# Patient Record
Sex: MALE | Race: WHITE | NOT HISPANIC OR LATINO | Employment: OTHER | URBAN - NONMETROPOLITAN AREA
[De-identification: names, ages, dates, MRNs, and addresses within clinical notes are randomized per-mention and may not be internally consistent; named-entity substitution may affect disease eponyms.]

---

## 2021-04-05 ENCOUNTER — APPOINTMENT (EMERGENCY)
Dept: CT IMAGING | Facility: HOSPITAL | Age: 66
End: 2021-04-05
Payer: MEDICARE

## 2021-04-05 ENCOUNTER — APPOINTMENT (INPATIENT)
Dept: RADIOLOGY | Facility: HOSPITAL | Age: 66
DRG: 181 | End: 2021-04-05
Payer: MEDICARE

## 2021-04-05 ENCOUNTER — HOSPITAL ENCOUNTER (INPATIENT)
Facility: HOSPITAL | Age: 66
LOS: 2 days | Discharge: HOME | DRG: 181 | End: 2021-04-07
Attending: INTERNAL MEDICINE | Admitting: INTERNAL MEDICINE
Payer: MEDICARE

## 2021-04-05 ENCOUNTER — HOSPITAL ENCOUNTER (EMERGENCY)
Facility: HOSPITAL | Age: 66
End: 2021-04-05
Attending: EMERGENCY MEDICINE | Admitting: EMERGENCY MEDICINE
Payer: MEDICARE

## 2021-04-05 ENCOUNTER — APPOINTMENT (EMERGENCY)
Dept: RADIOLOGY | Facility: HOSPITAL | Age: 66
End: 2021-04-05
Payer: MEDICARE

## 2021-04-05 VITALS
RESPIRATION RATE: 18 BRPM | TEMPERATURE: 98.5 F | BODY MASS INDEX: 20.65 KG/M2 | WEIGHT: 147.49 LBS | HEART RATE: 116 BPM | SYSTOLIC BLOOD PRESSURE: 152 MMHG | HEIGHT: 71 IN | OXYGEN SATURATION: 94 % | DIASTOLIC BLOOD PRESSURE: 77 MMHG

## 2021-04-05 DIAGNOSIS — C79.51 MALIGNANT NEOPLASM METASTATIC TO VERTEBRAL COLUMN WITH UNKNOWN PRIMARY SITE (HCC): ICD-10-CM

## 2021-04-05 DIAGNOSIS — R59.0 MEDIASTINAL LYMPHADENOPATHY: ICD-10-CM

## 2021-04-05 DIAGNOSIS — D64.9 ANEMIA: ICD-10-CM

## 2021-04-05 DIAGNOSIS — R06.00 DYSPNEA: Primary | ICD-10-CM

## 2021-04-05 DIAGNOSIS — C80.1 MALIGNANT NEOPLASM METASTATIC TO RIB WITH UNKNOWN PRIMARY SITE (HCC): ICD-10-CM

## 2021-04-05 DIAGNOSIS — R59.0 LYMPHADENOPATHY, MEDIASTINAL: ICD-10-CM

## 2021-04-05 DIAGNOSIS — R05.9 COUGH: ICD-10-CM

## 2021-04-05 DIAGNOSIS — R59.0 HILAR LYMPHADENOPATHY: ICD-10-CM

## 2021-04-05 DIAGNOSIS — C80.1 MALIGNANT NEOPLASM METASTATIC TO VERTEBRAL COLUMN WITH UNKNOWN PRIMARY SITE (HCC): ICD-10-CM

## 2021-04-05 DIAGNOSIS — C79.51 MALIGNANT NEOPLASM METASTATIC TO RIB WITH UNKNOWN PRIMARY SITE (HCC): ICD-10-CM

## 2021-04-05 DIAGNOSIS — J90 PLEURAL EFFUSION ON LEFT: ICD-10-CM

## 2021-04-05 DIAGNOSIS — I50.9 CHF (CONGESTIVE HEART FAILURE) (HCC): ICD-10-CM

## 2021-04-05 DIAGNOSIS — J90 PLEURAL EFFUSION: ICD-10-CM

## 2021-04-05 DIAGNOSIS — R91.8 LUNG MASS: Primary | ICD-10-CM

## 2021-04-05 PROBLEM — D72.829 LEUCOCYTOSIS: Status: ACTIVE | Noted: 2021-04-05

## 2021-04-05 PROBLEM — R06.02 SHORTNESS OF BREATH: Status: ACTIVE | Noted: 2021-04-05

## 2021-04-05 LAB
ALBUMIN SERPL BCP-MCNC: 2.3 G/DL (ref 3.5–5)
ALP SERPL-CCNC: 104 U/L (ref 46–116)
ALT SERPL W P-5'-P-CCNC: 73 U/L (ref 12–78)
ANION GAP SERPL CALCULATED.3IONS-SCNC: 9 MMOL/L (ref 4–13)
APTT PPP: 39 SECONDS (ref 23–37)
AST SERPL W P-5'-P-CCNC: 30 U/L (ref 5–45)
BASOPHILS # BLD AUTO: 0.09 THOUSANDS/ΜL (ref 0–0.1)
BASOPHILS NFR BLD AUTO: 1 % (ref 0–1)
BILIRUB SERPL-MCNC: 0.44 MG/DL (ref 0.2–1)
BUN SERPL-MCNC: 11 MG/DL (ref 5–25)
CALCIUM ALBUM COR SERPL-MCNC: 10.6 MG/DL (ref 8.3–10.1)
CALCIUM SERPL-MCNC: 9.2 MG/DL (ref 8.3–10.1)
CHLORIDE SERPL-SCNC: 102 MMOL/L (ref 100–108)
CO2 SERPL-SCNC: 25 MMOL/L (ref 21–32)
CREAT SERPL-MCNC: 0.73 MG/DL (ref 0.6–1.3)
D DIMER PPP FEU-MCNC: 4.25 UG/ML FEU
EOSINOPHIL # BLD AUTO: 0.03 THOUSAND/ΜL (ref 0–0.61)
EOSINOPHIL NFR BLD AUTO: 0 % (ref 0–6)
ERYTHROCYTE [DISTWIDTH] IN BLOOD BY AUTOMATED COUNT: 13.7 % (ref 11.6–15.1)
FLUAV RNA RESP QL NAA+PROBE: NEGATIVE
FLUBV RNA RESP QL NAA+PROBE: NEGATIVE
GFR SERPL CREATININE-BSD FRML MDRD: 97 ML/MIN/1.73SQ M
GLUCOSE SERPL-MCNC: 120 MG/DL (ref 65–140)
HCT VFR BLD AUTO: 37.4 % (ref 36.5–49.3)
HGB BLD-MCNC: 11.4 G/DL (ref 12–17)
IMM GRANULOCYTES # BLD AUTO: 0.05 THOUSAND/UL (ref 0–0.2)
IMM GRANULOCYTES NFR BLD AUTO: 0 % (ref 0–2)
INR PPP: 1.21 (ref 0.84–1.19)
LACTATE SERPL-SCNC: 1 MMOL/L (ref 0.5–2)
LYMPHOCYTES # BLD AUTO: 0.52 THOUSANDS/ΜL (ref 0.6–4.47)
LYMPHOCYTES NFR BLD AUTO: 4 % (ref 14–44)
MAGNESIUM SERPL-MCNC: 2 MG/DL (ref 1.6–2.6)
MCH RBC QN AUTO: 24.8 PG (ref 26.8–34.3)
MCHC RBC AUTO-ENTMCNC: 30.5 G/DL (ref 31.4–37.4)
MCV RBC AUTO: 81 FL (ref 82–98)
MONOCYTES # BLD AUTO: 1.61 THOUSAND/ΜL (ref 0.17–1.22)
MONOCYTES NFR BLD AUTO: 13 % (ref 4–12)
NEUTROPHILS # BLD AUTO: 9.94 THOUSANDS/ΜL (ref 1.85–7.62)
NEUTS SEG NFR BLD AUTO: 82 % (ref 43–75)
NRBC BLD AUTO-RTO: 0 /100 WBCS
NT-PROBNP SERPL-MCNC: 1121 PG/ML
PLATELET # BLD AUTO: 841 THOUSANDS/UL (ref 149–390)
PMV BLD AUTO: 8.8 FL (ref 8.9–12.7)
POTASSIUM SERPL-SCNC: 3.8 MMOL/L (ref 3.5–5.3)
PROT SERPL-MCNC: 8 G/DL (ref 6.4–8.2)
PROTHROMBIN TIME: 15.1 SECONDS (ref 11.6–14.5)
RBC # BLD AUTO: 4.6 MILLION/UL (ref 3.88–5.62)
RSV RNA RESP QL NAA+PROBE: NEGATIVE
SARS-COV-2 RNA RESP QL NAA+PROBE: NEGATIVE
SODIUM SERPL-SCNC: 136 MMOL/L (ref 136–145)
TROPONIN I SERPL-MCNC: <0.02 NG/ML
WBC # BLD AUTO: 12.24 THOUSAND/UL (ref 4.31–10.16)

## 2021-04-05 PROCEDURE — 84484 ASSAY OF TROPONIN QUANT: CPT | Performed by: PHYSICIAN ASSISTANT

## 2021-04-05 PROCEDURE — 94760 N-INVAS EAR/PLS OXIMETRY 1: CPT

## 2021-04-05 PROCEDURE — 74177 CT ABD & PELVIS W/CONTRAST: CPT

## 2021-04-05 PROCEDURE — 85025 COMPLETE CBC W/AUTO DIFF WBC: CPT | Performed by: PHYSICIAN ASSISTANT

## 2021-04-05 PROCEDURE — 99285 EMERGENCY DEPT VISIT HI MDM: CPT | Performed by: PHYSICIAN ASSISTANT

## 2021-04-05 PROCEDURE — 99285 EMERGENCY DEPT VISIT HI MDM: CPT

## 2021-04-05 PROCEDURE — 87040 BLOOD CULTURE FOR BACTERIA: CPT | Performed by: PHYSICIAN ASSISTANT

## 2021-04-05 PROCEDURE — G1004 CDSM NDSC: HCPCS

## 2021-04-05 PROCEDURE — 71045 X-RAY EXAM CHEST 1 VIEW: CPT

## 2021-04-05 PROCEDURE — 96374 THER/PROPH/DIAG INJ IV PUSH: CPT

## 2021-04-05 PROCEDURE — 85610 PROTHROMBIN TIME: CPT | Performed by: PHYSICIAN ASSISTANT

## 2021-04-05 PROCEDURE — 85730 THROMBOPLASTIN TIME PARTIAL: CPT | Performed by: PHYSICIAN ASSISTANT

## 2021-04-05 PROCEDURE — 0241U HB NFCT DS VIR RESP RNA 4 TRGT: CPT | Performed by: PHYSICIAN ASSISTANT

## 2021-04-05 PROCEDURE — 80053 COMPREHEN METABOLIC PANEL: CPT | Performed by: PHYSICIAN ASSISTANT

## 2021-04-05 PROCEDURE — 71275 CT ANGIOGRAPHY CHEST: CPT

## 2021-04-05 PROCEDURE — 83880 ASSAY OF NATRIURETIC PEPTIDE: CPT | Performed by: PHYSICIAN ASSISTANT

## 2021-04-05 PROCEDURE — 93005 ELECTROCARDIOGRAM TRACING: CPT

## 2021-04-05 PROCEDURE — 83735 ASSAY OF MAGNESIUM: CPT | Performed by: PHYSICIAN ASSISTANT

## 2021-04-05 PROCEDURE — 85379 FIBRIN DEGRADATION QUANT: CPT | Performed by: PHYSICIAN ASSISTANT

## 2021-04-05 PROCEDURE — 83605 ASSAY OF LACTIC ACID: CPT | Performed by: PHYSICIAN ASSISTANT

## 2021-04-05 PROCEDURE — 36415 COLL VENOUS BLD VENIPUNCTURE: CPT | Performed by: PHYSICIAN ASSISTANT

## 2021-04-05 RX ORDER — ACETAMINOPHEN 325 MG/1
650 TABLET ORAL EVERY 6 HOURS PRN
Status: DISCONTINUED | OUTPATIENT
Start: 2021-04-05 | End: 2021-04-07 | Stop reason: HOSPADM

## 2021-04-05 RX ORDER — ALBUTEROL SULFATE 2.5 MG/3ML
2.5 SOLUTION RESPIRATORY (INHALATION) EVERY 4 HOURS PRN
Status: DISCONTINUED | OUTPATIENT
Start: 2021-04-05 | End: 2021-04-07 | Stop reason: HOSPADM

## 2021-04-05 RX ORDER — GUAIFENESIN 600 MG
600 TABLET, EXTENDED RELEASE 12 HR ORAL EVERY 12 HOURS SCHEDULED
Status: DISCONTINUED | OUTPATIENT
Start: 2021-04-05 | End: 2021-04-07 | Stop reason: HOSPADM

## 2021-04-05 RX ORDER — BENZONATATE 100 MG/1
100 CAPSULE ORAL 3 TIMES DAILY PRN
Status: DISCONTINUED | OUTPATIENT
Start: 2021-04-05 | End: 2021-04-07 | Stop reason: HOSPADM

## 2021-04-05 RX ORDER — OXYCODONE HYDROCHLORIDE 5 MG/1
2.5 TABLET ORAL EVERY 4 HOURS PRN
Status: DISCONTINUED | OUTPATIENT
Start: 2021-04-05 | End: 2021-04-07 | Stop reason: HOSPADM

## 2021-04-05 RX ORDER — FUROSEMIDE 10 MG/ML
40 INJECTION INTRAMUSCULAR; INTRAVENOUS ONCE
Status: COMPLETED | OUTPATIENT
Start: 2021-04-05 | End: 2021-04-05

## 2021-04-05 RX ORDER — OXYCODONE HYDROCHLORIDE 5 MG/1
5 TABLET ORAL EVERY 4 HOURS PRN
Status: DISCONTINUED | OUTPATIENT
Start: 2021-04-05 | End: 2021-04-07 | Stop reason: HOSPADM

## 2021-04-05 RX ORDER — HEPARIN SODIUM 5000 [USP'U]/ML
5000 INJECTION, SOLUTION INTRAVENOUS; SUBCUTANEOUS EVERY 8 HOURS SCHEDULED
Status: DISCONTINUED | OUTPATIENT
Start: 2021-04-05 | End: 2021-04-07

## 2021-04-05 RX ADMIN — ACETAMINOPHEN 650 MG: 325 TABLET, FILM COATED ORAL at 21:33

## 2021-04-05 RX ADMIN — HEPARIN SODIUM 5000 UNITS: 5000 INJECTION INTRAVENOUS; SUBCUTANEOUS at 21:33

## 2021-04-05 RX ADMIN — FUROSEMIDE 40 MG: 10 INJECTION, SOLUTION INTRAVENOUS at 11:29

## 2021-04-05 RX ADMIN — IODIXANOL 70 ML: 320 INJECTION, SOLUTION INTRAVASCULAR at 19:43

## 2021-04-05 RX ADMIN — GUAIFENESIN 600 MG: 600 TABLET, EXTENDED RELEASE ORAL at 21:50

## 2021-04-05 RX ADMIN — IOHEXOL 85 ML: 350 INJECTION, SOLUTION INTRAVENOUS at 11:21

## 2021-04-05 NOTE — PROGRESS NOTES
INTERNAL MEDICINE RESIDENCY SENIOR ADMISSION NOTE     Name: Francis Barcenas   Age & Sex: 77 y o  male   MRN: [id number]  Unit/Bed#: [facility] 931-01   Encounter: [id number]  Primary Care Provider: No primary care provider on file  Admit to team: SOD Team B     Patient seen and examined  Reviewed H&P per Dr Rosa Gallo  Agree with the assessment and plan with any exception/addition as noted below:    Principal Problem:    Shortness of breath  Active Problems:    Anemia    Leucocytosis    Pleural effusion    Lung mass    Mediastinal lymphadenopathy      77years old patient with 30+ pack-year smoking history, no other known medical history due to poor outpatient follow-up presented to [address] [place] [address] with progressive shortness of breath and intermittent cough productive of clear sputum for 2 months  No CP, orthopnea, PND, lower extremity edema, hemoptysis or night sweats  Admits to almost 10 lb unintentional weight loss, no nausea, vomiting, change in apepetite, change in bowel habits, hematochezia/black stool  No family history of cancer  No prior colonoscopy, quit smoking 2 months ago  Exam patient in no acute distress, comfortable, tachycardic but regular, S1/S2, no murmur or gallop, decreased breath sounds L lower lung, b/l wheeze  Vitals tachycardia, otherwise WNL  EKG Sinus tachycardia  Labs significant for microscopic hypochromic anemia, leucocytosis and thrombocytosis  Mild hypercalcemia on BMP  Elevated d-dimer and elevated BNP to 1k-no baseline  CTA completed, negative for PE but did reveal mass effect on the upper lobe pulmonary arterial branches, mediastinal and hilar and left para-aortic LAD, left upper, lingula and right lower lobe nodular masses of metastasis, moderate left pleural effusion and extensive destructive lesion at left 9th rib and T9 vertebral body pathologic process     At this point suspected metastatic cancer, unclear primary   Will obtain CT abdomen and pelvis and IR thoracentesis with fluid analysis of pleural effusion  Will need tissue diagnosis, follow up on CT A/P for accessible site for biopsy, consider oncology consultation            Code Status: Level 1 - Full Code  Admission Status: INPATIENT   Disposition: Patient requires Med/Surg  Expected Length of Stay: > 2 midnights

## 2021-04-05 NOTE — ASSESSMENT & PLAN NOTE
Shortness of breath noted 2 months ago, worsening gradually, more when the patient active  EKG shows sinus tachy  He has been tachycardic 117-125, denies chest pain, sob lightheadedness, numbness tingling sensation of extremities this morning    CTA chest 04/05 remarkable for mediastinal and hilar lymphadenopathy as well as  Left upper, lingula and right lower lobe nodular masses of metastasis; moderate left pleural effusion and destructive lesion in T9 vertebral body and 9th rib the left  CTAP; Indeterminate mass lesions in the liver and at the left kidney suspicious for metastatic disease  Extensive retroperitoneal adenopathy including a large left para-aortic lymph node conglomerate measuring up to 9 3 cm     Most likely metastatic lung cancer  Given his significant smoking history, and his clinical presentation- this is likely a lung primary, but biopsy will need to be obtained to confirm (results pending)  S/P IR therapeutic thoracentesis;with no bacteria seen on Gram stain preliminary results  Patient is afebrile  S/P IR ultrasound-guided liver biopsy tissue result pending  Oncology consulted recs the patient follow up    Awaiting biopsy results for biomarker testing

## 2021-04-05 NOTE — ASSESSMENT & PLAN NOTE
White blood cells 12 on 04/05 ; afebrile, most likely reactive in context of malignancy versus infection  Patient with no fever,cough,dysuria    No symptoms suggestive of infection

## 2021-04-05 NOTE — PLAN OF CARE
Problem: PAIN - ADULT  Goal: Verbalizes/displays adequate comfort level or baseline comfort level  Description: Interventions:  - Encourage patient to monitor pain and request assistance  - Assess pain using appropriate pain scale  - Administer analgesics based on type and severity of pain and evaluate response  - Implement non-pharmacological measures as appropriate and evaluate response  - Consider cultural and social influences on pain and pain management  - Notify physician/advanced practitioner if interventions unsuccessful or patient reports new pain  Outcome: Progressing     Problem: INFECTION - ADULT  Goal: Absence or prevention of progression during hospitalization  Description: INTERVENTIONS:  - Assess and monitor for signs and symptoms of infection  - Monitor lab/diagnostic results  - Monitor all insertion sites, i e  indwelling lines, tubes, and drains  - Monitor endotracheal if appropriate and nasal secretions for changes in amount and color  - Adams appropriate cooling/warming therapies per order  - Administer medications as ordered  - Instruct and encourage patient and family to use good hand hygiene technique  - Identify and instruct in appropriate isolation precautions for identified infection/condition  Outcome: Progressing     Problem: SAFETY ADULT  Goal: Patient will remain free of falls  Description: INTERVENTIONS:  - Assess patient frequently for physical needs  -  Identify cognitive and physical deficits and behaviors that affect risk of falls    -  Adams fall precautions as indicated by assessment   - Educate patient/family on patient safety including physical limitations  - Instruct patient to call for assistance with activity based on assessment  - Modify environment to reduce risk of injury  - Consider OT/PT consult to assist with strengthening/mobility  Outcome: Progressing  Goal: Maintain or return to baseline ADL function  Description: INTERVENTIONS:  -  Assess patient's ability to carry out ADLs; assess patient's baseline for ADL function and identify physical deficits which impact ability to perform ADLs (bathing, care of mouth/teeth, toileting, grooming, dressing, etc )  - Assess/evaluate cause of self-care deficits   - Assess range of motion  - Assess patient's mobility; develop plan if impaired  - Assess patient's need for assistive devices and provide as appropriate  - Encourage maximum independence but intervene and supervise when necessary  - Involve family in performance of ADLs  - Assess for home care needs following discharge   - Consider OT consult to assist with ADL evaluation and planning for discharge  - Provide patient education as appropriate  Outcome: Progressing  Goal: Maintain or return mobility status to optimal level  Description: INTERVENTIONS:  - Assess patient's baseline mobility status (ambulation, transfers, stairs, etc )    - Identify cognitive and physical deficits and behaviors that affect mobility  - Identify mobility aids required to assist with transfers and/or ambulation (gait belt, sit-to-stand, lift, walker, cane, etc )  - Perry fall precautions as indicated by assessment  - Record patient progress and toleration of activity level on Mobility SBAR; progress patient to next Phase/Stage  - Instruct patient to call for assistance with activity based on assessment  - Consider rehabilitation consult to assist with strengthening/weightbearing, etc   Outcome: Progressing     Problem: DISCHARGE PLANNING  Goal: Discharge to home or other facility with appropriate resources  Description: INTERVENTIONS:  - Identify barriers to discharge w/patient and caregiver  - Arrange for needed discharge resources and transportation as appropriate  - Identify discharge learning needs (meds, wound care, etc )  - Arrange for interpretive services to assist at discharge as needed  - Refer to Case Management Department for coordinating discharge planning if the patient needs post-hospital services based on physician/advanced practitioner order or complex needs related to functional status, cognitive ability, or social support system  Outcome: Progressing     Problem: Knowledge Deficit  Goal: Patient/family/caregiver demonstrates understanding of disease process, treatment plan, medications, and discharge instructions  Description: Complete learning assessment and assess knowledge base    Interventions:  - Provide teaching at level of understanding  - Provide teaching via preferred learning methods  Outcome: Progressing

## 2021-04-05 NOTE — H&P
INTERNAL MEDICINE RESIDENCY ADMISSION H&P     Name: Librado Oliver   Age & Sex: 77 y o  male   MRN: [id number]  Unit/Bed#: [facility] 931-01   Encounter: [id number]  Primary Care Provider: No primary care provider on file  Code Status: Level 1 - Full Code  Admission Status: INPATIENT   Disposition: Patient requires Med/Surg    Admit to team: SOD Team B     ASSESSMENT/PLAN     Principal Problem:    Shortness of breath  Active Problems:    Anemia    Leucocytosis    Pleural effusion    Lung mass    Mediastinal lymphadenopathy      Mediastinal lymphadenopathy  Assessment & Plan  See shortness of breath assessment and plan    Lung mass  Assessment & Plan  See shortness of breath assessment and plan    Pleural effusion  Assessment & Plan  See shortness of breath assessment and plan     Leucocytosis  Assessment & Plan  White blood cells 12 on 04/05 ; afebrile, most likely reactive in context of malignancy versus infection    Plan  Monitor CBC  Monitor vitals  Thoracentesis ordered with fluid culture and cytology    Anemia  Assessment & Plan  No past medical history; April 5, 2021 hemoglobin 11 4 and MCV 81    Plan  Iron panel  Retic count        * Shortness of breath  Assessment & Plan  Shortness of breath noted 2 months ago, worsening gradually, more when the patient active    CTA chest 04/05 remarkable for mediastinal and hilar lymphadenopathy as well as  Left upper, lingula and right lower lobe nodular masses of metastasis; moderate left pleural effusion and destructive lesion in T9 vertebral body and 9th rib the left  Most likely metastatic lung cancer unknown primary source    Plan  IR consult for thoracentesis; LDH serum and fluid, protein serum fluid, culture and cytology  CT abdomen pelvis with contrast  Will consider IR consult and Oncology consult        VTE Pharmacologic Prophylaxis: Heparin  VTE Mechanical Prophylaxis: sequential compression device    CHIEF COMPLAINT   No chief complaint on file  HISTORY OF PRESENT ILLNESS   Patient is a 77years old male with no significant past medical history, does not follow up with primary care physician, presented initially to [facility] today 4/5 morning with worsening shortness of breath and intermittent cough  Per patient shortness of breath started about 2 months ago started to got worse gradually and experiences a more when active along with intermittent cough with sometimes clear sputum, for which she decided to seek medical care  Patient also notes left chest tenderness level of T9-T10 and say sometimes experience pain/tenderness on the right as well  The possible of benign versus malignancy differential explained to the patient and the need for further workup including thoracentesis and biopsy explained to him and he verbalized agreement noting that he wants everything to be done as indicated  Patient notes having living will document in place and will work on getting it to the hospital and till then he agreed to full code status  Family history positive for heart disease, patient's father  of heart attack at age 77 and his brother had 2 heart attacks  Social history remarkable for smoking 1 pack a day for 35 years, quit recently  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  Medication wise patient notes taken over-the-counter ibuprofen every 6-8 hours in the past 3-4 weeks for the pain also trying over-the-counter Mucinex for the cough  REVIEW OF SYSTEMS     Review of Systems   - GENERAL:  Positive for fatigue and weight loss; noting lost about 10 lb in a month, denies fever  - HEENT: Negative for any head/Neck trauma, pain, double/blurry vision, sinusitis, rhinitis, nose bleeding   - CARDIAC: Negative for any chest pain, palpitation, Dyspnea on exertion, peripheral edema    - PULMONARY:  Positive for shortness of breath and dyspnea on exertion, positive for intermittent productive cough with clear sputum , denies wheezing  - GASTROINTESTINAL: Negative for any abdominal pain, N/V/D/C, blood in stool    - GENITOURINARY: Negative for any dysuria, hematuria, incontinence   - NEUROLOGIC: Negative for any muscle weakness, numbness/tingling, memory changes  - MUSCULOSKELETAL: Negative for any joint pains/swelling, limited ROM  - INTEGUMENTARY: Negative for any rashes, cuts/ lesions   - HEMATOLOGIC: Negative for any abnormal bruising, frequent infections or bleeding  OBJECTIVE     Vitals:    21 1742   BP: 149/73   BP Location: Right arm   Pulse: (!) 125   Resp: 20   Temp: 98 3 °F (36 8 °C)   TempSrc: Oral   SpO2: 93%   Weight: 62 9 kg (138 lb 10 7 oz)   Height: 5' 11" (1 803 m)      Temperature:   Temp (24hrs), Av 4 °F (36 9 °C), Min:98 3 °F (36 8 °C), Max:98 5 °F (36 9 °C)    Temperature: 98 3 °F (36 8 °C)  Intake & Output:  I/O     None        Weights:   IBW: 75 3 kg    Body mass index is 19 34 kg/m²  Weight (last 2 days)     Date/Time   Weight    21 17:42:49   62 9 (138 67)            Physical Exam   - GEN: Appears well, alert and oriented x 3, pleasant and cooperative, in no acute distress  - HEENT: Anicteric, mucous membranes moist, PERRL and EOMI   - NECK: No lymphadenopathy, JVD or carotid bruits   - HEART: RRR, normal S1 and S2, no murmurs, clicks, gallops or rubs   - LUNGS: Clear to auscultation bilaterally; no wheezes, rales, or rhonchi  - ABDOMEN: Normal bowel sounds, soft, no tenderness, no distention, no organomegaly or masses felt on exam    - EXTREMITIES: Peripheral pulses normal; no clubbing, cyanosis, or edema  - NEURO: No focal findings, CN II-XII are grossly intact  - Musculoskeletal:  Left posterior chest tenderness to palpation level ribs 9 and 10 ; 5/5 strength, normal ROM, no swollen or erythematous joints     - SKIN: Normal without suspicious lesions on exposed skin  PAST MEDICAL HISTORY   No past medical history on file  PAST SURGICAL HISTORY     Past Surgical History:   Procedure Laterality Date    TENDON REPAIR Right     forearm     SOCIAL & FAMILY HISTORY     Social History     Substance and Sexual Activity   Alcohol Use Never    Frequency: Never     Social History     Substance and Sexual Activity   Drug Use Never     Social History     Tobacco Use   Smoking Status Former Smoker   Smokeless Tobacco Never Used     No family history on file  LABORATORY DATA     Labs: I have personally reviewed pertinent reports  Results from last 7 days   Lab Units 04/05/21  1013   WBC Thousand/uL 12 24*   HEMOGLOBIN g/dL 11 4*   HEMATOCRIT % 37 4   PLATELETS Thousands/uL 841*   NEUTROS PCT % 82*   MONOS PCT % 13*      Results from last 7 days   Lab Units 04/05/21  1013   POTASSIUM mmol/L 3 8   CHLORIDE mmol/L 102   CO2 mmol/L 25   BUN mg/dL 11   CREATININE mg/dL 0 73   CALCIUM mg/dL 9 2   ALK PHOS U/L 104   ALT U/L 73   AST U/L 30     Results from last 7 days   Lab Units 04/05/21  1013   MAGNESIUM mg/dL 2 0          Results from last 7 days   Lab Units 04/05/21  1013   INR  1 21*   PTT seconds 39*     Results from last 7 days   Lab Units 04/05/21  1013   LACTIC ACID mmol/L 1 0     Results from last 7 days   Lab Units 04/05/21  1013   TROPONIN I ng/mL <0 02     Micro:  Lab Results   Component Value Date    BLOODCX Received in Microbiology Lab  Culture in Progress  04/05/2021    BLOODCX Received in Microbiology Lab  Culture in Progress  04/05/2021     IMAGING & DIAGNOSTIC TESTS     Imaging: I have personally reviewed pertinent reports  Xr Chest 1 View Portable    Result Date: 4/5/2021  Impression: Pattern suggests probable CHF with some interstitial edema and minimal left effusion  There is elevation of left hemidiaphragm  Workstation performed: [id number]     Cta Ed Chest Pe Study    Result Date: 4/5/2021  Impression: 1  No pulmonary embolus  Mass effect on the upper lobe pulmonary arterial branches   Measured RV/LV ratio is within normal limits at less than 0 9   2   Pathologic mediastinal and hilar lymphadenopathy of lymphomatous process  3   Left upper, lingula and right lower lobe nodular masses of metastasis  4   Left para-aortic pathologic lymphadenopathy of lymphomatous process  CT abdomen pelvis for complete evaluation is suggested  5   Expansile destructive lesion involving the left 9th rib laterally and T9 vertebral body pathologic process of metastasis  6   Moderate left pleural effusion  Enhancing left pleura with nodular masses largest inferiorly of metastasis  I personally discussed this study with Brian Park on 4/5/2021 at 12:19 PM  Workstation performed: [id number]     EKG, Pathology, and Other Studies: I have personally reviewed pertinent reports  ALLERGIES   No Known Allergies  MEDICATIONS PRIOR TO ARRIVAL     Prior to Admission medications    Not on File     MEDICATIONS ADMINISTERED IN LAST 24 HOURS     CURRENT MEDICATIONS     Current Facility-Administered Medications   Medication Dose Route Frequency Provider Last Rate    acetaminophen  650 mg Oral Q6H PRN Husam Aiad, DO      heparin (porcine)  5,000 Units Subcutaneous [facility] Husam Aiad, DO      oxyCODONE  2 5 mg Oral Q4H PRN Husam Aiad, DO      Or    oxyCODONE  5 mg Oral Q4H PRN Husam Aiad, DO          acetaminophen, 650 mg, Q6H PRN  oxyCODONE, 2 5 mg, Q4H PRN    Or  oxyCODONE, 5 mg, Q4H PRN        Admission Time  I spent 30 minutes admitting the patient  This involved direct patient contact where I performed a full history and physical, reviewing previous records, and reviewing laboratory and other diagnostic studies  Portions of the record may have been created with voice recognition software  Occasional wrong word or "sound a like" substitutions may have occurred due to the inherent limitations of voice recognition software    Read the chart carefully and recognize, using context, where substitutions have occurred     ==  [employer], [address] [facility]  Internal Medicine Residency PGY-1

## 2021-04-05 NOTE — EMTALA/ACUTE CARE TRANSFER
[facility] EMERGENCY DEPARTMENT  [address] [facility] [address]  Dept: [phone]      EMTALA TRANSFER CONSENT    NAME Cachorro Garcia                                         1955                              MRN [id number]    I have been informed of my rights regarding examination, treatment, and transfer   by Dr Kevin Greenwood MD    Benefits: Specialized equipment and/or services available at the receiving facility (Include comment)________________________    Risks: Potential for delay in receiving treatment, Potential deterioration of medical condition, Loss of IV, Increased discomfort during transfer, Possible worsening of condition or death during transfer      Consent for Transfer:  I acknowledge that my medical condition has been evaluated and explained to me by the emergency department physician or other qualified medical person and/or my attending physician, who has recommended that I be transferred to the service of  Accepting Physician: Dr Darian Lopez at [address] Name, [address] : [facility]  The above potential benefits of such transfer, the potential risks associated with such transfer, and the probable risks of not being transferred have been explained to me, and I fully understand them  The doctor has explained that, in my case, the benefits of transfer outweigh the risks  I agree to be transferred  I authorize the performance of emergency medical procedures and treatments upon me in both transit and upon arrival at the receiving facility  Additionally, I authorize the release of any and all medical records to the receiving facility and request they be transported with me, if possible  I understand that the safest mode of transportation during a medical emergency is an ambulance and that the Hospital advocates the use of this mode of transport   Risks of traveling to the receiving facility by car, including absence of medical control, life sustaining equipment, such as oxygen, and medical personnel has been explained to me and I fully understand them  (NEEL CORRECT BOX BELOW)  [ x ]  I consent to the stated transfer and to be transported by ambulance/helicopter  [  ]  I consent to the stated transfer, but refuse transportation by ambulance and accept full responsibility for my transportation by car  I understand the risks of non-ambulance transfers and I exonerate the Hospital and its staff from any deterioration in my condition that results from this refusal     X___________________________________________    DATE  21  TIME________  Signature of patient or legally responsible individual signing on patient behalf           RELATIONSHIP TO PATIENT_________________________          Provider Certification    NAME Cachorro Garcia                                         1955                              MRN [id number]    A medical screening exam was performed on the above named patient  Based on the examination:    Condition Necessitating Transfer The primary encounter diagnosis was Dyspnea  Diagnoses of CHF (congestive heart failure) ([facility] [address] ), Lymphadenopathy, mediastinal, Hilar lymphadenopathy, 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 [facility]), 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 [facility]), and Pleural effusion on left were also pertinent to this visit      Patient Condition: The patient has been stabilized such that within reasonable medical probability, no material deterioration of the patient condition or the condition of the unborn child(debby) is likely to result from the transfer    Reason for Transfer: Level of Care needed not available at this facility    Transfer Requirements: Chintan Tapia   · Space available and qualified personnel available for treatment as acknowledged by PACS  · Agreed to accept transfer and to provide appropriate medical treatment as acknowledged by       Dr Endy Doyle  · Appropriate medical records of the examination and treatment of the patient are provided at the time of transfer   [address] [facility], [address] _______  · Transfer will be performed by qualified personnel from    and appropriate transfer equipment as required, including the use of necessary and appropriate life support measures  Provider Certification: I have examined the patient and explained the following risks and benefits of being transferred/refusing transfer to the patient/family:  General risk, such as traffic hazards, adverse weather conditions, rough terrain or turbulence, possible failure of equipment (including vehicle or aircraft), or consequences of actions of persons outside the control of the transport personnel, Unanticipated needs of medical equipment and personnel during transport, Risk of worsening condition, The possibility of a transport vehicle being unavailable      Based on these reasonable risks and benefits to the patient and/or the unborn child(debby), and based upon the information available at the time of the patients examination, I certify that the medical benefits reasonably to be expected from the provision of appropriate medical treatments at another medical facility outweigh the increasing risks, if any, to the individuals medical condition, and in the case of labor to the unborn child, from effecting the transfer      X____________________________________________ DATE 04/05/21        TIME_______      ORIGINAL - SEND TO MEDICAL RECORDS   COPY - SEND WITH PATIENT DURING TRANSFER

## 2021-04-05 NOTE — ASSESSMENT & PLAN NOTE
No past medical history; April 5, 2021 hemoglobin 11 4 and MCV 81  Iron panel/ferritin suggestive of mixed picture of iron deficiency anemia and anemia of chronic disease    Patient currently asymptomatic, HB 11 2  Plan  Will consider ferrous sulfate every other day

## 2021-04-05 NOTE — ED PROVIDER NOTES
History  Chief Complaint   Patient presents with    Shortness of Breath     Constant shortness of breath for 2 months, gets better when laying down, denies chest pain     77year old male with no reported significant PMH presents for evaluation of shortness of breath  Pt notes it started a few months ago after a snow storm in which he was shoveling  He developed a cough with this  He reports cough has been productive with clear phlegm  Denies chest pain but does not he gets pain along both his shoulder blades, typically relieved with ibuprofen throughout the day but not always  Denies fever, chills, congestion  Denies N/V/D, abdominal pain  He still has the cough and continues with shortness of breath intermittently  Shortness of breath worse with activity and improved when laying down  Although overall symptoms getting worse  No prior evaluation of symptoms  Does not have current physician  Has been taking OTC mucinex without relief  He is a former smoker and states he quit about 2 months ago  Denies sick contacts  Denies recent travel  No known exposure to covid 19  He reports a family history significant for cardiac disease  History provided by:  Patient   used: No    Shortness of Breath  Timing:  Intermittent  Progression:  Unchanged  Chronicity:  New  Relieved by:  Lying down  Worsened by:  Exertion and coughing  Ineffective treatments:  Rest  Associated symptoms: cough and sputum production    Associated symptoms: no abdominal pain, no chest pain, no fever, no headaches, no hemoptysis, no neck pain, no PND, no rash, no sore throat, no syncope, no vomiting and no wheezing    Risk factors: tobacco use    Risk factors: no hx of cancer, no hx of PE/DVT and no recent surgery        None       History reviewed  No pertinent past medical history  Past Surgical History:   Procedure Laterality Date    TENDON REPAIR Right     forearm       History reviewed   No pertinent family history  I have reviewed and agree with the history as documented  E-Cigarette/Vaping     E-Cigarette/Vaping Substances     Social History     Tobacco Use    Smoking status: Former Smoker    Smokeless tobacco: Never Used   Substance Use Topics    Alcohol use: Never     Frequency: Never    Drug use: Never       Review of Systems   Constitutional: Positive for unexpected weight change (lost about 10 lbs over the past month)  Negative for chills, fatigue and fever  HENT: Negative  Negative for congestion, rhinorrhea and sore throat  Eyes: Negative  Negative for visual disturbance  Respiratory: Positive for cough, sputum production and shortness of breath  Negative for hemoptysis and wheezing  Cardiovascular: Negative  Negative for chest pain, palpitations, leg swelling, syncope and PND  Gastrointestinal: Negative  Negative for abdominal pain, constipation, diarrhea, nausea and vomiting  Genitourinary: Negative  Negative for dysuria, flank pain, frequency and hematuria  Musculoskeletal: Positive for back pain  Negative for neck pain  Skin: Negative  Negative for rash  Neurological: Negative  Negative for dizziness, light-headedness, numbness and headaches  Psychiatric/Behavioral: Negative  Negative for confusion  All other systems reviewed and are negative  Physical Exam  Physical Exam  Vitals signs and nursing note reviewed  Constitutional:       General: He is not in acute distress  Appearance: Normal appearance  He is well-developed  He is not toxic-appearing or diaphoretic  HENT:      Head: Normocephalic and atraumatic  Right Ear: Hearing and external ear normal       Left Ear: Hearing and external ear normal       Nose: Nose normal       Mouth/Throat:      Pharynx: Oropharynx is clear  Uvula midline  No oropharyngeal exudate  Eyes:      General: No scleral icterus       Conjunctiva/sclera: Conjunctivae normal       Pupils: Pupils are equal, round, and reactive to light  Neck:      Musculoskeletal: Normal range of motion and neck supple  Trachea: Trachea and phonation normal  No tracheal deviation  Cardiovascular:      Rate and Rhythm: Regular rhythm  Tachycardia present  Pulses: Normal pulses  Heart sounds: Normal heart sounds, S1 normal and S2 normal  No murmur  Pulmonary:      Effort: Pulmonary effort is normal  Tachypnea present  No accessory muscle usage or respiratory distress  Breath sounds: Normal breath sounds  No wheezing, rhonchi or rales  Abdominal:      General: Bowel sounds are normal  There is no distension  Palpations: Abdomen is soft  Tenderness: There is no abdominal tenderness  There is no guarding or rebound  Musculoskeletal: Normal range of motion  General: No tenderness  Right lower leg: He exhibits no tenderness  No edema  Left lower leg: He exhibits no tenderness  No edema  Skin:     General: Skin is warm and dry  Capillary Refill: Capillary refill takes less than 2 seconds  Coloration: Skin is not cyanotic  Findings: No rash  Neurological:      General: No focal deficit present  Mental Status: He is alert and oriented to person, place, and time  GCS: GCS eye subscore is 4  GCS verbal subscore is 5  GCS motor subscore is 6  Cranial Nerves: No cranial nerve deficit  Sensory: No sensory deficit  Motor: No abnormal muscle tone        Gait: Gait normal    Psychiatric:         Mood and Affect: Mood normal          Speech: Speech normal          Behavior: Behavior normal          Vital Signs  ED Triage Vitals [04/05/21 0954]   Temperature Pulse Respirations Blood Pressure SpO2   98 5 °F (36 9 °C) (!) 116 16 (!) 175/82 96 %      Temp src Heart Rate Source Patient Position - Orthostatic VS BP Location FiO2 (%)   -- Monitor Lying Left arm --      Pain Score       --           Vitals:    04/05/21 1300 04/05/21 1330 04/05/21 1430 04/05/21 1500 BP: 152/87 139/81 146/82 152/77   Pulse: (!) 128 (!) 122 (!) 123 (!) 116   Patient Position - Orthostatic VS: Lying Sitting Sitting Sitting         Visual Acuity      ED Medications  Medications   furosemide (LASIX) injection 40 mg (40 mg Intravenous Given 4/5/21 1129)   iohexol (OMNIPAQUE) 350 MG/ML injection (SINGLE-DOSE) 85 mL (85 mL Intravenous Given 4/5/21 1121)       Diagnostic Studies  Results Reviewed     Procedure Component Value Units Date/Time    Blood culture #1 [[id number]] Collected: 04/05/21 1013    Lab Status: Preliminary result Specimen: Blood from Arm, Right Updated: 04/05/21 1701     Blood Culture Received in Microbiology Lab  Culture in Progress  Blood culture #2 [[id number]] Collected: 04/05/21 1013    Lab Status: Preliminary result Specimen: Blood from Arm, Right Updated: 04/05/21 1701     Blood Culture Received in Microbiology Lab  Culture in Progress  COVID19, Influenza A/B, RSV PCR, [facility] [[id number]]  (Normal) Collected: 04/05/21 1013    Lab Status: Final result Specimen: Nasopharyngeal Swab Updated: 04/05/21 1116     SARS-CoV-2 Negative     INFLUENZA A PCR Negative     INFLUENZA B PCR Negative     RSV PCR Negative    Narrative: This test has been authorized by FDA under an EUA (Emergency Use Assay) for use by authorized laboratories  Clinical caution and judgement should be used with the interpretation of these results with consideration of the clinical impression and other laboratory testing  Testing reported as "Positive" or "Negative" has been proven to be accurate according to standard laboratory validation requirements  All testing is performed with control materials showing appropriate reactivity at standard intervals      D-Dimer [[id number]]  (Abnormal) Collected: 04/05/21 1013    Lab Status: Final result Specimen: Blood from Arm, Right Updated: 04/05/21 1054     D-Dimer, Quant 4 25 ug/ml FEU     Troponin I [[id number]]  (Normal) Collected: 04/05/21 1013    Lab Status: Final result Specimen: Blood from Arm, Right Updated: 04/05/21 1048     Troponin I <0 02 ng/mL     NT-BNP PRO [[id number]]  (Abnormal) Collected: 04/05/21 1013    Lab Status: Final result Specimen: Blood from Arm, Right Updated: 04/05/21 1048     NT-proBNP 1,121 pg/mL     Magnesium [[id number]]  (Normal) Collected: 04/05/21 1013    Lab Status: Final result Specimen: Blood from Arm, Right Updated: 04/05/21 1048     Magnesium 2 0 mg/dL     Lactic acid [[id number]]  (Normal) Collected: 04/05/21 1013    Lab Status: Final result Specimen: Blood from Arm, Right Updated: 04/05/21 1044     LACTIC ACID 1 0 mmol/L     Narrative:      Result may be elevated if tourniquet was used during collection      Protime-INR [[id number]]  (Abnormal) Collected: 04/05/21 1013    Lab Status: Final result Specimen: Blood from Arm, Right Updated: 04/05/21 1042     Protime 15 1 seconds      INR 1 21    APTT [[id number]]  (Abnormal) Collected: 04/05/21 1013    Lab Status: Final result Specimen: Blood from Arm, Right Updated: 04/05/21 1042     PTT 39 seconds     Comprehensive metabolic panel [[id number]]  (Abnormal) Collected: 04/05/21 1013    Lab Status: Final result Specimen: Blood from Arm, Right Updated: 04/05/21 1041     Sodium 136 mmol/L      Potassium 3 8 mmol/L      Chloride 102 mmol/L      CO2 25 mmol/L      ANION GAP 9 mmol/L      BUN 11 mg/dL      Creatinine 0 73 mg/dL      Glucose 120 mg/dL      Calcium 9 2 mg/dL      Corrected Calcium 10 6 mg/dL      AST 30 U/L      ALT 73 U/L      Alkaline Phosphatase 104 U/L      Total Protein 8 0 g/dL      Albumin 2 3 g/dL      Total Bilirubin 0 44 mg/dL      eGFR 97 ml/min/1 73sq m     Narrative:      [place] guidelines for Chronic Kidney Disease (CKD):     Stage 1 with normal or high GFR (GFR > 90 mL/min/1 73 square meters)    Stage 2 Mild CKD (GFR = 60-89 mL/min/1 73 square meters)    Stage 3A Moderate CKD (GFR = 45-59 mL/min/1 73 square meters)    Stage 3B Moderate CKD (GFR = 30-44 mL/min/1 73 square meters)    Stage 4 Severe CKD (GFR = 15-29 mL/min/1 73 square meters)    Stage 5 End Stage CKD (GFR <15 mL/min/1 73 square meters)  Note: GFR calculation is accurate only with a steady state creatinine    CBC and differential [[id number]]  (Abnormal) Collected: 04/05/21 1013    Lab Status: Final result Specimen: Blood from Arm, Right Updated: 04/05/21 1029     WBC 12 24 Thousand/uL      RBC 4 60 Million/uL      Hemoglobin 11 4 g/dL      Hematocrit 37 4 %      MCV 81 fL      MCH 24 8 pg      MCHC 30 5 g/dL      RDW 13 7 %      MPV 8 8 fL      Platelets 985 Thousands/uL      nRBC 0 /100 WBCs      Neutrophils Relative 82 %      Immat GRANS % 0 %      Lymphocytes Relative 4 %      Monocytes Relative 13 %      Eosinophils Relative 0 %      Basophils Relative 1 %      Neutrophils Absolute 9 94 Thousands/µL      Immature Grans Absolute 0 05 Thousand/uL      Lymphocytes Absolute 0 52 Thousands/µL      Monocytes Absolute 1 61 Thousand/µL      Eosinophils Absolute 0 03 Thousand/µL      Basophils Absolute 0 09 Thousands/µL                  CTA ED chest PE Study   Final Result by Alea Hoyos MD (04/05 1223)      1  No pulmonary embolus  Mass effect on the upper lobe pulmonary arterial branches  Measured RV/LV ratio is within normal limits at less than 0 9         2   Pathologic mediastinal and hilar lymphadenopathy of lymphomatous process  3   Left upper, lingula and right lower lobe nodular masses of metastasis  4   Left para-aortic pathologic lymphadenopathy of lymphomatous process  CT abdomen pelvis for complete evaluation is suggested  5   Expansile destructive lesion involving the left 9th rib laterally and T9 vertebral body pathologic process of metastasis  6   Moderate left pleural effusion  Enhancing left pleura with nodular masses largest inferiorly of metastasis               I personally discussed this study with Sravani San on 4/5/2021 at 12:19 PM                Workstation performed: [id number]         XR chest 1 view portable   Final Result by David Gary MD (04/05 1032)      Pattern suggests probable CHF with some interstitial edema and minimal left effusion  There is elevation of left hemidiaphragm  Workstation performed: [id number]                    Procedures  ECG 12 Lead Documentation Only    Date/Time: 4/5/2021 10:00 AM  Performed by: Kike Sood PA-C  Authorized by: Kkie Sood PA-C     Indications / Diagnosis:  Dyspnea  ECG reviewed by me, the ED Provider: yes    Patient location:  ED  Previous ECG:     Previous ECG:  Unavailable    Comparison to cardiac monitor: Yes    Interpretation:     Interpretation: abnormal    Rate:     ECG rate:  115    ECG rate assessment: tachycardic    Rhythm:     Rhythm: sinus tachycardia    Ectopy:     Ectopy: none    QRS:     QRS axis:  Normal    QRS intervals:  Normal  Conduction:     Conduction: normal    ST segments:     ST segments:  Normal  T waves:     T waves: normal    Comments:      , QRS 76, QT/QTc 324/448; no acute ischemic changes  ED Course  ED Course as of Apr 05 2235 Mon Apr 05, 2021   1034 WBC(!): 12 24   1034 No prior for comparison  Hemoglobin(!): 11 4   1034 Platelet Count(!): 779   1036 IMPRESSION:     Pattern suggests probable CHF with some interstitial edema and minimal left effusion  There is elevation of left hemidiaphragm     XR chest 1 view portable   1042 Glucose, Random: 120   1042 Creatinine: 0 73   1042 BUN: 11   1042 Sodium: 136   1042 Potassium: 3 8   1042 Chloride: 102   1042 CO2: 25   1042 Anion Gap: 9   1042 CORRECTED CALCIUM(!): 10 6   1042 AST: 30   1042 ALT: 73   1042 Alkaline Phosphatase: 104   1042 Total Protein: 8 0   1042 Albumin(!): 2 3   1042 TOTAL BILIRUBIN: 0 44   1042 eGFR: 97   1043 INR(!): 1 21   1043 Protime(!): 15 1   1043 PTT(!): 39   1047 LACTIC ACID: 1 0   1049 Magnesium: 2 0   1049 Troponin I: <0 02   1049 NT-proBNP(!): 1,121   1103 Will obtain CTA chest to r/o PE   D-Dimer, Quant(!): 4 25   1106 Pt updated on results thus far  He is agreeable to recommended CT imaging  1117 SARS-COV-2: Negative   1117 INFLU A PCR: Negative   1117 INFLU B PCR: Negative   1117 RSV PCR: Negative   1220 Spoke to radiology regarding CTA findings  No PE       1239 IMPRESSION:     1  No pulmonary embolus  Mass effect on the upper lobe pulmonary arterial branches      Measured RV/LV ratio is within normal limits at less than 0 9        2   Pathologic mediastinal and hilar lymphadenopathy of lymphomatous process      3   Left upper, lingula and right lower lobe nodular masses of metastasis      4   Left para-aortic pathologic lymphadenopathy of lymphomatous process  CT abdomen pelvis for complete evaluation is suggested      5  Expansile destructive lesion involving the left 9th rib laterally and T9 vertebral body pathologic process of metastasis      6  Moderate left pleural effusion  Enhancing left pleura with nodular masses largest inferiorly of metastasis           I personally discussed this study with Christopher Christian on 4/5/2021 at 12:19 PM       CTA ED chest PE Study   1240 Pt updated on results of CT scan  Discussed report at length and reviewed body of report and final impressions  There is concern for malignancy of unknown primary  [facility] text sent to on call [address] Charis Orona to discuss admission  [address] Per Dr Charis Orona of [facility], recommends transfer for Pulm/Heme onc, possible rad onc, likely needs a bronch but definitely needs some type of biopsy  1404 Call from Sadie Swift at [facility]  Verifying if pt has PCP  Pt tells me he doesn't have a current physician  [phone] Pt and brother again updated and results again reviewed  He is agreeable to transfer  He would like any and all measures taken  He reports mild improvement with the lasix that was received but still notes continued dyspnea  O2 stable on room air  [address] to [place] resident and accepts for med-surg tele to service of Dr Clara Graves  0651 Pt in agreeance with admission/transfer plan  He is aware of need for further work up and to evaluate for cancer  I did discuss with pt that there doesn't appear to be a known primary and will need further work up to include consult from specialists and likely biopsy to establish a plan of care and discuss further treatment options  1513 Received call from [facility] and ETA 4 pm         Pt left in stable condition  Pt transferred to [facility] for further evaluation and management  Will need multi-speciality consultation with likely cardio, pulmonary, heme/onc, possibly IR, etc   Appears to have metastatic malignancy  Will need further evaluation  Pt has no prior records or any labs for review  PMH significant for tobacco abuse  HEART Risk Score      Most Recent Value   Heart Score Risk Calculator   History  0 Filed at: 04/05/2021 1012   ECG  1 Filed at: 04/05/2021 1012   Age  2 Filed at: 04/05/2021 1012   Risk Factors  1 Filed at: 04/05/2021 1012   Troponin  0 Filed at: 04/05/2021 1012   HEART Score  4 Filed at: 04/05/2021 1012                      SBIRT 22yo+      Most Recent Value   SBIRT (25 yo +)   In order to provide better care to our patients, we are screening all of our patients for alcohol and drug use  Would it be okay to ask you these screening questions? Yes Filed at: 04/05/2021 1029   Initial Alcohol Screen: US AUDIT-C    1  How often do you have a drink containing alcohol?  0 Filed at: 04/05/2021 1029   2  How many drinks containing alcohol do you have on a typical day you are drinking? 0 Filed at: 04/05/2021 1029   3b  FEMALE Any Age, or MALE 65+: How often do you have 4 or more drinks on one occassion? 0 Filed at: 04/05/2021 1029   Audit-C Score  0 Filed at: 04/05/2021 1029   VALERIY: How many times in the past year have you       Used an illegal drug or used a prescription medication for non-medical reasons?   Never Filed at: 04/05/2021 1029          Wells' Criteria for PE      Most Recent Value   Wells' Criteria for PE   Clinical signs and symptoms of DVT  0 Filed at: 04/05/2021 1012   PE is primary diagnosis or equally likely  3 Filed at: 04/05/2021 1012   HR >100  1 5 Filed at: 04/05/2021 1012   Immobilization at least 3 days or Surgery in the previous 4 weeks  0 Filed at: 04/05/2021 1012   Previous, objectively diagnosed PE or DVT  0 Filed at: 04/05/2021 1012   Hemoptysis  0 Filed at: 04/05/2021 1012   Malignancy with treatment within 6 months or palliative  0 Filed at: 04/05/2021 1012   Wells' Criteria Total  4 5 Filed at: 04/05/2021 1012                MDM  Number of Diagnoses or Management Options  CHF (congestive heart failure) ([facility] [address] ): new and requires workup  Dyspnea: new and requires workup  Hilar lymphadenopathy: new and requires workup  Lymphadenopathy, mediastinal: new and requires workup  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 [facility]): new and requires workup  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 [facility]): new and requires workup  Pleural effusion on left: new and requires workup     Amount and/or Complexity of Data Reviewed  Clinical lab tests: reviewed and ordered  Tests in the radiology section of CPT®: ordered and reviewed  Decide to obtain previous medical records or to obtain history from someone other than the patient: yes  Obtain history from someone other than the patient: yes  Review and summarize past medical records: yes  Discuss the patient with other providers: yes (SLIM)  Independent visualization of images, tracings, or specimens: yes    Risk of Complications, Morbidity, and/or Mortality  Presenting problems: high  Diagnostic procedures: high  Management options: high    Patient Progress  Patient progress: stable      Disposition  Final diagnoses:   Dyspnea   CHF (congestive heart failure) ([facility] )   Lymphadenopathy, mediastinal   Hilar lymphadenopathy   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 [facility])   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 [facility])   Pleural effusion on left     Time reflects when diagnosis was documented in both MDM as applicable and the Disposition within this note     Time User Action Codes Description Comment    4/5/2021  2:33 PM Kelsea Nordmann Add [R06 00] Dyspnea     4/5/2021  2:33 PM Kelsea Nordmann Add [I50 9] CHF (congestive heart failure) ([facility] [address] )     4/5/2021  2:34 PM Kelsea Nordmann Add [R59 0] Lymphadenopathy, mediastinal     4/5/2021  2:34 PM Kelsea Nordmann Add [R59 0] Hilar lymphadenopathy     4/5/2021  2:34 PM Kelsea Nordmann Add [C79 51,  C80 1] 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 ([facility] )     4/5/2021  2:35 PM Kelsea Nordmann Add [C79 51,  C80 1] 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 ([facility] )     4/5/2021  2:35 PM Kelsea Nordmann Add [J90] Pleural effusion on left       ED Disposition     ED Disposition Condition Date/Time Comment    Transfer to Another Facility-In Network  Mon Apr 5, 2021  1:35 PM Simi Smith should be transferred out to B          MD Documentation      Most Recent Value   Patient Condition  The patient has been stabilized such that within reasonable medical probability, no material deterioration of the patient condition or the condition of the unborn child(debby) is likely to result from the transfer   Reason for Transfer  Level of Care needed not available at this facility   Benefits of Transfer  Specialized equipment and/or services available at the receiving facility (Include comment)________________________   Risks of Transfer  Potential for delay in receiving treatment, Potential deterioration of medical condition, Loss of IV, Increased discomfort during transfer, Possible worsening of condition or death during transfer   Accepting Physician  Dr Juan Singleton Name, Melvi Sawyer    (Name & Tel number)  PACS   Sending MD Dr Beatriz Shepard PA-C   Provider Certification  General risk, such as traffic hazards, adverse weather conditions, rough terrain or turbulence, possible failure of equipment (including vehicle or aircraft), or consequences of actions of persons outside the control of the transport personnel, Unanticipated needs of medical equipment and personnel during transport, Risk of worsening condition, The possibility of a transport vehicle being unavailable      RN Documentation      Most Recent Value   Accepting Facility Name, Melvi Sawyer   Bed Assignment  [phone]    (Name & Tel number)  PACS   Report Given to  BODØ   Medications Reviewed with Next Provider of Service  Yes   Transport Mode  Ambulance   Level of Care  Basic life support   Copies of Medical Records Sent  History and Physical, Orders, Progress note, Transfer form, Nursing note, Labs, Radiology, EKG, Med Rec form   Transfer Date  04/05/21      Follow-up Information    None         There are no discharge medications for this patient  No discharge procedures on file      PDMP Review     None          ED Provider  Electronically Signed by           Kike Sood PA-C  04/05/21 5179

## 2021-04-06 ENCOUNTER — APPOINTMENT (INPATIENT)
Dept: RADIOLOGY | Facility: HOSPITAL | Age: 66
DRG: 181 | End: 2021-04-06
Payer: MEDICARE

## 2021-04-06 ENCOUNTER — APPOINTMENT (INPATIENT)
Dept: NON INVASIVE DIAGNOSTICS | Facility: HOSPITAL | Age: 66
DRG: 181 | End: 2021-04-06
Payer: MEDICARE

## 2021-04-06 PROBLEM — R00.0 TACHYCARDIA: Status: ACTIVE | Noted: 2021-04-06

## 2021-04-06 LAB
ALBUMIN SERPL BCP-MCNC: 2.2 G/DL (ref 3.5–5)
ALP SERPL-CCNC: 116 U/L (ref 46–116)
ALT SERPL W P-5'-P-CCNC: 83 U/L (ref 12–78)
ANION GAP SERPL CALCULATED.3IONS-SCNC: 8 MMOL/L (ref 4–13)
APPEARANCE FLD: ABNORMAL
AST SERPL W P-5'-P-CCNC: 42 U/L (ref 5–45)
ATRIAL RATE: 115 BPM
BASOPHILS # BLD AUTO: 0.07 THOUSANDS/ΜL (ref 0–0.1)
BASOPHILS NFR BLD AUTO: 1 % (ref 0–1)
BILIRUB SERPL-MCNC: 0.38 MG/DL (ref 0.2–1)
BUN SERPL-MCNC: 12 MG/DL (ref 5–25)
CALCIUM ALBUM COR SERPL-MCNC: 10.4 MG/DL (ref 8.3–10.1)
CALCIUM SERPL-MCNC: 9 MG/DL (ref 8.3–10.1)
CHLORIDE SERPL-SCNC: 104 MMOL/L (ref 100–108)
CO2 SERPL-SCNC: 25 MMOL/L (ref 21–32)
COLOR FLD: YELLOW
CREAT SERPL-MCNC: 0.68 MG/DL (ref 0.6–1.3)
EOSINOPHIL # BLD AUTO: 0.1 THOUSAND/ΜL (ref 0–0.61)
EOSINOPHIL NFR BLD AUTO: 1 % (ref 0–6)
ERYTHROCYTE [DISTWIDTH] IN BLOOD BY AUTOMATED COUNT: 14 % (ref 11.6–15.1)
FERRITIN SERPL-MCNC: 1055 NG/ML (ref 8–388)
GFR SERPL CREATININE-BSD FRML MDRD: 100 ML/MIN/1.73SQ M
GLUCOSE FLD-MCNC: 94 MG/DL
GLUCOSE SERPL-MCNC: 95 MG/DL (ref 65–140)
HCT VFR BLD AUTO: 36 % (ref 36.5–49.3)
HGB BLD-MCNC: 11.2 G/DL (ref 12–17)
IMM GRANULOCYTES # BLD AUTO: 0.07 THOUSAND/UL (ref 0–0.2)
IMM GRANULOCYTES NFR BLD AUTO: 1 % (ref 0–2)
IRON SATN MFR SERPL: 8 %
IRON SERPL-MCNC: 18 UG/DL (ref 65–175)
LDH FLD L TO P-CCNC: 481 U/L
LDH SERPL-CCNC: 361 U/L (ref 81–234)
LYMPHOCYTES # BLD AUTO: 0.71 THOUSANDS/ΜL (ref 0.6–4.47)
LYMPHOCYTES NFR BLD AUTO: 6 % (ref 14–44)
LYMPHOCYTES NFR BLD AUTO: 94 %
MCH RBC QN AUTO: 24.9 PG (ref 26.8–34.3)
MCHC RBC AUTO-ENTMCNC: 31.1 G/DL (ref 31.4–37.4)
MCV RBC AUTO: 80 FL (ref 82–98)
MONOCYTES # BLD AUTO: 1.66 THOUSAND/ΜL (ref 0.17–1.22)
MONOCYTES NFR BLD AUTO: 13 % (ref 4–12)
MONOCYTES NFR BLD AUTO: 3 %
NEUTROPHILS # BLD AUTO: 10.21 THOUSANDS/ΜL (ref 1.85–7.62)
NEUTS SEG NFR BLD AUTO: 3 %
NEUTS SEG NFR BLD AUTO: 78 % (ref 43–75)
NRBC BLD AUTO-RTO: 0 /100 WBCS
P AXIS: 73 DEGREES
PH BODY FLUID: 7.7
PLATELET # BLD AUTO: 822 THOUSANDS/UL (ref 149–390)
PMV BLD AUTO: 9.1 FL (ref 8.9–12.7)
POTASSIUM SERPL-SCNC: 4 MMOL/L (ref 3.5–5.3)
PR INTERVAL: 118 MS
PROT FLD-MCNC: 5.1 G/DL
PROT SERPL-MCNC: 7.7 G/DL (ref 6.4–8.2)
QRS AXIS: 72 DEGREES
QRSD INTERVAL: 76 MS
QT INTERVAL: 324 MS
QTC INTERVAL: 448 MS
RBC # BLD AUTO: 4.5 MILLION/UL (ref 3.88–5.62)
RETICS # AUTO: NORMAL 10*3/UL (ref 14356–105094)
RETICS # CALC: 1.09 % (ref 0.37–1.87)
SITE: ABNORMAL
SODIUM SERPL-SCNC: 137 MMOL/L (ref 136–145)
T WAVE AXIS: 62 DEGREES
TIBC SERPL-MCNC: 227 UG/DL (ref 250–450)
TOTAL CELLS COUNTED SPEC: 100
TSH SERPL DL<=0.05 MIU/L-ACNC: 1.21 UIU/ML (ref 0.36–3.74)
VENTRICULAR RATE: 115 BPM
WBC # BLD AUTO: 12.82 THOUSAND/UL (ref 4.31–10.16)
WBC # FLD MANUAL: 3729 /UL

## 2021-04-06 PROCEDURE — 84157 ASSAY OF PROTEIN OTHER: CPT | Performed by: STUDENT IN AN ORGANIZED HEALTH CARE EDUCATION/TRAINING PROGRAM

## 2021-04-06 PROCEDURE — 83615 LACTATE (LD) (LDH) ENZYME: CPT | Performed by: STUDENT IN AN ORGANIZED HEALTH CARE EDUCATION/TRAINING PROGRAM

## 2021-04-06 PROCEDURE — 80053 COMPREHEN METABOLIC PANEL: CPT | Performed by: STUDENT IN AN ORGANIZED HEALTH CARE EDUCATION/TRAINING PROGRAM

## 2021-04-06 PROCEDURE — 85045 AUTOMATED RETICULOCYTE COUNT: CPT | Performed by: STUDENT IN AN ORGANIZED HEALTH CARE EDUCATION/TRAINING PROGRAM

## 2021-04-06 PROCEDURE — 84443 ASSAY THYROID STIM HORMONE: CPT | Performed by: STUDENT IN AN ORGANIZED HEALTH CARE EDUCATION/TRAINING PROGRAM

## 2021-04-06 PROCEDURE — 99233 SBSQ HOSP IP/OBS HIGH 50: CPT | Performed by: INTERNAL MEDICINE

## 2021-04-06 PROCEDURE — 88305 TISSUE EXAM BY PATHOLOGIST: CPT | Performed by: PATHOLOGY

## 2021-04-06 PROCEDURE — 97166 OT EVAL MOD COMPLEX 45 MIN: CPT

## 2021-04-06 PROCEDURE — 97162 PT EVAL MOD COMPLEX 30 MIN: CPT

## 2021-04-06 PROCEDURE — 82728 ASSAY OF FERRITIN: CPT | Performed by: STUDENT IN AN ORGANIZED HEALTH CARE EDUCATION/TRAINING PROGRAM

## 2021-04-06 PROCEDURE — 87070 CULTURE OTHR SPECIMN AEROBIC: CPT | Performed by: STUDENT IN AN ORGANIZED HEALTH CARE EDUCATION/TRAINING PROGRAM

## 2021-04-06 PROCEDURE — 83550 IRON BINDING TEST: CPT | Performed by: STUDENT IN AN ORGANIZED HEALTH CARE EDUCATION/TRAINING PROGRAM

## 2021-04-06 PROCEDURE — 0FB13ZX EXCISION OF RIGHT LOBE LIVER, PERCUTANEOUS APPROACH, DIAGNOSTIC: ICD-10-PCS | Performed by: RADIOLOGY

## 2021-04-06 PROCEDURE — 83540 ASSAY OF IRON: CPT | Performed by: STUDENT IN AN ORGANIZED HEALTH CARE EDUCATION/TRAINING PROGRAM

## 2021-04-06 PROCEDURE — 85025 COMPLETE CBC W/AUTO DIFF WBC: CPT | Performed by: STUDENT IN AN ORGANIZED HEALTH CARE EDUCATION/TRAINING PROGRAM

## 2021-04-06 PROCEDURE — 93306 TTE W/DOPPLER COMPLETE: CPT | Performed by: INTERNAL MEDICINE

## 2021-04-06 PROCEDURE — 32555 ASPIRATE PLEURA W/ IMAGING: CPT

## 2021-04-06 PROCEDURE — 94760 N-INVAS EAR/PLS OXIMETRY 1: CPT

## 2021-04-06 PROCEDURE — 88112 CYTOPATH CELL ENHANCE TECH: CPT | Performed by: PATHOLOGY

## 2021-04-06 PROCEDURE — 32555 ASPIRATE PLEURA W/ IMAGING: CPT | Performed by: RADIOLOGY

## 2021-04-06 PROCEDURE — 83986 ASSAY PH BODY FLUID NOS: CPT | Performed by: STUDENT IN AN ORGANIZED HEALTH CARE EDUCATION/TRAINING PROGRAM

## 2021-04-06 PROCEDURE — 0W9B3ZZ DRAINAGE OF LEFT PLEURAL CAVITY, PERCUTANEOUS APPROACH: ICD-10-PCS | Performed by: INTERNAL MEDICINE

## 2021-04-06 PROCEDURE — 82945 GLUCOSE OTHER FLUID: CPT | Performed by: STUDENT IN AN ORGANIZED HEALTH CARE EDUCATION/TRAINING PROGRAM

## 2021-04-06 PROCEDURE — 93010 ELECTROCARDIOGRAM REPORT: CPT | Performed by: INTERNAL MEDICINE

## 2021-04-06 PROCEDURE — 89051 BODY FLUID CELL COUNT: CPT | Performed by: STUDENT IN AN ORGANIZED HEALTH CARE EDUCATION/TRAINING PROGRAM

## 2021-04-06 PROCEDURE — 93306 TTE W/DOPPLER COMPLETE: CPT

## 2021-04-06 PROCEDURE — 87205 SMEAR GRAM STAIN: CPT | Performed by: STUDENT IN AN ORGANIZED HEALTH CARE EDUCATION/TRAINING PROGRAM

## 2021-04-06 RX ORDER — LANOLIN ALCOHOL/MO/W.PET/CERES
6 CREAM (GRAM) TOPICAL
Status: DISCONTINUED | OUTPATIENT
Start: 2021-04-06 | End: 2021-04-07 | Stop reason: HOSPADM

## 2021-04-06 RX ADMIN — HEPARIN SODIUM 5000 UNITS: 5000 INJECTION INTRAVENOUS; SUBCUTANEOUS at 21:39

## 2021-04-06 RX ADMIN — GUAIFENESIN 600 MG: 600 TABLET, EXTENDED RELEASE ORAL at 21:39

## 2021-04-06 RX ADMIN — MELATONIN TAB 3 MG 6 MG: 3 TAB at 21:39

## 2021-04-06 RX ADMIN — HEPARIN SODIUM 5000 UNITS: 5000 INJECTION INTRAVENOUS; SUBCUTANEOUS at 06:21

## 2021-04-06 RX ADMIN — GUAIFENESIN 600 MG: 600 TABLET, EXTENDED RELEASE ORAL at 11:10

## 2021-04-06 NOTE — PHYSICAL THERAPY NOTE
Physical Therapy Evaluation     Patient's Name: Max Begin    Admitting Diagnosis  Shortness of breath [R06 02]    Problem List  Patient Active Problem List   Diagnosis    Shortness of breath    Anemia    Leucocytosis    Pleural effusion    Lung mass    Mediastinal lymphadenopathy    Tachycardia       Past Medical History  No past medical history on file      Past Surgical History  Past Surgical History:   Procedure Laterality Date    TENDON REPAIR Right     forearm        04/06/21 0919   PT Last Visit   PT Visit Date 04/06/21   Note Type   Note type Evaluation   Pain Assessment   Pain Assessment Tool Pain Assessment not indicated - pt denies pain   Pain Score No Pain   Home Living   Type of [address] [place] [address] Two level;Bed/bath upstairs  (2 HIRAM)   Bathroom Shower/Tub Walk-in shower   Bathroom Toilet Raised   Bathroom Equipment   (denies)   Home Equipment   (denies )   Prior Function   Level of [place] Independent with ADLs and functional mobility   Lives With Alone   Receives Help From Family   ADL Assistance Independent   IADLs Independent   Falls in the last 6 months 0   Vocational Retired   Restrictions/Precautions   Wells Janay Bearing Precautions Per Order No   Other Precautions Fall Risk   General   Family/Caregiver Present No   Cognition   Overall Cognitive Status WFL   Arousal/Participation Cooperative   Orientation Level Oriented X4   Memory Within functional limits   Following Commands Follows all commands and directions without difficulty   Comments Pt pleasant and cooperative to particiapte in therapy session this date    RLE Assessment   RLE Assessment   (fucntionally 4/5)   LLE Assessment   LLE Assessment   (functionally 4/5 )   Bed Mobility   Supine to Sit Unable to assess   Sit to Supine Unable to assess   Additional Comments Pt oob in bedside chair upon arrival  Pt returned to bedside chair with all needs within reach at the end of therapy session    Transfers   Sit to Stand 7 Independent   Stand to Sit 7  Independent   Additional Comments Performs STS without AD    Ambulation/Elevation   Gait pattern Excessively slow; Short stride   Gait Assistance 7  Independent   Assistive Device None   Distance 2 x 100ft    Stair Management Assistance 5  Supervision   Stair Management Technique One rail L;Foreward;Reciprocal   Number of Stairs 7   Balance   Static Sitting Good   Dynamic Sitting Fair +   Static Standing Fair +   Dynamic Standing Fair +   Ambulatory Fair +   Endurance Deficit   Endurance Deficit No   Activity Tolerance   Activity Tolerance Patient tolerated treatment well   Medical Staff Made Aware OT gris    Nurse Made Aware RN cleared pt to participate in therapy session    Assessment   Prognosis Good   Assessment Pt seen for mod complexity PT evaluation  Pt with active PT eval/treat orders  Pt is a 77 y o  male who was admitted to [facility] on 4/5/2021 s/p c/o of SOB for the past 2 months  Pt active problems include: SOB, mediastinal lymphadenopathy, lung mass, pleural effusion, leucocytosis, and anemia  Pt resides alone in [facility] with 2 [facility] and was independent prior to hospital admission  Pt performed STS from bedside chair without AD at I level  Pt ambulated in hallway without AD at an I level  Pt ascended 7 steps reciprocally using L HR at a S level  Pt was left sitting upright in bedside chair at the end of PT session with all needs in reach  Pt has no questions/ concerns regarding d/c home; pt has no further acute inpatient PT needs at this time- please re-consult if needed  PT to d/c pt and recommends home with increased family support after d/c  The patient's AM-PAC Basic Mobility Inpatient Short Form Raw Score is 23, Standardized Score is 50  88  A standardized score greater than 42 9 suggests the patient may benefit from discharge to home  Please also refer to the recommendation of the Physical Therapist for safe discharge planning     Goals   Patient Goals To go home    Plan   Treatment/Interventions ADL retraining;Functional transfer training;LE strengthening/ROM; Elevations; Endurance training;Patient/family training;Gait training;Spoke to nursing;OT   PT Frequency   (Evaluation this date)   Recommendation   PT Discharge Recommendation Return to previous environment with no needs   Equipment Recommended   (none)   PT - OK to Discharge Yes  (once medically cleared)   AM-PAC Basic Mobility Inpatient   Turning in Bed Without Bedrails 4   Lying on Back to Sitting on Edge of Flat Bed 4   Moving Bed to Chair 4   Standing Up From Chair 4   Walk in Room 4   Climb 3-5 Stairs 3   Basic Mobility Inpatient Raw Score 23   Basic Mobility Standardized Score 50 88           Lucas Bolanos, PT, DPT

## 2021-04-06 NOTE — OCCUPATIONAL THERAPY NOTE
Occupational Therapy Evaluation     Patient Name: Janak López  [id number] Date: 4/6/2021  Problem List  Principal Problem:    Shortness of breath  Active Problems:    Anemia    Leucocytosis    Pleural effusion    Lung mass    Mediastinal lymphadenopathy    Past Medical History  No past medical history on file  Past Surgical History  Past Surgical History:   Procedure Laterality Date    TENDON REPAIR Right     forearm             04/06/21 0920   OT Last Visit   OT Visit Date 04/06/21   Restrictions/Precautions   Weight Bearing Precautions Per Order No   Other Precautions Pain   Pain Assessment   Pain Assessment Tool 0-10   Pain Score 5   Pain Location/Orientation Orientation: Left; Location: Rib Cage   Hospital Pain Intervention(s) Repositioned; Ambulation/increased activity   Home Living   Type of [address] [place] [address] Two level  (2STE)   Bathroom Shower/Tub Walk-in shower   Bathroom Toilet Raised   Bathroom Equipment   (denies)   Home Equipment   (denies)   Prior Function   Level of [place] Independent with ADLs and functional mobility   Lives With Alone   Receives Help From Family   ADL Assistance Independent   IADLs Independent   Falls in the last 6 months 0   Vocational Retired   Lifestyle   Autonomy PTA pt was I with ADLs/IADLs   Reciprocal Relationships Girlfriend, brother, friends   Service to Others Retired   [address] ([address]) WDL   ADL   Where Assessed Chair   Eating Assistance 7  Independent   Grooming Assistance 7  Independent   UB Bathing Assistance 7  Independent   LB Bathing Assistance 7  Independent   UB Dressing Assistance 7  Independent   LB Dressing Assistance 7  [address]  7  Independent   Bed Mobility   Supine to Sit Unable to assess   Sit to Supine Unable to assess   Additional Comments Pt seated OOB upon arrival    Transfers   Sit to [address] to [address] [facility] Mobility   Functional Mobility 7  Independent   Balance   Static Sitting Good   Dynamic Sitting Good   Static Standing Fair +   Dynamic Standing Fair +   Ambulatory Fair +   Activity Tolerance   Activity Tolerance Patient tolerated treatment well   Medical Staff Made Aware PT Maliha   Nurse Made Aware RN clearance for session    RUE Assessment   RUE Assessment WFL   LUE Assessment   LUE Assessment WFL   Hand Function   Gross Motor Coordination Functional   Fine Motor Coordination Functional   Sensation   Light Touch No apparent deficits   Vision-Basic Assessment   Current Vision Wears glasses only for reading   Vision - Complex Assessment   Ocular Range of Motion WFL   Head Position WDL   Tracking Able to track stimulus in all quads without difficulty   Perception   Inattention/Neglect Appears intact   Cognition   Overall Cognitive Status [facility]   Arousal/Participation Alert; Responsive; Cooperative   Attention Within functional limits   Orientation Level Oriented X4   Memory Within functional limits   Following Commands Follows all commands and directions without difficulty   Comments Pt pleasant and cooperative t/o session    Assessment   Assessment Pt is a 77 y o  male admitted to [facility] on 4/5/2021 w/ Shortness of breath, pt with mediastinal lymphadenopathy, lung mass (NAKIA, lingula, and RL lbe nodular masses of metastasis), PE, & "destructive lesion in T9 vertebral body and 9th rib of L)  Pt with active OT orders  As per pt report, pta, resides in a 2STH, 2STE alone  Pt was I w/  ADLS and IADLS, (+) drove  Upon evaluation, pt currently independent for transfers and mobility  Pt currently independent for ADLs overall  Pt reports he has family, friends, and his girlfriend who can assist as needed upon d/c  Pt with no questions or concerns at this time  From OT standpoint, recommendation would be to return home with assist PRN  No further acute OT needs  D/C OT  Please re-consult if needed  Thank you   Pt was left in chair after session with all current needs met  The patient's raw score on the AM-PAC Daily Activity inpatient short form is 24, standardized score is 57 54, greater than 39 4  Patients at this level are likely to benefit from discharge to home  Please refer to the recommendation of the Occupational Therapist for safe discharge planning     Goals   Patient Goals to go home   Recommendation   OT Discharge Recommendation Return to previous environment with no needs   OT - OK to Discharge Yes  (when medically stable )   AM-PAC Daily Activity Inpatient   Lower Body Dressing 4   Bathing 4   Toileting 4   Upper Body Dressing 4   Grooming 4   Eating 4   Daily Activity Raw Score 24   Daily Activity Standardized Score (Calc for Raw Score >=11) 57 54   AM-PAC Applied Cognition Inpatient   Following a Speech/Presentation 4   Understanding Ordinary Conversation 4   Taking Medications 4   Remembering Where Things Are Placed or Put Away 4   Remembering List of 4-5 Errands 4   Taking Care of Complicated Tasks 4   Applied Cognition Raw Score 24   Applied Cognition Standardized Score 62 21     Dylan Douglas MS, OTR/L

## 2021-04-06 NOTE — PROGRESS NOTES
INTERNAL MEDICINE RESIDENCY PROGRESS NOTE     Name: Cindi Trejo   Age & Sex: 77 y o  male   MRN: [id number]  Unit/Bed#: [facility] 931-01   Encounter: [id number]  Team: SOD Team B     PATIENT INFORMATION     Name: Cindi Trejo   Age & Sex: 77 y o  male   MRN: [id number]  Hospital Stay Days: 1    ASSESSMENT/PLAN     Principal Problem:    Shortness of breath  Active Problems:    Anemia    Leucocytosis    Pleural effusion    Lung mass    Mediastinal lymphadenopathy      * Shortness of breath  Assessment & Plan  Shortness of breath noted 2 months ago, worsening gradually, more when the patient active  EKG shows sinus tachy  He has been tachycardic 117-125, denies chest pain, sob lightheadedness, numbness tingling sensation of extremities this morning    CTA chest 04/05 remarkable for mediastinal and hilar lymphadenopathy as well as  Left upper, lingula and right lower lobe nodular masses of metastasis; moderate left pleural effusion and destructive lesion in T9 vertebral body and 9th rib the left  CTAP; Indeterminate mass lesions in the liver and at the left kidney suspicious for metastatic disease  Extensive retroperitoneal adenopathy including a large left para-aortic lymph node conglomerate measuring up to 9 3 cm     Most likely metastatic lung cancer unknown primary source    Plan  IR consult for thoracentesis; LDH serum and fluid, protein serum fluid, culture and cytology     CT abdomen pelvis with contrast   IR consult and Oncology consulted recs appreciated  Will order TSH    Mediastinal lymphadenopathy  Assessment & Plan  See shortness of breath assessment and plan    Lung mass  Assessment & Plan  See shortness of breath assessment and plan    Pleural effusion  Assessment & Plan  See shortness of breath assessment and plan     Leucocytosis  Assessment & Plan  White blood cells 12 on 04/05 ; afebrile, most likely reactive in context of malignancy versus infection    Plan  Thoracentesis ordered with fluid culture and cytology  IR consulted  Monitor fever curve and CBC daily    Anemia  Assessment & Plan  No past medical history; 2021 hemoglobin 11 4 and MCV 81  Iron panel/ferritin suggestive of mixed picture of iron deficiency anemia and anemia of chronic disease  Patient currently asymptomatic, HB 11 2  Plan  Continue to monitor daily BMPTransfuse for hemoglobin less than 7  Will consider ferrous sulfate every other day        Disposition:  Continue inpatient management  SUBJECTIVE     Patient seen and examined by bedside  He has no complaint at this time his air not in any obvious distress  I explained findings on imaging 2020  No acute events overnight  OBJECTIVE     Vitals:    21 1742 21 2036 21 2228 21 0718   BP: 149/73  103/63 118/66   BP Location: Right arm   Right arm   Pulse: (!) 125  (!) 114 (!) 117   Resp:    Temp: 98 3 °F (36 8 °C)  98 3 °F (36 8 °C) 98 3 °F (36 8 °C)   TempSrc: Oral  Oral Oral   SpO2: 93% 94% 92% 92%   Weight: 62 9 kg (138 lb 10 7 oz)      Height: 5' 11" (1 803 m)         Temperature:   Temp (24hrs), Av 3 °F (36 8 °C), Min:98 3 °F (36 8 °C), Max:98 3 °F (36 8 °C)    Temperature: 98 3 °F (36 8 °C)  Intake & Output:  I/O        07 - / 0700 / 07 -  0700 / 07 -  0700    Urine (mL/kg/hr)  400     Total Output  400     Net  -400                Weights:   IBW: 75 3 kg    Body mass index is 19 34 kg/m²  Weight (last 2 days)     Date/Time   Weight    21 17:42:49   62 9 (138 67)            Physical Exam  Vitals signs and nursing note reviewed  Constitutional:       Appearance: He is well-developed  HENT:      Head: Normocephalic and atraumatic  Eyes:      Conjunctiva/sclera: Conjunctivae normal    Neck:      Musculoskeletal: Neck supple  Cardiovascular:      Rate and Rhythm: Normal rate and regular rhythm  Pulses: Normal pulses  Heart sounds: Normal heart sounds  No murmur  Pulmonary:      Effort: Pulmonary effort is normal  No respiratory distress  Breath sounds: Normal breath sounds  Abdominal:      General: Bowel sounds are normal       Palpations: Abdomen is soft  Tenderness: There is no abdominal tenderness  Musculoskeletal: Normal range of motion  General: No swelling or tenderness  Skin:     General: Skin is warm and dry  Neurological:      General: No focal deficit present  Mental Status: He is alert and oriented to person, place, and time  Mental status is at baseline  Psychiatric:         Mood and Affect: Mood normal          Behavior: Behavior normal        LABORATORY DATA     Labs: I have personally reviewed pertinent reports  Results from last 7 days   Lab Units 04/06/21  0610 04/05/21  1013   WBC Thousand/uL 12 82* 12 24*   HEMOGLOBIN g/dL 11 2* 11 4*   HEMATOCRIT % 36 0* 37 4   PLATELETS Thousands/uL 822* 841*   NEUTROS PCT % 78* 82*   MONOS PCT % 13* 13*      Results from last 7 days   Lab Units 04/06/21  0610 04/05/21  1013   POTASSIUM mmol/L 4 0 3 8   CHLORIDE mmol/L 104 102   CO2 mmol/L 25 25   BUN mg/dL 12 11   CREATININE mg/dL 0 68 0 73   CALCIUM mg/dL 9 0 9 2   ALK PHOS U/L 116 104   ALT U/L 83* 73   AST U/L 42 30     Results from last 7 days   Lab Units 04/05/21  1013   MAGNESIUM mg/dL 2 0          Results from last 7 days   Lab Units 04/05/21  1013   INR  1 21*   PTT seconds 39*     Results from last 7 days   Lab Units 04/05/21  1013   LACTIC ACID mmol/L 1 0     Results from last 7 days   Lab Units 04/05/21  1013   TROPONIN I ng/mL <0 02       IMAGING & DIAGNOSTIC TESTING     Radiology Results: I have personally reviewed pertinent reports  Xr Chest 1 View Portable    Result Date: 4/5/2021  Impression: Pattern suggests probable CHF with some interstitial edema and minimal left effusion  There is elevation of left hemidiaphragm   Workstation performed: [id number]     [facility] Ed Chest Pe Study    Result Date: 4/5/2021  Impression: 1   No pulmonary embolus  Mass effect on the upper lobe pulmonary arterial branches  Measured RV/LV ratio is within normal limits at less than 0 9   2   Pathologic mediastinal and hilar lymphadenopathy of lymphomatous process  3   Left upper, lingula and right lower lobe nodular masses of metastasis  4   Left para-aortic pathologic lymphadenopathy of lymphomatous process  CT abdomen pelvis for complete evaluation is suggested  5   Expansile destructive lesion involving the left 9th rib laterally and T9 vertebral body pathologic process of metastasis  6   Moderate left pleural effusion  Enhancing left pleura with nodular masses largest inferiorly of metastasis  I personally discussed this study with Bard Rendon on 4/5/2021 at 12:19 PM  Workstation performed: [id number]     Ct Abdomen Pelvis W Contrast    Result Date: 4/6/2021  Impression: Indeterminate mass lesions in the liver and at the left kidney suspicious for metastatic disease  Extensive retroperitoneal adenopathy including a large left para-aortic lymph node conglomerate measuring up to 9 3 cm as detailed above  Osseous metastasis as detailed above  Workstation performed: [id number]     Other Diagnostic Testing: I have personally reviewed pertinent reports      ACTIVE MEDICATIONS     Current Facility-Administered Medications   Medication Dose Route Frequency    acetaminophen (TYLENOL) tablet 650 mg  650 mg Oral Q6H PRN    albuterol inhalation solution 2 5 mg  2 5 mg Nebulization Q4H PRN    benzonatate (TESSALON PERLES) capsule 100 mg  100 mg Oral TID PRN    guaiFENesin (MUCINEX) 12 hr tablet 600 mg  600 mg Oral Q12H SADAF    heparin (porcine) subcutaneous injection 5,000 Units  5,000 Units Subcutaneous Q8H [address]    oxyCODONE (ROXICODONE) IR tablet 2 5 mg  2 5 mg Oral Q4H PRN    Or    oxyCODONE (ROXICODONE) IR tablet 5 mg  5 mg Oral Q4H PRN       VTE Pharmacologic Prophylaxis: Heparin  VTE Mechanical Prophylaxis: sequential compression device    Portions of the record may have been created with voice recognition software  Occasional wrong word or "sound a like" substitutions may have occurred due to the inherent limitations of voice recognition software    Read the chart carefully and recognize, using context, where substitutions have occurred   ==  Yinka Morrissey1 [facility]  Internal Medicine Residency PGY-2

## 2021-04-06 NOTE — BRIEF OP NOTE (RAD/CATH)
INTERVENTIONAL RADIOLOGY PROCEDURE NOTE    Date: 4/6/2021    Procedure:  Thoracentesis  Preoperative diagnosis:   1  Lung mass    2  Mediastinal lymphadenopathy    3  Pleural effusion         Postoperative diagnosis: Same  Surgeon: Alesha Naidu MD     Assistant: None  No qualified resident was available  Blood loss:  None    Specimens:  Sent     Findings:  Successful left thoracentesis    Complications: None immediate      Anesthesia: local

## 2021-04-06 NOTE — CASE MANAGEMENT
LOS: 1  Not a bundle  Readmission risk: Green  Met with pt and discussed role of CM  Pt lives alone in a 3-story home with 2 steps at entrance  Pt is independent in ADLs  No DME or prior HHC  Preference for pharmacy is [facility] in [place], [place]  No MH/D&A tx hx  Pt does not have identified PCP--info-link card provided to pt  Pt unsure if he completed POA  Main contact: SORosina Kocher ([phone])  Pt's brother will transport upon d/c     CM reviewed d/c planning process including the following: identifying help at home, patient preference for d/c planning needs, Discharge Lounge, [facility] Meds to Bed program, availability of treatment team to discuss questions or concerns patient and/or family may have regarding understanding medications and recognizing signs and symptoms once discharged  CM also encouraged patient to follow up with all recommended appointments after discharge  Patient advised of importance for patient and family to participate in managing patients medical well being

## 2021-04-06 NOTE — SEDATION DOCUMENTATION
Left sided thoracentesis performed without complications  800ml clear yellow fluid drained, specimens sent to lab  Tolerated well by patient

## 2021-04-06 NOTE — TELEMEDICINE
INTERPROFESSIONAL (PHONE) CONSULTATION - Interventional Radiology  Tino Post 77 y o  male MRN: [id number]  Unit/Bed#: [facility] 931-01 Encounter: [id number]    IR has been consulted to evaluate the patient, determine the appropriate procedure, and whether or not a procedure can and should be performed regarding the care of Tino Post     We were consulted by internal medicine concerning liver and periaortic mass, and to possibly perform a biopsy if medically appropriate for the patient  IP Consult to IR  Consult performed by: Edward Vo PA-C  Consult ordered by: Liborio Soulier, DO        04/06/21      Assessment/Recommendation:     77year old male with no significant pmh presenting with worsening SOB, left pleural effusion, lung, liver, periaortic masses    - patient for thoracentesis with IR today  - will evaluate liver in holding area with ultrasound today to determine if liver mass able to be biopsied  - if liver mass is not able to be biopsied, will plan for CT guided periaortic mass biopsy  - please keep npo after midnight in preparation for biopsy tomorrow  Will be determined today if liver vs periaortic mass  - please hold morning anticoagulation    Total time spent in review of data, discussion with requesting provider and rendering advice was 25 minutes       Patient or appropriate family member was verbally informed by internal medicine of this consultative service on their behalf to provide more timely access to specialty care in lieu of an in person consultation  Verbal consent was obtained  Thank you for allowing Interventional Radiology to participate in the care of Tino Post  Please don't hesitate to call or TigerText us with any questions       Edward Vo PA-C

## 2021-04-06 NOTE — RESPIRATORY THERAPY NOTE
RT Protocol Note  Ash Cantu 77 y o  male MRN: [id number]  [facility] Encounter: [id number]    Assessment    Principal Problem:    Shortness of breath  Active Problems:    Anemia    Leucocytosis    Pleural effusion    Lung mass    Mediastinal lymphadenopathy      Home Pulmonary Medications:  na       No past medical history on file    Social History     Socioeconomic History    Marital status: Single     Spouse name: Not on file    Number of children: Not on file    Years of education: Not on file    Highest education level: Not on file   Occupational History    Not on file   Social Needs    Financial resource strain: Not on file    Food insecurity     Worry: Not on file     Inability: Not on file    Transportation needs     Medical: Not on file     Non-medical: Not on file   Tobacco Use    Smoking status: Former Smoker    Smokeless tobacco: Never Used   Substance and Sexual Activity    Alcohol use: Never     Frequency: Never    Drug use: Never    Sexual activity: Not on file   Lifestyle    Physical activity     Days per week: Not on file     Minutes per session: Not on file    Stress: Not on file   Relationships    Social connections     Talks on phone: Not on file     Gets together: Not on file     Attends Yazidi service: Not on file     Active member of club or organization: Not on file     Attends meetings of clubs or organizations: Not on file     Relationship status: Not on file    Intimate partner violence     Fear of current or ex partner: Not on file     Emotionally abused: Not on file     Physically abused: Not on file     Forced sexual activity: Not on file   Other Topics Concern    Not on file   Social History Narrative    Not on file       Subjective         Objective    Physical Exam:   Assessment Type: Assess only  General Appearance: Alert, Awake  Respiratory Pattern: Dyspnea with exertion  Chest Assessment: Chest expansion symmetrical  Bilateral Breath Sounds: Diminished, Crackles, Coarse  Cough: Non-productive  O2 Device: RA    Vitals:  Blood pressure 149/73, pulse (!) 125, temperature 98 3 °F (36 8 °C), temperature source Oral, resp  rate 20, height 5' 11" (1 803 m), weight 62 9 kg (138 lb 10 7 oz), SpO2 94 %  Imaging and other studies: I have personally reviewed pertinent reports  and I have personally reviewed pertinent films in PACS    O2 Device: RA     Plan    Respiratory Plan: Mild Distress pathway        Resp Comments: (P) Pt  evaluated at bedside per Respiratory protocol  Pt  admitted with SOB  Pt  admits to being 35pk/yr smoker and quit 2 months ago  CTA of chest shows DONALDO,LLL, and RUL nodular mases with mets and moderate lt  pleural effusion  Pt's breath sounds are coarse with crackles in bases  Pt  complains of SOB with exertion but is in no distress at this time  Will order Albuterol udns prn for sob/wheezing per Respiratory protocol

## 2021-04-06 NOTE — CONSULTS
Medical Oncology/Hematology Consult Note  Cece Epstein, male, 77 y o , 1955,  [facility] 931-01, [id number]     Assessment and Plan  1  Mediastinal lymphadenopathy  2  Lung masses  3  Liver lesion  4  Osseous lesions   -  mediastinal and hilar lymphadenopathy as well as  Left upper, lingula and right lower lobe nodular masses of metastasis; moderate left pleural effusion and destructive lesion in T9 vertebral body and 9th rib the left  -  Indeterminate mass lesions in the liver and at the left kidney suspicious for metastatic disease  Extensive retroperitoneal adenopathy including a large left para-aortic lymph node conglomerate measuring up to 9 3 cm  - All of these findings are concerning for likely metastatic cancer;  Given his significant smoking history, and his clinical presentation- this is likely a lung primary, but biopsy will need to be obtained to confirm this  - Spoke with Dr Laisha Morales; asked that IR biopsy the liver lesion; this would confirm diagnosis and staging  - Fine for patient to have thoracentesis with IR, but should be more for therapeutic intent, rather than diagnostic as it may not yield sufficient sample for biomarker testing (if thora is done, please still send cytology)    5  Iron deficiency microcytic anemia  - mild, likely multifactorial secondary to chronic illness and iron deficiency  - he could have IV venofer 200mg IV inpatient EOD x 3 doses; if he does not entire course this can be continued in outpatient setting by hematologist/oncologist  He should have EGD and colonoscopy as outpatient as I do not see that has been done    Reason for consultation: new likely metastatic cancer    I saw this patient with Dr Zackery Everett and he is in agreement with this plan  Johan Issa, STERLING-BC  Hematology/Oncology Nurse Practitioner        History of present illness:   77years old male with no significant past medical history, does not follow up with primary care physician   Pt has had progressive shortness of breath started about 2 months ago, with intermittent cough with sometimes clear sputum  Mild fatigue  No N/V/D/C, BRBPR, black or tarry stools  No blood in urine  No urinary issues  No areas of significant pain- some chest tenderness  No family cancer history that he reports/recalls     Family history positive for heart disease, patient's father  of heart attack at age 77 and his brother had 2 heart attacks  Social history remarkable for smoking 1 pack a day for 35 years, quit recently  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 and 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 during work  Review of Systems:   Review of Systems   Constitutional: Positive for fatigue  Negative for appetite change and fever  HENT: Negative for congestion, nosebleeds and sore throat  Eyes: Negative for visual disturbance  Respiratory: Positive for cough and shortness of breath  Negative for wheezing  Cardiovascular: Negative for chest pain and leg swelling  Gastrointestinal: Negative for abdominal pain, constipation, diarrhea and nausea  Endocrine: Negative for cold intolerance and heat intolerance  Genitourinary: Negative for difficulty urinating and dysuria  Musculoskeletal: Negative for arthralgias  Skin: Negative for color change and rash  Neurological: Negative for dizziness, seizures, numbness and headaches  Hematological: Negative for adenopathy  Does not bruise/bleed easily  Psychiatric/Behavioral: Negative for agitation and confusion  No past medical history on file  Past Surgical History:   Procedure Laterality Date    TENDON REPAIR Right     forearm       No family history on file      Social History     Socioeconomic History    Marital status: Single     Spouse name: Not on file    Number of children: Not on file    Years of education: Not on file    Highest education level: Not on file   Occupational History    Not on file Social Needs    Financial resource strain: Not on file    Food insecurity     Worry: Not on file     Inability: Not on file    Transportation needs     Medical: Not on file     Non-medical: Not on file   Tobacco Use    Smoking status: Former Smoker    Smokeless tobacco: Never Used   Substance and Sexual Activity    Alcohol use: Never     Frequency: Never    Drug use: Never    Sexual activity: Not on file   Lifestyle    Physical activity     Days per week: Not on file     Minutes per session: Not on file    Stress: Not on file   Relationships    Social connections     Talks on phone: Not on file     Gets together: Not on file     Attends Roman Catholic service: Not on file     Active member of club or organization: Not on file     Attends meetings of clubs or organizations: Not on file     Relationship status: Not on file    Intimate partner violence     Fear of current or ex partner: Not on file     Emotionally abused: Not on file     Physically abused: Not on file     Forced sexual activity: Not on file   Other Topics Concern    Not on file   Social History Narrative    Not on file         Current Facility-Administered Medications:     acetaminophen (TYLENOL) tablet 650 mg, 650 mg, Oral, Q6H PRN, Husam Waller, DO, 650 mg at 04/05/21 2133    albuterol inhalation solution 2 5 mg, 2 5 mg, Nebulization, Q4H PRN, Deepika Hammonds MD    benzonatate (TESSALON PERLES) capsule 100 mg, 100 mg, Oral, TID PRN, Nadia Fuentes DO    guaiFENesin (MUCINEX) 12 hr tablet 600 mg, 600 mg, Oral, Q12H [address], Bernie Ball DO, 600 mg at 04/06/21 1110    heparin (porcine) subcutaneous injection 5,000 Units, 5,000 Units, Subcutaneous, Q8H [address], Husam Waller DO, 5,000 Units at 04/06/21 0621    oxyCODONE (ROXICODONE) IR tablet 2 5 mg, 2 5 mg, Oral, Q4H PRN **OR** oxyCODONE (ROXICODONE) IR tablet 5 mg, 5 mg, Oral, Q4H PRN, Husam Waller DO    No medications prior to admission         No Known Allergies      Physical Exam:    /66 (BP Location: Right arm)   Pulse (!) 117   Temp 98 3 °F (36 8 °C) (Oral)   Resp 19   Ht 5' 11" (1 803 m)   Wt 62 9 kg (138 lb 10 7 oz)   SpO2 92%   BMI 19 34 kg/m²     Physical Exam  Constitutional:       Appearance: Normal appearance  He is normal weight  HENT:      Head: Normocephalic and atraumatic  Eyes:      Conjunctiva/sclera: Conjunctivae normal       Pupils: Pupils are equal, round, and reactive to light  Neck:      Musculoskeletal: No muscular tenderness  Cardiovascular:      Rate and Rhythm: Normal rate and regular rhythm  Heart sounds: Normal heart sounds  Pulmonary:      Effort: Pulmonary effort is normal       Breath sounds: Wheezing (throughout) present  Comments: Breath sounds absent from RLL  Abdominal:      General: Abdomen is flat  Bowel sounds are normal       Palpations: Abdomen is soft  Musculoskeletal: Normal range of motion  Lymphadenopathy:      Cervical: No cervical adenopathy  Skin:     General: Skin is warm and dry  Neurological:      General: No focal deficit present  Mental Status: He is alert and oriented to person, place, and time     Psychiatric:         Mood and Affect: Mood normal          Judgment: Judgment normal          Recent Results (from the past 48 hour(s))   ECG 12 lead    Collection Time: 04/05/21  9:52 AM   Result Value Ref Range    Ventricular Rate 115 BPM    Atrial Rate 115 BPM    ND Interval 118 ms    QRSD Interval 76 ms    QT Interval 324 ms    QTC Interval 448 ms    P Axis 73 degrees    QRS Axis 72 degrees    T Wave Axis 62 degrees   CBC and differential    Collection Time: 04/05/21 10:13 AM   Result Value Ref Range    WBC 12 24 (H) 4 31 - 10 16 Thousand/uL    RBC 4 60 3 88 - 5 62 Million/uL    Hemoglobin 11 4 (L) 12 0 - 17 0 g/dL    Hematocrit 37 4 36 5 - 49 3 %    MCV 81 (L) 82 - 98 fL    MCH 24 8 (L) 26 8 - 34 3 pg    MCHC 30 5 (L) 31 4 - 37 4 g/dL    RDW 13 7 11 6 - 15 1 %    MPV 8 8 (L) 8 9 - 12 7 fL    Platelets 034 (H) 144 - 390 Thousands/uL    nRBC 0 /100 WBCs    Neutrophils Relative 82 (H) 43 - 75 %    Immat GRANS % 0 0 - 2 %    Lymphocytes Relative 4 (L) 14 - 44 %    Monocytes Relative 13 (H) 4 - 12 %    Eosinophils Relative 0 0 - 6 %    Basophils Relative 1 0 - 1 %    Neutrophils Absolute 9 94 (H) 1 85 - 7 62 Thousands/µL    Immature Grans Absolute 0 05 0 00 - 0 20 Thousand/uL    Lymphocytes Absolute 0 52 (L) 0 60 - 4 47 Thousands/µL    Monocytes Absolute 1 61 (H) 0 17 - 1 22 Thousand/µL    Eosinophils Absolute 0 03 0 00 - 0 61 Thousand/µL    Basophils Absolute 0 09 0 00 - 0 10 Thousands/µL   Protime-INR    Collection Time: 04/05/21 10:13 AM   Result Value Ref Range    Protime 15 1 (H) 11 6 - 14 5 seconds    INR 1 21 (H) 0 84 - 1 19   APTT    Collection Time: 04/05/21 10:13 AM   Result Value Ref Range    PTT 39 (H) 23 - 37 seconds   Comprehensive metabolic panel    Collection Time: 04/05/21 10:13 AM   Result Value Ref Range    Sodium 136 136 - 145 mmol/L    Potassium 3 8 3 5 - 5 3 mmol/L    Chloride 102 100 - 108 mmol/L    CO2 25 21 - 32 mmol/L    ANION GAP 9 4 - 13 mmol/L    BUN 11 5 - 25 mg/dL    Creatinine 0 73 0 60 - 1 30 mg/dL    Glucose 120 65 - 140 mg/dL    Calcium 9 2 8 3 - 10 1 mg/dL    Corrected Calcium 10 6 (H) 8 3 - 10 1 mg/dL    AST 30 5 - 45 U/L    ALT 73 12 - 78 U/L    Alkaline Phosphatase 104 46 - 116 U/L    Total Protein 8 0 6 4 - 8 2 g/dL    Albumin 2 3 (L) 3 5 - 5 0 g/dL    Total Bilirubin 0 44 0 20 - 1 00 mg/dL    eGFR 97 ml/min/1 73sq m   Magnesium    Collection Time: 04/05/21 10:13 AM   Result Value Ref Range    Magnesium 2 0 1 6 - 2 6 mg/dL   D-Dimer    Collection Time: 04/05/21 10:13 AM   Result Value Ref Range    D-Dimer, Quant 4 25 (H) <0 50 ug/ml FEU   Troponin I    Collection Time: 04/05/21 10:13 AM   Result Value Ref Range    Troponin I <0 02 <=0 04 ng/mL   NT-BNP PRO    Collection Time: 04/05/21 10:13 AM   Result Value Ref Range    NT-proBNP 1,121 (H) <125 pg/mL   Lactic acid    Collection Time: 04/05/21 10:13 AM   Result Value Ref Range    LACTIC ACID 1 0 0 5 - 2 0 mmol/L   Blood culture #1    Collection Time: 04/05/21 10:13 AM    Specimen: Arm, Right; Blood   Result Value Ref Range    Blood Culture Received in Microbiology Lab  Culture in Progress  Blood culture #2    Collection Time: 04/05/21 10:13 AM    Specimen: Arm, Right; Blood   Result Value Ref Range    Blood Culture Received in Microbiology Lab  Culture in Progress      COVID19, Influenza A/B, RSV PCR, [facility]    Collection Time: 04/05/21 10:13 AM    Specimen: Nasopharyngeal Swab   Result Value Ref Range    SARS-CoV-2 Negative Negative    INFLUENZA A PCR Negative Negative    INFLUENZA B PCR Negative Negative    RSV PCR Negative Negative   Comprehensive metabolic panel    Collection Time: 04/06/21  6:10 AM   Result Value Ref Range    Sodium 137 136 - 145 mmol/L    Potassium 4 0 3 5 - 5 3 mmol/L    Chloride 104 100 - 108 mmol/L    CO2 25 21 - 32 mmol/L    ANION GAP 8 4 - 13 mmol/L    BUN 12 5 - 25 mg/dL    Creatinine 0 68 0 60 - 1 30 mg/dL    Glucose 95 65 - 140 mg/dL    Calcium 9 0 8 3 - 10 1 mg/dL    Corrected Calcium 10 4 (H) 8 3 - 10 1 mg/dL    AST 42 5 - 45 U/L    ALT 83 (H) 12 - 78 U/L    Alkaline Phosphatase 116 46 - 116 U/L    Total Protein 7 7 6 4 - 8 2 g/dL    Albumin 2 2 (L) 3 5 - 5 0 g/dL    Total Bilirubin 0 38 0 20 - 1 00 mg/dL    eGFR 100 ml/min/1 73sq m   CBC and differential    Collection Time: 04/06/21  6:10 AM   Result Value Ref Range    WBC 12 82 (H) 4 31 - 10 16 Thousand/uL    RBC 4 50 3 88 - 5 62 Million/uL    Hemoglobin 11 2 (L) 12 0 - 17 0 g/dL    Hematocrit 36 0 (L) 36 5 - 49 3 %    MCV 80 (L) 82 - 98 fL    MCH 24 9 (L) 26 8 - 34 3 pg    MCHC 31 1 (L) 31 4 - 37 4 g/dL    RDW 14 0 11 6 - 15 1 %    MPV 9 1 8 9 - 12 7 fL    Platelets 815 (H) 963 - 390 Thousands/uL    nRBC 0 /100 WBCs    Neutrophils Relative 78 (H) 43 - 75 %    Immat GRANS % 1 0 - 2 %    Lymphocytes Relative 6 (L) 14 - 44 %    Monocytes Relative 13 (H) 4 - 12 % Eosinophils Relative 1 0 - 6 %    Basophils Relative 1 0 - 1 %    Neutrophils Absolute 10 21 (H) 1 85 - 7 62 Thousands/µL    Immature Grans Absolute 0 07 0 00 - 0 20 Thousand/uL    Lymphocytes Absolute 0 71 0 60 - 4 47 Thousands/µL    Monocytes Absolute 1 66 (H) 0 17 - 1 22 Thousand/µL    Eosinophils Absolute 0 10 0 00 - 0 61 Thousand/µL    Basophils Absolute 0 07 0 00 - 0 10 Thousands/µL   Lactate dehydrogenase    Collection Time: 04/06/21  6:10 AM   Result Value Ref Range     (H) 81 - 234 U/L   Retic Count    Collection Time: 04/06/21  6:10 AM   Result Value Ref Range    Retic Ct Abs 49,100 14,356-105,094    Retic Ct Pct 1 09 0 37 - 1 87 %   Iron Saturation %    Collection Time: 04/06/21  6:10 AM   Result Value Ref Range    Iron Saturation 8 %    TIBC 227 (L) 250 - 450 ug/dL    Iron 18 (L) 65 - 175 ug/dL   Ferritin    Collection Time: 04/06/21  6:10 AM   Result Value Ref Range    Ferritin 1,055 (H) 8 - 388 ng/mL   TSH, 3rd generation with Free T4 reflex    Collection Time: 04/06/21  6:10 AM   Result Value Ref Range    TSH 3RD GENERATON 1 210 0 358 - 3 740 uIU/mL       Xr Chest 1 View Portable    Result Date: 4/5/2021  Narrative: CHEST INDICATION:   dyspnea  Patient has suspected COVID-19  COMPARISON:  None EXAM PERFORMED/VIEWS:  XR CHEST PORTABLE FINDINGS: Cardiomediastinal silhouette appears unremarkable  The left diaphragm is elevated  There are relatively diffuse bilateral interstitial infiltrates in the lungs and there may also be some ill-defined alveolar infiltrate on the left side  The pulmonary vasculature centrally seems somewhat prominent  There seems to be some left pleural fluid along the lateral aspect of the chest   No right-sided effusion  No pneumothorax  Osseous structures appear within normal limits for patient age  Impression: Pattern suggests probable CHF with some interstitial edema and minimal left effusion  There is elevation of left hemidiaphragm   Workstation performed: [id number]     Cta Ed Chest Pe Study    Result Date: 4/5/2021  Narrative: CTA - CHEST WITH IV CONTRAST - PULMONARY ANGIOGRAM INDICATION:   Shortness of breath PE suspected, intermediate prob, positive D-dimer dyspnea, elevated d dimer  COMPARISON: None  TECHNIQUE: CTA examination of the chest was performed using angiographic technique according to a protocol specifically tailored to evaluate for pulmonary embolism  Axial, sagittal, and coronal 2D reformatted images were created from the source data and  submitted for interpretation  In addition, coronal 3D MIP postprocessing was performed on the acquisition scanner  Radiation dose length product (DLP) for this visit:  285 44 mGy-cm   This examination, like all CT scans performed in the [facility], was performed utilizing techniques to minimize radiation dose exposure, including the use of iterative  reconstruction and automated exposure control  IV Contrast:  85 mL of iohexol (OMNIPAQUE)  FINDINGS: PULMONARY ARTERIAL TREE:  No pulmonary embolus is seen  Mass effect on the upper lobe pulmonary arterial branches  LUNGS:  Lobulated left upper lobe mass measuring 3 x 3 4 cm extending superiorly and along the fissure  Lobular mass in the lingular region measuring 3 x 2 cm  Nodularity in the right lower lobe peripherally measuring 1 5 cm and 0 9 cm in image 59 and 61  Bilateral scattered patchy groundglass areas seen  There is no tracheal or endobronchial lesion  PLEURA:  Moderate left pleural effusion  Enhancement of the pleura with nodularity seen with a enhancing mass inferiorly and posteriorly measuring 3 0 x 1 8 cm  HEART/GREAT VESSELS:  Unremarkable for patient's age  MEDIASTINUM AND MARGARITA:  Multiple prevascular lymph nodes largest measuring 1 5 x 2 3 cm  Subcentimeter superior mediastinal lymph node  Right paratracheal lymph node measuring 3 1 x 2 1 cm  Subcarinal lymph node complex measuring 5 2 x 4 2 cm    Right hilar lymph node complex measuring 4 4 x 3 4 cm and left suprahilar lymph node complex measuring 3 7 x 3 5 cm  CHEST WALL AND LOWER NECK: 1 2 cm hypodense nodule in the left thyroid lobe  Incidental discovery of one or more thyroid nodule(s) measuring less than 1 5 cm and without suspicious features is noted in this patient who is above 28years old; according to guidelines published in the February 2015 white paper on incidental thyroid nodules in the Journal of the [employer] of Radiology [facility]), no further evaluation is recommended  VISUALIZED STRUCTURES IN THE UPPER ABDOMEN:  Heterogeneity of the spleen may be due to phase of contrast or pathologic involvement given the other findings  Aortocaval lymph node measuring 1 4 cm  Partially imaged left para-aortic soft tissue mass measuring 6 4 x 4 6 cm and encases the left splenic artery  OSSEOUS STRUCTURES:  Expansile destructive lesion involving the left 9th rib laterally measuring 3 6 x 1 9 cm  Wedge deformity of T9 vertebral body with areas of lucency sclerosis  Impression: 1  No pulmonary embolus  Mass effect on the upper lobe pulmonary arterial branches  Measured RV/LV ratio is within normal limits at less than 0 9   2   Pathologic mediastinal and hilar lymphadenopathy of lymphomatous process  3   Left upper, lingula and right lower lobe nodular masses of metastasis  4   Left para-aortic pathologic lymphadenopathy of lymphomatous process  CT abdomen pelvis for complete evaluation is suggested  5   Expansile destructive lesion involving the left 9th rib laterally and T9 vertebral body pathologic process of metastasis  6   Moderate left pleural effusion  Enhancing left pleura with nodular masses largest inferiorly of metastasis    I personally discussed this study with Annemarie Prasad on 4/5/2021 at 12:19 PM  Workstation performed: [id number]     Ct Abdomen Pelvis W Contrast    Result Date: 4/6/2021  Narrative: CT ABDOMEN AND PELVIS WITH IV CONTRAST INDICATION:   lung masses suspectd mets , primary unknown  COMPARISON:  None  TECHNIQUE:  CT examination of the abdomen and pelvis was performed  Axial, sagittal, and coronal 2D reformatted images were created from the source data and submitted for interpretation  Radiation dose length product (DLP) for this visit:  691 87 mGy-cm   This examination, like all CT scans performed in the [facility], was performed utilizing techniques to minimize radiation dose exposure, including the use of iterative  reconstruction and automated exposure control  IV Contrast:  70 mL of iodixanol (VISIPAQUE) Enteric Contrast:  Enteric contrast was not administered  FINDINGS: ABDOMEN LOWER CHEST:  Loculated left pleural effusion with areas of nodular pleural thickening better evaluated on recently performed CT chest examination  LIVER/BILIARY TREE:  2 5 cm hypodense lesion at the right hepatic lobe  GALLBLADDER:  No calcified gallstones  No pericholecystic inflammatory change  SPLEEN:  Unremarkable  PANCREAS:  Unremarkable  ADRENAL GLANDS:  Unremarkable  KIDNEYS/URETERS:  2 8 cm mass at the left inferior pole  No hydronephrosis  STOMACH AND BOWEL:  Unremarkable  APPENDIX:  No findings to suggest appendicitis  ABDOMINOPELVIC CAVITY:  Retroperitoneal adenopathy including a large left para-aortic lymph node conglomerate measuring 5 8 x 5 3 x 9 3 cm, which compresses the left renal artery and anteriorly displaces the aorta and left renal artery  Mild pelvic ascites  VESSELS:  Unremarkable for patient's age  PELVIS REPRODUCTIVE ORGANS:  Unremarkable for patient's age  URINARY BLADDER:  Unremarkable  ABDOMINAL WALL/INGUINAL REGIONS:  Unremarkable  OSSEOUS STRUCTURES:  Expansile lytic lesion at the left 9th rib and right L4 transverse process  1 4 cm lytic lesion at the right sacral ala bordering the right sacroiliac joint  Impression: Indeterminate mass lesions in the liver and at the left kidney suspicious for metastatic disease  Extensive retroperitoneal adenopathy including a large left para-aortic lymph node conglomerate measuring up to 9 3 cm as detailed above  Osseous metastasis as detailed above  Workstation performed: [id number]       Labs and pertinent reports reviewed  This note has been generated by voice recognition software system  Therefore, there may be spelling, grammar, and or syntax errors  Please contact if questions arise

## 2021-04-07 ENCOUNTER — APPOINTMENT (INPATIENT)
Dept: RADIOLOGY | Facility: HOSPITAL | Age: 66
DRG: 181 | End: 2021-04-07
Payer: MEDICARE

## 2021-04-07 ENCOUNTER — PREP FOR PROCEDURE (OUTPATIENT)
Dept: INTERVENTIONAL RADIOLOGY/VASCULAR | Facility: CLINIC | Age: 66
End: 2021-04-07

## 2021-04-07 VITALS
DIASTOLIC BLOOD PRESSURE: 68 MMHG | RESPIRATION RATE: 18 BRPM | SYSTOLIC BLOOD PRESSURE: 129 MMHG | BODY MASS INDEX: 19.41 KG/M2 | OXYGEN SATURATION: 94 % | HEART RATE: 116 BPM | WEIGHT: 138.67 LBS | HEIGHT: 71 IN | TEMPERATURE: 97.9 F

## 2021-04-07 DIAGNOSIS — R59.0 MEDIASTINAL LYMPHADENOPATHY: Primary | ICD-10-CM

## 2021-04-07 LAB
ALBUMIN SERPL BCP-MCNC: 2 G/DL (ref 3.5–5)
ALP SERPL-CCNC: 100 U/L (ref 46–116)
ALT SERPL W P-5'-P-CCNC: 64 U/L (ref 12–78)
ANION GAP SERPL CALCULATED.3IONS-SCNC: 6 MMOL/L (ref 4–13)
AST SERPL W P-5'-P-CCNC: 25 U/L (ref 5–45)
BASOPHILS # BLD AUTO: 0.1 THOUSANDS/ΜL (ref 0–0.1)
BASOPHILS NFR BLD AUTO: 1 % (ref 0–1)
BILIRUB SERPL-MCNC: 0.39 MG/DL (ref 0.2–1)
BUN SERPL-MCNC: 14 MG/DL (ref 5–25)
CALCIUM ALBUM COR SERPL-MCNC: 10.3 MG/DL (ref 8.3–10.1)
CALCIUM SERPL-MCNC: 8.7 MG/DL (ref 8.3–10.1)
CHLORIDE SERPL-SCNC: 106 MMOL/L (ref 100–108)
CO2 SERPL-SCNC: 26 MMOL/L (ref 21–32)
CREAT SERPL-MCNC: 0.69 MG/DL (ref 0.6–1.3)
EOSINOPHIL # BLD AUTO: 0.1 THOUSAND/ΜL (ref 0–0.61)
EOSINOPHIL NFR BLD AUTO: 1 % (ref 0–6)
ERYTHROCYTE [DISTWIDTH] IN BLOOD BY AUTOMATED COUNT: 13.9 % (ref 11.6–15.1)
GFR SERPL CREATININE-BSD FRML MDRD: 99 ML/MIN/1.73SQ M
GLUCOSE SERPL-MCNC: 97 MG/DL (ref 65–140)
HCT VFR BLD AUTO: 35.5 % (ref 36.5–49.3)
HGB BLD-MCNC: 11.1 G/DL (ref 12–17)
IMM GRANULOCYTES # BLD AUTO: 0.06 THOUSAND/UL (ref 0–0.2)
IMM GRANULOCYTES NFR BLD AUTO: 1 % (ref 0–2)
LYMPHOCYTES # BLD AUTO: 0.58 THOUSANDS/ΜL (ref 0.6–4.47)
LYMPHOCYTES NFR BLD AUTO: 5 % (ref 14–44)
MCH RBC QN AUTO: 25.2 PG (ref 26.8–34.3)
MCHC RBC AUTO-ENTMCNC: 31.3 G/DL (ref 31.4–37.4)
MCV RBC AUTO: 81 FL (ref 82–98)
MONOCYTES # BLD AUTO: 1.71 THOUSAND/ΜL (ref 0.17–1.22)
MONOCYTES NFR BLD AUTO: 13 % (ref 4–12)
NEUTROPHILS # BLD AUTO: 10.47 THOUSANDS/ΜL (ref 1.85–7.62)
NEUTS SEG NFR BLD AUTO: 79 % (ref 43–75)
NRBC BLD AUTO-RTO: 0 /100 WBCS
PLATELET # BLD AUTO: 773 THOUSANDS/UL (ref 149–390)
PMV BLD AUTO: 9 FL (ref 8.9–12.7)
POTASSIUM SERPL-SCNC: 4.1 MMOL/L (ref 3.5–5.3)
PROT SERPL-MCNC: 7.3 G/DL (ref 6.4–8.2)
RBC # BLD AUTO: 4.41 MILLION/UL (ref 3.88–5.62)
SODIUM SERPL-SCNC: 138 MMOL/L (ref 136–145)
WBC # BLD AUTO: 13.02 THOUSAND/UL (ref 4.31–10.16)

## 2021-04-07 PROCEDURE — 88341 IMHCHEM/IMCYTCHM EA ADD ANTB: CPT | Performed by: PATHOLOGY

## 2021-04-07 PROCEDURE — 88307 TISSUE EXAM BY PATHOLOGIST: CPT | Performed by: PATHOLOGY

## 2021-04-07 PROCEDURE — 99153 MOD SED SAME PHYS/QHP EA: CPT

## 2021-04-07 PROCEDURE — 88342 IMHCHEM/IMCYTCHM 1ST ANTB: CPT | Performed by: PATHOLOGY

## 2021-04-07 PROCEDURE — 76942 ECHO GUIDE FOR BIOPSY: CPT

## 2021-04-07 PROCEDURE — 47000 NEEDLE BIOPSY OF LIVER PERQ: CPT

## 2021-04-07 PROCEDURE — 99152 MOD SED SAME PHYS/QHP 5/>YRS: CPT | Performed by: STUDENT IN AN ORGANIZED HEALTH CARE EDUCATION/TRAINING PROGRAM

## 2021-04-07 PROCEDURE — 80053 COMPREHEN METABOLIC PANEL: CPT | Performed by: STUDENT IN AN ORGANIZED HEALTH CARE EDUCATION/TRAINING PROGRAM

## 2021-04-07 PROCEDURE — 47000 NEEDLE BIOPSY OF LIVER PERQ: CPT | Performed by: STUDENT IN AN ORGANIZED HEALTH CARE EDUCATION/TRAINING PROGRAM

## 2021-04-07 PROCEDURE — 99152 MOD SED SAME PHYS/QHP 5/>YRS: CPT

## 2021-04-07 PROCEDURE — 85025 COMPLETE CBC W/AUTO DIFF WBC: CPT | Performed by: STUDENT IN AN ORGANIZED HEALTH CARE EDUCATION/TRAINING PROGRAM

## 2021-04-07 PROCEDURE — NC001 PR NO CHARGE: Performed by: STUDENT IN AN ORGANIZED HEALTH CARE EDUCATION/TRAINING PROGRAM

## 2021-04-07 PROCEDURE — 76942 ECHO GUIDE FOR BIOPSY: CPT | Performed by: STUDENT IN AN ORGANIZED HEALTH CARE EDUCATION/TRAINING PROGRAM

## 2021-04-07 PROCEDURE — 94760 N-INVAS EAR/PLS OXIMETRY 1: CPT

## 2021-04-07 RX ORDER — BENZONATATE 100 MG/1
100 CAPSULE ORAL 3 TIMES DAILY PRN
Qty: 20 CAPSULE | Refills: 0 | Status: SHIPPED | OUTPATIENT
Start: 2021-04-07

## 2021-04-07 RX ORDER — FERROUS SULFATE TAB EC 324 MG (65 MG FE EQUIVALENT) 324 (65 FE) MG
324 TABLET DELAYED RESPONSE ORAL EVERY OTHER DAY
Qty: 15 TABLET | Refills: 0 | Status: SHIPPED | OUTPATIENT
Start: 2021-04-07 | End: 2021-06-08

## 2021-04-07 RX ORDER — OXYCODONE HYDROCHLORIDE 5 MG/1
5 TABLET ORAL ONCE AS NEEDED
Status: DISCONTINUED | OUTPATIENT
Start: 2021-04-07 | End: 2021-04-07 | Stop reason: HOSPADM

## 2021-04-07 RX ORDER — FENTANYL CITRATE 50 UG/ML
INJECTION, SOLUTION INTRAMUSCULAR; INTRAVENOUS CODE/TRAUMA/SEDATION MEDICATION
Status: COMPLETED | OUTPATIENT
Start: 2021-04-07 | End: 2021-04-07

## 2021-04-07 RX ORDER — MIDAZOLAM HYDROCHLORIDE 2 MG/2ML
INJECTION, SOLUTION INTRAMUSCULAR; INTRAVENOUS CODE/TRAUMA/SEDATION MEDICATION
Status: COMPLETED | OUTPATIENT
Start: 2021-04-07 | End: 2021-04-07

## 2021-04-07 RX ORDER — SODIUM CHLORIDE 9 MG/ML
75 INJECTION, SOLUTION INTRAVENOUS CONTINUOUS
Status: CANCELLED | OUTPATIENT
Start: 2021-04-07

## 2021-04-07 RX ADMIN — MIDAZOLAM 2 MG: 1 INJECTION INTRAMUSCULAR; INTRAVENOUS at 12:48

## 2021-04-07 RX ADMIN — FENTANYL CITRATE 50 MCG: 50 INJECTION INTRAMUSCULAR; INTRAVENOUS at 12:48

## 2021-04-07 RX ADMIN — MIDAZOLAM 1 MG: 1 INJECTION INTRAMUSCULAR; INTRAVENOUS at 12:56

## 2021-04-07 RX ADMIN — FENTANYL CITRATE 50 MCG: 50 INJECTION INTRAMUSCULAR; INTRAVENOUS at 12:56

## 2021-04-07 NOTE — SEDATION DOCUMENTATION
Liver Mass Biopsy Completed by Dr Davin Douglas without complications  VSS  Denies pain or nausea at this time  Instructed to do bedrest x 4 hours until 1730 with right side down for first 2 hours (until 1530)  Report called to Tacho Lopez RN

## 2021-04-07 NOTE — PROGRESS NOTES
Interventional Radiology Preprocedure Note    History/Indication for procedure:   Max Jenkins is a 77 y o  male with a PMH of liver lesions who presents for US guided liver biopsy  Relevant past medical history:    No past medical history on file  Patient Active Problem List   Diagnosis    Shortness of breath    Anemia    Leucocytosis    Pleural effusion    Lung mass    Mediastinal lymphadenopathy    Tachycardia       /62   Pulse 98   Temp 97 6 °F (36 4 °C)   Resp 16   Ht 5' 11" (1 803 m)   Wt 62 9 kg (138 lb 10 7 oz)   SpO2 95%   BMI 19 34 kg/m²     Medications:    Inpatient Medications:     Scheduled Medications:  Current Facility-Administered Medications   Medication Dose Route Frequency Provider Last Rate    acetaminophen  650 mg Oral Q6H PRN Husam Aiad, DO      albuterol  2 5 mg Nebulization Q4H PRN Kwabena Fernandez MD      benzonatate  100 mg Oral TID PRN Ryan Long, DO      fentanyl citrate (PF)   Intravenous Code/Trauma/Sedation Med Cecile Trinh MD      guaiFENesin  600 mg Oral Q12H [address] Ryan Long, DO      iron sucrose  200 mg Intravenous Once per day on Mon Wed Fri [facility], DO      melatonin  6 mg Oral HS Ryan Long, DO      midazolam    Code/Trauma/Sedation Med Cecile Trinh MD      oxyCODONE  2 5 mg Oral Q4H PRN Husam Aiad, DO      Or    oxyCODONE  5 mg Oral Q4H PRN Husam Aiad, DO         Infusions:       PRN:    acetaminophen    albuterol    benzonatate    fentanyl citrate (PF)    midazolam    oxyCODONE **OR** oxyCODONE    Outpatient Medications:  No current facility-administered medications on file prior to encounter  No current outpatient medications on file prior to encounter         No Known Allergies    Anticoagulants: none    ASA classification: ASA 3 - Patient with moderate systemic disease with functional limitations    Airway Assessment: II (hard and soft palate, upper portion of tonsils anduvula visible)    Relevant family history: None    Relevant review of systems: None    Prior sedation/anesthesia: yes    Can the patient lie flat? Yes     NPO Status: yes    Labs:   CBC with diff:   Lab Results   Component Value Date    WBC 13 02 (H) 04/07/2021    HGB 11 1 (L) 04/07/2021    HCT 35 5 (L) 04/07/2021    MCV 81 (L) 04/07/2021     (H) 04/07/2021    MCH 25 2 (L) 04/07/2021    MCHC 31 3 (L) 04/07/2021    RDW 13 9 04/07/2021    MPV 9 0 04/07/2021    NRBC 0 04/07/2021     BMP/CMP:  Lab Results   Component Value Date    K 4 1 04/07/2021     04/07/2021    CO2 26 04/07/2021    BUN 14 04/07/2021    CREATININE 0 69 04/07/2021    CALCIUM 8 7 04/07/2021    AST 25 04/07/2021    ALT 64 04/07/2021    ALKPHOS 100 04/07/2021    EGFR 99 04/07/2021   ,     Coags:   Lab Results   Component Value Date    PTT 39 (H) 04/05/2021    INR 1 21 (H) 04/05/2021   ,   Results from last 7 days   Lab Units 04/05/21  1013   PTT seconds 39*   INR  1 21*        Relevant imaging studies:   Reviewed  Directed physical examination:  I agree with the physical exam performed on 4/6/21 and there are no additional changes  Assessment/Plan: For US guided liver biopsy  Sedation/Anesthesia plan: Moderate sedation will be used as needed for procedure  Consent with alternatives to the procedure, risks and benefits have been explained and discussed with the patient/patient's family: yes

## 2021-04-07 NOTE — NURSING NOTE
Patients medication were picked up from the pharmacy by the RN, and given directly to patient  Discharge instructions explained and given to patient as well

## 2021-04-07 NOTE — DISCHARGE SUMMARY
IMR Discharge Summary - Medical Antony Colbert 77 y o  male MRN: [id number]    [facility] 9[facility] 931-01 Encounter: [id number]    BRIEF OVERVIEW    Admitting Provider: Rachel Mishra MD  Discharge Provider: Rachel Mishra MD  Primary Care Physician at Discharge: Rachel Mishra MD    Discharge To: Home    Admission Date: 4/5/2021     Discharge Date: 4/7/2021  Primary Discharge Diagnosis  Principal Problem:    Shortness of breath  Active Problems:    Anemia    Leucocytosis    Pleural effusion    Lung mass    Mediastinal lymphadenopathy  Resolved Problems:    * No resolved hospital problems  *    * Shortness of breath  Assessment & Plan  Shortness of breath noted 2 months ago, worsening gradually, more when the patient active  EKG shows sinus tachy  He has been tachycardic 117-125, denies chest pain, sob lightheadedness, numbness tingling sensation of extremities this morning    CTA chest 04/05 remarkable for mediastinal and hilar lymphadenopathy as well as  Left upper, lingula and right lower lobe nodular masses of metastasis; moderate left pleural effusion and destructive lesion in T9 vertebral body and 9th rib the left  CTAP; Indeterminate mass lesions in the liver and at the left kidney suspicious for metastatic disease  Extensive retroperitoneal adenopathy including a large left para-aortic lymph node conglomerate measuring up to 9 3 cm     Most likely metastatic lung cancer  Given his significant smoking history, and his clinical presentation- this is likely a lung primary, but biopsy will need to be obtained to confirm (results pending)  S/P IR therapeutic thoracentesis;with no bacteria seen on Gram stain preliminary results  Patient is afebrile  S/P IR ultrasound-guided liver biopsy tissue result pending  Oncology consulted recs the patient follow up    Awaiting biopsy results for biomarker testing      Mediastinal lymphadenopathy  Assessment & Plan  See shortness of breath assessment and plan    Lung mass  Assessment & Plan  See shortness of breath assessment and plan    Pleural effusion  Assessment & Plan  See shortness of breath assessment and plan     Leucocytosis  Assessment & Plan  White blood cells 12 on 04/05 ; afebrile, most likely reactive in context of malignancy versus infection  Patient with no fever,cough,dysuria  No symptoms suggestive of infection    Anemia  Assessment & Plan  No past medical history; April 5, 2021 hemoglobin 11 4 and MCV 81  Iron panel/ferritin suggestive of mixed picture of iron deficiency anemia and anemia of chronic disease  Patient currently asymptomatic, HB 11 2  Plan  Will consider ferrous sulfate every other day      Other Problems Addressed: None    Consulting Providers   Dr Rosita Dnih        Therapeutic Operative Procedures Performed      Diagnostic Procedures Performed  Liver biopsy, thoracentesis    Discharge Disposition: [address]  Discharged With Lines: no    Test Results Pending at Discharge: Cytology/path    Outpatient Follow-Up  IR,Oncology,PCP  Code Status: Level 1 - Full Code  Advance Directive and Living Will: <no information>  Power of :    POLST:      Medications   See after visit summary for reconciled discharge medications provided to patient and family  Allergies  No Known Allergies  Discharge Diet: regular diet  Activity restrictions: none    [email or web address] Course  Patient is a 77years old male with no significant past medical history, does not follow up with primary care physician, presented initially to [facility] today 4/5 morning with worsening shortness of breath and intermittent cough  Per patient shortness of breath started about 2 months ago started to got worse gradually and experiences a more when active along with intermittent cough with sometimes clear sputum, for which she decided to seek medical care  Patient also notes left chest tenderness level of T9-T10 and say sometimes experience pain/tenderness on the right as well  In the ED, CXR shows Pattern suggestive of probable CHF with some interstitial edema and minimal left effusion, CT PE showed mediastinal and hilar lymphadenopathy as well as  Left upper, lingula and right lower lobe nodular masses of metastasis; moderate left pleural effusion and destructive lesion in T9 vertebral body and 9th rib the left   CTAP shows Indeterminate mass lesions in the liver and at the left kidney suspicious for metastatic disease  Extensive retroperitoneal adenopathy including a large left para-aortic lymph node conglomerate measuring up to 9 3 cm  IR was consulted for liver biopsy and therapeutic thoracentesis, Oncology was also consulted  He is now s/p ultrasound-guided liver biopsy with unfortunately no definite lesion along with serial obtained despite multiple bypasses  He will have an outpatient CT-guided left periaortic lymph node biopsy  The S/B therapeutic  Thoracentesis  with no bacteria seen on Gram stain preliminary result  Covid 19 negetive, Blood culture shows no growth in 48 hours, EKG sinus tach  He will follow up outpatient with Oncology in two weeks for discussion of further management, tissue exam pending at this time  Patient was seen and examined by bedside,sitting comfortably on the chair not in any obvious distress  He denies headaches, lightheadedness, chest pain, SOB, palpitation, abdominal pain, N/V/C/D, numbness tingling sensation of his extremity, patient looks stable on room air  The patient will be picked up by his brother  He was advised to schedule appointment with PCP, follow up with IR on Oncology outpatient for further workup/management  Shortness of breath is resolved at this time      Vitals:    04/07/21 1545   BP: 93/51   Pulse: (!) 111   Resp:    Temp:    SpO2:    Physical Exam  Vitals signs and nursing note reviewed  Constitutional:       General: He is not in acute distress  Appearance: He is well-developed  He is not diaphoretic  HENT:      Head: Normocephalic and atraumatic  Mouth/Throat:      Mouth: Mucous membranes are moist       Pharynx: No oropharyngeal exudate  Eyes:      General: No scleral icterus  Extraocular Movements: Extraocular movements intact  Neck:      Musculoskeletal: Normal range of motion and neck supple  Vascular: No JVD  Cardiovascular:      Rate and Rhythm: Normal rate and regular rhythm  Heart sounds: Normal heart sounds  No murmur  No friction rub  No gallop  Pulmonary:      Effort: Pulmonary effort is normal  No respiratory distress  Breath sounds: Normal breath sounds  No stridor  No wheezing or rales  Abdominal:      General: Bowel sounds are normal  There is no distension  Palpations: Abdomen is soft  Tenderness: There is no abdominal tenderness  Musculoskeletal: Normal range of motion  General: No tenderness  Skin:     General: Skin is warm and dry  Capillary Refill: Capillary refill takes less than 2 seconds  Findings: No erythema  Neurological:      General: No focal deficit present  Mental Status: He is alert and oriented to person, place, and time     Psychiatric:         Mood and Affect: Mood normal          Behavior: Behavior normal        Current Discharge Medication List        Current Discharge Medication List        Current Discharge Medication List      START taking these medications    Details   benzonatate (TESSALON PERLES) 100 mg capsule Take 1 capsule (100 mg total) by mouth 3 (three) times a day as needed for cough  Qty: 20 capsule, Refills: 0    Associated Diagnoses: Cough      ferrous sulfate 324 (65 Fe) mg Take 1 tablet (324 mg total) by mouth every other day  Qty: 15 tablet, Refills: 0    Associated Diagnoses: Anemia               Other Pertinent Test Results  biopsy result    Discharge Condition: stable      Discharge  Statement   I spent 30 minutes minutes discharging the patient  This time was spent on the day of discharge  I had direct contact with the patient on the day of discharge  Additional documentation is required if more than 30 minutes were spent on discharge

## 2021-04-07 NOTE — BRIEF OP NOTE (RAD/CATH)
INTERVENTIONAL RADIOLOGY PROCEDURE NOTE    Date: 4/7/2021    Procedure: US guided liver biopsy    Preoperative diagnosis:   1  Lung mass    2  Mediastinal lymphadenopathy    3  Pleural effusion         Postoperative diagnosis: Same  Surgeon: Raul Serna MD     Assistant: None  No qualified resident was available  Blood loss: 5 ml    Specimens: Sent to lab  Findings:   Technically challenging liver biopsy  No definite lesional material obtained despite multiple passes  If he continues to require inpatient care, we could consider CT guided left daniel-aortic lymph node biopsy either tomorrow or Friday  If discharge is anticipated, we could consider doing this as an outpatient  Complications: None immediate      Anesthesia: conscious sedation

## 2021-04-07 NOTE — DISCHARGE INSTRUCTIONS
Percutaneous Liver Biopsy   WHAT YOU NEED TO KNOW:   A PLB is a procedure to remove a sample of tissue from your liver  The sample can be sent to a lab and tested for liver disease, cancer, or infection  After the procedure you may have pain and bruising at the biopsy site  You may also have pain in your right shoulder  These symptoms should get better in 48 to 72 hours  DISCHARGE INSTRUCTIONS:     [address] and [place] patients,  Contact Interventional Radiology at [phone] PATIENTS: Contact Interventional Radiology at [phone]   [facility] PATIENTS: Contact Interventional Radiology at [phone] if:    · Fever greater than 101 or chills  · You have severe pain in your abdomen  · Your abdomen is larger than usual and feels hard  · Your neck is more swollen and you have trouble swallowing  · You feel weak or dizzy  · Your heart is beating faster than usual    · Your pain does not get better after you take pain medicine  · Your wound is red, swollen, or draining pus  · You have nausea or are vomiting  · Your skin is itchy, swollen, or you have a rash  · You have questions or concerns about your condition or care  Medicines:   · Acetaminophen decreases pain and fever  It is available without a doctor's order  Acetaminophen can cause liver damage if not taken correctly  · Take your home medicine as directed  · Resume your normal diet  Small sips of flat soda will help with mild nausea  Self-care:   · Rest as directed  Do not play sports, exercise, or lift anything heavier than 5 pounds for up to 1 week  · Apply firm, steady pressure if bleeding occurs  A small amount of bleeding from your wound is possible  Apply pressure with a clean gauze or towel for 5 to 10 minutes  Call 911 if bleeding becomes heavy or does not stop  · Ask your healthcare provider when to take your blood thinner or antiplatelet medicine   You may need to wait 24 to 72 hours to take your medicine  This will prevent bleeding  Follow up with your healthcare provider as directed: Write down your questions so you remember to ask them during your visits  Moderate Sedation   WHAT YOU NEED TO KNOW:   Moderate sedation, or conscious sedation, is medicine used during procedures to help you feel relaxed and calm  You will be awake and able to follow directions without anxiety or pain  You will remember little to none of the procedure  You may feel tired, weak, or unsteady on your feet after you get sedation  You may also have trouble concentrating or short-term memory loss  These symptoms should go away in 24 hours or less  DISCHARGE INSTRUCTIONS:   Call 911 or have someone else call for any of the following:   · You have sudden trouble breathing  · You cannot be woken  Seek care immediately if:   · You have a severe headache or dizziness  · Your heart is beating faster than usual   Contact your healthcare provider if:   · You have a fever  · You have nausea or are vomiting for more than 8 hours after the procedure  · Your skin is itchy, swollen, or you have a rash  · You have questions or concerns about your condition or care  Self-care:   · Have someone stay with you for 24 hours  This person can drive you to errands and help you do things around the house  This person can also watch for problems  · Rest and do quiet activities for 24 hours  Do not exercise, ride a bike, or play sports  Stand up slowly to prevent dizziness and falls  Take short walks around the house with another person  Slowly return to your usual activities the next day  · Do not drive or use dangerous machines or tools for 24 hours  You may injure yourself or others  Examples include a lawnmower, saw, or drill  Do not return to work for 24 hours if you use dangerous machines or tools for work  · Do not make important decisions for 24 hours   For example, do not sign important papers or invest money  · Drink liquids as directed  Liquids help flush the sedation medicine out of your body  Ask how much liquid to drink each day and which liquids are best for you  · Eat small, frequent meals to prevent nausea and vomiting  Start with clear liquids such as juice or broth  If you do not vomit after clear liquids, you can eat your usual foods  · Do not drink alcohol or take medicines that make you drowsy  This includes medicines that help you sleep and anxiety medicines  Ask your healthcare provider if it is safe for you to take pain medicine  Follow up with your healthcare provider as directed: Write down your questions so you remember to ask them during your visits  © 2017 [address] [employer] Information is for End User's use only and may not be sold, redistributed or otherwise used for commercial purposes  All illustrations and images included in CareNotes® are the copyrighted property of A D A M , Inc  or Danilo Cota  The above information is an  only  It is not intended as medical advice for individual conditions or treatments  Talk to your doctor, nurse or pharmacist before following any medical regimen to see if it is safe and effective for you  Thoracentesis   WHAT YOU NEED TO KNOW:   A thoracentesis is a procedure to remove extra fluid or air from between your lungs and your inner chest wall  Air or fluid buildup may make it hard for you to breathe  A thoracentesis allows your lungs to expand fully so you can breathe more easily  DISCHARGE INSTRUCTIONS:     Small amount of shoulder pain and bloody sputum is normal after a Thoracentesis  Rest:  Rest when you feel it is needed  Slowly start to do more each day  Return to your daily activities as directed  Resume your normal diet  Small sips of flat soda will help mild nausea  Do not smoke: If you smoke, it is never too late to quit   Ask for information about how to stop smoking if you need help  Contact Interventional Radiology at [phone] [place] PATIENTS: Contact Interventional Radiology at [phone]) Thelma Rouse PATIENTS: Contact Interventional Radiology at [phone]) if:   · You have a fever  · Your puncture site is red, warm, swollen, or draining pus  · You have questions or concerns about your procedure, medicine, or care  Seek care immediately or call 911 if:   · Severe chest pain with inspiration and shortness of breath    · Large amounts of blood in your sputum    Follow up with your healthcare provider as directed

## 2021-04-07 NOTE — PROGRESS NOTES
Pastoral Care Progress Note    2021  Patient: Willie Whitlock : 1955  Admission Date & Time: 2021 1741  MRN: [id number] [facility]: [id number]               Chaplaincy Interventions Utilized:   Ritual: Vida Aviles provided sacrament of the sick and Provided prayer       21 1200   Clinical Encounter Type   Visited With Patient   Episcopalian Encounters   Episcopalian Needs Prayer   Sacramental Encounters   Sacrament of Sick-Anointing Anointed

## 2021-04-08 ENCOUNTER — TELEPHONE (OUTPATIENT)
Dept: INTERVENTIONAL RADIOLOGY/VASCULAR | Facility: HOSPITAL | Age: 66
End: 2021-04-08

## 2021-04-08 ENCOUNTER — TELEPHONE (OUTPATIENT)
Dept: HEMATOLOGY ONCOLOGY | Facility: CLINIC | Age: 66
End: 2021-04-08

## 2021-04-08 ENCOUNTER — TRANSITIONAL CARE MANAGEMENT (OUTPATIENT)
Dept: INTERNAL MEDICINE CLINIC | Facility: CLINIC | Age: 66
End: 2021-04-08

## 2021-04-08 NOTE — PROGRESS NOTES
Spoke with patient to set up lymph node biopsy  Appointment was made for 4/13/21 @ the [facility]  Plan to have a ride to and from, and NPO after midnight the night prior  Answered all questions, consult complete 
21-Jan-2018

## 2021-04-08 NOTE — TELEPHONE ENCOUNTER
New Patient Encounter    New Patient Intake Form   Patient Details:  Tereso Thornton  1955  [id number]    Background Information:  [address] starts by opening a telephone encounter and gathering the following information   Who is calling to schedule? If not self, relationship to patient? self   Referring Provider SLIM   What is the diagnosis? Multiple lesions, liver, lung, kidney, bone   Is this diagnosis confirmed? Yes   When was the diagnosis? 4/2021   Is there a confirmed diagnosis from a biopsy/tissue reviewed by pathology? pending   Were outside slides requested? NA   Is patient aware of diagnosis? Yes   Is there a personal history and what kind? No   Is there a family history and what kind? No   Reason for visit? Hospital Follow Up   Have you had any imaging or labs done? If so: when, where? yes  [facility]   Are records in [facility]? yes   If patient has a prior history of breast cancer were old records obtained? NA   Was the patient told to bring a disk? No   Does the patient smoke or Vape? No   If yes, how many packs or cartridges per day? Quit 3 months ago was smoking 1PPD x 30 yrs   Scheduling Information:   Preferred [place]:  [place]     Are there any dates/time the patient cannot be seen? Miscellaneous: pt seen by Dr Carson Niño as in pt  Pt will schedule LN bx fpr prior to this appt  Liver path pending  After completing the above information, please route to Financial Counselor and the appropriate Nurse Navigator for review

## 2021-04-09 LAB
BACTERIA SPEC BFLD CULT: NO GROWTH
GRAM STN SPEC: NORMAL
GRAM STN SPEC: NORMAL

## 2021-04-10 LAB
BACTERIA BLD CULT: NORMAL
BACTERIA BLD CULT: NORMAL

## 2021-04-13 ENCOUNTER — HOSPITAL ENCOUNTER (OUTPATIENT)
Dept: CT IMAGING | Facility: HOSPITAL | Age: 66
Discharge: HOME | End: 2021-04-13
Admitting: RADIOLOGY
Payer: MEDICARE

## 2021-04-13 VITALS
HEART RATE: 98 BPM | SYSTOLIC BLOOD PRESSURE: 116 MMHG | TEMPERATURE: 98.2 F | OXYGEN SATURATION: 94 % | RESPIRATION RATE: 18 BRPM | WEIGHT: 138 LBS | DIASTOLIC BLOOD PRESSURE: 62 MMHG | BODY MASS INDEX: 19.32 KG/M2 | HEIGHT: 71 IN

## 2021-04-13 DIAGNOSIS — R59.0 MEDIASTINAL LYMPHADENOPATHY: ICD-10-CM

## 2021-04-13 PROCEDURE — 88341 IMHCHEM/IMCYTCHM EA ADD ANTB: CPT | Performed by: PATHOLOGY

## 2021-04-13 PROCEDURE — 99153 MOD SED SAME PHYS/QHP EA: CPT

## 2021-04-13 PROCEDURE — 88185 FLOWCYTOMETRY/TC ADD-ON: CPT

## 2021-04-13 PROCEDURE — 88342 IMHCHEM/IMCYTCHM 1ST ANTB: CPT | Performed by: PATHOLOGY

## 2021-04-13 PROCEDURE — 88305 TISSUE EXAM BY PATHOLOGIST: CPT | Performed by: PATHOLOGY

## 2021-04-13 PROCEDURE — 77012 CT SCAN FOR NEEDLE BIOPSY: CPT

## 2021-04-13 PROCEDURE — 49180 BIOPSY ABDOMINAL MASS: CPT | Performed by: RADIOLOGY

## 2021-04-13 PROCEDURE — 88184 FLOWCYTOMETRY/ TC 1 MARKER: CPT

## 2021-04-13 PROCEDURE — 77012 CT SCAN FOR NEEDLE BIOPSY: CPT | Performed by: RADIOLOGY

## 2021-04-13 PROCEDURE — 49180 BIOPSY ABDOMINAL MASS: CPT

## 2021-04-13 PROCEDURE — 99152 MOD SED SAME PHYS/QHP 5/>YRS: CPT

## 2021-04-13 PROCEDURE — 99152 MOD SED SAME PHYS/QHP 5/>YRS: CPT | Performed by: RADIOLOGY

## 2021-04-13 RX ORDER — LIDOCAINE WITH 8.4% SOD BICARB 0.9%(10ML)
SYRINGE (ML) INJECTION CODE/TRAUMA/SEDATION MEDICATION
Status: COMPLETED | OUTPATIENT
Start: 2021-04-13 | End: 2021-04-13

## 2021-04-13 RX ORDER — MIDAZOLAM HYDROCHLORIDE 2 MG/2ML
INJECTION, SOLUTION INTRAMUSCULAR; INTRAVENOUS CODE/TRAUMA/SEDATION MEDICATION
Status: COMPLETED | OUTPATIENT
Start: 2021-04-13 | End: 2021-04-13

## 2021-04-13 RX ORDER — FENTANYL CITRATE 50 UG/ML
INJECTION, SOLUTION INTRAMUSCULAR; INTRAVENOUS CODE/TRAUMA/SEDATION MEDICATION
Status: COMPLETED | OUTPATIENT
Start: 2021-04-13 | End: 2021-04-13

## 2021-04-13 RX ORDER — SODIUM CHLORIDE 9 MG/ML
75 INJECTION, SOLUTION INTRAVENOUS CONTINUOUS
Status: DISCONTINUED | OUTPATIENT
Start: 2021-04-13 | End: 2021-04-14 | Stop reason: HOSPADM

## 2021-04-13 RX ADMIN — FENTANYL CITRATE 50 MCG: 50 INJECTION, SOLUTION INTRAMUSCULAR; INTRAVENOUS at 08:56

## 2021-04-13 RX ADMIN — Medication 10 ML: at 08:50

## 2021-04-13 RX ADMIN — SODIUM CHLORIDE 75 ML/HR: 0.9 INJECTION, SOLUTION INTRAVENOUS at 08:12

## 2021-04-13 RX ADMIN — MIDAZOLAM HYDROCHLORIDE 0.5 MG: 1 INJECTION, SOLUTION INTRAMUSCULAR; INTRAVENOUS at 08:43

## 2021-04-13 NOTE — PROCEDURES
Interventional Radiology Procedure Note    PATIENT NAME: Tino Post  : 1955  MRN: [id number]     Pre-op Diagnosis:   1  Mediastinal lymphadenopathy      2  Retroperitoneal adenopathy    Post-op Diagnosis:   1  Mediastinal lymphadenopathy      2     Same    Procedure: CT Guided Biopsy of the retroperitoneal adenopathy  Surgeon: Odella Lennox, MD  Assistants: No qualified resident was available, Resident is only observing  Estimated Blood Loss: 10 cc  Findings: Please see full report in PACS  Specimens: Please see full report in PACS  Complications: None  Anesthesia: conscious sedation and local  Prep: 2% Chlorhexidine and alcohol, allowed to dry prior to sterile draping  Timeout: Performed  Fluoro time: Please see full report in PACS  Radiation dose: Please see full report in PACS  Contrast dose: Please see full report in PACS  Contrast type: Please see full report in PACS  Contrast strength: Please see full report in PACS  Contrast route of administration: Please see full report in PACS  Antibiotics: None        Odella Lennox, MD     Date: 2021  Time: 9:09 AM

## 2021-04-13 NOTE — DISCHARGE INSTRUCTIONS
[address]  Interventional Radiology  Dr Mae[address] [facility] [address]: [phone] (M-F 7:30am - 4:00pm)  Off hours or no answer: [phone] (Ask for IR on call)           [address] after your procedure:    1  Limit your activities for 24 hours after your biopsy  2  No driving day of biopsy  3  Return to your normal diet  Small sips of flat soda will help with mild nausea  4  Remove band-aid or dressing 24 hours after procedure  Contact Interventional Radiology at [phone] [place] PATIENTS: Contact Interventional Radiology at [phone]) Shavon Palafox PATIENTS: Contact Interventional Radiology at [phone]) if:    1  Difficulty breathing, nausea or vomiting  2  Chills or fever above 101 degrees F      3  Pain at biopsy site not relieved by medication  4  Develop any redness, swelling, heat, unusual drainage, heavy bruising or bleeding from biopsy site

## 2021-04-13 NOTE — PROGRESS NOTES
Interventional Radiology  History and Physical 4/13/2021     Antony Colbert   1955   [id number]    Assessment/Plan:  Plan approach to periaortic nodes, in the retroperitoneum, below the left renal hilum  Plan Danish position   high to identify the calcium in the renal artery  Plan oblique approach between the muscle and kidney  Problem List Items Addressed This Visit        Cardiovascular and Mediastinum    Mediastinal lymphadenopathy    Relevant Orders    IR biopsy lymph node             Subjective:     Patient ID: Rita Bunn is a 77 y o  male  History of Present Illness  Impressive lung lesion  Biopsy of liver showed hemangioma  For staging  Review of Systems      History reviewed  No pertinent past medical history       Past Surgical History:   Procedure Laterality Date    IR BIOPSY LIVER MASS  4/7/2021    IR THORACENTESIS  4/6/2021    TENDON REPAIR Right     forearm        Social History     Tobacco Use   Smoking Status Former Smoker   Smokeless Tobacco Never Used   Tobacco Comment    quit feb 2021        Social History     Substance and Sexual Activity   Alcohol Use Never    Frequency: Never        Social History     Substance and Sexual Activity   Drug Use Never        No Known Allergies    Current Outpatient Medications   Medication Sig Dispense Refill    benzonatate (TESSALON PERLES) 100 mg capsule Take 1 capsule (100 mg total) by mouth 3 (three) times a day as needed for cough 20 capsule 0    ferrous sulfate 324 (65 Fe) mg Take 1 tablet (324 mg total) by mouth every other day 15 tablet 0     Current Facility-Administered Medications   Medication Dose Route Frequency Provider Last Rate Last Admin    sodium chloride 0 9 % infusion  75 mL/hr Intravenous Continuous Buzz MARILOU Roldan 75 mL/hr at 04/13/21 0812 75 mL/hr at 04/13/21 0812          Objective:    Vitals:    04/13/21 0801 04/13/21 0840 04/13/21 0845 04/13/21 0850   BP: 126/71 137/72 126/60 124/56   Pulse: (!) 109 (!) 110 104 100   Resp: 18 18 18 18   Temp: 98 °F (36 7 °C)      TempSrc: Tympanic      SpO2: 95% 97% 96% 96%   Weight: 62 6 kg (138 lb)      Height: 5' 11" (1 803 m)           Physical Exam      The skin over the left flank is clean, dry, and intact  Normal respiratory excursion  Regular rate and rhythm  No results found for: BNP   Lab Results   Component Value Date    WBC 13 02 (H) 04/07/2021    HGB 11 1 (L) 04/07/2021    HCT 35 5 (L) 04/07/2021    MCV 81 (L) 04/07/2021     (H) 04/07/2021     Lab Results   Component Value Date    INR 1 21 (H) 04/05/2021    PROTIME 15 1 (H) 04/05/2021     Lab Results   Component Value Date    PTT 39 (H) 04/05/2021         I have personally reviewed pertinent imaging and laboratory results  Code Status: Prior  Advance Directive and Living Will:      Power of :    POLST:      This text is generated with voice recognition software  There may be translation, syntax,  or grammatical errors  If you have any questions, please contact the dictating provider

## 2021-04-15 ENCOUNTER — OFFICE VISIT (OUTPATIENT)
Dept: INTERNAL MEDICINE CLINIC | Facility: CLINIC | Age: 66
End: 2021-04-15
Payer: MEDICARE

## 2021-04-15 VITALS
WEIGHT: 143.3 LBS | BODY MASS INDEX: 20.06 KG/M2 | DIASTOLIC BLOOD PRESSURE: 82 MMHG | HEART RATE: 116 BPM | SYSTOLIC BLOOD PRESSURE: 144 MMHG | TEMPERATURE: 98 F | OXYGEN SATURATION: 95 % | HEIGHT: 71 IN

## 2021-04-15 DIAGNOSIS — E44.0 MODERATE PROTEIN-CALORIE MALNUTRITION (HCC): ICD-10-CM

## 2021-04-15 DIAGNOSIS — R59.0 MEDIASTINAL LYMPHADENOPATHY: ICD-10-CM

## 2021-04-15 DIAGNOSIS — C79.51 BONE METASTASES (HCC): ICD-10-CM

## 2021-04-15 DIAGNOSIS — R91.8 LUNG MASS: Primary | ICD-10-CM

## 2021-04-15 DIAGNOSIS — R00.0 TACHYCARDIA: ICD-10-CM

## 2021-04-15 DIAGNOSIS — J43.9 PULMONARY EMPHYSEMA, UNSPECIFIED EMPHYSEMA TYPE (HCC): ICD-10-CM

## 2021-04-15 PROCEDURE — 99495 TRANSJ CARE MGMT MOD F2F 14D: CPT | Performed by: FAMILY MEDICINE

## 2021-04-15 RX ORDER — OXYCODONE HYDROCHLORIDE AND ACETAMINOPHEN 5; 325 MG/1; MG/1
1 TABLET ORAL EVERY 4 HOURS PRN
Qty: 60 TABLET | Refills: 0 | Status: SHIPPED | OUTPATIENT
Start: 2021-04-15 | End: 2021-04-19 | Stop reason: SDUPTHER

## 2021-04-15 RX ORDER — ALBUTEROL SULFATE 90 UG/1
2 AEROSOL, METERED RESPIRATORY (INHALATION) EVERY 6 HOURS PRN
Qty: 1 INHALER | Refills: 3 | Status: SHIPPED | OUTPATIENT
Start: 2021-04-15 | End: 2021-05-15

## 2021-04-15 RX ORDER — FLUTICASONE FUROATE AND VILANTEROL 100; 25 UG/1; UG/1
1 POWDER RESPIRATORY (INHALATION) DAILY
Qty: 1 INHALER | Refills: 3 | Status: SHIPPED | OUTPATIENT
Start: 2021-04-15 | End: 2021-04-19

## 2021-04-15 NOTE — TELEPHONE ENCOUNTER
Per Isidoro Swanson, pt called to confirm this appointment  He was in Dr Irvin Second office at the time and they had done his covid screening for today's appt

## 2021-04-15 NOTE — TELEPHONE ENCOUNTER
Per Nicolette, pt has been scheduled for tomorrow at [facility] with Dr Alehsia Rosenberg  I left appt details on voicemail for patient and requested he call me to confirm

## 2021-04-15 NOTE — PROGRESS NOTES
Assessment/Plan:     No problem-specific Assessment & Plan notes found for this encounter  Diagnoses and all orders for this visit:    Lung mass  -     oxyCODONE-acetaminophen (PERCOCET) 5-325 mg per tablet; Take 1 tablet by mouth every 4 (four) hours as needed for moderate pain for up to 10 daysMax Daily Amount: 6 tablets    Mediastinal lymphadenopathy  -     oxyCODONE-acetaminophen (PERCOCET) 5-325 mg per tablet; Take 1 tablet by mouth every 4 (four) hours as needed for moderate pain for up to 10 daysMax Daily Amount: 6 tablets    Bone metastases (HCC)  -     oxyCODONE-acetaminophen (PERCOCET) 5-325 mg per tablet; Take 1 tablet by mouth every 4 (four) hours as needed for moderate pain for up to 10 daysMax Daily Amount: 6 tablets    Moderate protein-calorie malnutrition (HCC)    Tachycardia    Pulmonary emphysema, unspecified emphysema type (HCC)  -     fluticasone-vilanterol (BREO ELLIPTA) 100-25 mcg/inh inhaler; Inhale 1 puff daily Rinse mouth after use  -     albuterol (PROVENTIL HFA,VENTOLIN HFA) 90 mcg/act inhaler; Inhale 2 puffs every 6 (six) hours as needed for wheezing          Recent hospital records reviewed with him  Available pathology reports reviewed  Metastatic cancer discussed with him but primary not certain yet  At this point likely pulmonary source but not confirmed as of yet by tissue diagnosis  Confirmed with him the appointment with Oncology tomorrow  Stressed to him the importance of keeping this appointment to discuss further treatment options especially with widely metastatic cancer  He does have significant pain  This is unrelieved by Tylenol or Motrin  Prescription for Percocet given  This may need to be increased depending on his response  He does have moderate malnutrition  Discussed with him the importance of increasing the protein in his diet  Discussed options for this  Persistent shortness of breath discussed  COPD likely with his long-term smoking history  Prescription given for inhalers as noted above  Hopefully this will increase his overall stamina  Discussed with him that likely this cancer is not curable but may be treatable but this depends on the final tissue diagnosis as well as the evaluation by Oncology  Continue to abstain from smoking  Watch for any increasing pain  Watch for any nausea  Will have him follow up in about 4-6 weeks or sooner if needed depending on his symptoms as well as the upcoming evaluations  Subjective:     Patient ID: Toyin Alfonso is a 77 y o  male  He presents for follow-up after recent hospitalization  He had presented to the hospital with increasing shortness of breath  He admits that the shortness of breath has been worsening over the last few months  Had noted decreasing stamina as well as weight loss  He had stop smoking about 2 months ago because of this  Appetite had decreased  In-hospital use found to be tachycardic slightly up to around 125  CT of the chest showed significant mediastinal and hilar adenopathy as well as nodular masses in the lungs consistent with metastases and pleural effusion  He was also found to have lytic lesions in the T9 vertebrae as well as 9th rib he was also found to have lesions in the liver and left kidney as well as extensive retroperitoneal adenopathy  Initially this was felt to be metastatic lung cancer given his significant smoking history  He had undergone therapeutic thoracocentesis as well as ultrasound-guided liver biopsy  Definitive diagnosis was not found, however, based on this  He was deemed stable and was able to be discharged home  He did undergo an additional interventional radiology biopsy as an outpatient of 1 of the lymph nodes with the results pending at this appointment  Since discharge, he has been relatively stable  Energy level has remained fair  Much more tired than he had been a few months ago    Has had increasing shortness of breath and decreased stamina  Has noticed that he is very short of breath with stairs  Has had persistent back pain which is unrelieved with over-the-counter ibuprofen  Pain is severe at times  Has difficulty finding a comfortable position  Denies any abdominal pain  Denies any nausea or vomiting  Denies any chest pain or palpitations  Has not followed up with the primary care physician in many years  Review of Systems   Constitutional: Positive for activity change, appetite change, fatigue and unexpected weight change  Negative for chills and fever  HENT: Negative for hearing loss  Eyes: Negative for pain and visual disturbance  Respiratory: Positive for cough and shortness of breath  Negative for chest tightness  Cardiovascular: Negative for chest pain and palpitations  Gastrointestinal: Negative for abdominal pain, blood in stool, diarrhea, nausea and vomiting  Endocrine: Negative for polydipsia, polyphagia and polyuria  Genitourinary: Negative for dysuria  Musculoskeletal: Positive for arthralgias and back pain  Negative for gait problem  Skin: Negative for color change  Neurological: Negative for dizziness and headaches  Hematological: Does not bruise/bleed easily  Psychiatric/Behavioral: Positive for sleep disturbance  Negative for behavioral problems  The patient is not nervous/anxious  The following portions of the patient's history were reviewed and updated as appropriate:   He  has no past medical history on file    He   Patient Active Problem List    Diagnosis Date Noted    Bone metastases ([facility] [address] ) 04/15/2021    Moderate protein-calorie malnutrition ([facility] [address] ) 04/15/2021    Pulmonary emphysema ([facility] [address] ) 04/15/2021    Tachycardia 04/06/2021    Shortness of breath 04/05/2021    Anemia 04/05/2021    Leucocytosis 04/05/2021    Pleural effusion 04/05/2021    Lung mass 04/05/2021    Mediastinal lymphadenopathy 04/05/2021     He  has a past surgical history that includes Tendon repair (Right); IR thoracentesis (4/6/2021); and IR biopsy liver mass (4/7/2021)  His family history includes Heart attack in his brother; Heart disease in his father; Multiple sclerosis in his sister  He  reports that he has quit smoking  He has never used smokeless tobacco  He reports that he does not drink alcohol or use drugs  Current Outpatient Medications   Medication Sig Dispense Refill    benzonatate (TESSALON PERLES) 100 mg capsule Take 1 capsule (100 mg total) by mouth 3 (three) times a day as needed for cough 20 capsule 0    ferrous sulfate 324 (65 Fe) mg Take 1 tablet (324 mg total) by mouth every other day 15 tablet 0    albuterol (PROVENTIL HFA,VENTOLIN HFA) 90 mcg/act inhaler Inhale 2 puffs every 6 (six) hours as needed for wheezing 1 Inhaler 3    fluticasone-vilanterol (BREO ELLIPTA) 100-25 mcg/inh inhaler Inhale 1 puff daily Rinse mouth after use  1 Inhaler 3    oxyCODONE-acetaminophen (PERCOCET) 5-325 mg per tablet Take 1 tablet by mouth every 4 (four) hours as needed for moderate pain for up to 10 daysMax Daily Amount: 6 tablets 60 tablet 0     No current facility-administered medications for this visit  Current Outpatient Medications on File Prior to Visit   Medication Sig    benzonatate (TESSALON PERLES) 100 mg capsule Take 1 capsule (100 mg total) by mouth 3 (three) times a day as needed for cough    ferrous sulfate 324 (65 Fe) mg Take 1 tablet (324 mg total) by mouth every other day     No current facility-administered medications on file prior to visit  He has No Known Allergies       Objective:     Physical Exam  Vitals signs and nursing note reviewed  Constitutional:       General: He is not in acute distress  Appearance: He is well-groomed and underweight  He is ill-appearing  HENT:      Head: Normocephalic and atraumatic  Nose: Nose normal    Eyes:      Conjunctiva/sclera: Conjunctivae normal       Pupils: Pupils are equal, round, and reactive to light  Cardiovascular:      Rate and Rhythm: Regular rhythm  Tachycardia present  Heart sounds: Normal heart sounds  No murmur  No friction rub  No gallop  Pulmonary:      Effort: No tachypnea or respiratory distress  Breath sounds: Decreased breath sounds present  No wheezing or rales  Chest:      Chest wall: No tenderness  Abdominal:      General: There is no distension  Tenderness: There is no abdominal tenderness  There is no guarding or rebound  Musculoskeletal:      Comments:   Muscle wasting noted in the interosseous muscles of the hands   Lymphadenopathy:      Cervical: No cervical adenopathy  Skin:     General: Skin is warm and dry  Neurological:      Mental Status: He is alert and oriented to person, place, and time  Psychiatric:         Behavior: Behavior normal  Behavior is cooperative  Vitals:    04/15/21 1149   BP: 144/82   BP Location: Left arm   Patient Position: Sitting   Cuff Size: Standard   Pulse: (!) 116   Temp: 98 °F (36 7 °C)   SpO2: 95%   Weight: 65 kg (143 lb 4 8 oz)   Height: 5' 11" (1 803 m)       Xr Chest 1 View Portable    Result Date: 4/5/2021  Narrative: CHEST INDICATION:   dyspnea  Patient has suspected COVID-19  COMPARISON:  None EXAM PERFORMED/VIEWS:  XR CHEST PORTABLE FINDINGS: Cardiomediastinal silhouette appears unremarkable  The left diaphragm is elevated  There are relatively diffuse bilateral interstitial infiltrates in the lungs and there may also be some ill-defined alveolar infiltrate on the left side  The pulmonary vasculature centrally seems somewhat prominent  There seems to be some left pleural fluid along the lateral aspect of the chest   No right-sided effusion  No pneumothorax  Osseous structures appear within normal limits for patient age  Impression: Pattern suggests probable CHF with some interstitial edema and minimal left effusion  There is elevation of left hemidiaphragm   Workstation performed: [id number]     Ir In-patient Thoracentesis    Result Date: 4/6/2021  Narrative: ULTRASOUND-GUIDED THORACENTESIS PROCEDURE SUMMARY: Ultrasound of the left  chest Thoracentesis (therapeutic and diagnostic) Targeted ultrasound evaluation of the liver : Attending(s): Shari BAILEY  Assistant(s): None CLINICAL HISTORY: Moderate left pleural effusion COMPLICATIONS: No immediate complications TECHNIQUE: The risks, benefits and alternatives of the procedure were fully discussed  All questions were answered  Informed consent for the procedure was obtained and time-out was performed prior to the procedure  The patient was brought to the interventional radiology area and targeted sonographic evaluation of the liver was performed  An ultrasound image was saved  The patient was then placed in the sitting position on the side of a stretcher  The left chest was then examined with ultrasound and the skin was marked  The skin was then prepped, and draped in usual sterile fashion  All elements of maximal sterile barrier technique were followed (cap, mask, sterile gown, sterile gloves, large sterile sheet, hand hygiene, and 2% chlorhexidine for cutaneous antisepsis)  Lidocaine was administered to the skin  Under direct ultrasound guidance, a 5-[place] Ellevation catheter was advanced into the pleural fluid  An ultrasound image was saved  The catheter was placed to suction drainage  At the end of the procedure the catheter was removed and sterile dressing was applied to the puncture site  Patient tolerated the procedure well  Specimens removed: 800 mL of clear milli-colored pleural fluid  FINDINGS: Sonographic evaluation of the liver demonstrated a 2 3 cm hyperechoic lesion in hepatic segment 7  Moderate-sized left pleural effusion  Impression: Technically successful ultrasound-guided left thoracentesis   Workstation performed: [id number]     Ir Biopsy Liver Mass    Result Date: 4/7/2021  Narrative: PROCEDURE: Ultrasound-guided right hepatic lobe targeted mass biopsy STAFF: BRDALY Parmar Rastafarian: Multiple  COMPLICATIONS: None  MEDICATIONS: Versed 3 milligrams iv  Fentanyl 100 micrograms iv  Moderate sedation was monitored by radiology nursing staff  Monitoring of conscious sedation totaled 30 minutes  INDICATION: History of multiple enlarged lymph nodes with a right liver mass  PROCEDURE: Under real-time ultrasound guidance, a 17-gauge coaxial needle was advanced into the right hepatic lobe liver mass  Under ultrasound guidance, a total of 6 1 1 cm  core biopsies were obtained  The tract with embolized with Gelfoam, and the coaxial needle  was removed  FINDINGS: 1  Pre-procedural ultrasound showed the approximately 2 cm hyperechoic rounded right hepatic lobe liver mass    2   Intra-procedural ultrasound confirmed good positioning of the biopsy needle within the liver mass  3   The in-room pathology team could not confirm lesional material  4   Post-procedural ultrasound showed no immediate complication  Impression: Ultrasound-guided right hepatic lobe targeted mass biopsy  PLAN: Given preliminary interpretation of lack of lesional material obtained on this biopsy, I suspect the patient will ultimately require a CT guided biopsy of his left periaortic enlarged lymph nodes  Workstation performed: [id number]     Cta Ed Chest Pe Study    Result Date: 4/5/2021  Narrative: CTA - CHEST WITH IV CONTRAST - PULMONARY ANGIOGRAM INDICATION:   Shortness of breath PE suspected, intermediate prob, positive D-dimer dyspnea, elevated d dimer  COMPARISON: None  TECHNIQUE: CTA examination of the chest was performed using angiographic technique according to a protocol specifically tailored to evaluate for pulmonary embolism  Axial, sagittal, and coronal 2D reformatted images were created from the source data and  submitted for interpretation  In addition, coronal 3D MIP postprocessing was performed on the acquisition scanner    Radiation dose length product (DLP) for this visit:  285 44 mGy-cm   This examination, like all CT scans performed in the [facility], was performed utilizing techniques to minimize radiation dose exposure, including the use of iterative  reconstruction and automated exposure control  IV Contrast:  85 mL of iohexol (OMNIPAQUE)  FINDINGS: PULMONARY ARTERIAL TREE:  No pulmonary embolus is seen  Mass effect on the upper lobe pulmonary arterial branches  LUNGS:  Lobulated left upper lobe mass measuring 3 x 3 4 cm extending superiorly and along the fissure  Lobular mass in the lingular region measuring 3 x 2 cm  Nodularity in the right lower lobe peripherally measuring 1 5 cm and 0 9 cm in image 59 and 61  Bilateral scattered patchy groundglass areas seen  There is no tracheal or endobronchial lesion  PLEURA:  Moderate left pleural effusion  Enhancement of the pleura with nodularity seen with a enhancing mass inferiorly and posteriorly measuring 3 0 x 1 8 cm  HEART/GREAT VESSELS:  Unremarkable for patient's age  MEDIASTINUM AND MARGARITA:  Multiple prevascular lymph nodes largest measuring 1 5 x 2 3 cm  Subcentimeter superior mediastinal lymph node  Right paratracheal lymph node measuring 3 1 x 2 1 cm  Subcarinal lymph node complex measuring 5 2 x 4 2 cm  Right hilar lymph node complex measuring 4 4 x 3 4 cm and left suprahilar lymph node complex measuring 3 7 x 3 5 cm  CHEST WALL AND LOWER NECK: 1 2 cm hypodense nodule in the left thyroid lobe  Incidental discovery of one or more thyroid nodule(s) measuring less than 1 5 cm and without suspicious features is noted in this patient who is above 28years old; according to guidelines published in the February 2015 white paper on incidental thyroid nodules in the Journal of the [employer] of Radiology [facility]), no further evaluation is recommended   VISUALIZED STRUCTURES IN THE UPPER ABDOMEN:  Heterogeneity of the spleen may be due to phase of contrast or pathologic involvement given the other findings  Aortocaval lymph node measuring 1 4 cm  Partially imaged left para-aortic soft tissue mass measuring 6 4 x 4 6 cm and encases the left splenic artery  OSSEOUS STRUCTURES:  Expansile destructive lesion involving the left 9th rib laterally measuring 3 6 x 1 9 cm  Wedge deformity of T9 vertebral body with areas of lucency sclerosis  Impression: 1  No pulmonary embolus  Mass effect on the upper lobe pulmonary arterial branches  Measured RV/LV ratio is within normal limits at less than 0 9   2   Pathologic mediastinal and hilar lymphadenopathy of lymphomatous process  3   Left upper, lingula and right lower lobe nodular masses of metastasis  4   Left para-aortic pathologic lymphadenopathy of lymphomatous process  CT abdomen pelvis for complete evaluation is suggested  5   Expansile destructive lesion involving the left 9th rib laterally and T9 vertebral body pathologic process of metastasis  6   Moderate left pleural effusion  Enhancing left pleura with nodular masses largest inferiorly of metastasis  I personally discussed this study with Andrea Castillo on 4/5/2021 at 12:19 PM  Workstation performed: [id number]     Ct Abdomen Pelvis W Contrast    Result Date: 4/6/2021  Narrative: CT ABDOMEN AND PELVIS WITH IV CONTRAST INDICATION:   lung masses suspectd mets , primary unknown  COMPARISON:  None  TECHNIQUE:  CT examination of the abdomen and pelvis was performed  Axial, sagittal, and coronal 2D reformatted images were created from the source data and submitted for interpretation  Radiation dose length product (DLP) for this visit:  691 87 mGy-cm   This examination, like all CT scans performed in the [facility], was performed utilizing techniques to minimize radiation dose exposure, including the use of iterative  reconstruction and automated exposure control   IV Contrast:  70 mL of iodixanol (VISIPAQUE) Enteric Contrast:  Enteric contrast was not administered  FINDINGS: ABDOMEN LOWER CHEST:  Loculated left pleural effusion with areas of nodular pleural thickening better evaluated on recently performed CT chest examination  LIVER/BILIARY TREE:  2 5 cm hypodense lesion at the right hepatic lobe  GALLBLADDER:  No calcified gallstones  No pericholecystic inflammatory change  SPLEEN:  Unremarkable  PANCREAS:  Unremarkable  ADRENAL GLANDS:  Unremarkable  KIDNEYS/URETERS:  2 8 cm mass at the left inferior pole  No hydronephrosis  STOMACH AND BOWEL:  Unremarkable  APPENDIX:  No findings to suggest appendicitis  ABDOMINOPELVIC CAVITY:  Retroperitoneal adenopathy including a large left para-aortic lymph node conglomerate measuring 5 8 x 5 3 x 9 3 cm, which compresses the left renal artery and anteriorly displaces the aorta and left renal artery  Mild pelvic ascites  VESSELS:  Unremarkable for patient's age  PELVIS REPRODUCTIVE ORGANS:  Unremarkable for patient's age  URINARY BLADDER:  Unremarkable  ABDOMINAL WALL/INGUINAL REGIONS:  Unremarkable  OSSEOUS STRUCTURES:  Expansile lytic lesion at the left 9th rib and right L4 transverse process  1 4 cm lytic lesion at the right sacral ala bordering the right sacroiliac joint  Impression: Indeterminate mass lesions in the liver and at the left kidney suspicious for metastatic disease  Extensive retroperitoneal adenopathy including a large left para-aortic lymph node conglomerate measuring up to 9 3 cm as detailed above  Osseous metastasis as detailed above   Workstation performed: [id number] Outpatient Visit on 04/13/2021   Component Date Value Ref Range Status    Case Report 04/13/2021    Final                    Value:Surgical Pathology Report                         Case: [id number]                                   Authorizing Provider:  Steve Cid MD        Collected:           04/13/2021 0859              Ordering Location:     [facility] Received:            04/13/2021 8117                                     CAT Scan                                                                     Pathologist:           Benny Carter MD                                                               Specimen:    Lymph Node, left daniel-aortic                                                               Final Diagnosis 04/13/2021    Final                    Value: This result contains rich text formatting which cannot be displayed here   Additional Information 04/13/2021    Final                    Value: This result contains rich text formatting which cannot be displayed here  Dagoberto Yasmeenel Description 04/13/2021    Final                    Value: This result contains rich text formatting which cannot be displayed here   Clinical Information 04/13/2021    Final                    Value:enlarged lymph node    CTA  LUNGS:    Lobulated left upper lobe mass measuring 3 x 3 4 cm extending superiorly and along the fissure  Lobular mass in the lingular region measuring 3 x 2 cm  Nodularity in the right lower lobe peripherally measuring 1 5 cm and 0 9 cm in image 59 and 61  Bilateral scattered patchy groundglass areas seen  There is no tracheal or endobronchial lesion      PLEURA:  Moderate left pleural effusion  Enhancement of the pleura with nodularity seen with a enhancing mass inferiorly and posteriorly measuring 3 0 x 1 8 cm      HEART/GREAT VESSELS:  Unremarkable for patient's age      MEDIASTINUM AND MARGARITA:  Multiple prevascular lymph nodes largest measuring 1 5 x 2 3 cm  Subcentimeter superior mediastinal lymph node  Right paratracheal lymph node measuring 3 1 x 2 1 cm  Subcarinal lymph node complex measuring 5 2 x 4 2 cm  Right   hilar lymph node complex measuring 4 4 x 3 4 cm and left suprahilar lymph node complex measuring 3 7 x 3 5                           cm      KIDNEYS/URETERS:  2 8 cm mass at the left inferior pole  No hydronephrosis     Admission on 04/05/2021, Discharged on 04/07/2021   Component Date Value Ref Range Status    Sodium 04/06/2021 137  136 - 145 mmol/L Final    Potassium 04/06/2021 4 0  3 5 - 5 3 mmol/L Final    Chloride 04/06/2021 104  100 - 108 mmol/L Final    CO2 04/06/2021 25  21 - 32 mmol/L Final    ANION GAP 04/06/2021 8  4 - 13 mmol/L Final    BUN 04/06/2021 12  5 - 25 mg/dL Final    Creatinine 04/06/2021 0 68  0 60 - 1 30 mg/dL Final    Standardized to IDMS reference method    Glucose 04/06/2021 95  65 - 140 mg/dL Final    If the patient is fasting, the ADA then defines impaired fasting glucose as > 100 mg/dL and diabetes as > or equal to 123 mg/dL  Specimen collection should occur prior to Sulfasalazine administration due to the potential for falsely depressed results  Specimen collection should occur prior to Sulfapyridine administration due to the potential for falsely elevated results   Calcium 04/06/2021 9 0  8 3 - 10 1 mg/dL Final    Corrected Calcium 04/06/2021 10 4* 8 3 - 10 1 mg/dL Final    AST 04/06/2021 42  5 - 45 U/L Final    Specimen collection should occur prior to Sulfasalazine administration due to the potential for falsely depressed results   ALT 04/06/2021 83* 12 - 78 U/L Final    Specimen collection should occur prior to Sulfasalazine and/or Sulfapyridine administration due to the potential for falsely depressed results   Alkaline Phosphatase 04/06/2021 116  46 - 116 U/L Final    Total Protein 04/06/2021 7 7  6 4 - 8 2 g/dL Final    Albumin 04/06/2021 2 2* 3 5 - 5 0 g/dL Final    Total Bilirubin 04/06/2021 0 38  0 20 - 1 00 mg/dL Final    Use of this assay is not recommended for patients undergoing treatment with eltrombopag due to the potential for falsely elevated results      eGFR 04/06/2021 100  ml/min/1 73sq m Final    WBC 04/06/2021 12 82* 4 31 - 10 16 Thousand/uL Final    RBC 04/06/2021 4 50  3 88 - 5 62 Million/uL Final    Hemoglobin 04/06/2021 11 2* 12 0 - 17 0 g/dL Final    Hematocrit 04/06/2021 36 0* 36 5 - 49 3 % Final    MCV 04/06/2021 80* 82 - 98 fL Final    MCH 04/06/2021 24 9* 26 8 - 34 3 pg Final    MCHC 04/06/2021 31 1* 31 4 - 37 4 g/dL Final    RDW 04/06/2021 14 0  11 6 - 15 1 % Final    MPV 04/06/2021 9 1  8 9 - 12 7 fL Final    Platelets 06/11/5920 822* 149 - 390 Thousands/uL Final    nRBC 04/06/2021 0  /100 WBCs Final    Neutrophils Relative 04/06/2021 78* 43 - 75 % Final    Immat GRANS % 04/06/2021 1  0 - 2 % Final    Lymphocytes Relative 04/06/2021 6* 14 - 44 % Final    Monocytes Relative 04/06/2021 13* 4 - 12 % Final    Eosinophils Relative 04/06/2021 1  0 - 6 % Final    Basophils Relative 04/06/2021 1  0 - 1 % Final    Neutrophils Absolute 04/06/2021 10 21* 1 85 - 7 62 Thousands/µL Final    Immature Grans Absolute 04/06/2021 0 07  0 00 - 0 20 Thousand/uL Final    Lymphocytes Absolute 04/06/2021 0 71  0 60 - 4 47 Thousands/µL Final    Monocytes Absolute 04/06/2021 1 66* 0 17 - 1 22 Thousand/µL Final    Eosinophils Absolute 04/06/2021 0 10  0 00 - 0 61 Thousand/µL Final    Basophils Absolute 04/06/2021 0 07  0 00 - 0 10 Thousands/µL Final    LD 04/06/2021 361* 81 - 234 U/L Final    Retic Ct Abs 04/06/2021 49,100  14,356-105,094 Final    Retic Ct Pct 04/06/2021 1 09  0 37 - 1 87 % Final    Iron Saturation 04/06/2021 8  % Final    TIBC 04/06/2021 227* 250 - 450 ug/dL Final    Iron 04/06/2021 18* 65 - 175 ug/dL Final    Patients treated with metal-binding drugs (ie  Deferoxamine) may have depressed iron values   Ferritin 04/06/2021 1,055* 8 - 388 ng/mL Final    TSH 3RD GENERATON 04/06/2021 1 210  0 358 - 3 740 uIU/mL Final    Site 04/06/2021 Left Plueral   Final    Color, Fluid 04/06/2021 Yellow  Clear, Colorless,Yellow Final    Clarity, Fluid 04/06/2021 Hazy* Clear Final    WBC, Fluid 04/06/2021 3,729  /ul Final    The reference range and other method performance specifications have not been established for this body fluid   The test result must be integrated into the clinical context for interpretation   Body Fluid Culture, Sterile 04/06/2021 No growth   Final    Gram Stain Result 04/06/2021 Rare Mononuclear Cells   Final    Gram Stain Result 04/06/2021 No Polys or Bacteria seen   Final    Glucose, Fluid 04/06/2021 94  mg/dL Final    The reference range and other method performance specifications have not been established for this body fluid  The test result must be integrated into the clinical context for interpretation   LD, Fluid 04/06/2021 481  U/L Final    The reference range and other method performance specifications have not been established for this body fluid  The test result must be integrated into the clinical context for interpretation   Case Report 04/06/2021    Final                    Value:Non-gynecologic Cytology                          Case: [id number]                                  Authorizing Provider:  Shalom Boland DO              Collected:           04/06/2021 1620              Ordering Location:     [address] [place] [address]      Received:            04/06/2021 [address] [place] [address] 9                                                              Pathologist:           Marylu Murry MD                                                         Specimens:   A) - Thoracentesis, Left                                                                            B) - Thoracentesis, Cell Block                                                             Final Diagnosis 04/06/2021    Final                    Value: This result contains rich text formatting which cannot be displayed here  Erven Ally Description 04/06/2021    Final                    Value: This result contains rich text formatting which cannot be displayed here   Additional Information 04/06/2021    Final                    Value: This result contains rich text formatting which cannot be displayed here      [facility] BODY FLUID 04/06/2021 7 7   Final    The reference range and other method performance specifications have not been established for this body fluid  The test result must be integrated into the clinical context for interpretation   Protein, Fluid 04/06/2021 5 1  g/dL Final    The reference range and other method performance specifications have not been established for this body fluid  The test result must be integrated into the clinical context for interpretation      Total Counted 04/06/2021 100   Final    Neutrophils % (Fluid) 04/06/2021 3  % Final    Lymphs % (Fluid) 04/06/2021 94  % Final    Monocytes % (Fluid) 04/06/2021 3  % Final    WBC 04/07/2021 13 02* 4 31 - 10 16 Thousand/uL Final    RBC 04/07/2021 4 41  3 88 - 5 62 Million/uL Final    Hemoglobin 04/07/2021 11 1* 12 0 - 17 0 g/dL Final    Hematocrit 04/07/2021 35 5* 36 5 - 49 3 % Final    MCV 04/07/2021 81* 82 - 98 fL Final    MCH 04/07/2021 25 2* 26 8 - 34 3 pg Final    MCHC 04/07/2021 31 3* 31 4 - 37 4 g/dL Final    RDW 04/07/2021 13 9  11 6 - 15 1 % Final    MPV 04/07/2021 9 0  8 9 - 12 7 fL Final    Platelets 96/30/7493 773* 149 - 390 Thousands/uL Final    nRBC 04/07/2021 0  /100 WBCs Final    Neutrophils Relative 04/07/2021 79* 43 - 75 % Final    Immat GRANS % 04/07/2021 1  0 - 2 % Final    Lymphocytes Relative 04/07/2021 5* 14 - 44 % Final    Monocytes Relative 04/07/2021 13* 4 - 12 % Final    Eosinophils Relative 04/07/2021 1  0 - 6 % Final    Basophils Relative 04/07/2021 1  0 - 1 % Final    Neutrophils Absolute 04/07/2021 10 47* 1 85 - 7 62 Thousands/µL Final    Immature Grans Absolute 04/07/2021 0 06  0 00 - 0 20 Thousand/uL Final    Lymphocytes Absolute 04/07/2021 0 58* 0 60 - 4 47 Thousands/µL Final    Monocytes Absolute 04/07/2021 1 71* 0 17 - 1 22 Thousand/µL Final    Eosinophils Absolute 04/07/2021 0 10  0 00 - 0 61 Thousand/µL Final    Basophils Absolute 04/07/2021 0 10  0 00 - 0 10 Thousands/µL Final    Sodium 04/07/2021 138  136 - 145 mmol/L Final  Potassium 04/07/2021 4 1  3 5 - 5 3 mmol/L Final    Chloride 04/07/2021 106  100 - 108 mmol/L Final    CO2 04/07/2021 26  21 - 32 mmol/L Final    ANION GAP 04/07/2021 6  4 - 13 mmol/L Final    BUN 04/07/2021 14  5 - 25 mg/dL Final    Creatinine 04/07/2021 0 69  0 60 - 1 30 mg/dL Final    Standardized to IDMS reference method    Glucose 04/07/2021 97  65 - 140 mg/dL Final    If the patient is fasting, the ADA then defines impaired fasting glucose as > 100 mg/dL and diabetes as > or equal to 123 mg/dL  Specimen collection should occur prior to Sulfasalazine administration due to the potential for falsely depressed results  Specimen collection should occur prior to Sulfapyridine administration due to the potential for falsely elevated results   Calcium 04/07/2021 8 7  8 3 - 10 1 mg/dL Final    Corrected Calcium 04/07/2021 10 3* 8 3 - 10 1 mg/dL Final    AST 04/07/2021 25  5 - 45 U/L Final    Specimen collection should occur prior to Sulfasalazine administration due to the potential for falsely depressed results   ALT 04/07/2021 64  12 - 78 U/L Final    Specimen collection should occur prior to Sulfasalazine and/or Sulfapyridine administration due to the potential for falsely depressed results   Alkaline Phosphatase 04/07/2021 100  46 - 116 U/L Final    Total Protein 04/07/2021 7 3  6 4 - 8 2 g/dL Final    Albumin 04/07/2021 2 0* 3 5 - 5 0 g/dL Final    Total Bilirubin 04/07/2021 0 39  0 20 - 1 00 mg/dL Final    Use of this assay is not recommended for patients undergoing treatment with eltrombopag due to the potential for falsely elevated results      eGFR 04/07/2021 99  ml/min/1 73sq m Final    Case Report 04/07/2021    Final                    Value:Surgical Pathology Report                         Case: [id number]                                   Authorizing Provider:  Gissell Tatum MD PhD          Collected:           04/07/2021 1255              Ordering Location:     [address] [place] [address] Received:            04/07/2021 Praveen Ross                                                              Pathologist:           Blanca Shaffer MD                                                                 Specimen:    Liver, liver mass                                                                          Final Diagnosis 04/07/2021    Final                    Value: This result contains rich text formatting which cannot be displayed here   Additional Information 04/07/2021    Final                    Value: This result contains rich text formatting which cannot be displayed here  Xiao Parra Description 04/07/2021    Final                    Value: This result contains rich text formatting which cannot be displayed here      Clinical Information 04/07/2021    Final                    Value:liver mass lung mass   Admission on 04/05/2021, Discharged on 04/05/2021   Component Date Value Ref Range Status    WBC 04/05/2021 12 24* 4 31 - 10 16 Thousand/uL Final    RBC 04/05/2021 4 60  3 88 - 5 62 Million/uL Final    Hemoglobin 04/05/2021 11 4* 12 0 - 17 0 g/dL Final    Hematocrit 04/05/2021 37 4  36 5 - 49 3 % Final    MCV 04/05/2021 81* 82 - 98 fL Final    MCH 04/05/2021 24 8* 26 8 - 34 3 pg Final    MCHC 04/05/2021 30 5* 31 4 - 37 4 g/dL Final    RDW 04/05/2021 13 7  11 6 - 15 1 % Final    MPV 04/05/2021 8 8* 8 9 - 12 7 fL Final    Platelets 50/86/4979 841* 149 - 390 Thousands/uL Final    nRBC 04/05/2021 0  /100 WBCs Final    Neutrophils Relative 04/05/2021 82* 43 - 75 % Final    Immat GRANS % 04/05/2021 0  0 - 2 % Final    Lymphocytes Relative 04/05/2021 4* 14 - 44 % Final    Monocytes Relative 04/05/2021 13* 4 - 12 % Final    Eosinophils Relative 04/05/2021 0  0 - 6 % Final    Basophils Relative 04/05/2021 1  0 - 1 % Final    Neutrophils Absolute 04/05/2021 9 94* 1 85 - 7 62 Thousands/µL Final    Immature Grans Absolute 04/05/2021 0 05  0 00 - 0 20 Thousand/uL Final    Lymphocytes Absolute 04/05/2021 0 52* 0 60 - 4 47 Thousands/µL Final    Monocytes Absolute 04/05/2021 1 61* 0 17 - 1 22 Thousand/µL Final    Eosinophils Absolute 04/05/2021 0 03  0 00 - 0 61 Thousand/µL Final    Basophils Absolute 04/05/2021 0 09  0 00 - 0 10 Thousands/µL Final    Protime 04/05/2021 15 1* 11 6 - 14 5 seconds Final    INR 04/05/2021 1 21* 0 84 - 1 19 Final    PTT 04/05/2021 39* 23 - 37 seconds Final    Therapeutic Heparin Range =  60-90 seconds    Sodium 04/05/2021 136  136 - 145 mmol/L Final    Potassium 04/05/2021 3 8  3 5 - 5 3 mmol/L Final    Chloride 04/05/2021 102  100 - 108 mmol/L Final    CO2 04/05/2021 25  21 - 32 mmol/L Final    ANION GAP 04/05/2021 9  4 - 13 mmol/L Final    BUN 04/05/2021 11  5 - 25 mg/dL Final    Creatinine 04/05/2021 0 73  0 60 - 1 30 mg/dL Final    Standardized to IDMS reference method    Glucose 04/05/2021 120  65 - 140 mg/dL Final    If the patient is fasting, the ADA then defines impaired fasting glucose as > 100 mg/dL and diabetes as > or equal to 123 mg/dL  Specimen collection should occur prior to Sulfasalazine administration due to the potential for falsely depressed results  Specimen collection should occur prior to Sulfapyridine administration due to the potential for falsely elevated results   Calcium 04/05/2021 9 2  8 3 - 10 1 mg/dL Final    Corrected Calcium 04/05/2021 10 6* 8 3 - 10 1 mg/dL Final    AST 04/05/2021 30  5 - 45 U/L Final    Specimen collection should occur prior to Sulfasalazine administration due to the potential for falsely depressed results   ALT 04/05/2021 73  12 - 78 U/L Final    Specimen collection should occur prior to Sulfasalazine administration due to the potential for falsely depressed results       Alkaline Phosphatase 04/05/2021 104  46 - 116 U/L Final    Total Protein 04/05/2021 8 0  6 4 - 8 2 g/dL Final    Albumin 04/05/2021 2 3* 3 5 - 5 0 g/dL Final    Total Bilirubin 04/05/2021 0  44  0 20 - 1 00 mg/dL Final    Use of this assay is not recommended for patients undergoing treatment with eltrombopag due to the potential for falsely elevated results   eGFR 04/05/2021 97  ml/min/1 73sq m Final    Magnesium 04/05/2021 2 0  1 6 - 2 6 mg/dL Final    D-Dimer, Quant 04/05/2021 4 25* <0 50 ug/ml FEU Final    Reference and upper limits to exclude DVT and PE are the same  Do not use to exclude if clinical symptoms are present   Troponin I 04/05/2021 <0 02  <=0 04 ng/mL Final    3Autovalidation override  [employer] Chemistry analyzer 99% cutoff is > 0 04 ng/mL in network labs     o cTnI 99% cutoff is useful only when applied to patients in the clinical setting of myocardial ischemia   o cTnI 99% cutoff should be interpreted in the context of clinical history, ECG findings and possibly cardiac imaging to establish correct diagnosis  o cTnI 99% cutoff may be suggestive but clearly not indicative of a coronary event without the clinical setting of myocardial ischemia   NT-proBNP 04/05/2021 1,121* <125 pg/mL Final    LACTIC ACID 04/05/2021 1 0  0 5 - 2 0 mmol/L Final    Blood Culture 04/05/2021 No Growth After 5 Days  Final    Blood Culture 04/05/2021 No Growth After 5 Days  Final    SARS-CoV-2 04/05/2021 Negative  Negative Final    INFLUENZA A PCR 04/05/2021 Negative  Negative Final    INFLUENZA B PCR 04/05/2021 Negative  Negative Final    RSV PCR 04/05/2021 Negative  Negative Final    Ventricular Rate 04/05/2021 115  BPM Final    Atrial Rate 04/05/2021 115  BPM Final    ID Interval 04/05/2021 118  ms Final    QRSD Interval 04/05/2021 76  ms Final    QT Interval 04/05/2021 324  ms Final    QTC Interval 04/05/2021 448  ms Final    P Axis 04/05/2021 73  degrees Final    QRS Axis 04/05/2021 72  degrees Final    T Wave [place] 04/05/2021 62  degrees Final       Transitional Care Management Review:  Danny Murguia is a 77 y o  male here for TCM follow up       During the TCM phone call patient stated:    TCM Call (since 3/15/2021)     Date and time call was made  4/8/2021  [facility] 64 care reviewed  Records reviewed    Patient was hospitialized at  [facility]        Date of Admission  04/05/21    Date of discharge  04/07/21    Diagnosis  Shortness of breath    Disposition  Home    Were the patients medications reviewed and updated  Yes    Current Symptoms  None; Back pain - right side; Fatigue    Back pain, right side, severity  Moderate    Back pain, right side, onset  Ongoing      TCM Call (since 3/15/2021)     Post hospital issues  None    Should patient be enrolled in anticoag monitoring? No    Scheduled for follow up? Yes    Patients specialists  Other (comment)    Other specialists names  Dr Flor Vargas hematology    Did you obtain your prescribed medications  Yes    Do you need help managing your prescriptions or medications  No    Is transportation to your appointment needed  No    I have advised the patient to call PCP with any new or worsening symptoms  [address]  Family    The type of support provided  Physical; Emotional    Do you have social support  Yes, as much as I need    Are you recieving any outpatient services  No    Are you recieving home care services  No    Are you using any community resources  No    Current waiver services  No    Have you fallen in the last 12 months  No    Interperter language line needed  No    Counseling  Patient    Counseling topics  Activities of daily living    Comments  Pt stated he has R side back pain  Stated hurts when getting out of bed  Has an appetite and some fatigue                Glen Morfin MD

## 2021-04-16 ENCOUNTER — OFFICE VISIT (OUTPATIENT)
Dept: HEMATOLOGY ONCOLOGY | Facility: CLINIC | Age: 66
End: 2021-04-16
Payer: MEDICARE

## 2021-04-16 VITALS
BODY MASS INDEX: 19.96 KG/M2 | HEIGHT: 71 IN | HEART RATE: 118 BPM | TEMPERATURE: 98.6 F | SYSTOLIC BLOOD PRESSURE: 135 MMHG | DIASTOLIC BLOOD PRESSURE: 78 MMHG | RESPIRATION RATE: 17 BRPM | OXYGEN SATURATION: 94 % | WEIGHT: 142.6 LBS

## 2021-04-16 DIAGNOSIS — C79.51 BONE METASTASES (HCC): ICD-10-CM

## 2021-04-16 DIAGNOSIS — C64.2 RENAL CELL CARCINOMA OF LEFT KIDNEY (HCC): Primary | ICD-10-CM

## 2021-04-16 PROCEDURE — 1124F ACP DISCUSS-NO DSCNMKR DOCD: CPT | Performed by: INTERNAL MEDICINE

## 2021-04-16 PROCEDURE — 99215 OFFICE O/P EST HI 40 MIN: CPT | Performed by: INTERNAL MEDICINE

## 2021-04-16 NOTE — LETTER
April 16, 2021     Jake Sampson, [address] [facility]  [address]    Patient: Francis Barcenas   YOB: 1955   Date of Visit: 4/16/2021       Dear Dr Jaison Dupree:    Thank you for referring Francis Barcenas to me for evaluation  Below are my notes for this consultation  If you have questions, please do not hesitate to call me  I look forward to following your patient along with you  Sincerely,        Kizzy Moralez DO        CC: Froylan Farmer MD PhD  Kizzy Moralez DO  4/16/2021 11:46 AM  Incomplete  ST [address] [facility] [address]  [address] [facility]  [phone]    Antony Simms [id number]  04/16/21    Discussion:   In summary, this is a 70-year-old male history of recently diagnosed stage IV renal cell carcinoma with bone metastases  Intermediate/high risk  I would favor the use of Opdivo/Yervoy, consistent with NCCN category 1 recommendation  Addition of Xgeva for reduction of skeletal related events  We reviewed potential toxicities of this program including but not limited to infusion reactions, diarrhea, eczematoid rash, hypothyroidism, hypocalcemia, injection site reaction, rare episodes of ONJ  We reviewed that other organs can be affected by autoimmune processes but that this is less common  Toxicities are generally considered to be reversible  Likelihood of disease control, stabilization, improvement is moderate  We reviewed that the goal of therapy is disease control, palliative benefit, survival benefit but that cure is not likely  Reimaging 2 months after treatment initiation  Oral calcium supplementation is recommended  I discussed the above with the patient    The patient  voiced understanding and agreement   ______________________________________________________________________    Chief Complaint   Patient presents with    Follow-up       HPI:  Oncology History   Renal cell carcinoma of left kidney ([facility] [address] )   4/5/2021 Initial Diagnosis    April 2021 patient presented with weight loss, dyspnea  April 5, 2021 CT of the chest showed 3 4 cm left upper lobe mass, 3 x 2 cm lingular mass, right lower lobe nodules up to 1 5 cm  Pleural enhancement and moderate left effusion are noted  Mediastinal lymph nodes up to 5 2 cm  Bony destructive lesion in the left 9th rib  CT of the abdomen pelvis showed indeterminate lesions in the left kidney and liver  Retroperitoneal lymph nodes up to 9 3 cm  L4 right transverse process and right sacral destructive lesions noted  WBC is 13 0, hemoglobin 11 1, MCV 81, platelet count 268, neutrophil 79, bands 1, lymphocytes 5, monocytes 13  CMP showed albumin 2 0, total protein 7 3, otherwise normal   Liver biopsy showed hemangioma  4/13/2021 Biopsy    April 13, 2021 biopsy of left periaortic node showed metastatic carcinoma consistent with renal primary  Interval History:  Clinically stable  ECOG-  1 - Symptomatic but completely ambulatory    Review of Systems   Constitutional: Negative for chills and fever  HENT: Negative for nosebleeds  Eyes: Negative for discharge  Respiratory: Negative for cough and shortness of breath  Cardiovascular: Negative for chest pain  Gastrointestinal: Negative for abdominal pain, constipation and diarrhea  Endocrine: Negative for polydipsia  Genitourinary: Negative for hematuria  Musculoskeletal: Negative for arthralgias  Skin: Negative for color change  Allergic/Immunologic: Negative for immunocompromised state  Neurological: Negative for dizziness and headaches  Hematological: Negative for adenopathy  Psychiatric/Behavioral: Negative for agitation  History reviewed  No pertinent past medical history    Patient Active Problem List   Diagnosis    Shortness of breath    Anemia    Leucocytosis    Pleural effusion    Lung mass    Mediastinal lymphadenopathy    Tachycardia    Bone metastases ([facility] )    [facility]    Pulmonary emphysema ([facility] )    Renal cell carcinoma of left kidney (HCC)       Current Outpatient Medications:     albuterol (PROVENTIL HFA,VENTOLIN HFA) 90 mcg/act inhaler, Inhale 2 puffs every 6 (six) hours as needed for wheezing, Disp: 1 Inhaler, Rfl: 3    benzonatate (TESSALON PERLES) 100 mg capsule, Take 1 capsule (100 mg total) by mouth 3 (three) times a day as needed for cough, Disp: 20 capsule, Rfl: 0    ferrous sulfate 324 (65 Fe) mg, Take 1 tablet (324 mg total) by mouth every other day, Disp: 15 tablet, Rfl: 0    fluticasone-vilanterol (BREO ELLIPTA) 100-25 mcg/inh inhaler, Inhale 1 puff daily Rinse mouth after use , Disp: 1 Inhaler, Rfl: 3    oxyCODONE-acetaminophen (PERCOCET) 5-325 mg per tablet, Take 1 tablet by mouth every 4 (four) hours as needed for moderate pain for up to 10 daysMax Daily Amount: 6 tablets, Disp: 60 tablet, Rfl: 0  No Known Allergies  Past Surgical History:   Procedure Laterality Date    IR BIOPSY LIVER MASS  4/7/2021    IR THORACENTESIS  4/6/2021    TENDON REPAIR Right     forearm     Social History     Objective:  Vitals:    04/16/21 1058   BP: 135/78   BP Location: Left arm   Patient Position: Sitting   Pulse: (!) 118   Resp: 17   Temp: 98 6 °F (37 °C)   TempSrc: Tympanic   SpO2: 94%   Weight: 64 7 kg (142 lb 9 6 oz)   Height: 5' 11" (1 803 m)     Physical Exam  Constitutional:       Appearance: He is well-developed  HENT:      Head: Normocephalic and atraumatic  Eyes:      Pupils: Pupils are equal, round, and reactive to light  Neck:      Musculoskeletal: Neck supple  Cardiovascular:      Rate and Rhythm: Normal rate and regular rhythm  Heart sounds: No murmur  Pulmonary:      Breath sounds: Normal breath sounds  No wheezing or rales  Abdominal:      Palpations: Abdomen is soft  Tenderness: There is no abdominal tenderness  Musculoskeletal: Normal range of motion           General: No tenderness  Lymphadenopathy:      Cervical: No cervical adenopathy  Skin:     Findings: No erythema or rash  Neurological:      Mental Status: He is alert and oriented to person, place, and time  Cranial Nerves: No cranial nerve deficit  Deep Tendon Reflexes: Reflexes are normal and symmetric  Psychiatric:         Behavior: Behavior normal            Labs: I personally reviewed the labs and imaging pertinent to this patient care  Elyssa Glover DO  4/16/2021 11:07 AM  Sign when Signing Visit  [address] [facility] [address]  [address] [facility] [address]  [phone]  [phone]    [address],1955, [id number]  04/16/21    Discussion:   ***    I discussed the above with the patient  The patient *** voiced understanding and agreement   ______________________________________________________________________    Chief Complaint   Patient presents with    Follow-up       HPI:  Oncology History   Renal cell carcinoma of left kidney ([address] )   4/5/2021 Initial Diagnosis    April 2021 patient presented with weight loss, dyspnea  April 5, 2021 CT of the chest showed 3 4 cm left upper lobe mass, 3 x 2 cm lingular mass, right lower lobe nodules up to 1 5 cm  Pleural enhancement and moderate left effusion are noted  Mediastinal lymph nodes up to 5 2 cm  Bony destructive lesion in the left 9th rib  CT of the abdomen pelvis showed indeterminate lesions in the left kidney and liver  Retroperitoneal lymph nodes up to 9 3 cm  L4 right transverse process and right sacral destructive lesions noted  WBC is 13 0, hemoglobin 11 1, MCV 81, platelet count 696, neutrophil 79, bands 1, lymphocytes 5, monocytes 13  CMP showed albumin 2 0, total protein 7 3, otherwise normal   Liver biopsy showed hemangioma  4/13/2021 Biopsy    April 13, 2021 biopsy of left periaortic node showed metastatic carcinoma consistent with renal primary             Interval History:  ***  {performance status:75865}    Review of Systems    History reviewed  No pertinent past medical history  Patient Active Problem List   Diagnosis    Shortness of breath    Anemia    Leucocytosis    Pleural effusion    Lung mass    Mediastinal lymphadenopathy    Tachycardia    Bone metastases (HCC)    Moderate protein-calorie malnutrition (HCC)    Pulmonary emphysema (HCC)    Renal cell carcinoma of left kidney (HCC)       Current Outpatient Medications:     albuterol (PROVENTIL HFA,VENTOLIN HFA) 90 mcg/act inhaler, Inhale 2 puffs every 6 (six) hours as needed for wheezing, Disp: 1 Inhaler, Rfl: 3    benzonatate (TESSALON PERLES) 100 mg capsule, Take 1 capsule (100 mg total) by mouth 3 (three) times a day as needed for cough, Disp: 20 capsule, Rfl: 0    ferrous sulfate 324 (65 Fe) mg, Take 1 tablet (324 mg total) by mouth every other day, Disp: 15 tablet, Rfl: 0    fluticasone-vilanterol (BREO ELLIPTA) 100-25 mcg/inh inhaler, Inhale 1 puff daily Rinse mouth after use , Disp: 1 Inhaler, Rfl: 3    oxyCODONE-acetaminophen (PERCOCET) 5-325 mg per tablet, Take 1 tablet by mouth every 4 (four) hours as needed for moderate pain for up to 10 daysMax Daily Amount: 6 tablets, Disp: 60 tablet, Rfl: 0  No Known Allergies  Past Surgical History:   Procedure Laterality Date    IR BIOPSY LIVER MASS  4/7/2021    IR THORACENTESIS  4/6/2021    TENDON REPAIR Right     forearm     Social History     Objective:  Vitals:    04/16/21 1058   BP: 135/78   BP Location: Left arm   Patient Position: Sitting   Pulse: (!) 118   Resp: 17   Temp: 98 6 °F (37 °C)   TempSrc: Tympanic   SpO2: 94%   Weight: 64 7 kg (142 lb 9 6 oz)   Height: 5' 11" (1 803 m)     Physical Exam      Labs: I personally reviewed the labs and imaging pertinent to this patient care

## 2021-04-16 NOTE — PROGRESS NOTES
[facility] [place]  [address]  [place] [address]  [phone]  [phone]    [facility] [id number]  04/16/21    Discussion:   In summary, this is a 54-year-old male history of recently diagnosed stage IV renal cell carcinoma with bone metastases  Intermediate/high risk  I would favor the use of Opdivo/Yervoy, consistent with NCCN category 1 recommendation  Addition of Xgeva for reduction of skeletal related events  We reviewed potential toxicities of this program including but not limited to infusion reactions, diarrhea, eczematoid rash, hypothyroidism, hypocalcemia, injection site reaction, rare episodes of ONJ  We reviewed that other organs can be affected by autoimmune processes but that this is less common  Toxicities are generally considered to be reversible  Likelihood of disease control, stabilization, improvement is moderate  We reviewed that the goal of therapy is disease control, palliative benefit, survival benefit but that cure is not likely  Reimaging 2 months after treatment initiation  Oral calcium supplementation is recommended  I discussed the above with the patient  The patient  voiced understanding and agreement   ______________________________________________________________________    Chief Complaint   Patient presents with    Follow-up       HPI:  Oncology History   Renal cell carcinoma of left kidney ([facility] [address] )   4/5/2021 Initial Diagnosis    April 2021 patient presented with weight loss, dyspnea  April 5, 2021 CT of the chest showed 3 4 cm left upper lobe mass, 3 x 2 cm lingular mass, right lower lobe nodules up to 1 5 cm  Pleural enhancement and moderate left effusion are noted  Mediastinal lymph nodes up to 5 2 cm  Bony destructive lesion in the left 9th rib  CT of the abdomen pelvis showed indeterminate lesions in the left kidney and liver  Retroperitoneal lymph nodes up to 9 3 cm    L4 right transverse process and right sacral destructive lesions noted  WBC is 13 0, hemoglobin 11 1, MCV 81, platelet count 215, neutrophil 79, bands 1, lymphocytes 5, monocytes 13  CMP showed albumin 2 0, total protein 7 3, otherwise normal   Liver biopsy showed hemangioma  4/13/2021 Biopsy    April 13, 2021 biopsy of left periaortic node showed metastatic carcinoma consistent with renal primary  Interval History:  Clinically stable  ECOG-  1 - Symptomatic but completely ambulatory    Review of Systems   Constitutional: Negative for chills and fever  HENT: Negative for nosebleeds  Eyes: Negative for discharge  Respiratory: Negative for cough and shortness of breath  Cardiovascular: Negative for chest pain  Gastrointestinal: Negative for abdominal pain, constipation and diarrhea  Endocrine: Negative for polydipsia  Genitourinary: Negative for hematuria  Musculoskeletal: Negative for arthralgias  Skin: Negative for color change  Allergic/Immunologic: Negative for immunocompromised state  Neurological: Negative for dizziness and headaches  Hematological: Negative for adenopathy  Psychiatric/Behavioral: Negative for agitation  History reviewed  No pertinent past medical history    Patient Active Problem List   Diagnosis    Shortness of breath    Anemia    Leucocytosis    Pleural effusion    Lung mass    Mediastinal lymphadenopathy    Tachycardia    Bone metastases (HCC)    Moderate protein-calorie malnutrition (HCC)    Pulmonary emphysema (HCC)    Renal cell carcinoma of left kidney (HCC)       Current Outpatient Medications:     albuterol (PROVENTIL HFA,VENTOLIN HFA) 90 mcg/act inhaler, Inhale 2 puffs every 6 (six) hours as needed for wheezing, Disp: 1 Inhaler, Rfl: 3    benzonatate (TESSALON PERLES) 100 mg capsule, Take 1 capsule (100 mg total) by mouth 3 (three) times a day as needed for cough, Disp: 20 capsule, Rfl: 0    ferrous sulfate 324 (65 Fe) mg, Take 1 tablet (324 mg total) by mouth every other day, Disp: 15 tablet, Rfl: 0    fluticasone-vilanterol (BREO ELLIPTA) 100-25 mcg/inh inhaler, Inhale 1 puff daily Rinse mouth after use , Disp: 1 Inhaler, Rfl: 3    oxyCODONE-acetaminophen (PERCOCET) 5-325 mg per tablet, Take 1 tablet by mouth every 4 (four) hours as needed for moderate pain for up to 10 daysMax Daily Amount: 6 tablets, Disp: 60 tablet, Rfl: 0  No Known Allergies  Past Surgical History:   Procedure Laterality Date    IR BIOPSY LIVER MASS  4/7/2021    IR THORACENTESIS  4/6/2021    TENDON REPAIR Right     forearm     Social History     Objective:  Vitals:    04/16/21 1058   BP: 135/78   BP Location: Left arm   Patient Position: Sitting   Pulse: (!) 118   Resp: 17   Temp: 98 6 °F (37 °C)   TempSrc: Tympanic   SpO2: 94%   Weight: 64 7 kg (142 lb 9 6 oz)   Height: 5' 11" (1 803 m)     Physical Exam  Constitutional:       Appearance: He is well-developed  HENT:      Head: Normocephalic and atraumatic  Eyes:      Pupils: Pupils are equal, round, and reactive to light  Neck:      Musculoskeletal: Neck supple  Cardiovascular:      Rate and Rhythm: Normal rate and regular rhythm  Heart sounds: No murmur  Pulmonary:      Breath sounds: Normal breath sounds  No wheezing or rales  Abdominal:      Palpations: Abdomen is soft  Tenderness: There is no abdominal tenderness  Musculoskeletal: Normal range of motion  General: No tenderness  Lymphadenopathy:      Cervical: No cervical adenopathy  Skin:     Findings: No erythema or rash  Neurological:      Mental Status: He is alert and oriented to person, place, and time  Cranial Nerves: No cranial nerve deficit  Deep Tendon Reflexes: Reflexes are normal and symmetric  Psychiatric:         Behavior: Behavior normal            Labs: I personally reviewed the labs and imaging pertinent to this patient care

## 2021-04-16 NOTE — LETTER
April 16, 2021     Yang Benjamin, [address] [facility]  [address]    Patient: Cece Epstein   YOB: 1955   Date of Visit: 4/16/2021       Dear Dr Brendan Friedman:    Thank you for referring Cece Epstein to me for evaluation  Below are my notes for this consultation  If you have questions, please do not hesitate to call me  I look forward to following your patient along with you  Sincerely,        Sejal Miller DO        CC: Cesilia Eason MD PhD  Sejal Miller DO  4/16/2021 11:45 AM  Incomplete  [facility] [place]  [address] [facility] [address]  [phone]  [phone]    Blake Colbert,1955, [id number]  04/16/21    Discussion:   In summary, this is a 54-year-old male history of recently diagnosed stage IV renal cell carcinoma with bone metastases  Intermediate/high risk  I would favor the use of Opdivo/Yervoy, consistent with NCCN category 1 recommendation  Addition of Xgeva for reduction of skeletal related events  We reviewed potential toxicities of this program including but not limited to infusion reactions, diarrhea, eczematoid rash, hypothyroidism, hypocalcemia, injection site reaction, rare episodes of ONJ  We reviewed that other organs can be affected by autoimmune processes but that this is less common  Toxicities are generally considered to be reversible  Likelihood of disease control, stabilization, improvement is moderate  We reviewed that the goal of therapy is disease control, palliative benefit, survival benefit but that cure is not likely  Reimaging 2 months after treatment initiation  Oral calcium supplementation is recommended  I discussed the above with the patient    The patient  voiced understanding and agreement   ______________________________________________________________________    Chief Complaint   Patient presents with    Follow-up       HPI:  Oncology History   Renal cell carcinoma of left kidney ([facility] [address] )   4/5/2021 Initial Diagnosis    April 2021 patient presented with weight loss, dyspnea  April 5, 2021 CT of the chest showed 3 4 cm left upper lobe mass, 3 x 2 cm lingular mass, right lower lobe nodules up to 1 5 cm  Pleural enhancement and moderate left effusion are noted  Mediastinal lymph nodes up to 5 2 cm  Bony destructive lesion in the left 9th rib  CT of the abdomen pelvis showed indeterminate lesions in the left kidney and liver  Retroperitoneal lymph nodes up to 9 3 cm  L4 right transverse process and right sacral destructive lesions noted  WBC is 13 0, hemoglobin 11 1, MCV 81, platelet count 369, neutrophil 79, bands 1, lymphocytes 5, monocytes 13  CMP showed albumin 2 0, total protein 7 3, otherwise normal   Liver biopsy showed hemangioma  4/13/2021 Biopsy    April 13, 2021 biopsy of left periaortic node showed metastatic carcinoma consistent with renal primary  Interval History:  Clinically stable  ECOG-  1 - Symptomatic but completely ambulatory    Review of Systems   Constitutional: Negative for chills and fever  HENT: Negative for nosebleeds  Eyes: Negative for discharge  Respiratory: Negative for cough and shortness of breath  Cardiovascular: Negative for chest pain  Gastrointestinal: Negative for abdominal pain, constipation and diarrhea  Endocrine: Negative for polydipsia  Genitourinary: Negative for hematuria  Musculoskeletal: Negative for arthralgias  Skin: Negative for color change  Allergic/Immunologic: Negative for immunocompromised state  Neurological: Negative for dizziness and headaches  Hematological: Negative for adenopathy  Psychiatric/Behavioral: Negative for agitation  History reviewed  No pertinent past medical history    Patient Active Problem List   Diagnosis    Shortness of breath    Anemia    Leucocytosis    Pleural effusion    Lung mass    Mediastinal lymphadenopathy    Tachycardia    Bone metastases ([facility] )    [facility]    Pulmonary emphysema ([facility] )    Renal cell carcinoma of left kidney (HCC)       Current Outpatient Medications:     albuterol (PROVENTIL HFA,VENTOLIN HFA) 90 mcg/act inhaler, Inhale 2 puffs every 6 (six) hours as needed for wheezing, Disp: 1 Inhaler, Rfl: 3    benzonatate (TESSALON PERLES) 100 mg capsule, Take 1 capsule (100 mg total) by mouth 3 (three) times a day as needed for cough, Disp: 20 capsule, Rfl: 0    ferrous sulfate 324 (65 Fe) mg, Take 1 tablet (324 mg total) by mouth every other day, Disp: 15 tablet, Rfl: 0    fluticasone-vilanterol (BREO ELLIPTA) 100-25 mcg/inh inhaler, Inhale 1 puff daily Rinse mouth after use , Disp: 1 Inhaler, Rfl: 3    oxyCODONE-acetaminophen (PERCOCET) 5-325 mg per tablet, Take 1 tablet by mouth every 4 (four) hours as needed for moderate pain for up to 10 daysMax Daily Amount: 6 tablets, Disp: 60 tablet, Rfl: 0  No Known Allergies  Past Surgical History:   Procedure Laterality Date    IR BIOPSY LIVER MASS  4/7/2021    IR THORACENTESIS  4/6/2021    TENDON REPAIR Right     forearm     Social History     Objective:  Vitals:    04/16/21 1058   BP: 135/78   BP Location: Left arm   Patient Position: Sitting   Pulse: (!) 118   Resp: 17   Temp: 98 6 °F (37 °C)   TempSrc: Tympanic   SpO2: 94%   Weight: 64 7 kg (142 lb 9 6 oz)   Height: 5' 11" (1 803 m)     Physical Exam  Constitutional:       Appearance: He is well-developed  HENT:      Head: Normocephalic and atraumatic  Eyes:      Pupils: Pupils are equal, round, and reactive to light  Neck:      Musculoskeletal: Neck supple  Cardiovascular:      Rate and Rhythm: Normal rate and regular rhythm  Heart sounds: No murmur  Pulmonary:      Breath sounds: Normal breath sounds  No wheezing or rales  Abdominal:      Palpations: Abdomen is soft  Tenderness: There is no abdominal tenderness  Musculoskeletal: Normal range of motion           General: No tenderness  Lymphadenopathy:      Cervical: No cervical adenopathy  Skin:     Findings: No erythema or rash  Neurological:      Mental Status: He is alert and oriented to person, place, and time  Cranial Nerves: No cranial nerve deficit  Deep Tendon Reflexes: Reflexes are normal and symmetric  Psychiatric:         Behavior: Behavior normal            Labs: I personally reviewed the labs and imaging pertinent to this patient care  Kvng Mayer DO  4/16/2021 11:07 AM  Sign when Signing Visit  [address] [facility] [address]  [address] [facility] [address]  [phone]  [phone]    [address] [place],1955, [phone]  04/16/21    Discussion:   ***    I discussed the above with the patient  The patient *** voiced understanding and agreement   ______________________________________________________________________    Chief Complaint   Patient presents with    Follow-up       HPI:  Oncology History   Renal cell carcinoma of left kidney ([address] )   4/5/2021 Initial Diagnosis    April 2021 patient presented with weight loss, dyspnea  April 5, 2021 CT of the chest showed 3 4 cm left upper lobe mass, 3 x 2 cm lingular mass, right lower lobe nodules up to 1 5 cm  Pleural enhancement and moderate left effusion are noted  Mediastinal lymph nodes up to 5 2 cm  Bony destructive lesion in the left 9th rib  CT of the abdomen pelvis showed indeterminate lesions in the left kidney and liver  Retroperitoneal lymph nodes up to 9 3 cm  L4 right transverse process and right sacral destructive lesions noted  WBC is 13 0, hemoglobin 11 1, MCV 81, platelet count 179, neutrophil 79, bands 1, lymphocytes 5, monocytes 13  CMP showed albumin 2 0, total protein 7 3, otherwise normal   Liver biopsy showed hemangioma  4/13/2021 Biopsy    April 13, 2021 biopsy of left periaortic node showed metastatic carcinoma consistent with renal primary             Interval History:  ***  {performance status:99065}    Review of Systems    History reviewed  No pertinent past medical history  Patient Active Problem List   Diagnosis    Shortness of breath    Anemia    Leucocytosis    Pleural effusion    Lung mass    Mediastinal lymphadenopathy    Tachycardia    Bone metastases (HCC)    Moderate protein-calorie malnutrition (HCC)    Pulmonary emphysema (HCC)    Renal cell carcinoma of left kidney (HCC)       Current Outpatient Medications:     albuterol (PROVENTIL HFA,VENTOLIN HFA) 90 mcg/act inhaler, Inhale 2 puffs every 6 (six) hours as needed for wheezing, Disp: 1 Inhaler, Rfl: 3    benzonatate (TESSALON PERLES) 100 mg capsule, Take 1 capsule (100 mg total) by mouth 3 (three) times a day as needed for cough, Disp: 20 capsule, Rfl: 0    ferrous sulfate 324 (65 Fe) mg, Take 1 tablet (324 mg total) by mouth every other day, Disp: 15 tablet, Rfl: 0    fluticasone-vilanterol (BREO ELLIPTA) 100-25 mcg/inh inhaler, Inhale 1 puff daily Rinse mouth after use , Disp: 1 Inhaler, Rfl: 3    oxyCODONE-acetaminophen (PERCOCET) 5-325 mg per tablet, Take 1 tablet by mouth every 4 (four) hours as needed for moderate pain for up to 10 daysMax Daily Amount: 6 tablets, Disp: 60 tablet, Rfl: 0  No Known Allergies  Past Surgical History:   Procedure Laterality Date    IR BIOPSY LIVER MASS  4/7/2021    IR THORACENTESIS  4/6/2021    TENDON REPAIR Right     forearm     Social History     Objective:  Vitals:    04/16/21 1058   BP: 135/78   BP Location: Left arm   Patient Position: Sitting   Pulse: (!) 118   Resp: 17   Temp: 98 6 °F (37 °C)   TempSrc: Tympanic   SpO2: 94%   Weight: 64 7 kg (142 lb 9 6 oz)   Height: 5' 11" (1 803 m)     Physical Exam      Labs: I personally reviewed the labs and imaging pertinent to this patient care

## 2021-04-16 NOTE — LETTER
April 16, 2021     Honorio Pee, [address] [facility]  [address]    Patient: Tay Sanchez   YOB: 1955   Date of Visit: 4/16/2021       Dear Dr Barnes Forth:    Thank you for referring Tay Sanchez to me for evaluation  Below are my notes for this consultation  If you have questions, please do not hesitate to call me  I look forward to following your patient along with you  Sincerely,        Beverley Fagan DO        CC: Collette Ket, MD PhD  Beverley Fagan DO  4/16/2021 11:46 AM  Sign when Signing Visit  [address] [facility] [address]  [address]  [phone]  [phone]    [id number] [phone]  04/16/21    Discussion:   In summary, this is a 70-year-old male history of recently diagnosed stage IV renal cell carcinoma with bone metastases  Intermediate/high risk  I would favor the use of Opdivo/Yervoy, consistent with NCCN category 1 recommendation  Addition of Xgeva for reduction of skeletal related events  We reviewed potential toxicities of this program including but not limited to infusion reactions, diarrhea, eczematoid rash, hypothyroidism, hypocalcemia, injection site reaction, rare episodes of ONJ  We reviewed that other organs can be affected by autoimmune processes but that this is less common  Toxicities are generally considered to be reversible  Likelihood of disease control, stabilization, improvement is moderate  We reviewed that the goal of therapy is disease control, palliative benefit, survival benefit but that cure is not likely  Reimaging 2 months after treatment initiation  Oral calcium supplementation is recommended  I discussed the above with the patient    The patient  voiced understanding and agreement   ______________________________________________________________________    Chief Complaint   Patient presents with    Follow-up       HPI:  Oncology History   Renal cell carcinoma of left kidney ([facility] [address] )   4/5/2021 Initial Diagnosis    April 2021 patient presented with weight loss, dyspnea  April 5, 2021 CT of the chest showed 3 4 cm left upper lobe mass, 3 x 2 cm lingular mass, right lower lobe nodules up to 1 5 cm  Pleural enhancement and moderate left effusion are noted  Mediastinal lymph nodes up to 5 2 cm  Bony destructive lesion in the left 9th rib  CT of the abdomen pelvis showed indeterminate lesions in the left kidney and liver  Retroperitoneal lymph nodes up to 9 3 cm  L4 right transverse process and right sacral destructive lesions noted  WBC is 13 0, hemoglobin 11 1, MCV 81, platelet count 380, neutrophil 79, bands 1, lymphocytes 5, monocytes 13  CMP showed albumin 2 0, total protein 7 3, otherwise normal   Liver biopsy showed hemangioma  4/13/2021 Biopsy    April 13, 2021 biopsy of left periaortic node showed metastatic carcinoma consistent with renal primary  Interval History:  Clinically stable  ECOG-  1 - Symptomatic but completely ambulatory    Review of Systems   Constitutional: Negative for chills and fever  HENT: Negative for nosebleeds  Eyes: Negative for discharge  Respiratory: Negative for cough and shortness of breath  Cardiovascular: Negative for chest pain  Gastrointestinal: Negative for abdominal pain, constipation and diarrhea  Endocrine: Negative for polydipsia  Genitourinary: Negative for hematuria  Musculoskeletal: Negative for arthralgias  Skin: Negative for color change  Allergic/Immunologic: Negative for immunocompromised state  Neurological: Negative for dizziness and headaches  Hematological: Negative for adenopathy  Psychiatric/Behavioral: Negative for agitation  History reviewed  No pertinent past medical history    Patient Active Problem List   Diagnosis    Shortness of breath    Anemia    Leucocytosis    Pleural effusion    Lung mass    Mediastinal lymphadenopathy    Tachycardia    Bone metastases ([facility] )    Moderate protein-calorie malnutrition ([facility] )    Pulmonary emphysema (HCC)    Renal cell carcinoma of left kidney (HCC)       Current Outpatient Medications:     albuterol (PROVENTIL HFA,VENTOLIN HFA) 90 mcg/act inhaler, Inhale 2 puffs every 6 (six) hours as needed for wheezing, Disp: 1 Inhaler, Rfl: 3    benzonatate (TESSALON PERLES) 100 mg capsule, Take 1 capsule (100 mg total) by mouth 3 (three) times a day as needed for cough, Disp: 20 capsule, Rfl: 0    ferrous sulfate 324 (65 Fe) mg, Take 1 tablet (324 mg total) by mouth every other day, Disp: 15 tablet, Rfl: 0    fluticasone-vilanterol (BREO ELLIPTA) 100-25 mcg/inh inhaler, Inhale 1 puff daily Rinse mouth after use , Disp: 1 Inhaler, Rfl: 3    oxyCODONE-acetaminophen (PERCOCET) 5-325 mg per tablet, Take 1 tablet by mouth every 4 (four) hours as needed for moderate pain for up to 10 daysMax Daily Amount: 6 tablets, Disp: 60 tablet, Rfl: 0  No Known Allergies  Past Surgical History:   Procedure Laterality Date    IR BIOPSY LIVER MASS  4/7/2021    IR THORACENTESIS  4/6/2021    TENDON REPAIR Right     forearm     Social History     Objective:  Vitals:    04/16/21 1058   BP: 135/78   BP Location: Left arm   Patient Position: Sitting   Pulse: (!) 118   Resp: 17   Temp: 98 6 °F (37 °C)   TempSrc: Tympanic   SpO2: 94%   Weight: 64 7 kg (142 lb 9 6 oz)   Height: 5' 11" (1 803 m)     Physical Exam  Constitutional:       Appearance: He is well-developed  HENT:      Head: Normocephalic and atraumatic  Eyes:      Pupils: Pupils are equal, round, and reactive to light  Neck:      Musculoskeletal: Neck supple  Cardiovascular:      Rate and Rhythm: Normal rate and regular rhythm  Heart sounds: No murmur  Pulmonary:      Breath sounds: Normal breath sounds  No wheezing or rales  Abdominal:      Palpations: Abdomen is soft  Tenderness: There is no abdominal tenderness  Musculoskeletal: Normal range of motion  General: No tenderness  Lymphadenopathy:      Cervical: No cervical adenopathy  Skin:     Findings: No erythema or rash  Neurological:      Mental Status: He is alert and oriented to person, place, and time  Cranial Nerves: No cranial nerve deficit  Deep Tendon Reflexes: Reflexes are normal and symmetric  Psychiatric:         Behavior: Behavior normal            Labs: I personally reviewed the labs and imaging pertinent to this patient care

## 2021-04-16 NOTE — LETTER
April 16, 2021     Paxton Barney, [address] [facility]  [address]    Patient: Janak López   YOB: 1955   Date of Visit: 4/16/2021       Dear Dr Tia Pinto:    Thank you for referring Janak López to me for evaluation  Below are my notes for this consultation  If you have questions, please do not hesitate to call me  I look forward to following your patient along with you  Sincerely,        Pa Booth DO        CC: Shine Pagan MD PhD  Pa Booth DO  4/16/2021 11:46 AM  Incomplete  ST [address] [facility] [address]  [address] [facility]  [phone]  [phone]    Antony Mariposa Cross [id number]  04/16/21    Discussion:   In summary, this is a 57-year-old male history of recently diagnosed stage IV renal cell carcinoma with bone metastases  Intermediate/high risk  I would favor the use of Opdivo/Yervoy, consistent with NCCN category 1 recommendation  Addition of Xgeva for reduction of skeletal related events  We reviewed potential toxicities of this program including but not limited to infusion reactions, diarrhea, eczematoid rash, hypothyroidism, hypocalcemia, injection site reaction, rare episodes of ONJ  We reviewed that other organs can be affected by autoimmune processes but that this is less common  Toxicities are generally considered to be reversible  Likelihood of disease control, stabilization, improvement is moderate  We reviewed that the goal of therapy is disease control, palliative benefit, survival benefit but that cure is not likely  Reimaging 2 months after treatment initiation  Oral calcium supplementation is recommended  I discussed the above with the patient    The patient  voiced understanding and agreement   ______________________________________________________________________    Chief Complaint   Patient presents with    Follow-up       HPI:  Oncology History   Renal cell carcinoma of left kidney ([facility] [address] )   4/5/2021 Initial Diagnosis    April 2021 patient presented with weight loss, dyspnea  April 5, 2021 CT of the chest showed 3 4 cm left upper lobe mass, 3 x 2 cm lingular mass, right lower lobe nodules up to 1 5 cm  Pleural enhancement and moderate left effusion are noted  Mediastinal lymph nodes up to 5 2 cm  Bony destructive lesion in the left 9th rib  CT of the abdomen pelvis showed indeterminate lesions in the left kidney and liver  Retroperitoneal lymph nodes up to 9 3 cm  L4 right transverse process and right sacral destructive lesions noted  WBC is 13 0, hemoglobin 11 1, MCV 81, platelet count 492, neutrophil 79, bands 1, lymphocytes 5, monocytes 13  CMP showed albumin 2 0, total protein 7 3, otherwise normal   Liver biopsy showed hemangioma  4/13/2021 Biopsy    April 13, 2021 biopsy of left periaortic node showed metastatic carcinoma consistent with renal primary  Interval History:  Clinically stable  ECOG-  1 - Symptomatic but completely ambulatory    Review of Systems   Constitutional: Negative for chills and fever  HENT: Negative for nosebleeds  Eyes: Negative for discharge  Respiratory: Negative for cough and shortness of breath  Cardiovascular: Negative for chest pain  Gastrointestinal: Negative for abdominal pain, constipation and diarrhea  Endocrine: Negative for polydipsia  Genitourinary: Negative for hematuria  Musculoskeletal: Negative for arthralgias  Skin: Negative for color change  Allergic/Immunologic: Negative for immunocompromised state  Neurological: Negative for dizziness and headaches  Hematological: Negative for adenopathy  Psychiatric/Behavioral: Negative for agitation  History reviewed  No pertinent past medical history    Patient Active Problem List   Diagnosis    Shortness of breath    Anemia    Leucocytosis    Pleural effusion    Lung mass    Mediastinal lymphadenopathy    Tachycardia    Bone metastases ([facility] )    Moderate protein-cal[facility] malnutrition [facility]    Pulmonary emphysema ([facility] )    Renal cell carcinoma of left kidney (HCC)       Current Outpatient Medications:     albuterol (PROVENTIL HFA,VENTOLIN HFA) 90 mcg/act inhaler, Inhale 2 puffs every 6 (six) hours as needed for wheezing, Disp: 1 Inhaler, Rfl: 3    benzonatate (TESSALON PERLES) 100 mg capsule, Take 1 capsule (100 mg total) by mouth 3 (three) times a day as needed for cough, Disp: 20 capsule, Rfl: 0    ferrous sulfate 324 (65 Fe) mg, Take 1 tablet (324 mg total) by mouth every other day, Disp: 15 tablet, Rfl: 0    fluticasone-vilanterol (BREO ELLIPTA) 100-25 mcg/inh inhaler, Inhale 1 puff daily Rinse mouth after use , Disp: 1 Inhaler, Rfl: 3    oxyCODONE-acetaminophen (PERCOCET) 5-325 mg per tablet, Take 1 tablet by mouth every 4 (four) hours as needed for moderate pain for up to 10 daysMax Daily Amount: 6 tablets, Disp: 60 tablet, Rfl: 0  No Known Allergies  Past Surgical History:   Procedure Laterality Date    IR BIOPSY LIVER MASS  4/7/2021    IR THORACENTESIS  4/6/2021    TENDON REPAIR Right     forearm     Social History     Objective:  Vitals:    04/16/21 1058   BP: 135/78   BP Location: Left arm   Patient Position: Sitting   Pulse: (!) 118   Resp: 17   Temp: 98 6 °F (37 °C)   TempSrc: Tympanic   SpO2: 94%   Weight: 64 7 kg (142 lb 9 6 oz)   Height: 5' 11" (1 803 m)     Physical Exam  Constitutional:       Appearance: He is well-developed  HENT:      Head: Normocephalic and atraumatic  Eyes:      Pupils: Pupils are equal, round, and reactive to light  Neck:      Musculoskeletal: Neck supple  Cardiovascular:      Rate and Rhythm: Normal rate and regular rhythm  Heart sounds: No murmur  Pulmonary:      Breath sounds: Normal breath sounds  No wheezing or rales  Abdominal:      Palpations: Abdomen is soft  Tenderness: There is no abdominal tenderness  Musculoskeletal: Normal range of motion           General: No tenderness  Lymphadenopathy:      Cervical: No cervical adenopathy  Skin:     Findings: No erythema or rash  Neurological:      Mental Status: He is alert and oriented to person, place, and time  Cranial Nerves: No cranial nerve deficit  Deep Tendon Reflexes: Reflexes are normal and symmetric  Psychiatric:         Behavior: Behavior normal            Labs: I personally reviewed the labs and imaging pertinent to this patient care  René Dodd DO  4/16/2021 11:07 AM  Sign when Signing Visit  [address] [facility] [address]  [address] [facility] [address]  [phone]  [phone]    [address] [facility],1955, [id number]  04/16/21    Discussion:   ***    I discussed the above with the patient  The patient *** voiced understanding and agreement   ______________________________________________________________________    Chief Complaint   Patient presents with    Follow-up       HPI:  Oncology History   Renal cell carcinoma of left kidney ([address] )   4/5/2021 Initial Diagnosis    April 2021 patient presented with weight loss, dyspnea  April 5, 2021 CT of the chest showed 3 4 cm left upper lobe mass, 3 x 2 cm lingular mass, right lower lobe nodules up to 1 5 cm  Pleural enhancement and moderate left effusion are noted  Mediastinal lymph nodes up to 5 2 cm  Bony destructive lesion in the left 9th rib  CT of the abdomen pelvis showed indeterminate lesions in the left kidney and liver  Retroperitoneal lymph nodes up to 9 3 cm  L4 right transverse process and right sacral destructive lesions noted  WBC is 13 0, hemoglobin 11 1, MCV 81, platelet count 101, neutrophil 79, bands 1, lymphocytes 5, monocytes 13  CMP showed albumin 2 0, total protein 7 3, otherwise normal   Liver biopsy showed hemangioma  4/13/2021 Biopsy    April 13, 2021 biopsy of left periaortic node showed metastatic carcinoma consistent with renal primary             Interval History:  ***  {performance status:13999}    Review of Systems    History reviewed  No pertinent past medical history  Patient Active Problem List   Diagnosis    Shortness of breath    Anemia    Leucocytosis    Pleural effusion    Lung mass    Mediastinal lymphadenopathy    Tachycardia    Bone metastases (HCC)    Moderate protein-calorie malnutrition (HCC)    Pulmonary emphysema (HCC)    Renal cell carcinoma of left kidney (HCC)       Current Outpatient Medications:     albuterol (PROVENTIL HFA,VENTOLIN HFA) 90 mcg/act inhaler, Inhale 2 puffs every 6 (six) hours as needed for wheezing, Disp: 1 Inhaler, Rfl: 3    benzonatate (TESSALON PERLES) 100 mg capsule, Take 1 capsule (100 mg total) by mouth 3 (three) times a day as needed for cough, Disp: 20 capsule, Rfl: 0    ferrous sulfate 324 (65 Fe) mg, Take 1 tablet (324 mg total) by mouth every other day, Disp: 15 tablet, Rfl: 0    fluticasone-vilanterol (BREO ELLIPTA) 100-25 mcg/inh inhaler, Inhale 1 puff daily Rinse mouth after use , Disp: 1 Inhaler, Rfl: 3    oxyCODONE-acetaminophen (PERCOCET) 5-325 mg per tablet, Take 1 tablet by mouth every 4 (four) hours as needed for moderate pain for up to 10 daysMax Daily Amount: 6 tablets, Disp: 60 tablet, Rfl: 0  No Known Allergies  Past Surgical History:   Procedure Laterality Date    IR BIOPSY LIVER MASS  4/7/2021    IR THORACENTESIS  4/6/2021    TENDON REPAIR Right     forearm     Social History     Objective:  Vitals:    04/16/21 1058   BP: 135/78   BP Location: Left arm   Patient Position: Sitting   Pulse: (!) 118   Resp: 17   Temp: 98 6 °F (37 °C)   TempSrc: Tympanic   SpO2: 94%   Weight: 64 7 kg (142 lb 9 6 oz)   Height: 5' 11" (1 803 m)     Physical Exam      Labs: I personally reviewed the labs and imaging pertinent to this patient care

## 2021-04-19 ENCOUNTER — TELEPHONE (OUTPATIENT)
Dept: INTERNAL MEDICINE CLINIC | Facility: CLINIC | Age: 66
End: 2021-04-19

## 2021-04-19 DIAGNOSIS — C79.51 BONE METASTASES (HCC): ICD-10-CM

## 2021-04-19 DIAGNOSIS — J43.9 PULMONARY EMPHYSEMA, UNSPECIFIED EMPHYSEMA TYPE (HCC): Primary | ICD-10-CM

## 2021-04-19 DIAGNOSIS — R59.0 MEDIASTINAL LYMPHADENOPATHY: ICD-10-CM

## 2021-04-19 DIAGNOSIS — R91.8 LUNG MASS: ICD-10-CM

## 2021-04-19 LAB
Lab: NORMAL
MISCELLANEOUS LAB TEST RESULT: NORMAL

## 2021-04-19 RX ORDER — OXYCODONE HYDROCHLORIDE AND ACETAMINOPHEN 5; 325 MG/1; MG/1
1 TABLET ORAL EVERY 4 HOURS PRN
Qty: 42 TABLET | Refills: 0 | Status: SHIPPED | OUTPATIENT
Start: 2021-04-19 | End: 2021-05-12 | Stop reason: SDUPTHER

## 2021-04-19 RX ORDER — SODIUM CHLORIDE 9 MG/ML
20 INJECTION, SOLUTION INTRAVENOUS ONCE
Status: CANCELLED | OUTPATIENT
Start: 2021-04-22

## 2021-04-19 NOTE — TELEPHONE ENCOUNTER
Received fax from pharmacy that Oxycodone-Acetaminophen needs to be sent in as a 7 day supply as its the first time the patient is getting this medication  The [facility] [place] is also not covered by insurance  Covered medications are: Symbicort, Advair, Fluticasone-Salmeterol  Patient uses [facility] in [address] [place] [address]

## 2021-04-20 ENCOUNTER — APPOINTMENT (OUTPATIENT)
Dept: LAB | Facility: HOSPITAL | Age: 66
End: 2021-04-20
Attending: INTERNAL MEDICINE
Payer: MEDICARE

## 2021-04-20 DIAGNOSIS — C79.51 BONE METASTASES (HCC): ICD-10-CM

## 2021-04-20 LAB
ALBUMIN SERPL BCP-MCNC: 2 G/DL (ref 3.5–5)
ALP SERPL-CCNC: 130 U/L (ref 46–116)
ALT SERPL W P-5'-P-CCNC: 99 U/L (ref 12–78)
ANION GAP SERPL CALCULATED.3IONS-SCNC: 5 MMOL/L (ref 4–13)
AST SERPL W P-5'-P-CCNC: 39 U/L (ref 5–45)
BILIRUB SERPL-MCNC: 0.4 MG/DL (ref 0.2–1)
BUN SERPL-MCNC: 13 MG/DL (ref 5–25)
CALCIUM ALBUM COR SERPL-MCNC: 10.5 MG/DL (ref 8.3–10.1)
CALCIUM SERPL-MCNC: 8.9 MG/DL (ref 8.3–10.1)
CHLORIDE SERPL-SCNC: 100 MMOL/L (ref 100–108)
CO2 SERPL-SCNC: 29 MMOL/L (ref 21–32)
CREAT SERPL-MCNC: 0.8 MG/DL (ref 0.6–1.3)
GFR SERPL CREATININE-BSD FRML MDRD: 93 ML/MIN/1.73SQ M
GLUCOSE SERPL-MCNC: 110 MG/DL (ref 65–140)
POTASSIUM SERPL-SCNC: 4.6 MMOL/L (ref 3.5–5.3)
PROT SERPL-MCNC: 7.6 G/DL (ref 6.4–8.2)
SODIUM SERPL-SCNC: 134 MMOL/L (ref 136–145)

## 2021-04-20 PROCEDURE — 80053 COMPREHEN METABOLIC PANEL: CPT

## 2021-04-20 PROCEDURE — 36415 COLL VENOUS BLD VENIPUNCTURE: CPT

## 2021-04-22 ENCOUNTER — HOSPITAL ENCOUNTER (OUTPATIENT)
Dept: INFUSION CENTER | Facility: HOSPITAL | Age: 66
Discharge: HOME | End: 2021-04-22
Attending: INTERNAL MEDICINE
Payer: MEDICARE

## 2021-04-22 VITALS
TEMPERATURE: 96.9 F | WEIGHT: 139.77 LBS | RESPIRATION RATE: 16 BRPM | BODY MASS INDEX: 19.49 KG/M2 | SYSTOLIC BLOOD PRESSURE: 146 MMHG | DIASTOLIC BLOOD PRESSURE: 69 MMHG | HEART RATE: 89 BPM

## 2021-04-22 DIAGNOSIS — C79.51 BONE METASTASES (HCC): ICD-10-CM

## 2021-04-22 DIAGNOSIS — C64.2 RENAL CELL CARCINOMA OF LEFT KIDNEY (HCC): Primary | ICD-10-CM

## 2021-04-22 LAB
ALBUMIN SERPL BCP-MCNC: 2.1 G/DL (ref 3.5–5)
ALP SERPL-CCNC: 126 U/L (ref 46–116)
ALT SERPL W P-5'-P-CCNC: 87 U/L (ref 12–78)
ANION GAP SERPL CALCULATED.3IONS-SCNC: 7 MMOL/L (ref 4–13)
AST SERPL W P-5'-P-CCNC: 46 U/L (ref 5–45)
BASOPHILS # BLD AUTO: 0.08 THOUSANDS/ΜL (ref 0–0.1)
BASOPHILS NFR BLD AUTO: 1 % (ref 0–1)
BILIRUB SERPL-MCNC: 0.28 MG/DL (ref 0.2–1)
BUN SERPL-MCNC: 12 MG/DL (ref 5–25)
CALCIUM ALBUM COR SERPL-MCNC: 10.4 MG/DL (ref 8.3–10.1)
CALCIUM SERPL-MCNC: 8.9 MG/DL (ref 8.3–10.1)
CHLORIDE SERPL-SCNC: 100 MMOL/L (ref 100–108)
CO2 SERPL-SCNC: 29 MMOL/L (ref 21–32)
CREAT SERPL-MCNC: 0.79 MG/DL (ref 0.6–1.3)
EOSINOPHIL # BLD AUTO: 0.06 THOUSAND/ΜL (ref 0–0.61)
EOSINOPHIL NFR BLD AUTO: 0 % (ref 0–6)
ERYTHROCYTE [DISTWIDTH] IN BLOOD BY AUTOMATED COUNT: 14.6 % (ref 11.6–15.1)
GFR SERPL CREATININE-BSD FRML MDRD: 94 ML/MIN/1.73SQ M
GLUCOSE SERPL-MCNC: 96 MG/DL (ref 65–140)
HCT VFR BLD AUTO: 35.2 % (ref 36.5–49.3)
HGB BLD-MCNC: 10.3 G/DL (ref 12–17)
IMM GRANULOCYTES # BLD AUTO: 0.05 THOUSAND/UL (ref 0–0.2)
IMM GRANULOCYTES NFR BLD AUTO: 0 % (ref 0–2)
LYMPHOCYTES # BLD AUTO: 0.57 THOUSANDS/ΜL (ref 0.6–4.47)
LYMPHOCYTES NFR BLD AUTO: 4 % (ref 14–44)
MCH RBC QN AUTO: 23.5 PG (ref 26.8–34.3)
MCHC RBC AUTO-ENTMCNC: 29.3 G/DL (ref 31.4–37.4)
MCV RBC AUTO: 80 FL (ref 82–98)
MONOCYTES # BLD AUTO: 1.84 THOUSAND/ΜL (ref 0.17–1.22)
MONOCYTES NFR BLD AUTO: 13 % (ref 4–12)
NEUTROPHILS # BLD AUTO: 11.37 THOUSANDS/ΜL (ref 1.85–7.62)
NEUTS SEG NFR BLD AUTO: 82 % (ref 43–75)
NRBC BLD AUTO-RTO: 0 /100 WBCS
PLATELET # BLD AUTO: 815 THOUSANDS/UL (ref 149–390)
PMV BLD AUTO: 8.6 FL (ref 8.9–12.7)
POTASSIUM SERPL-SCNC: 4.6 MMOL/L (ref 3.5–5.3)
PROT SERPL-MCNC: 7.9 G/DL (ref 6.4–8.2)
RBC # BLD AUTO: 4.38 MILLION/UL (ref 3.88–5.62)
SODIUM SERPL-SCNC: 136 MMOL/L (ref 136–145)
TSH SERPL DL<=0.05 MIU/L-ACNC: 1.34 UIU/ML (ref 0.36–3.74)
WBC # BLD AUTO: 13.97 THOUSAND/UL (ref 4.31–10.16)

## 2021-04-22 PROCEDURE — 80053 COMPREHEN METABOLIC PANEL: CPT | Performed by: INTERNAL MEDICINE

## 2021-04-22 PROCEDURE — 96413 CHEMO IV INFUSION 1 HR: CPT

## 2021-04-22 PROCEDURE — 96401 CHEMO ANTI-NEOPL SQ/IM: CPT

## 2021-04-22 PROCEDURE — 85025 COMPLETE CBC W/AUTO DIFF WBC: CPT | Performed by: INTERNAL MEDICINE

## 2021-04-22 PROCEDURE — 84443 ASSAY THYROID STIM HORMONE: CPT | Performed by: INTERNAL MEDICINE

## 2021-04-22 PROCEDURE — 96417 CHEMO IV INFUS EACH ADDL SEQ: CPT

## 2021-04-22 RX ORDER — SODIUM CHLORIDE 9 MG/ML
20 INJECTION, SOLUTION INTRAVENOUS ONCE
Status: COMPLETED | OUTPATIENT
Start: 2021-04-22 | End: 2021-04-22

## 2021-04-22 RX ADMIN — DENOSUMAB 120 MG: 120 INJECTION SUBCUTANEOUS at 11:34

## 2021-04-22 RX ADMIN — SODIUM CHLORIDE 65 MG: 9 INJECTION, SOLUTION INTRAVENOUS at 12:25

## 2021-04-22 RX ADMIN — SODIUM CHLORIDE 20 ML/HR: 0.9 INJECTION, SOLUTION INTRAVENOUS at 11:33

## 2021-04-22 RX ADMIN — SODIUM CHLORIDE 200 MG: 9 INJECTION, SOLUTION INTRAVENOUS at 11:41

## 2021-04-22 NOTE — PROGRESS NOTES
Patient tolerated infusion well he was instructed to refer all problems to MD he was d/c from unit in stable condition

## 2021-05-03 ENCOUNTER — HOSPITAL ENCOUNTER (INPATIENT)
Facility: HOSPITAL | Age: 66
LOS: 8 days | Discharge: HOME | DRG: 871 | End: 2021-05-11
Attending: EMERGENCY MEDICINE | Admitting: INTERNAL MEDICINE
Payer: MEDICARE

## 2021-05-03 ENCOUNTER — APPOINTMENT (EMERGENCY)
Dept: CT IMAGING | Facility: HOSPITAL | Age: 66
DRG: 871 | End: 2021-05-03
Payer: MEDICARE

## 2021-05-03 DIAGNOSIS — I49.1 PREMATURE ATRIAL CONTRACTIONS: ICD-10-CM

## 2021-05-03 DIAGNOSIS — J18.9 HCAP (HEALTHCARE-ASSOCIATED PNEUMONIA): Primary | ICD-10-CM

## 2021-05-03 DIAGNOSIS — E55.9 VITAMIN D DEFICIENCY: ICD-10-CM

## 2021-05-03 DIAGNOSIS — C64.9 METASTATIC RENAL CELL CARCINOMA (HCC): ICD-10-CM

## 2021-05-03 DIAGNOSIS — J18.9 HOSPITAL-ACQUIRED PNEUMONIA: ICD-10-CM

## 2021-05-03 DIAGNOSIS — E44.0 MODERATE PROTEIN-CALORIE MALNUTRITION (HCC): ICD-10-CM

## 2021-05-03 DIAGNOSIS — C64.2 RENAL CELL CARCINOMA OF LEFT KIDNEY (HCC): ICD-10-CM

## 2021-05-03 DIAGNOSIS — R91.8 LUNG MASS: ICD-10-CM

## 2021-05-03 DIAGNOSIS — D64.9 ANEMIA, UNSPECIFIED TYPE: ICD-10-CM

## 2021-05-03 DIAGNOSIS — E83.51 HYPOCALCEMIA: ICD-10-CM
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
## 2021-05-03 DIAGNOSIS — J96.01 ACUTE RESPIRATORY FAILURE WITH HYPOXIA (HCC): ICD-10-CM

## 2021-05-03 DIAGNOSIS — A41.9 SEPSIS (HCC): ICD-10-CM

## 2021-05-03 DIAGNOSIS — I49.3 VENTRICULAR ECTOPY: ICD-10-CM

## 2021-05-03 DIAGNOSIS — R09.02 HYPOXIA: ICD-10-CM

## 2021-05-03 PROBLEM — R79.89 ELEVATED D-DIMER: Status: ACTIVE | Noted: 2021-05-03

## 2021-05-03 PROBLEM — R74.01 TRANSAMINITIS: Status: ACTIVE | Noted: 2021-05-03

## 2021-05-03 PROBLEM — D75.839 THROMBOCYTOSIS: Status: ACTIVE | Noted: 2021-05-03

## 2021-05-03 LAB
ALBUMIN SERPL BCP-MCNC: 1.7 G/DL (ref 3.5–5)
ALP SERPL-CCNC: 143 U/L (ref 46–116)
ALT SERPL W P-5'-P-CCNC: 79 U/L (ref 12–78)
ANION GAP SERPL CALCULATED.3IONS-SCNC: 8 MMOL/L (ref 4–13)
APTT PPP: 36 SECONDS (ref 23–37)
AST SERPL W P-5'-P-CCNC: 53 U/L (ref 5–45)
BASOPHILS # BLD AUTO: 0.05 THOUSANDS/ΜL (ref 0–0.1)
BASOPHILS NFR BLD AUTO: 0 % (ref 0–1)
BILIRUB SERPL-MCNC: 0.18 MG/DL (ref 0.2–1)
BUN SERPL-MCNC: 19 MG/DL (ref 5–25)
CALCIUM ALBUM COR SERPL-MCNC: 9.6 MG/DL (ref 8.3–10.1)
CALCIUM SERPL-MCNC: 7.8 MG/DL (ref 8.3–10.1)
CHLORIDE SERPL-SCNC: 105 MMOL/L (ref 100–108)
CO2 SERPL-SCNC: 24 MMOL/L (ref 21–32)
CREAT SERPL-MCNC: 0.72 MG/DL (ref 0.6–1.3)
D DIMER PPP FEU-MCNC: 3.91 UG/ML FEU
EOSINOPHIL # BLD AUTO: 0.39 THOUSAND/ΜL (ref 0–0.61)
EOSINOPHIL NFR BLD AUTO: 3 % (ref 0–6)
ERYTHROCYTE [DISTWIDTH] IN BLOOD BY AUTOMATED COUNT: 15.3 % (ref 11.6–15.1)
GFR SERPL CREATININE-BSD FRML MDRD: 97 ML/MIN/1.73SQ M
GLUCOSE SERPL-MCNC: 158 MG/DL (ref 65–140)
HCT VFR BLD AUTO: 29.6 % (ref 36.5–49.3)
HGB BLD-MCNC: 8.7 G/DL (ref 12–17)
IMM GRANULOCYTES # BLD AUTO: 0.09 THOUSAND/UL (ref 0–0.2)
IMM GRANULOCYTES NFR BLD AUTO: 1 % (ref 0–2)
INR PPP: 1.33 (ref 0.84–1.19)
LACTATE SERPL-SCNC: 2 MMOL/L (ref 0.5–2)
LYMPHOCYTES # BLD AUTO: 0.18 THOUSANDS/ΜL (ref 0.6–4.47)
LYMPHOCYTES NFR BLD AUTO: 1 % (ref 14–44)
MCH RBC QN AUTO: 22.9 PG (ref 26.8–34.3)
MCHC RBC AUTO-ENTMCNC: 29.4 G/DL (ref 31.4–37.4)
MCV RBC AUTO: 78 FL (ref 82–98)
MONOCYTES # BLD AUTO: 0.83 THOUSAND/ΜL (ref 0.17–1.22)
MONOCYTES NFR BLD AUTO: 6 % (ref 4–12)
NEUTROPHILS # BLD AUTO: 11.73 THOUSANDS/ΜL (ref 1.85–7.62)
NEUTS SEG NFR BLD AUTO: 89 % (ref 43–75)
NRBC BLD AUTO-RTO: 0 /100 WBCS
PLATELET # BLD AUTO: 913 THOUSANDS/UL (ref 149–390)
PMV BLD AUTO: 9 FL (ref 8.9–12.7)
POTASSIUM SERPL-SCNC: 4.2 MMOL/L (ref 3.5–5.3)
PROT SERPL-MCNC: 6.7 G/DL (ref 6.4–8.2)
PROTHROMBIN TIME: 16.2 SECONDS (ref 11.6–14.5)
RBC # BLD AUTO: 3.8 MILLION/UL (ref 3.88–5.62)
SARS-COV-2 RNA RESP QL NAA+PROBE: NEGATIVE
SODIUM SERPL-SCNC: 137 MMOL/L (ref 136–145)
TROPONIN I SERPL-MCNC: 0.02 NG/ML
WBC # BLD AUTO: 13.27 THOUSAND/UL (ref 4.31–10.16)

## 2021-05-03 PROCEDURE — 36415 COLL VENOUS BLD VENIPUNCTURE: CPT | Performed by: EMERGENCY MEDICINE

## 2021-05-03 PROCEDURE — 99285 EMERGENCY DEPT VISIT HI MDM: CPT

## 2021-05-03 PROCEDURE — 85379 FIBRIN DEGRADATION QUANT: CPT | Performed by: EMERGENCY MEDICINE

## 2021-05-03 PROCEDURE — 93005 ELECTROCARDIOGRAM TRACING: CPT

## 2021-05-03 PROCEDURE — 84484 ASSAY OF TROPONIN QUANT: CPT | Performed by: EMERGENCY MEDICINE

## 2021-05-03 PROCEDURE — 83605 ASSAY OF LACTIC ACID: CPT | Performed by: EMERGENCY MEDICINE

## 2021-05-03 PROCEDURE — 71275 CT ANGIOGRAPHY CHEST: CPT

## 2021-05-03 PROCEDURE — 99223 1ST HOSP IP/OBS HIGH 75: CPT | Performed by: NURSE PRACTITIONER

## 2021-05-03 PROCEDURE — 96360 HYDRATION IV INFUSION INIT: CPT

## 2021-05-03 PROCEDURE — 99285 EMERGENCY DEPT VISIT HI MDM: CPT | Performed by: EMERGENCY MEDICINE

## 2021-05-03 PROCEDURE — 85730 THROMBOPLASTIN TIME PARTIAL: CPT | Performed by: EMERGENCY MEDICINE

## 2021-05-03 PROCEDURE — 84145 PROCALCITONIN (PCT): CPT | Performed by: EMERGENCY MEDICINE

## 2021-05-03 PROCEDURE — 85025 COMPLETE CBC W/AUTO DIFF WBC: CPT | Performed by: EMERGENCY MEDICINE

## 2021-05-03 PROCEDURE — 85610 PROTHROMBIN TIME: CPT | Performed by: EMERGENCY MEDICINE

## 2021-05-03 PROCEDURE — U0003 INFECTIOUS AGENT DETECTION BY NUCLEIC ACID (DNA OR RNA); SEVERE ACUTE RESPIRATORY SYNDROME CORONAVIRUS 2 (SARS-COV-2) (CORONAVIRUS DISEASE [COVID-19]), AMPLIFIED PROBE TECHNIQUE, MAKING USE OF HIGH THROUGHPUT TECHNOLOGIES AS DESCRIBED BY CMS-2020-01-R: HCPCS | Performed by: EMERGENCY MEDICINE

## 2021-05-03 PROCEDURE — 87040 BLOOD CULTURE FOR BACTERIA: CPT | Performed by: EMERGENCY MEDICINE

## 2021-05-03 PROCEDURE — 80053 COMPREHEN METABOLIC PANEL: CPT | Performed by: EMERGENCY MEDICINE

## 2021-05-03 PROCEDURE — U0005 INFEC AGEN DETEC AMPLI PROBE: HCPCS | Performed by: EMERGENCY MEDICINE

## 2021-05-03 RX ORDER — GUAIFENESIN 600 MG
600 TABLET, EXTENDED RELEASE 12 HR ORAL 2 TIMES DAILY
Status: DISCONTINUED | OUTPATIENT
Start: 2021-05-03 | End: 2021-05-11 | Stop reason: HOSPADM

## 2021-05-03 RX ORDER — FERROUS SULFATE 325(65) MG
325 TABLET ORAL
Status: DISCONTINUED | OUTPATIENT
Start: 2021-05-04 | End: 2021-05-11 | Stop reason: HOSPADM

## 2021-05-03 RX ORDER — CEFEPIME HYDROCHLORIDE 2 G/50ML
2000 INJECTION, SOLUTION INTRAVENOUS EVERY 8 HOURS
Status: DISCONTINUED | OUTPATIENT
Start: 2021-05-04 | End: 2021-05-10

## 2021-05-03 RX ORDER — ALBUTEROL SULFATE 90 UG/1
2 AEROSOL, METERED RESPIRATORY (INHALATION) EVERY 6 HOURS PRN
Status: DISCONTINUED | OUTPATIENT
Start: 2021-05-03 | End: 2021-05-04

## 2021-05-03 RX ORDER — CEFTRIAXONE 1 G/50ML
1000 INJECTION, SOLUTION INTRAVENOUS ONCE
Status: DISCONTINUED | OUTPATIENT
Start: 2021-05-03 | End: 2021-05-03

## 2021-05-03 RX ORDER — BENZONATATE 100 MG/1
100 CAPSULE ORAL 3 TIMES DAILY PRN
Status: DISCONTINUED | OUTPATIENT
Start: 2021-05-03 | End: 2021-05-11 | Stop reason: HOSPADM

## 2021-05-03 RX ORDER — OXYCODONE HYDROCHLORIDE AND ACETAMINOPHEN 5; 325 MG/1; MG/1
1 TABLET ORAL EVERY 4 HOURS PRN
Status: DISCONTINUED | OUTPATIENT
Start: 2021-05-03 | End: 2021-05-04

## 2021-05-03 RX ORDER — SODIUM CHLORIDE 9 MG/ML
75 INJECTION, SOLUTION INTRAVENOUS CONTINUOUS
Status: DISCONTINUED | OUTPATIENT
Start: 2021-05-03 | End: 2021-05-06

## 2021-05-03 RX ORDER — HEPARIN SODIUM 5000 [USP'U]/ML
5000 INJECTION, SOLUTION INTRAVENOUS; SUBCUTANEOUS EVERY 8 HOURS SCHEDULED
Status: DISCONTINUED | OUTPATIENT
Start: 2021-05-03 | End: 2021-05-04

## 2021-05-03 RX ORDER — FLUTICASONE FUROATE AND VILANTEROL 100; 25 UG/1; UG/1
1 POWDER RESPIRATORY (INHALATION)
Status: DISCONTINUED | OUTPATIENT
Start: 2021-05-04 | End: 2021-05-11 | Stop reason: HOSPADM

## 2021-05-03 RX ORDER — ACETAMINOPHEN 325 MG/1
650 TABLET ORAL EVERY 6 HOURS PRN
Status: DISCONTINUED | OUTPATIENT
Start: 2021-05-03 | End: 2021-05-04

## 2021-05-03 RX ORDER — CEFEPIME HYDROCHLORIDE 2 G/50ML
2000 INJECTION, SOLUTION INTRAVENOUS ONCE
Status: COMPLETED | OUTPATIENT
Start: 2021-05-03 | End: 2021-05-03

## 2021-05-03 RX ORDER — ONDANSETRON 2 MG/ML
4 INJECTION INTRAMUSCULAR; INTRAVENOUS EVERY 6 HOURS PRN
Status: DISCONTINUED | OUTPATIENT
Start: 2021-05-03 | End: 2021-05-11 | Stop reason: HOSPADM

## 2021-05-03 RX ADMIN — ALBUTEROL SULFATE 5 MG: 2.5 SOLUTION RESPIRATORY (INHALATION) at 20:58

## 2021-05-03 RX ADMIN — Medication 1000 MG: at 23:54

## 2021-05-03 RX ADMIN — SODIUM CHLORIDE 1000 ML: 0.9 INJECTION, SOLUTION INTRAVENOUS at 22:02

## 2021-05-03 RX ADMIN — GUAIFENESIN 600 MG: 600 TABLET, EXTENDED RELEASE ORAL at 23:07

## 2021-05-03 RX ADMIN — SODIUM CHLORIDE 1000 ML: 0.9 INJECTION, SOLUTION INTRAVENOUS at 20:57

## 2021-05-03 RX ADMIN — HEPARIN SODIUM 5000 UNITS: 5000 INJECTION INTRAVENOUS; SUBCUTANEOUS at 23:08

## 2021-05-03 RX ADMIN — IPRATROPIUM BROMIDE 0.5 MG: 0.5 SOLUTION RESPIRATORY (INHALATION) at 20:58

## 2021-05-03 RX ADMIN — SODIUM CHLORIDE 75 ML/HR: 0.9 INJECTION, SOLUTION INTRAVENOUS at 23:05

## 2021-05-03 RX ADMIN — IOHEXOL 85 ML: 350 INJECTION, SOLUTION INTRAVENOUS at 20:52

## 2021-05-03 RX ADMIN — CEFEPIME HYDROCHLORIDE 2000 MG: 2 INJECTION, SOLUTION INTRAVENOUS at 23:13

## 2021-05-03 NOTE — ED NOTES
Patient stated he feels better as long as he is sitting, denies SOB at this time      Cami Hassan RN  05/03/21 1950

## 2021-05-04 ENCOUNTER — APPOINTMENT (INPATIENT)
Dept: NON INVASIVE DIAGNOSTICS | Facility: HOSPITAL | Age: 66
DRG: 871 | End: 2021-05-04
Payer: MEDICARE

## 2021-05-04 PROBLEM — E83.39 HYPOPHOSPHATEMIA: Status: ACTIVE | Noted: 2021-05-04

## 2021-05-04 LAB
ALBUMIN SERPL BCP-MCNC: 1.6 G/DL (ref 3.5–5)
ALP SERPL-CCNC: 147 U/L (ref 46–116)
ALT SERPL W P-5'-P-CCNC: 74 U/L (ref 12–78)
ANION GAP SERPL CALCULATED.3IONS-SCNC: 9 MMOL/L (ref 4–13)
ARTERIAL PATENCY WRIST A: YES
AST SERPL W P-5'-P-CCNC: 51 U/L (ref 5–45)
ATRIAL RATE: 112 BPM
BASE EXCESS BLDA CALC-SCNC: -0.8 MMOL/L
BILIRUB SERPL-MCNC: 0.26 MG/DL (ref 0.2–1)
BUN SERPL-MCNC: 11 MG/DL (ref 5–25)
CALCIUM ALBUM COR SERPL-MCNC: 9.4 MG/DL (ref 8.3–10.1)
CALCIUM SERPL-MCNC: 7.5 MG/DL (ref 8.3–10.1)
CHLORIDE SERPL-SCNC: 106 MMOL/L (ref 100–108)
CO2 SERPL-SCNC: 25 MMOL/L (ref 21–32)
CREAT SERPL-MCNC: 0.53 MG/DL (ref 0.6–1.3)
ERYTHROCYTE [DISTWIDTH] IN BLOOD BY AUTOMATED COUNT: 15.4 % (ref 11.6–15.1)
GFR SERPL CREATININE-BSD FRML MDRD: 110 ML/MIN/1.73SQ M
GLUCOSE SERPL-MCNC: 94 MG/DL (ref 65–140)
HCO3 BLDA-SCNC: 23.7 MMOL/L (ref 22–28)
HCT VFR BLD AUTO: 31.4 % (ref 36.5–49.3)
HGB BLD-MCNC: 8.8 G/DL (ref 12–17)
L PNEUMO1 AG UR QL IA.RAPID: NEGATIVE
MAGNESIUM SERPL-MCNC: 2.1 MG/DL (ref 1.6–2.6)
MCH RBC QN AUTO: 22.3 PG (ref 26.8–34.3)
MCHC RBC AUTO-ENTMCNC: 28 G/DL (ref 31.4–37.4)
MCV RBC AUTO: 80 FL (ref 82–98)
NASAL CANNULA: ABNORMAL
O2 CT BLDA-SCNC: 13.6 ML/DL (ref 16–23)
OXYHGB MFR BLDA: 93.3 % (ref 94–97)
P AXIS: 69 DEGREES
PCO2 BLDA: 38.2 MM HG (ref 36–44)
PH BLDA: 7.41 [PH] (ref 7.35–7.45)
PHOSPHATE SERPL-MCNC: 1.9 MG/DL (ref 2.3–4.1)
PLATELET # BLD AUTO: 721 THOUSANDS/UL (ref 149–390)
PMV BLD AUTO: 10.3 FL (ref 8.9–12.7)
PO2 BLDA: 68.4 MM HG (ref 75–129)
POTASSIUM SERPL-SCNC: 4.2 MMOL/L (ref 3.5–5.3)
PR INTERVAL: 126 MS
PROCALCITONIN SERPL-MCNC: 0.51 NG/ML
PROCALCITONIN SERPL-MCNC: 1.25 NG/ML
PROT SERPL-MCNC: 6.8 G/DL (ref 6.4–8.2)
QRS AXIS: 58 DEGREES
QRSD INTERVAL: 76 MS
QT INTERVAL: 348 MS
QTC INTERVAL: 475 MS
RBC # BLD AUTO: 3.95 MILLION/UL (ref 3.88–5.62)
S PNEUM AG UR QL: NEGATIVE
SODIUM SERPL-SCNC: 140 MMOL/L (ref 136–145)
SPECIMEN SOURCE: ABNORMAL
T WAVE AXIS: 67 DEGREES
VENTRICULAR RATE: 112 BPM
WBC # BLD AUTO: 13.23 THOUSAND/UL (ref 4.31–10.16)

## 2021-05-04 PROCEDURE — 85027 COMPLETE CBC AUTOMATED: CPT | Performed by: NURSE PRACTITIONER

## 2021-05-04 PROCEDURE — 84145 PROCALCITONIN (PCT): CPT | Performed by: NURSE PRACTITIONER

## 2021-05-04 PROCEDURE — 94640 AIRWAY INHALATION TREATMENT: CPT

## 2021-05-04 PROCEDURE — 84100 ASSAY OF PHOSPHORUS: CPT | Performed by: NURSE PRACTITIONER

## 2021-05-04 PROCEDURE — 87081 CULTURE SCREEN ONLY: CPT | Performed by: NURSE PRACTITIONER

## 2021-05-04 PROCEDURE — 83735 ASSAY OF MAGNESIUM: CPT | Performed by: NURSE PRACTITIONER

## 2021-05-04 PROCEDURE — 93970 EXTREMITY STUDY: CPT | Performed by: SURGERY

## 2021-05-04 PROCEDURE — 93010 ELECTROCARDIOGRAM REPORT: CPT | Performed by: INTERNAL MEDICINE

## 2021-05-04 PROCEDURE — 99223 1ST HOSP IP/OBS HIGH 75: CPT | Performed by: PHYSICIAN ASSISTANT

## 2021-05-04 PROCEDURE — 99223 1ST HOSP IP/OBS HIGH 75: CPT | Performed by: NURSE PRACTITIONER

## 2021-05-04 PROCEDURE — 82805 BLOOD GASES W/O2 SATURATION: CPT | Performed by: INTERNAL MEDICINE

## 2021-05-04 PROCEDURE — 80053 COMPREHEN METABOLIC PANEL: CPT | Performed by: NURSE PRACTITIONER

## 2021-05-04 PROCEDURE — 93970 EXTREMITY STUDY: CPT

## 2021-05-04 PROCEDURE — 94760 N-INVAS EAR/PLS OXIMETRY 1: CPT

## 2021-05-04 PROCEDURE — 36600 WITHDRAWAL OF ARTERIAL BLOOD: CPT

## 2021-05-04 PROCEDURE — 36415 COLL VENOUS BLD VENIPUNCTURE: CPT | Performed by: NURSE PRACTITIONER

## 2021-05-04 PROCEDURE — 87449 NOS EACH ORGANISM AG IA: CPT | Performed by: NURSE PRACTITIONER

## 2021-05-04 PROCEDURE — 87081 CULTURE SCREEN ONLY: CPT | Performed by: INTERNAL MEDICINE

## 2021-05-04 PROCEDURE — 99232 SBSQ HOSP IP/OBS MODERATE 35: CPT | Performed by: INTERNAL MEDICINE

## 2021-05-04 RX ORDER — HYDROMORPHONE HCL/PF 1 MG/ML
0.5 SYRINGE (ML) INJECTION EVERY 4 HOURS PRN
Status: DISCONTINUED | OUTPATIENT
Start: 2021-05-04 | End: 2021-05-11 | Stop reason: HOSPADM

## 2021-05-04 RX ORDER — LEVALBUTEROL 1.25 MG/.5ML
1.25 SOLUTION, CONCENTRATE RESPIRATORY (INHALATION)
Status: DISCONTINUED | OUTPATIENT
Start: 2021-05-04 | End: 2021-05-11 | Stop reason: HOSPADM

## 2021-05-04 RX ORDER — ALBUTEROL SULFATE 2.5 MG/3ML
2.5 SOLUTION RESPIRATORY (INHALATION) EVERY 4 HOURS PRN
Status: DISCONTINUED | OUTPATIENT
Start: 2021-05-04 | End: 2021-05-11 | Stop reason: HOSPADM

## 2021-05-04 RX ORDER — METHYLPREDNISOLONE SODIUM SUCCINATE 40 MG/ML
40 INJECTION, POWDER, LYOPHILIZED, FOR SOLUTION INTRAMUSCULAR; INTRAVENOUS EVERY 12 HOURS SCHEDULED
Status: DISCONTINUED | OUTPATIENT
Start: 2021-05-04 | End: 2021-05-06

## 2021-05-04 RX ORDER — OXYCODONE HYDROCHLORIDE 5 MG/1
5 TABLET ORAL EVERY 4 HOURS PRN
Status: DISCONTINUED | OUTPATIENT
Start: 2021-05-04 | End: 2021-05-11 | Stop reason: HOSPADM

## 2021-05-04 RX ORDER — OXYCODONE HYDROCHLORIDE 10 MG/1
10 TABLET ORAL EVERY 4 HOURS PRN
Status: DISCONTINUED | OUTPATIENT
Start: 2021-05-04 | End: 2021-05-11 | Stop reason: HOSPADM

## 2021-05-04 RX ADMIN — LEVALBUTEROL HYDROCHLORIDE 1.25 MG: 1.25 SOLUTION, CONCENTRATE RESPIRATORY (INHALATION) at 20:22

## 2021-05-04 RX ADMIN — Medication 2 TABLET: at 08:55

## 2021-05-04 RX ADMIN — LEVALBUTEROL HYDROCHLORIDE 1.25 MG: 1.25 SOLUTION, CONCENTRATE RESPIRATORY (INHALATION) at 15:13

## 2021-05-04 RX ADMIN — ALBUTEROL SULFATE 2.5 MG: 2.5 SOLUTION RESPIRATORY (INHALATION) at 04:58

## 2021-05-04 RX ADMIN — FLUTICASONE FUROATE AND VILANTEROL TRIFENATATE 1 PUFF: 100; 25 POWDER RESPIRATORY (INHALATION) at 08:55

## 2021-05-04 RX ADMIN — OXYCODONE HYDROCHLORIDE AND ACETAMINOPHEN 1 TABLET: 5; 325 TABLET ORAL at 07:46

## 2021-05-04 RX ADMIN — HEPARIN SODIUM 5000 UNITS: 5000 INJECTION INTRAVENOUS; SUBCUTANEOUS at 13:12

## 2021-05-04 RX ADMIN — METHYLPREDNISOLONE SODIUM SUCCINATE 40 MG: 40 INJECTION, POWDER, FOR SOLUTION INTRAMUSCULAR; INTRAVENOUS at 13:12

## 2021-05-04 RX ADMIN — GUAIFENESIN 600 MG: 600 TABLET, EXTENDED RELEASE ORAL at 08:55

## 2021-05-04 RX ADMIN — IPRATROPIUM BROMIDE 0.5 MG: 0.5 SOLUTION RESPIRATORY (INHALATION) at 15:12

## 2021-05-04 RX ADMIN — OXYCODONE HYDROCHLORIDE 10 MG: 10 TABLET ORAL at 18:00

## 2021-05-04 RX ADMIN — LEVALBUTEROL HYDROCHLORIDE 1.25 MG: 1.25 SOLUTION, CONCENTRATE RESPIRATORY (INHALATION) at 09:04

## 2021-05-04 RX ADMIN — IPRATROPIUM BROMIDE 0.5 MG: 0.5 SOLUTION RESPIRATORY (INHALATION) at 09:05

## 2021-05-04 RX ADMIN — IPRATROPIUM BROMIDE 0.5 MG: 0.5 SOLUTION RESPIRATORY (INHALATION) at 20:22

## 2021-05-04 RX ADMIN — ENOXAPARIN SODIUM 40 MG: 40 INJECTION SUBCUTANEOUS at 21:15

## 2021-05-04 RX ADMIN — HEPARIN SODIUM 5000 UNITS: 5000 INJECTION INTRAVENOUS; SUBCUTANEOUS at 06:38

## 2021-05-04 RX ADMIN — CEFEPIME HYDROCHLORIDE 2000 MG: 2 INJECTION, SOLUTION INTRAVENOUS at 21:16

## 2021-05-04 RX ADMIN — CEFEPIME HYDROCHLORIDE 2000 MG: 2 INJECTION, SOLUTION INTRAVENOUS at 13:12

## 2021-05-04 RX ADMIN — GUAIFENESIN 600 MG: 600 TABLET, EXTENDED RELEASE ORAL at 18:00

## 2021-05-04 RX ADMIN — VANCOMYCIN HYDROCHLORIDE 1500 MG: 1 INJECTION, POWDER, LYOPHILIZED, FOR SOLUTION INTRAVENOUS at 10:24

## 2021-05-04 RX ADMIN — VANCOMYCIN HYDROCHLORIDE 1500 MG: 1 INJECTION, POWDER, LYOPHILIZED, FOR SOLUTION INTRAVENOUS at 22:28

## 2021-05-04 RX ADMIN — CEFEPIME HYDROCHLORIDE 2000 MG: 2 INJECTION, SOLUTION INTRAVENOUS at 06:38

## 2021-05-04 RX ADMIN — METHYLPREDNISOLONE SODIUM SUCCINATE 40 MG: 40 INJECTION, POWDER, FOR SOLUTION INTRAMUSCULAR; INTRAVENOUS at 21:15

## 2021-05-04 NOTE — PLAN OF CARE
Problem: Nutrition/Hydration-ADULT  Goal: Nutrient/Hydration intake appropriate for improving, restoring or maintaining nutritional needs  Description: Monitor and assess patient's nutrition/hydration status for malnutrition  Collaborate with interdisciplinary team and initiate plan and interventions as ordered  Monitor patient's weight and dietary intake as ordered or per policy  Utilize nutrition screening tool and intervene as necessary  Determine patient's food preferences and provide high-protein, high-caloric foods as appropriate       INTERVENTIONS:  - Monitor oral intake, urinary output, labs, and treatment plans  - Assess nutrition and hydration status and recommend course of action  - Evaluate amount of meals eaten  - Assist patient with eating if necessary   - Allow adequate time for meals  - Recommend/ encourage appropriate diets, oral nutritional supplements, and vitamin/mineral supplements  - Order, calculate, and assess calorie counts as needed  - Recommend, monitor, and adjust tube feedings and TPN/PPN based on assessed needs  - Assess need for intravenous fluids  - Provide specific nutrition/hydration education as appropriate  - Include patient/family/caregiver in decisions related to nutrition  Outcome: Progressing     Problem: RESPIRATORY - ADULT  Goal: Achieves optimal ventilation and oxygenation  Description: INTERVENTIONS:  - Assess for changes in respiratory status  - Assess for changes in mentation and behavior  - Position to facilitate oxygenation and minimize respiratory effort  - Oxygen administered by appropriate delivery if ordered  - Initiate smoking cessation education as indicated  - Encourage broncho-pulmonary hygiene including cough, deep breathe, Incentive Spirometry  - Assess the need for suctioning and aspirate as needed  - Assess and instruct to report SOB or any respiratory difficulty  - Respiratory Therapy support as indicated  Outcome: Progressing

## 2021-05-04 NOTE — ASSESSMENT & PLAN NOTE
Chief complaint-shortness of breath in the setting of patient with metastatic lung cancer from renal origin  Cta Ed Chest Pe Study-5/3/2021-Impression: 1  No evidence of pulmonary embolism  2   Large dense opacity in the left upper lobe and interval increase in patchy groundglass opacities in the right perihilar region likely due to pneumonia in the appropriate clinical setting  3   Known left upper lobe mass is obscured  Interval increase in mass in the lingular region  Redemonstrated right lower lobe nodular masses or metastasis  4   Stable extensive lymphadenopathy  5   Redemonstrated expansile destructive lesion in the 9th rib and interval increase in compression deformity at the T9 vertebral body       Recent hospitalization April 4th through April 7th  Trend procalcitonin  Blood cultures collected  Collect urine antigens  Respiratory protocol in place  Patient currently requiring 5 L O2 supplementation baseline no O2 requirement  Continue prior to admission inhalers  Cefepime and vancomycin ordered

## 2021-05-04 NOTE — NURSING NOTE
Pt up to floor 1130-BSR given by Jonatan Washburn, RN  Pt VSS, no complaints at this time  Pt resting comfortably in bed, lungs diminished, 02 on at 5L  Bed in low position, call bell in reach   Will follow

## 2021-05-04 NOTE — RESPIRATORY THERAPY NOTE
Pt placed on Vapotherm per pulmonary request due to high O2 requirements, ABG results and WOB  Pt placed on 20L and 45% pt states he feels better, but noted to still be tachypneic  Pt asking for flow to stay lower at current time  RN aware of settings will continue to monitor and makes changes as needed

## 2021-05-04 NOTE — ASSESSMENT & PLAN NOTE
As evidence by white blood cell count of 13 27 with tachycardia and tachypnea  Most likely secondary to HCAP  Please see additional treatment plan for HCAP

## 2021-05-04 NOTE — ASSESSMENT & PLAN NOTE
Chief complaint shortness of breath-SpO2 84% on room air upon entering emergency room-accompanied with tachycardia and tachypnea  Patient with known lung mass following Hematology-Oncology and suspect healthcare associated pneumonia in addition-please see additional treatment plan for healthcare associated pneumonia

## 2021-05-04 NOTE — ASSESSMENT & PLAN NOTE
· Check an iron panel, vitamin B12 level, and folate level  · Check the patient's stool for occult blood x 3 specimens  · Follow the CBC  · Transfuse for a hemoglobin less than 7 g/dl    Results from last 7 days   Lab Units 05/04/21  0637 05/03/21  1947   WBC Thousand/uL 13 23* 13 27*   HEMOGLOBIN g/dL 8 8* 8 7*   HEMATOCRIT % 31 4* 29 6*   PLATELETS Thousands/uL 721* 913*

## 2021-05-04 NOTE — PROGRESS NOTES
Vancomycin Assessment    Cachorro Garcia is a 77 y o  male who is currently receiving vancomycin 1000mg q12h for Pneumonia     Relevant clinical data and objective history reviewed:  Creatinine   Date Value Ref Range Status   05/04/2021 0 53 (L) 0 60 - 1 30 mg/dL Final     Comment:     Standardized to IDMS reference method   05/03/2021 0 72 0 60 - 1 30 mg/dL Final     Comment:     Standardized to IDMS reference method   04/22/2021 0 79 0 60 - 1 30 mg/dL Final     Comment:     Standardized to IDMS reference method     /59 (BP Location: Left arm)   Pulse 96   Temp 99 1 °F (37 3 °C) (Temporal)   Resp 22   Ht 5' 11" (1 803 m)   Wt 64 7 kg (142 lb 10 2 oz)   SpO2 94%   BMI 19 89 kg/m²   I/O last 3 completed shifts: In: 2050 [IV Piggyback:2050]  Out: -   Lab Results   Component Value Date/Time    BUN 11 05/04/2021 06:37 AM    WBC 13 23 (H) 05/04/2021 06:37 AM    HGB 8 8 (L) 05/04/2021 06:37 AM    HCT 31 4 (L) 05/04/2021 06:37 AM    MCV 80 (L) 05/04/2021 06:37 AM     (H) 05/04/2021 06:37 AM     Temp Readings from Last 3 Encounters:   05/03/21 99 1 °F (37 3 °C) (Temporal)   04/22/21 (!) 96 9 °F (36 1 °C) (Tympanic)   04/16/21 98 6 °F (37 °C) (Tympanic)     Vancomycin Days of Therapy: 1    Assessment/Plan  The patient is currently on vancomycin utilizing scheduled dosing based on actual body weight  Baseline risks associated with therapy include: advanced age and dehydration  The patient is currently receiving 1000mg q12h and after clinical evaluation will be changed to 1500mg q12h  Pharmacy will also follow closely for s/sx of nephrotoxicity, infusion reactions, and appropriateness of therapy  BMP and CBC will be ordered per protocol  Plan for trough as patient approaches steady state, prior to the 5th  dose at approximately 1030 on 5/6/21  Due to infection severity, will target a trough of 15-20 (appropriate for most indications)     Pharmacy will continue to follow the patients culture results and clinical progress daily      Nilson Esteban, Pharmacist

## 2021-05-04 NOTE — ASSESSMENT & PLAN NOTE
As evidence by a D-dimer of 3 91  CTA collected of the chest-no signs of pulmonary embolus    Heparin 5000 units q 8 hours for DVT PPX

## 2021-05-04 NOTE — PROGRESS NOTES
[address] [facility] [address]  Progress Note - Danny Murguia 1955, 77 y o  male MRN: [id number]  Unit/Bed#: 400-01 Encounter: [id number]  Primary Care Provider: Regine Levy MD   Date and time admitted to hospital: 5/3/2021  7:23 PM    * Acute respiratory failure with hypoxia [facility])  Assessment & Plan  · Secondary to post-obstructive pneumonia  · Initially required 5 lpm of continuous supplemental oxygen via the nasal cannula to maintain oxygen saturation levels at 92% and above  · Now the patient is requiring the Vapotherm Unit to maintain oxygen saturation levels at 92% and above  · Low threshold to transfer the patient to ICU level of care  · The patient was seen in consultation by Pulmonology  · Respiratory protocol  · COVID-19 testing was negative  Sepsis ([facility] )  Assessment & Plan  · Sepsis was present on admission and secondary to probable post-obstructive pneumonia  · SIRS criteria was met with tachycardia and leukocytosis  · Possible gram-negative pneumonia  · Continue IV cefepime and IV vancomycin  · Check a MRSA nasal screen  · Follow culture results  · Follow the procalcitonin level  · Continue NSS IV fluids at 75 ml/hr    HCAP (healthcare-associated pneumonia)  Assessment & Plan  · Sepsis was present on admission and secondary to probable post-obstructive pneumonia  · SIRS criteria was met with tachycardia and leukocytosis    · Possible gram-negative pneumonia  · Continue IV cefepime and IV vancomycin  · Check a MRSA nasal screen  · Follow culture results  · Follow the procalcitonin level  · Continue NSS IV fluids at 75 ml/hr    Hypophosphatemia  Assessment & Plan  · Give K-phos, 2 tablets PO today  · Follow the phosphorus level    Transaminitis  Assessment & Plan  · Check an acute hepatitis panel  · Avoid all hepatotoxic agents  · Follow the liver function tests    Results from last 7 days   Lab Units 05/04/21  0637 05/03/21  1947   SODIUM mmol/L 140 137   POTASSIUM mmol/L 4 2 4 2 CHLORIDE mmol/L 106 105   CO2 mmol/L 25 24   BUN mg/dL 11 19   CREATININE mg/dL 0 53* 0 72   CALCIUM mg/dL 7 5* 7 8*   ALK PHOS U/L 147* 143*   ALT U/L 74 79*   AST U/L 51* 53*         Elevated d-dimer  Assessment & Plan  · Secondary to active malignancy    Results for [facility] (MRN [id number]) as of 5/4/2021 15:19   Ref  Range 5/3/2021 19:47   D-Dimer, Carilyn Siena Latest Ref Range: <0 50 ug/ml FEU 3 91 (H)       VAS lower limb venous duplex study, complete bilateral  Status: In process   PACS Images     Show images for VAS lower limb venous duplex study, complete bilateral   Study Result       [facility] REPORT  CLINICAL:  Indications:  Patient presents with bilateral lower extremity edema  Operative History:  No cardiovascular surgeries noted  FINDINGS:     Segment  Right            Left                Impression       Impression         CFV      Normal (Patent)  Normal (Patent)             CONCLUSION:  RIGHT LOWER LIMB:  No evidence of acute or chronic deep vein thrombosis  No evidence of superficial thrombophlebitis noted  Doppler evaluation shows a normal response to augmentation maneuvers  Popliteal, posterior tibial and anterior tibial arterial Doppler waveforms are  triphasic  LEFT LOWER LIMB:  No evidence of acute or chronic deep vein thrombosis  No evidence of superficial thrombophlebitis noted  Doppler evaluation shows a normal response to augmentation maneuvers  Popliteal, posterior tibial and anterior tibial arterial Doppler waveforms are  triphasic       CTA ED chest PE Study  Status: Final result   PACS Images     Show images for CTA ED chest PE Study   Study Result    CTA - CHEST WITH IV CONTRAST - PULMONARY ANGIOGRAM     INDICATION:   tachycardia, hypoxia, current cancer on chemo      COMPARISON: CT of the chest on April 5, 2021      TECHNIQUE: CTA examination of the chest was performed using angiographic technique according to a protocol specifically tailored to evaluate for pulmonary embolism  Axial, sagittal, and coronal 2D reformatted images were created from the source data and   submitted for interpretation  In addition, coronal 3D MIP postprocessing was performed on the acquisition scanner        Radiation dose length product (DLP) for this visit:  305 93 mGy-cm   This examination, like all CT scans performed in the [facility], was performed utilizing techniques to minimize radiation dose exposure, including the use of iterative   reconstruction and automated exposure control      IV Contrast:  85 mL of iohexol (OMNIPAQUE)  was administered intravenously without immediate adverse reaction  FINDINGS:     PULMONARY ARTERIAL TREE:  No pulmonary embolus is seen       LUNGS:  Known left upper lobe mass is now obscured by large patchy opacity (series 3, image 45) likely due to pneumonia  Interval increase in patchy groundglass opacities in the right perihilar region (series 3, image 36)  Increase in size of dense   patchy opacity in the right apex (series 3, image 26) Interval increase in mass in the lingular region measuring 4 7 x 3 1 cm  Juxtapleural nodules in the left lower lobe are not significantly changed in size      PLEURA:  Stable size of moderate loculated left pleural effusion with nodularity and thickening of the pleura, increased since prior exam      HEART/GREAT VESSELS:  Unremarkable for patient's age      MEDIASTINUM AND MARGARITA:  Stable extensive mediastinal, hilar, and cardiophrenic lymphadenopathy     CHEST WALL AND LOWER NECK:   Unremarkable      VISUALIZED STRUCTURES IN THE UPPER ABDOMEN:  Stable left para-aortic mass which is partially visualized      OSSEOUS STRUCTURES:  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  Compression deformity at the T9 vertebral body with increase in moderate vertebral body height loss when compared with prior exam        IMPRESSION:     1  No evidence of pulmonary embolism    2   Large dense opacity in the left upper lobe and interval increase in patchy groundglass opacities in the right perihilar region likely due to pneumonia in the appropriate clinical setting  3   Known left upper lobe mass is obscured  Interval increase in mass in the lingular region  Redemonstrated right lower lobe nodular masses or metastasis  4   Stable extensive lymphadenopathy  5   Redemonstrated expansile destructive lesion in the 9th rib and interval increase in compression deformity at the T9 vertebral body  Thrombocytosis ([address] )  Assessment & Plan  · Likely reactive in the setting of active malignancy  · Follow the platelet count    Results from last 7 days   Lab Units 05/04/21  0637 05/03/21 1947   WBC Thousand/uL 13 23* 13 27*   HEMOGLOBIN g/dL 8 8* 8 7*   HEMATOCRIT % 31 4* 29 6*   PLATELETS Thousands/uL 721* 913*         Renal cell carcinoma of left kidney [facility])  Assessment & Plan  · Consult Hematology/Oncology    Moderate protein-calorie malnutrition ([facility] )  Assessment & Plan  Malnutrition Findings:           BMI Findings: Body mass index is 19 89 kg/m²         · Consult the dietitian/nutritionist    Mediastinal lymphadenopathy  Assessment & Plan  · Known metastatic renal cell carcinoma    Lung mass  Assessment & Plan  · Known metastatic renal cell carcinoma with lung metastases  · Consult Hematology/Oncology    Anemia  Assessment & Plan  · Check an iron panel, vitamin B12 level, and folate level  · Check the patient's stool for occult blood x 3 specimens  · Follow the CBC  · Transfuse for a hemoglobin less than 7 g/dl    Results from last 7 days   Lab Units 05/04/21 0637 05/03/21 1947   WBC Thousand/uL 13 23* 13 27*   HEMOGLOBIN g/dL 8 8* 8 7*   HEMATOCRIT % 31 4* 29 6*   PLATELETS Thousands/uL 721* 913*           VTE Pharmacologic Prophylaxis:   Pharmacologic: Enoxaparin (Lovenox)  Mechanical VTE Prophylaxis in Place: Yes    Patient Centered Rounds: I have performed bedside rounds with nursing staff today  I discussed the case with Hematology/Oncology  Time Spent for Care: 30 minutes  More than 50% of total time spent on counseling and coordination of care as described above  Current Length of Stay: 1 day(s)    Current Patient Status: Inpatient   Certification Statement: The patient will continue to require additional inpatient hospital stay due to the need for IV antibiotic treatment and with the patient requiring the Vapotherm Unit to maintain adequate oxygen levels  Code Status: Level 1 - Full Code      Subjective: The patient was seen and examined  The patient complains of shortness of breath at rest, which is worsened with any type of physical exertion  Objective:     Vitals:   Temp (24hrs), Av 4 °F (36 9 °C), Min:97 8 °F (36 6 °C), Max:99 1 °F (37 3 °C)    Temp:  [97 8 °F (36 6 °C)-99 1 °F (37 3 °C)] 97 8 °F (36 6 °C)  HR:  [] 111  Resp:  [17-26] 20  BP: (102-139)/(51-88) 135/60  SpO2:  [84 %-96 %] 95 %  Body mass index is 19 89 kg/m²  Input and Output Summary (last 24 hours):        Intake/Output Summary (Last 24 hours) at 2021 1525  Last data filed at 2021 1050  Gross per 24 hour   Intake 2050 ml   Output 300 ml   Net 1750 ml       Physical Exam:     Physical Exam  General:  Moderate respiratory distress, follows commands, conversational dyspnea is present  HEENT:  NC/AT, mucous membranes moist  Neck:  Supple, No JVP elevation  CV:  + S1, + S2, Tachycardic, Regular rhythm  Pulm:  Scattered rhonchi bilaterally with expiratory wheezing bilaterally  Abd:  Soft, Non-tender, Non-distended  Ext:  No clubbing/cyanosis/edema  Skin:  No rashes  Neuro:  Awake, alert, oriented  Psych:  Normal mood and affect      Additional Data:    Labs:    Results from last 7 days   Lab Units 21  0637 21  1947   WBC Thousand/uL 13 23* 13 27*   HEMOGLOBIN g/dL 8 8* 8 7*   HEMATOCRIT % 31 4* 29 6*   PLATELETS Thousands/uL 721* 913*   NEUTROS PCT %  --  89* LYMPHS PCT %  --  1*   MONOS PCT %  --  6   EOS PCT %  --  3     Results from last 7 days   Lab Units 05/04/21  0637   SODIUM mmol/L 140   POTASSIUM mmol/L 4 2   CHLORIDE mmol/L 106   CO2 mmol/L 25   BUN mg/dL 11   CREATININE mg/dL 0 53*   ANION GAP mmol/L 9   CALCIUM mg/dL 7 5*   ALBUMIN g/dL 1 6*   TOTAL BILIRUBIN mg/dL 0 26   ALK PHOS U/L 147*   ALT U/L 74   AST U/L 51*   GLUCOSE RANDOM mg/dL 94     Results from last 7 days   Lab Units 05/03/21  1951   INR  1 33*             Results from last 7 days   Lab Units 05/03/21 2059   LACTIC ACID mmol/L 2 0   PROCALCITONIN ng/ml 0 51*           * I Have Reviewed All Lab Data Listed Above  * Additional Pertinent Lab Tests Reviewed: [address] Admission Reviewed      Recent Cultures (last 7 days):     Results from last 7 days   Lab Units 05/04/21  0124 05/03/21 2059   BLOOD CULTURE   --  Received in Microbiology Lab  Culture in Progress  Received in Microbiology Lab  Culture in Progress     LEGIONELLA URINARY ANTIGEN  Negative  --        Last 24 Hours Medication List:   Current Facility-Administered Medications   Medication Dose Route Frequency Provider Last Rate    albuterol  2 5 mg Nebulization Q4H PRN Chapinace Spurr, DO      benzonatate  100 mg Oral TID PRN MARILOU Ventura      cefepime  2,000 mg Intravenous Q8H MARILOU Ventura 2,000 mg (05/04/21 1312)    enoxaparin  40 mg Subcutaneous Q24H Hollace Spurr, DO      ferrous sulfate  325 mg Oral Daily With Breakfast Rosy Y Swank, CRNP      fluticasone-vilanterol  1 puff Inhalation Daily Rosy Y Swank, CRNP      guaiFENesin  600 mg Oral BID Rosy Y Swank, CRNP      HYDROmorphone  0 5 mg Intravenous Q4H PRN Hollace Spurr, DO      ipratropium  0 5 mg Nebulization TID Hollace Spurr, DO      levalbuterol  1 25 mg Nebulization TID Hollace Spurr, DO      methylPREDNISolone sodium succinate  40 mg Intravenous Q12H [facility] Froilan Parada PA-C      ondansetron 4 mg Intravenous Q6H PRN Gifty Grace, CRNP      oxyCODONE  5 mg Oral Q4H PRN Ashley Spear DO      Or    oxyCODONE  10 mg Oral Q4H PRN Ashley Spear, DO      sodium chloride  75 mL/hr Intravenous Continuous Gifty Grace, CRNP 75 mL/hr (05/03/21 2305)    vancomycin  20 mg/kg (Ideal) Intravenous Q12H Ashley Spear DO 1,500 mg (05/04/21 1024)        Today, Patient Was Seen By: Ashley Spear DO    ** Please Note: Dictation voice to text software may have been used in the creation of this document   **

## 2021-05-04 NOTE — RESPIRATORY THERAPY NOTE
RT Protocol Note  Gretta Phalen 77 y o  male MRN: [id number]  Unit/Bed#: RM07 Encounter: [id number]    Assessment    Principal Problem:    Acute respiratory failure with hypoxia ([address] )  Active Problems:    Anemia    Lung mass    Mediastinal lymphadenopathy    Moderate protein-calorie malnutrition (HCC)    Renal cell carcinoma of left kidney (HCC)    HCAP (healthcare-associated pneumonia)    Thrombocytosis (HCC)    Sepsis (HCC)    Elevated d-dimer    Transaminitis      Home Pulmonary Medications:  Patient uses BreoEllipta daily and prn Albuteral MDI (he says he uses this at least 3 times a day)  Patient does not have oxygen or pap therapy       History reviewed  No pertinent past medical history    Social History     Socioeconomic History    Marital status: Single     Spouse name: None    Number of children: None    Years of education: None    Highest education level: None   Occupational History    None   Social Needs    Financial resource strain: None    Food insecurity     Worry: None     Inability: None    Transportation needs     Medical: None     Non-medical: None   Tobacco Use    Smoking status: Former Smoker    Smokeless tobacco: Never Used    Tobacco comment: quit feb 2021   Substance and Sexual Activity    Alcohol use: Never     Frequency: Never    Drug use: Never    Sexual activity: None   Lifestyle    Physical activity     Days per week: None     Minutes per session: None    Stress: None   Relationships    Social connections     Talks on phone: None     Gets together: None     Attends [employer] service: None     Active member of club or organization: None     Attends meetings of clubs or organizations: None     Relationship status: None    Intimate partner violence     Fear of current or ex partner: None     Emotionally abused: None     Physically abused: None     Forced sexual activity: None   Other Topics Concern    None   Social History Narrative    None       Subjective    Subjective Data: Patient is sob    Objective    Physical Exam:   Assessment Type: During-treatment  General Appearance: Awake, Alert  Respiratory Pattern: Dyspnea at rest, Tachypneic  Chest Assessment: Chest expansion symmetrical  Bilateral Breath Sounds: Diminished, Coarse  Cough: Non-productive, Strong  O2 Device: 5 lpm oxygen via nasal cannula    Vitals:  Blood pressure 103/59, pulse 96, temperature 99 1 °F (37 3 °C), temperature source Temporal, resp  rate (!) 26, height 5' 11" (1 803 m), weight 64 7 kg (142 lb 10 2 oz), SpO2 94 %  Imaging and other studies: I have personally reviewed pertinent reports  O2 Device: 5 lpm oxygen via nasal cannula     Plan    Respiratory Plan: Home Bronchodilator Patient pathway, Moderate/Severe Distress pathway  Airway Clearance Plan: Incentive Spirometer     Resp Comments: Patient received in the ED  He does not have oxygen or pap therapy  He does use BreoEllipta daily, prn Albuteral MDI (he says sometimes 3 times a day)  Patient is very tachypneic  I believe patient would benefit from Albuteral nebulizer treatments at this time, due to his increase respiratory rate, delievery may be improved with nebulizer  Incentive spirometer given to patient to use Q1H, instruction and education  Patient did give return demonstration

## 2021-05-04 NOTE — ED PROVIDER NOTES
History  Chief Complaint   Patient presents with    Shortness of Breath     shortness of breath since this morning; dysnpea with exertion/ambulation; patient recently d/c from [place] for new onset lung cancer; has been taking his breathing treatment with no relief      Patient is a 61-year-old male with history of renal cell carcinoma currently on chemotherapy presenting for shortness of breath  Patient says he has been having increasing dyspnea on exertion over the last couple days  He is also complaining of some upper back discomfort  He says that the back pain is not pleuritic in nature but is worse when he gets up in the morning  He denies lightheadedness, dizziness, chest pain, abdominal pain  The patient sees Dr Arley Christina of [facility] for his renal cell carcinoma  Upon arrival to the ED, the patient was satting 84 percent on room air after ambulating back to the room  He was placed on 2 liters of nasal cannula which improved his oxygenation to the mid 90s  He has a tachycardic in the high 120s  Prior to Admission Medications   Prescriptions Last Dose Informant Patient Reported? Taking? albuterol (PROVENTIL HFA,VENTOLIN HFA) 90 mcg/act inhaler 5/3/2021 at Unknown time Self No Yes   Sig: Inhale 2 puffs every 6 (six) hours as needed for wheezing   benzonatate (TESSALON PERLES) 100 mg capsule Past Week at Unknown time Self No Yes   Sig: Take 1 capsule (100 mg total) by mouth 3 (three) times a day as needed for cough   ferrous sulfate 324 (65 Fe) mg 5/3/2021 at Unknown time Self No Yes   Sig: Take 1 tablet (324 mg total) by mouth every other day   fluticasone-salmeterol (ADVAIR, WIXELA) 250-50 mcg/dose inhaler 5/3/2021 at Unknown time  No Yes   Sig: Inhale 1 puff 2 (two) times a day Rinse mouth after use     oxyCODONE-acetaminophen (PERCOCET) 5-325 mg per tablet 5/3/2021 at Unknown time  No Yes   Sig: Take 1 tablet by mouth every 4 (four) hours as needed for moderate painMax Daily Amount: 6 tablets      Facility-Administered Medications: None       History reviewed  No pertinent past medical history  Past Surgical History:   Procedure Laterality Date    IR BIOPSY LIVER MASS  4/7/2021    IR BIOPSY LYMPH NODE  4/13/2021    IR THORACENTESIS  4/6/2021    TENDON REPAIR Right     forearm       Family History   Problem Relation Age of Onset    Heart disease Father     Multiple sclerosis Sister     Heart attack Brother      I have reviewed and agree with the history as documented  E-Cigarette/Vaping    E-Cigarette Use Never User      E-Cigarette/Vaping Substances    Nicotine No     THC No     CBD No     Flavoring No     Other No     Unknown No      Social History     Tobacco Use    Smoking status: Former Smoker    Smokeless tobacco: Never Used    Tobacco comment: quit feb 2021   Substance Use Topics    Alcohol use: Never     Frequency: Never    Drug use: Never       Review of Systems   Constitutional: Negative for chills, fever and unexpected weight change  HENT: Negative for congestion, sore throat and trouble swallowing  Eyes: Negative for pain, discharge and itching  Respiratory: Positive for shortness of breath  Negative for cough, chest tightness and wheezing  Cardiovascular: Negative for chest pain, palpitations and leg swelling  Gastrointestinal: Negative for abdominal pain, blood in stool, diarrhea, nausea and vomiting  Endocrine: Negative for polyuria  Genitourinary: Negative for difficulty urinating, dysuria, frequency and hematuria  Musculoskeletal: Positive for back pain (upper back)  Negative for arthralgias  Neurological: Negative for dizziness, syncope, weakness, light-headedness and headaches  Physical Exam  Physical Exam  Vitals signs and nursing note reviewed  Constitutional:       General: He is not in acute distress  Appearance: He is well-developed  HENT:      Head: Normocephalic and atraumatic        Right Ear: External ear normal  Left Ear: External ear normal    Eyes:      Conjunctiva/sclera: Conjunctivae normal       Pupils: Pupils are equal, round, and reactive to light  Neck:      Musculoskeletal: Normal range of motion  Cardiovascular:      Rate and Rhythm: Normal rate and regular rhythm  Heart sounds: Normal heart sounds  No murmur  No friction rub  No gallop  Pulmonary:      Effort: Tachypnea and accessory muscle usage present  No respiratory distress  Breath sounds: Wheezing (mild, throughout) present  No rales  Abdominal:      General: Bowel sounds are normal  There is no distension  Palpations: Abdomen is soft  Tenderness: There is no abdominal tenderness  There is no guarding  Musculoskeletal: Normal range of motion  General: No tenderness or deformity  Lymphadenopathy:      Cervical: No cervical adenopathy  Skin:     General: Skin is warm and dry  Neurological:      General: No focal deficit present  Mental Status: He is alert and oriented to person, place, and time  Mental status is at baseline  Cranial Nerves: No cranial nerve deficit  Sensory: No sensory deficit  Motor: No weakness or abnormal muscle tone     Psychiatric:         Behavior: Behavior normal          Vital Signs  ED Triage Vitals   Temperature Pulse Respirations Blood Pressure SpO2   05/03/21 1933 05/03/21 1933 05/03/21 1933 05/03/21 1933 05/03/21 1933   99 1 °F (37 3 °C) (!) 135 (!) 26 105/51 (!) 84 %      Temp Source Heart Rate Source Patient Position - Orthostatic VS BP Location FiO2 (%)   05/03/21 1933 05/03/21 1933 05/03/21 2030 05/03/21 2030 05/04/21 2022   Temporal Monitor Sitting Right arm 45      Pain Score       05/03/21 1933       Worst Possible Pain           Vitals:    05/05/21 0453 05/05/21 0807 05/05/21 1533 05/05/21 2147   BP:  132/71 128/67 152/84   Pulse: 87 79 77 76   Patient Position - Orthostatic VS:   Sitting Sitting         Visual Acuity  Visual Acuity      Most Recent Value   L Pupil Size (mm)  2   R Pupil Size (mm)  2   L Pupil Shape  Round   R Pupil Shape  Round          ED Medications  Medications   benzonatate (TESSALON PERLES) capsule 100 mg (has no administration in time range)   ferrous sulfate tablet 325 mg (325 mg Oral Given 5/5/21 0808)   fluticasone-vilanterol (BREO ELLIPTA) 100-25 mcg/inh inhaler 1 puff (1 puff Inhalation Given 5/5/21 1011)   sodium chloride 0 9 % infusion (75 mL/hr Intravenous New Bag 5/6/21 0253)   guaiFENesin (MUCINEX) 12 hr tablet 600 mg (600 mg Oral Given 5/5/21 1714)   ondansetron (ZOFRAN) injection 4 mg (has no administration in time range)   cefepime (MAXIPIME) IVPB (premix in dextrose) 2,000 mg 50 mL (2,000 mg Intravenous New Bag 5/6/21 0500)   levalbuterol (XOPENEX) inhalation solution 1 25 mg (1 25 mg Nebulization Given 5/5/21 2011)   albuterol inhalation solution 2 5 mg (2 5 mg Nebulization Given 5/4/21 0458)   ipratropium (ATROVENT) 0 02 % inhalation solution 0 5 mg (0 5 mg Nebulization Given 5/5/21 2011)   vancomycin (VANCOCIN) 1,500 mg in sodium chloride 0 9 % 500 mL IVPB (1,500 mg Intravenous New Bag 5/5/21 2219)   methylPREDNISolone sodium succinate (Solu-MEDROL) injection 40 mg (40 mg Intravenous Given 5/5/21 2139)   enoxaparin (LOVENOX) subcutaneous injection 40 mg (40 mg Subcutaneous Given 5/5/21 2139)   oxyCODONE (ROXICODONE) IR tablet 5 mg ( Oral See Alternative 5/5/21 1718)     Or   oxyCODONE (ROXICODONE) immediate release tablet 10 mg (10 mg Oral Given 5/5/21 1718)   HYDROmorphone (DILAUDID) injection 0 5 mg (has no administration in time range)   calcium carbonate (OYSTER SHELL,OSCAL) 500 mg tablet 1 tablet (1 tablet Oral Given 5/5/21 1319)   ergocalciferol (VITAMIN D2) capsule 50,000 Units (50,000 Units Oral Given 5/5/21 1319)   sodium chloride 0 9 % bolus 1,000 mL (0 mL Intravenous Stopped 5/3/21 2200)   albuterol inhalation solution 5 mg (5 mg Nebulization Given 5/3/21 2058)   ipratropium (ATROVENT) 0 02 % inhalation solution 0 5 mg (0 5 mg Nebulization Given 5/3/21 2058)   iohexol (OMNIPAQUE) 350 MG/ML injection (SINGLE-DOSE) 85 mL (85 mL Intravenous Given 5/3/21 2052)   sodium chloride 0 9 % bolus 1,000 mL (0 mL Intravenous Stopped 5/3/21 2310)   cefepime (MAXIPIME) IVPB (premix in dextrose) 2,000 mg 50 mL (0 mg Intravenous Stopped 5/3/21 2354)   vancomycin (VANCOCIN) 1000 mg in sodium chloride 0 9% 250 mL IVPB (1,000 mg Intravenous Given 5/3/21 2354)   potassium-sodium phosphateS (K-PHOS,PHOSPHA 250) -250 mg tablet 2 tablet (2 tablets Oral Given 5/4/21 0855)   potassium-sodium phosphateS (K-PHOS,PHOSPHA 250) -250 mg tablet 2 tablet (2 tablets Oral Given 5/5/21 1319)       Diagnostic Studies  Results Reviewed     Procedure Component Value Units Date/Time    MRSA culture [[id number]]  (Normal) Collected: 05/04/21 1051    Lab Status: Final result Specimen: Nares from Nose Updated: 05/05/21 2045     MRSA Culture Only No Methicillin Resistant Staphlyococcus aureus (MRSA) isolated    Procalcitonin Reflex [[id number]]  (Abnormal) Collected: 05/05/21 0622    Lab Status: Final result Specimen: Blood from Arm, Left Updated: 05/05/21 1358     Procalcitonin 1 12 ng/ml     MRSA culture [[id number]]  (Normal) Collected: 05/04/21 0124    Lab Status: Final result Specimen: Nares from Nose Updated: 05/05/21 1136     MRSA Culture Only No Methicillin Resistant Staphlyococcus aureus (MRSA) isolated    Blood culture #1 [[id number]] Collected: 05/03/21 2059    Lab Status: Preliminary result Specimen: Blood from Arm, Right Updated: 05/05/21 0801     Blood Culture No Growth at 24 hrs  Blood culture #2 [[id number]] Collected: 05/03/21 2059    Lab Status: Preliminary result Specimen: Blood from Arm, Left Updated: 05/05/21 0801     Blood Culture No Growth at 24 hrs      Procalcitonin with AM Reflex [[id number]]  (Abnormal) Collected: 05/04/21 0699    Lab Status: Final result Specimen: Blood from Arm, Right Updated: 05/04/21 5125 Procalcitonin 1 25 ng/ml     Blood gas, arterial [[id number]]  (Abnormal) Collected: 05/04/21 0926    Lab Status: Final result Specimen: Blood, Arterial from Radial, Right Updated: 05/04/21 0936     pH, Arterial 7 410     pCO2, Arterial 38 2 mm Hg      pO2, Arterial 68 4 mm Hg      HCO3, Arterial 23 7 mmol/L      Base Excess, Arterial -0 8 mmol/L      O2 Content, Arterial 13 6 mL/dL      O2 HGB,Arterial  93 3 %      SOURCE Radial, Right     UVALDO TEST Yes     Nasal Cannula 5L    Procalcitonin with AM Reflex [[id number]]  (Abnormal) Collected: 05/03/21 2059    Lab Status: Final result Specimen: Blood from Arm, Left Updated: 05/04/21 0916     Procalcitonin 0 51 ng/ml     Strep Pneumoniae, Urine [[id number]]  (Normal) Collected: 05/04/21 0124    Lab Status: Final result Specimen: Urine, Clean Catch Updated: 05/04/21 0801     Strep pneumoniae antigen, urine Negative    Legionella antigen, urine [[id number]]  (Normal) Collected: 05/04/21 0124    Lab Status: Final result Specimen: Urine, Clean Catch Updated: 05/04/21 0757     Legionella Urinary Antigen Negative    CBC (With Platelets) [[id number]]  (Abnormal) Collected: 05/04/21 0637    Lab Status: Final result Specimen: Blood from Arm, Right Updated: 05/04/21 0714     WBC 13 23 Thousand/uL      RBC 3 95 Million/uL      Hemoglobin 8 8 g/dL      Hematocrit 31 4 %      MCV 80 fL      MCH 22 3 pg      MCHC 28 0 g/dL      RDW 15 4 %      Platelets 528 Thousands/uL      MPV 10 3 fL     Comprehensive metabolic panel [[id number]]  (Abnormal) Collected: 05/04/21 0637    Lab Status: Final result Specimen: Blood from Arm, Right Updated: 05/04/21 0708     Sodium 140 mmol/L      Potassium 4 2 mmol/L      Chloride 106 mmol/L      CO2 25 mmol/L      ANION GAP 9 mmol/L      BUN 11 mg/dL      Creatinine 0 53 mg/dL      Glucose 94 mg/dL      Calcium 7 5 mg/dL      Corrected Calcium 9 4 mg/dL      AST 51 U/L      ALT 74 U/L      Alkaline Phosphatase 147 U/L      Total Protein 6 8 g/dL Albumin 1 6 g/dL      Total Bilirubin 0 26 mg/dL      eGFR 110 ml/min/1 73sq m     Narrative:      [place] guidelines for Chronic Kidney Disease (CKD):     Stage 1 with normal or high GFR (GFR > 90 mL/min/1 73 square meters)    Stage 2 Mild CKD (GFR = 60-89 mL/min/1 73 square meters)    Stage 3A Moderate CKD (GFR = 45-59 mL/min/1 73 square meters)    Stage 3B Moderate CKD (GFR = 30-44 mL/min/1 73 square meters)    Stage 4 Severe CKD (GFR = 15-29 mL/min/1 73 square meters)    Stage 5 End Stage CKD (GFR <15 mL/min/1 73 square meters)  Note: GFR calculation is accurate only with a steady state creatinine    Magnesium [[id number]]  (Normal) Collected: 05/04/21 0637    Lab Status: Final result Specimen: Blood from Arm, Right Updated: 05/04/21 0708     Magnesium 2 1 mg/dL     Phosphorus [[id number]]  (Abnormal) Collected: 05/04/21 0637    Lab Status: Final result Specimen: Blood from Arm, Right Updated: 05/04/21 0708     Phosphorus 1 9 mg/dL     Sputum culture and Gram stain [[id number]]     Lab Status: No result Specimen: Sputum     Novel Coronavirus (Covid-19),PCR [facility] - 2 Hour Stat [[id number]]  (Normal) Collected: 05/03/21 2059    Lab Status: Final result Specimen: Nares from Nasopharyngeal Swab Updated: 05/03/21 2210     SARS-CoV-2 Negative    Narrative: The specimen collection materials, transport medium, and/or testing methodology utilized in the production of these test results have been proven to be reliable in a limited validation with an abbreviated program under the Emergency Utilization Authorization provided by the FDA  Testing reported as "Presumptive positive" will be confirmed with secondary testing to ensure result accuracy  Clinical caution and judgement should be used with the interpretation of these results with consideration of the clinical impression and other laboratory testing    Testing reported as "Positive" or "Negative" has been proven to be accurate according to standard laboratory validation requirements  All testing is performed with control materials showing appropriate reactivity at standard intervals  Lactic acid, plasma [[id number]]  (Normal) Collected: 05/03/21 2059    Lab Status: Final result Specimen: Blood from Arm, Left Updated: 05/03/21 2136     LACTIC ACID 2 0 mmol/L     Narrative:      Result may be elevated if tourniquet was used during collection      Protime-INR [[id number]]  (Abnormal) Collected: 05/03/21 1951    Lab Status: Final result Specimen: Blood from Arm, Right Updated: 05/03/21 2027     Protime 16 2 seconds      INR 1 33    APTT [[id number]]  (Normal) Collected: 05/03/21 1951    Lab Status: Final result Specimen: Blood from Arm, Right Updated: 05/03/21 2027     PTT 36 seconds     D-Dimer [[id number]]  (Abnormal) Collected: 05/03/21 1947    Lab Status: Final result Specimen: Blood from Arm, Right Updated: 05/03/21 2018     D-Dimer, Quant 3 91 ug/ml FEU     Troponin I [[id number]]  (Normal) Collected: 05/03/21 1947    Lab Status: Final result Specimen: Blood from Arm, Right Updated: 05/03/21 2017     Troponin I 0 02 ng/mL     Comprehensive metabolic panel [[id number]]  (Abnormal) Collected: 05/03/21 1947    Lab Status: Final result Specimen: Blood from Arm, Right Updated: 05/03/21 2015     Sodium 137 mmol/L      Potassium 4 2 mmol/L      Chloride 105 mmol/L      CO2 24 mmol/L      ANION GAP 8 mmol/L      BUN 19 mg/dL      Creatinine 0 72 mg/dL      Glucose 158 mg/dL      Calcium 7 8 mg/dL      Corrected Calcium 9 6 mg/dL      AST 53 U/L      ALT 79 U/L      Alkaline Phosphatase 143 U/L      Total Protein 6 7 g/dL      Albumin 1 7 g/dL      Total Bilirubin 0 18 mg/dL      eGFR 97 ml/min/1 73sq m     Narrative:      [place] guidelines for Chronic Kidney Disease (CKD):     Stage 1 with normal or high GFR (GFR > 90 mL/min/1 73 square meters)    Stage 2 Mild CKD (GFR = 60-89 mL/min/1 73 square meters)    Stage 3A Moderate CKD (GFR = 45-59 mL/min/1 73 square meters)    Stage 3B Moderate CKD (GFR = 30-44 mL/min/1 73 square meters)    Stage 4 Severe CKD (GFR = 15-29 mL/min/1 73 square meters)    Stage 5 End Stage CKD (GFR <15 mL/min/1 73 square meters)  Note: GFR calculation is accurate only with a steady state creatinine    CBC and differential [[id number]]  (Abnormal) Collected: 05/03/21 1947    Lab Status: Final result Specimen: Blood from Arm, Right Updated: 05/03/21 1957     WBC 13 27 Thousand/uL      RBC 3 80 Million/uL      Hemoglobin 8 7 g/dL      Hematocrit 29 6 %      MCV 78 fL      MCH 22 9 pg      MCHC 29 4 g/dL      RDW 15 3 %      MPV 9 0 fL      Platelets 401 Thousands/uL      nRBC 0 /100 WBCs      Neutrophils Relative 89 %      Immat GRANS % 1 %      Lymphocytes Relative 1 %      Monocytes Relative 6 %      Eosinophils Relative 3 %      Basophils Relative 0 %      Neutrophils Absolute 11 73 Thousands/µL      Immature Grans Absolute 0 09 Thousand/uL      Lymphocytes Absolute 0 18 Thousands/µL      Monocytes Absolute 0 83 Thousand/µL      Eosinophils Absolute 0 39 Thousand/µL      Basophils Absolute 0 05 Thousands/µL                  VAS lower limb venous duplex study, complete bilateral   Final Result by Catie Brock MD (05/04 1536)      CTA ED chest PE Study   Final Result by Rosalie Garcia MD (05/03 2133)      1  No evidence of pulmonary embolism  2   Large dense opacity in the left upper lobe and interval increase in patchy groundglass opacities in the right perihilar region likely due to pneumonia in the appropriate clinical setting  3   Known left upper lobe mass is obscured  Interval increase in mass in the lingular region  Redemonstrated right lower lobe nodular masses or metastasis  4   Stable extensive lymphadenopathy  5   Redemonstrated expansile destructive lesion in the 9th rib and interval increase in compression deformity at the T9 vertebral body        Workstation performed: [id number]                    Procedures  ECG 12 Lead Documentation Only    Date/Time: 5/3/2021 9:11 PM  Performed by: Selam Yee DO  Authorized by: Selam Yee DO     Indications / Diagnosis:   sob  ECG reviewed by me, the ED Provider: yes    Patient location:  ED  Previous ECG:     Previous ECG:  Unavailable    Comparison to cardiac monitor: Yes    Interpretation:     Interpretation: normal    Rate:     ECG rate:  112    ECG rate assessment: tachycardic    Rhythm:     Rhythm: sinus rhythm    Ectopy:     Ectopy: none    QRS:     QRS axis:  Normal  Conduction:     Conduction: normal    ST segments:     ST segments:  Normal  T waves:     T waves: normal               ED Course  ED Course as of May 06 0700   Mon May 03, 2021   1926 Patient ambulated into department without difficulty                            Initial Sepsis Screening     Row Name 05/03/21 2205                Is the patient's history suggestive of a new or worsening infection? (!) Yes (Proceed)  -NR        Suspected source of infection  pneumonia  -NR        Are two or more of the following signs & symptoms of infection both present and new to the patient? (!) Yes (Proceed)  -NR        Indicate SIRS criteria  Tachycardia > 90 bpm;Leukocytosis (WBC > 63959 IJL); Tachypnea > 20 resp per min  -NR        If the answer is yes to both questions, suspicion of sepsis is present  --        If severe sepsis is present AND tissue hypoperfusion perists in the hour after fluid resuscitation or lactate > 4, the patient meets criteria for SEPTIC SHOCK  --        Are any of the following organ dysfunction criteria present within 6 hours of suspected infection and SIRS criteria that are NOT considered to be chronic conditions?   No  -NR        Organ dysfunction  --        Date of presentation of severe sepsis  --        Time of presentation of severe sepsis  --        Tissue hypoperfusion persists in the hour after crystalloid fluid administration, evidenced, by either:  --        Was hypotension present within one hour of the conclusion of crystalloid fluid administration?  --        Date of presentation of septic shock  --        Time of presentation of septic shock  --          User Key  (r) = Recorded By, (t) = Taken By, (c) = Cosigned By    [address] Name Provider Type    JAYY Schwartz DO Physician          SBIRT 22yo+      Most Recent Value   SBIRT (23 yo +)   In order to provide better care to our patients, we are screening all of our patients for alcohol and drug use  Would it be okay to ask you these screening questions? Yes Filed at: 05/03/2021 1948   Initial Alcohol Screen: US AUDIT-C    1  How often do you have a drink containing alcohol?  0 Filed at: 05/03/2021 1948   2  How many drinks containing alcohol do you have on a typical day you are drinking? 0 Filed at: 05/03/2021 1948   3a  Male UNDER 65: How often do you have five or more drinks on one occasion? 0 Filed at: 05/03/2021 1948   3b  FEMALE Any Age, or MALE 65+: How often do you have 4 or more drinks on one occassion? 0 Filed at: 05/03/2021 1948   Audit-C Score  0 Filed at: 05/03/2021 1948   VALERIY: How many times in the past year have you    Used an illegal drug or used a prescription medication for non-medical reasons? Never Filed at: 05/03/2021 1948                    MDM  Number of Diagnoses or Management Options  Diagnosis management comments: 70-year-old male with history of renal cell carcinoma with Mets to bone and lung presenting for progressive shortness of breath and dyspnea on exertion over the last couple days  Admits to some upper back pain but denies any chest pain, lightheadedness, dizziness her abdominal pain  Done be hypoxic after ambulating to the room  Improved with 2 liters nasal cannula  Patient is tachycardic  Will obtain cardiac workup, D-dimer  Given history of cancer will obtain CT PE study          Disposition  Final diagnoses:   HCAP (healthcare-associated pneumonia)   Acute respiratory failure with hypoxia ([facility] )   Lung mass   Renal cell carcinoma of left kidney ([facility] )   Moderate protein-calorie malnutrition ([facility] )     Time reflects when diagnosis was documented in both MDM as applicable and the Disposition within this note     Time User Action Codes Description Comment    5/3/2021 10:24 PM Alcus Gooding Add [J18 9] HCAP (healthcare-associated pneumonia)     5/3/2021 10:24 PM Alcus Gooding Modify [J18 9] HCAP (healthcare-associated pneumonia)     5/3/2021 10:24 PM Gasperclair Fraser, Madan [place] Add [J96 01] Acute respiratory failure with hypoxia ([facility] )     5/3/2021 10:24 PM Alcus Gooding Add [R91 8] Lung mass     5/4/2021  7:21 AM Cameron Dalton Add [C64 2] Renal cell carcinoma of left kidney ([facility] )     5/4/2021  3:17 PM Cameron Dalton [place] Add [E44 0] Moderate protein-calorie malnutrition [facility])       ED Disposition     ED Disposition Condition Date/Time Comment    Admit Stable Mon May 3, 2021  9:58 PM Case was discussed with LUIS and the patient's admission status was agreed to be Admission Status: inpatient status to the service of Dr Bruce Harvey           Follow-up Information    None         Current Discharge Medication List      CONTINUE these medications which have NOT CHANGED    Details   albuterol (PROVENTIL HFA,VENTOLIN HFA) 90 mcg/act inhaler Inhale 2 puffs every 6 (six) hours as needed for wheezing  Qty: 1 Inhaler, Refills: 3    Associated Diagnoses: Pulmonary emphysema, unspecified emphysema type (HCC)      benzonatate (TESSALON PERLES) 100 mg capsule Take 1 capsule (100 mg total) by mouth 3 (three) times a day as needed for cough  Qty: 20 capsule, Refills: 0    Associated Diagnoses: Cough      ferrous sulfate 324 (65 Fe) mg Take 1 tablet (324 mg total) by mouth every other day  Qty: 15 tablet, Refills: 0    Associated Diagnoses: Anemia      fluticasone-salmeterol (ADVAIR, WIXELA) 250-50 mcg/dose inhaler Inhale 1 puff 2 (two) times a day Rinse mouth after use  Qty: 1 Inhaler, Refills: 3    Comments: Substitution to a formulary equivalent within the same pharmaceutical class is authorized  Associated Diagnoses: Pulmonary emphysema, unspecified emphysema type ([facility]      oxyCODONE-acetaminophen (PERCOCET) 5-325 mg per tablet Take 1 tablet by mouth every 4 (four) hours as needed for moderate painMax Daily Amount: 6 tablets  Qty: 42 tablet, Refills: 0    Associated Diagnoses: Lung mass; Mediastinal lymphadenopathy; Bone metastases ([facility] )           No discharge procedures on file      PDMP Review       Value Time User    PDMP Reviewed  Yes 4/19/2021  9:41 AM Silviano Payne MD          ED Provider  Electronically Signed by           Luisito Rouse DO  05/06/21 0700

## 2021-05-04 NOTE — ASSESSMENT & PLAN NOTE
As evidence by hemoglobin level of 8 7  No signs and symptoms of bleeding  Trend CBC daily hemoglobin down from 10 3 on 04/22  Continue prior to admission iron

## 2021-05-04 NOTE — ASSESSMENT & PLAN NOTE
· Secondary to active malignancy    Results for Zulema Flores (MRN [id number]) as of 5/4/2021 15:19   Ref  Range 5/3/2021 19:47   D-Dimer, Latisha Candeer Latest Ref Range: <0 50 ug/ml FEU 3 91 (H)       VAS lower limb venous duplex study, complete bilateral  Status: In process   PACS Images     Show images for VAS lower limb venous duplex study, complete bilateral   Study Result       [facility] REPORT  CLINICAL:  Indications:  Patient presents with bilateral lower extremity edema  Operative History:  No cardiovascular surgeries noted  FINDINGS:     Segment  Right            Left                Impression       Impression         CFV      Normal (Patent)  Normal (Patent)             CONCLUSION:  RIGHT LOWER LIMB:  No evidence of acute or chronic deep vein thrombosis  No evidence of superficial thrombophlebitis noted  Doppler evaluation shows a normal response to augmentation maneuvers  Popliteal, posterior tibial and anterior tibial arterial Doppler waveforms are  triphasic  LEFT LOWER LIMB:  No evidence of acute or chronic deep vein thrombosis  No evidence of superficial thrombophlebitis noted  Doppler evaluation shows a normal response to augmentation maneuvers  Popliteal, posterior tibial and anterior tibial arterial Doppler waveforms are  triphasic  CTA ED chest PE Study  Status: Final result   PACS Images     Show images for CTA ED chest PE Study   Study Result    CTA - CHEST WITH IV CONTRAST - PULMONARY ANGIOGRAM     INDICATION:   tachycardia, hypoxia, current cancer on chemo      COMPARISON: CT of the chest on April 5, 2021      TECHNIQUE: CTA examination of the chest was performed using angiographic technique according to a protocol specifically tailored to evaluate for pulmonary embolism  Axial, sagittal, and coronal 2D reformatted images were created from the source data and   submitted for interpretation    In addition, coronal 3D MIP postprocessing was performed on the acquisition scanner        Radiation dose length product (DLP) for this visit:  305 93 mGy-cm   This examination, like all CT scans performed in the [facility], was performed utilizing techniques to minimize radiation dose exposure, including the use of iterative   reconstruction and automated exposure control      IV Contrast:  85 mL of iohexol (OMNIPAQUE)  was administered intravenously without immediate adverse reaction  FINDINGS:     PULMONARY ARTERIAL TREE:  No pulmonary embolus is seen       LUNGS:  Known left upper lobe mass is now obscured by large patchy opacity (series 3, image 45) likely due to pneumonia  Interval increase in patchy groundglass opacities in the right perihilar region (series 3, image 36)  Increase in size of dense   patchy opacity in the right apex (series 3, image 26) Interval increase in mass in the lingular region measuring 4 7 x 3 1 cm  Juxtapleural nodules in the left lower lobe are not significantly changed in size      PLEURA:  Stable size of moderate loculated left pleural effusion with nodularity and thickening of the pleura, increased since prior exam      HEART/GREAT VESSELS:  Unremarkable for patient's age      MEDIASTINUM AND MARGARITA:  Stable extensive mediastinal, hilar, and cardiophrenic lymphadenopathy     CHEST WALL AND LOWER NECK:   Unremarkable      VISUALIZED STRUCTURES IN THE UPPER ABDOMEN:  Stable left para-aortic mass which is partially visualized      OSSEOUS STRUCTURES:  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  Compression deformity at the T9 vertebral body with increase in moderate vertebral body height loss when compared with prior exam        IMPRESSION:     1  No evidence of pulmonary embolism  2   Large dense opacity in the left upper lobe and interval increase in patchy groundglass opacities in the right perihilar region likely due to pneumonia in the appropriate clinical setting    3   Known left upper lobe mass is obscured  Interval increase in mass in the lingular region  Redemonstrated right lower lobe nodular masses or metastasis  4   Stable extensive lymphadenopathy  5   Redemonstrated expansile destructive lesion in the 9th rib and interval increase in compression deformity at the T9 vertebral body

## 2021-05-04 NOTE — CONSULTS
Medical Oncology/Hematology Consult Note  Melany Pollard, male, 77 y o , 1955,  /[id number], [id number]     Assessment and Plan  1  Renal cell carcinoma  2  Lung mass   Patient has recently diagnosed renal cell carcinoma with metastasis to a left para aortic lymph node diagnosed April 13, 2021  He is status post 1 chemotherapy treatment on 04/22/2021  Patient states he tolerated this without difficulty  He denied any nausea vomiting diarrhea constipation fever or chills  Patient states he had increased shortness of breath which prompted his visit to the ER yesterday  Patient's hemoglobin is currently 8 8, continue to trend and transfuse as indicated  Check iron panel, folate, B12 level, already ordered  I had a Goals of care conversation with patient  At this time he would like to continue treatment for his renal cell carcinoma  Given his overall performance status I think it is reasonable to continue treatment once his pneumonia treated  Overall survival at 4 years for patients with metastatic renal cell carcinoma is 50%  Continue management of pneumonia per primary team    Discussed above with primary team and Pulmonary  At this time there is no role for radiation therapy  Pulmonary will consider possible thoracentesis for diagnostic and therapeutic purposes  Patient has an appointment scheduled with our office on 05/11/2021  Reason for consultation:  Renal cell carcinoma of the left kidney and lung mass    History of present illness:  Patient is a 49-year-old  male with no significant past medical history  Patient is a previous 1 pack per day times 35 years smoker who quit recently  He was recently diagnosed with Stage IV renal cell carcinoma with metastasis to a left para-aortic lymph node, diagnosed April 13, 2021  He started on chemotherapy on 04/22/2021 with Opdivo and Yervoy       On the day of admission patient had acute onset of shortness of breath that worsened with activities  He also reported a productive mucopurulent sputum, with night sweats however denies fevers hemoptysis chills or other signs of infection  In emergency room patient was noted to be tachypneic, tachycardic and hypoxic requiring supplemental oxygen  He was also found to be anemic with a hemoglobin of 8 7, MCV of 78, white blood cell count 13 27, and platelet count 053  His ANC was elevated at 11 73, and relative lymphocytes decreased at 1% with an absolute lymphocyte count of 0 18  Patient had an elevated procalcitonin and elevated D-dimer however CTA of the chest was negative for pulmonary embolism but did show worsening right upper lobe mass and an interval increase in the large dense obesity of the left upper lobe and lingular mass, stable extensive mediastinal and hilar lymphadenopathy  Redemonstrated lytic lesion of the 9th rib and compression deformity of T9 vertebral body  Patient was started on broad-spectrum antibiotics and admitted for acute respiratory failure with hypoxia, Hcap  Review of Systems:   Review of Systems   Constitutional: Positive for fatigue  Negative for activity change, appetite change, fever and unexpected weight change  Respiratory: Positive for cough and shortness of breath  Cardiovascular: Negative for chest pain and leg swelling  Gastrointestinal: Negative for abdominal pain, constipation, diarrhea and nausea  Endocrine: Negative for cold intolerance and heat intolerance  Musculoskeletal: Negative for arthralgias and myalgias  Skin: Negative  Neurological: Negative for dizziness, weakness and headaches  Hematological: Negative for adenopathy  Does not bruise/bleed easily  History reviewed  No pertinent past medical history      Past Surgical History:   Procedure Laterality Date    IR BIOPSY LIVER MASS  4/7/2021    IR BIOPSY LYMPH NODE  4/13/2021    IR THORACENTESIS  4/6/2021    TENDON REPAIR Right     forearm       Family History   Problem Relation Age of Onset    Heart disease Father     Multiple sclerosis Sister     Heart attack Brother        Social History     Socioeconomic History    Marital status: Single     Spouse name: None    Number of children: None    Years of education: None    Highest education level: None   Occupational History    None   Social Needs    Financial resource strain: None    Food insecurity     Worry: None     Inability: None    Transportation needs     Medical: None     Non-medical: None   Tobacco Use    Smoking status: Former Smoker    Smokeless tobacco: Never Used    Tobacco comment: quit feb 2021   Substance and Sexual Activity    Alcohol use: Never     Frequency: Never    Drug use: Never    Sexual activity: None   Lifestyle    Physical activity     Days per week: None     Minutes per session: None    Stress: None   Relationships    Social connections     Talks on phone: None     Gets together: None     Attends [employer]ism service: None     Active member of club or organization: None     Attends meetings of clubs or organizations: None     Relationship status: None    Intimate partner violence     Fear of current or ex partner: None     Emotionally abused: None     Physically abused: None     Forced sexual activity: None   Other Topics Concern    None   Social History Narrative    None         Current Facility-Administered Medications:     acetaminophen (TYLENOL) tablet 650 mg, 650 mg, Oral, Q6H PRN, Rosy Padilla, MARILOU    albuterol inhalation solution 2 5 mg, 2 5 mg, Nebulization, Q4H PRN, [facility], DO, 2 5 mg at 05/04/21 0458    benzonatate (TESSALON PERLES) capsule 100 mg, 100 mg, Oral, TID PRN, MARILOU Zapata    cefepime (MAXIPIME) IVPB (premix in dextrose) 2,000 mg 50 mL, 2,000 mg, Intravenous, Q8H, MARILOU Zapata, Last Rate: 100 mL/hr at 05/04/21 1312, 2,000 mg at 05/04/21 1312    ferrous sulfate tablet 325 mg, 325 mg, Oral, Daily With Breakfast, Yousuf Figueroa CRNP    fluticasone-vilanterol (BREO ELLIPTA) 100-25 mcg/inh inhaler 1 puff, 1 puff, Inhalation, Daily, Rosy Y Benjiank, CRNP, 1 puff at 05/04/21 0855    guaiFENesin (MUCINEX) 12 hr tablet 600 mg, 600 mg, Oral, BID, Saundra Y Swank, CRNP, 600 mg at 05/04/21 0855    heparin (porcine) subcutaneous injection 5,000 Units, 5,000 Units, Subcutaneous, Q8H [facility], Rosy Y Swank, CRNP, 5,000 Units at 05/04/21 1312    ipratropium (ATROVENT) 0 02 % inhalation solution 0 5 mg, 0 5 mg, Nebulization, TID, Briseida Casas, DO, 0 5 mg at 05/04/21 0905    levalbuterol (Mechele Ma) inhalation solution 1 25 mg, 1 25 mg, Nebulization, TID, Briseida Casas, DO, 1 25 mg at 05/04/21 0904    methylPREDNISolone sodium succinate (Solu-MEDROL) injection 40 mg, 40 mg, Intravenous, Q12H [facility], Froilan Parada PA-C, 40 mg at 05/04/21 1312    ondansetron (ZOFRAN) injection 4 mg, 4 mg, Intravenous, Q6H PRN, Rosy Y Swank, CRNP    oxyCODONE-acetaminophen (PERCOCET) 5-325 mg per tablet 1 tablet, 1 tablet, Oral, Q4H PRN, Saundra Y Swank, CRNP, 1 tablet at 05/04/21 0746    sodium chloride 0 9 % infusion, 75 mL/hr, Intravenous, Continuous, Rosy Y Swank, CRNP, Last Rate: 75 mL/hr at 05/03/21 2305, 75 mL/hr at 05/03/21 2305    vancomycin (VANCOCIN) 1,500 mg in sodium chloride 0 9 % 500 mL IVPB, 20 mg/kg (Ideal), Intravenous, Q12H, Last Rate: 250 mL/hr at 05/04/21 1024, 1,500 mg at 05/04/21 1024 **AND** MRSA culture, , , Once **AND** MRSA culture, , , Once, Briseida Casas, DO    Medications Prior to Admission   Medication    albuterol (PROVENTIL HFA,VENTOLIN HFA) 90 mcg/act inhaler    benzonatate (TESSALON PERLES) 100 mg capsule    ferrous sulfate 324 (65 Fe) mg    fluticasone-salmeterol (ADVAIR, WIXELA) 250-50 mcg/dose inhaler    oxyCODONE-acetaminophen (PERCOCET) 5-325 mg per tablet       No Known Allergies      Physical Exam:    /88   Pulse (!) 111   Temp 98 3 °F (36 8 °C)   Resp 17   Ht 5' 11" (1 803 m)   Wt 64 7 kg (142 lb 10 2 oz)   SpO2 94%   BMI 19 89 kg/m²     Physical Exam  Vitals signs reviewed  Constitutional:       Appearance: He is well-developed  He is ill-appearing  HENT:      Head: Normocephalic and atraumatic  Eyes:      Extraocular Movements: Extraocular movements intact  Conjunctiva/sclera: Conjunctivae normal       Pupils: Pupils are equal, round, and reactive to light  Neck:      Musculoskeletal: Normal range of motion  Cardiovascular:      Rate and Rhythm: Regular rhythm  Tachycardia present  Pulses: Normal pulses  Heart sounds: Normal heart sounds  Pulmonary:      Effort: Tachypnea and accessory muscle usage present  No respiratory distress  Breath sounds: Examination of the right-upper field reveals wheezing and rhonchi  Examination of the left-upper field reveals wheezing and rhonchi  Examination of the right-middle field reveals wheezing and rhonchi  Examination of the left-middle field reveals wheezing and rhonchi  Examination of the right-lower field reveals rhonchi  Examination of the left-lower field reveals rhonchi  Wheezing and rhonchi present  Comments: 20 L Vapotherm with 45% O2 intact   Abdominal:      General: Bowel sounds are normal       Palpations: Abdomen is soft  Musculoskeletal: Normal range of motion  Lymphadenopathy:      Cervical: No cervical adenopathy  Skin:     General: Skin is warm and dry  Capillary Refill: Capillary refill takes less than 2 seconds  Neurological:      Mental Status: He is alert and oriented to person, place, and time     Psychiatric:         Behavior: Behavior normal          Recent Results (from the past 48 hour(s))   CBC and differential    Collection Time: 05/03/21  7:47 PM   Result Value Ref Range    WBC 13 27 (H) 4 31 - 10 16 Thousand/uL    RBC 3 80 (L) 3 88 - 5 62 Million/uL    Hemoglobin 8 7 (L) 12 0 - 17 0 g/dL    Hematocrit 29 6 (L) 36 5 - 49 3 %    MCV 78 (L) 82 - 98 fL    MCH 22 9 (L) 26 8 - 34 3 pg MCHC 29 4 (L) 31 4 - 37 4 g/dL    RDW 15 3 (H) 11 6 - 15 1 %    MPV 9 0 8 9 - 12 7 fL    Platelets 813 (H) 821 - 390 Thousands/uL    nRBC 0 /100 WBCs    Neutrophils Relative 89 (H) 43 - 75 %    Immat GRANS % 1 0 - 2 %    Lymphocytes Relative 1 (L) 14 - 44 %    Monocytes Relative 6 4 - 12 %    Eosinophils Relative 3 0 - 6 %    Basophils Relative 0 0 - 1 %    Neutrophils Absolute 11 73 (H) 1 85 - 7 62 Thousands/µL    Immature Grans Absolute 0 09 0 00 - 0 20 Thousand/uL    Lymphocytes Absolute 0 18 (L) 0 60 - 4 47 Thousands/µL    Monocytes Absolute 0 83 0 17 - 1 22 Thousand/µL    Eosinophils Absolute 0 39 0 00 - 0 61 Thousand/µL    Basophils Absolute 0 05 0 00 - 0 10 Thousands/µL   Comprehensive metabolic panel    Collection Time: 05/03/21  7:47 PM   Result Value Ref Range    Sodium 137 136 - 145 mmol/L    Potassium 4 2 3 5 - 5 3 mmol/L    Chloride 105 100 - 108 mmol/L    CO2 24 21 - 32 mmol/L    ANION GAP 8 4 - 13 mmol/L    BUN 19 5 - 25 mg/dL    Creatinine 0 72 0 60 - 1 30 mg/dL    Glucose 158 (H) 65 - 140 mg/dL    Calcium 7 8 (L) 8 3 - 10 1 mg/dL    Corrected Calcium 9 6 8 3 - 10 1 mg/dL    AST 53 (H) 5 - 45 U/L    ALT 79 (H) 12 - 78 U/L    Alkaline Phosphatase 143 (H) 46 - 116 U/L    Total Protein 6 7 6 4 - 8 2 g/dL    Albumin 1 7 (L) 3 5 - 5 0 g/dL    Total Bilirubin 0 18 (L) 0 20 - 1 00 mg/dL    eGFR 97 ml/min/1 73sq m   Troponin I    Collection Time: 05/03/21  7:47 PM   Result Value Ref Range    Troponin I 0 02 <=0 04 ng/mL   D-Dimer    Collection Time: 05/03/21  7:47 PM   Result Value Ref Range    D-Dimer, Quant 3 91 (H) <0 50 ug/ml FEU   Protime-INR    Collection Time: 05/03/21  7:51 PM   Result Value Ref Range    Protime 16 2 (H) 11 6 - 14 5 seconds    INR 1 33 (H) 0 84 - 1 19   APTT    Collection Time: 05/03/21  7:51 PM   Result Value Ref Range    PTT 36 23 - 37 seconds   Blood culture #1    Collection Time: 05/03/21  8:59 PM    Specimen: Arm, Right; Blood   Result Value Ref Range    Blood Culture Received in Microbiology Lab  Culture in Progress  Lactic acid, plasma    Collection Time: 05/03/21  8:59 PM   Result Value Ref Range    LACTIC ACID 2 0 0 5 - 2 0 mmol/L   Procalcitonin with AM Reflex    Collection Time: 05/03/21  8:59 PM   Result Value Ref Range    Procalcitonin 0 51 (H) <=0 25 ng/ml   Novel Coronavirus (Covid-19),PCR [facility] - 2 Hour Stat    Collection Time: 05/03/21  8:59 PM    Specimen: Nasopharyngeal Swab; Nares   Result Value Ref Range    SARS-CoV-2 Negative Negative   Blood culture #2    Collection Time: 05/03/21  8:59 PM    Specimen: Arm, Left; Blood   Result Value Ref Range    Blood Culture Received in Microbiology Lab  Culture in Progress      Strep Pneumoniae, Urine    Collection Time: 05/04/21  1:24 AM    Specimen: Urine, Clean Catch   Result Value Ref Range    Strep pneumoniae antigen, urine Negative Negative   Legionella antigen, urine    Collection Time: 05/04/21  1:24 AM    Specimen: Urine, Clean Catch   Result Value Ref Range    Legionella Urinary Antigen Negative Negative   Comprehensive metabolic panel    Collection Time: 05/04/21  6:37 AM   Result Value Ref Range    Sodium 140 136 - 145 mmol/L    Potassium 4 2 3 5 - 5 3 mmol/L    Chloride 106 100 - 108 mmol/L    CO2 25 21 - 32 mmol/L    ANION GAP 9 4 - 13 mmol/L    BUN 11 5 - 25 mg/dL    Creatinine 0 53 (L) 0 60 - 1 30 mg/dL    Glucose 94 65 - 140 mg/dL    Calcium 7 5 (L) 8 3 - 10 1 mg/dL    Corrected Calcium 9 4 8 3 - 10 1 mg/dL    AST 51 (H) 5 - 45 U/L    ALT 74 12 - 78 U/L    Alkaline Phosphatase 147 (H) 46 - 116 U/L    Total Protein 6 8 6 4 - 8 2 g/dL    Albumin 1 6 (L) 3 5 - 5 0 g/dL    Total Bilirubin 0 26 0 20 - 1 00 mg/dL    eGFR 110 ml/min/1 73sq m   Magnesium    Collection Time: 05/04/21  6:37 AM   Result Value Ref Range    Magnesium 2 1 1 6 - 2 6 mg/dL   Phosphorus    Collection Time: 05/04/21  6:37 AM   Result Value Ref Range    Phosphorus 1 9 (L) 2 3 - 4 1 mg/dL   CBC (With Platelets)    Collection Time: 05/04/21  6:37 AM Result Value Ref Range    WBC 13 23 (H) 4 31 - 10 16 Thousand/uL    RBC 3 95 3 88 - 5 62 Million/uL    Hemoglobin 8 8 (L) 12 0 - 17 0 g/dL    Hematocrit 31 4 (L) 36 5 - 49 3 %    MCV 80 (L) 82 - 98 fL    MCH 22 3 (L) 26 8 - 34 3 pg    MCHC 28 0 (L) 31 4 - 37 4 g/dL    RDW 15 4 (H) 11 6 - 15 1 %    Platelets 731 (H) 560 - 390 Thousands/uL    MPV 10 3 8 9 - 12 7 fL   Blood gas, arterial    Collection Time: 05/04/21  9:26 AM   Result Value Ref Range    pH, Arterial 7 410 7 350 - 7 450    pCO2, Arterial 38 2 36 0 - 44 0 mm Hg    pO2, Arterial 68 4 (L) 75 0 - 129 0 mm Hg    HCO3, Arterial 23 7 22 0 - 28 0 mmol/L    Base Excess, Arterial -0 8 mmol/L    O2 Content, Arterial 13 6 (L) 16 0 - 23 0 mL/dL    O2 HGB,Arterial  93 3 (L) 94 0 - 97 0 %    SOURCE Radial, Right     UVALDO TEST Yes     Nasal Cannula 5L        Xr Chest 1 View Portable    Result Date: 4/5/2021  Narrative: CHEST INDICATION:   dyspnea  Patient has suspected COVID-19  COMPARISON:  None EXAM PERFORMED/VIEWS:  XR CHEST PORTABLE FINDINGS: Cardiomediastinal silhouette appears unremarkable  The left diaphragm is elevated  There are relatively diffuse bilateral interstitial infiltrates in the lungs and there may also be some ill-defined alveolar infiltrate on the left side  The pulmonary vasculature centrally seems somewhat prominent  There seems to be some left pleural fluid along the lateral aspect of the chest   No right-sided effusion  No pneumothorax  Osseous structures appear within normal limits for patient age  Impression: Pattern suggests probable CHF with some interstitial edema and minimal left effusion  There is elevation of left hemidiaphragm   Workstation performed: [id number]     Ir In-patient Thoracentesis    Result Date: 4/6/2021  Narrative: ULTRASOUND-GUIDED THORACENTESIS PROCEDURE SUMMARY: Ultrasound of the left  chest Thoracentesis (therapeutic and diagnostic) Targeted ultrasound evaluation of the liver : Attending(s): Leesa BAILEY  Assistant(s): None CLINICAL HISTORY: Moderate left pleural effusion COMPLICATIONS: No immediate complications TECHNIQUE: The risks, benefits and alternatives of the procedure were fully discussed  All questions were answered  Informed consent for the procedure was obtained and time-out was performed prior to the procedure  The patient was brought to the interventional radiology area and targeted sonographic evaluation of the liver was performed  An ultrasound image was saved  The patient was then placed in the sitting position on the side of a stretcher  The left chest was then examined with ultrasound and the skin was marked  The skin was then prepped, and draped in usual sterile fashion  All elements of maximal sterile barrier technique were followed (cap, mask, sterile gown, sterile gloves, large sterile sheet, hand hygiene, and 2% chlorhexidine for cutaneous antisepsis)  Lidocaine was administered to the skin  Under direct ultrasound guidance, a 5-[employer] catheter was advanced into the pleural fluid  An ultrasound image was saved  The catheter was placed to suction drainage  At the end of the procedure the catheter was removed and sterile dressing was applied to the puncture site  Patient tolerated the procedure well  Specimens removed: 800 mL of clear milli-colored pleural fluid  FINDINGS: Sonographic evaluation of the liver demonstrated a 2 3 cm hyperechoic lesion in hepatic segment 7  Moderate-sized left pleural effusion  Impression: Technically successful ultrasound-guided left thoracentesis  Workstation performed: [id number]     Ir Biopsy Liver Mass    Result Date: 4/7/2021  Narrative: PROCEDURE: Ultrasound-guided right hepatic lobe targeted mass biopsy STAFF: BRADLY Mayes  NUMBER OF IMAGES: Multiple  COMPLICATIONS: None  MEDICATIONS: Versed 3 milligrams iv  Fentanyl 100 micrograms iv  Moderate sedation was monitored by radiology nursing staff    Monitoring of conscious sedation totaled 30 minutes  INDICATION: History of multiple enlarged lymph nodes with a right liver mass  PROCEDURE: Under real-time ultrasound guidance, a 17-gauge coaxial needle was advanced into the right hepatic lobe liver mass  Under ultrasound guidance, a total of 6 1 1 cm  core biopsies were obtained  The tract with embolized with Gelfoam, and the coaxial needle  was removed  FINDINGS: 1  Pre-procedural ultrasound showed the approximately 2 cm hyperechoic rounded right hepatic lobe liver mass    2   Intra-procedural ultrasound confirmed good positioning of the biopsy needle within the liver mass  3   The in-room pathology team could not confirm lesional material  4   Post-procedural ultrasound showed no immediate complication  Impression: Ultrasound-guided right hepatic lobe targeted mass biopsy  PLAN: Given preliminary interpretation of lack of lesional material obtained on this biopsy, I suspect the patient will ultimately require a CT guided biopsy of his left periaortic enlarged lymph nodes  Workstation performed: [id number]     Ir Biopsy Lymph Node    Result Date: 4/16/2021  Narrative: Examination: CT guided percutaneous core needle biopsy of retroperitoneal lymph node  HISTORY:  Multiple lung nodules  Large mass in the retroperitoneum on the left  This appears discontiguous from the kidney  TECHNIQUE: The patient was brought to CT  Informed consent was obtained  The left flank  was prepped and draped in usual sterile fashion  Timeout was performed  Lidocaine was given as local anesthesia  Monitored, intravenous, conscious sedation was provided  Using CT guidance,  a 17-gauge needle was advanced to abut the lesion  An 18-gauge needle was placed coaxially  Core biopsy was performed of   Cores were submitted in formalin  media  The tract was not closed with D stat  Postbiopsy scanning showed nothing to suggest a complication  The patient tolerated the procedure well    There were no immediate complications or complaints  FINDINGS: Postbiopsy scan shows nothing to suggest bleeding  Impression: Technically successful CT-guided biopsy  This is the end of the clinically relevant portion of this report  Subsequent information is for compliance, documentation, and coding purposes  Automated exposure control was utilized, and was minimize, in accordance with the application of the ALARA technique  Chlorhexidine and alcohol was used for cleansing and sterile preparation of the skin  This was allowed to dry prior to the application of sterile draping  Timeout was performed, with all team members present, and in agreement  Confirmation of patient, procedure, laterally, allergies, and all needed equipment was performed  During the course of informed consent, information was communicated, verbally, to the patient regarding the procedure  The patient was informed of; the name of the procedure, nature of the procedure, nature of the condition, risks, benefits, alternatives, and potential complications, as well as the consequences of not having the procedure  All the patient's questions were answered  Informed consent was signed  The patient was examined, and is in satisfactory condition for planned moderate sedation  Mallampati score: 1 Intravenous conscious sedation was provided by an independent  There was continuous monitoring of blood pressure, heart rate, respiratory rate, and oxygen saturation, by an independent trained observer  Conscious sedation time: 20 Minutes Versed dose: 0 5 milligrams Fentanyl dose: 50 micrograms After the procedure, the patient was recovered, and return to their baseline status, and was deemed fit for discharge from IR   Radiation dose: 177 mGy-cm PROCEDURE: CT guided biopsy of the above-named organ or abnormality Preprocedure diagnosis: Please see "history ", above Postprocedure diagnosis: Same Antibiotics: None Estimated blood loss: Less than 5 mL Specimen: Core biopsies specimens were sent to pathology Anesthesia: Local and monitored intravenous conscious sedation Workstation performed: [id number]     Cta Ed Chest Pe Study    Result Date: 5/3/2021  Narrative: CTA - CHEST WITH IV CONTRAST - PULMONARY ANGIOGRAM INDICATION:   tachycardia, hypoxia, current cancer on chemo  COMPARISON: CT of the chest on April 5, 2021  TECHNIQUE: CTA examination of the chest was performed using angiographic technique according to a protocol specifically tailored to evaluate for pulmonary embolism  Axial, sagittal, and coronal 2D reformatted images were created from the source data and  submitted for interpretation  In addition, coronal 3D MIP postprocessing was performed on the acquisition scanner  Radiation dose length product (DLP) for this visit:  305 93 mGy-cm   This examination, like all CT scans performed in the [facility], was performed utilizing techniques to minimize radiation dose exposure, including the use of iterative  reconstruction and automated exposure control  IV Contrast:  85 mL of iohexol (OMNIPAQUE)  was administered intravenously without immediate adverse reaction  FINDINGS: PULMONARY ARTERIAL TREE:  No pulmonary embolus is seen  LUNGS:  Known left upper lobe mass is now obscured by large patchy opacity (series 3, image 45) likely due to pneumonia  Interval increase in patchy groundglass opacities in the right perihilar region (series 3, image 36)  Increase in size of dense patchy opacity in the right apex (series 3, image 26) Interval increase in mass in the lingular region measuring 4 7 x 3 1 cm  Juxtapleural nodules in the left lower lobe are not significantly changed in size  PLEURA:  Stable size of moderate loculated left pleural effusion with nodularity and thickening of the pleura, increased since prior exam  HEART/GREAT VESSELS:  Unremarkable for patient's age   MEDIASTINUM AND MARGARITA:  Stable extensive mediastinal, hilar, and cardiophrenic lymphadenopathy CHEST WALL AND LOWER NECK:   Unremarkable  VISUALIZED STRUCTURES IN THE UPPER ABDOMEN:  Stable left para-aortic mass which is partially visualized  OSSEOUS STRUCTURES:  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  Compression deformity at the T9 vertebral body with increase in moderate vertebral body height loss when compared with prior exam       Impression: 1  No evidence of pulmonary embolism  2   Large dense opacity in the left upper lobe and interval increase in patchy groundglass opacities in the right perihilar region likely due to pneumonia in the appropriate clinical setting  3   Known left upper lobe mass is obscured  Interval increase in mass in the lingular region  Redemonstrated right lower lobe nodular masses or metastasis  4   Stable extensive lymphadenopathy  5   Redemonstrated expansile destructive lesion in the 9th rib and interval increase in compression deformity at the T9 vertebral body  Workstation performed: [id number]     Cta Ed Chest Pe Study    Result Date: 4/5/2021  Narrative: CTA - CHEST WITH IV CONTRAST - PULMONARY ANGIOGRAM INDICATION:   Shortness of breath PE suspected, intermediate prob, positive D-dimer dyspnea, elevated d dimer  COMPARISON: None  TECHNIQUE: CTA examination of the chest was performed using angiographic technique according to a protocol specifically tailored to evaluate for pulmonary embolism  Axial, sagittal, and coronal 2D reformatted images were created from the source data and  submitted for interpretation  In addition, coronal 3D MIP postprocessing was performed on the acquisition scanner  Radiation dose length product (DLP) for this visit:  285 44 mGy-cm   This examination, like all CT scans performed in the [facility], was performed utilizing techniques to minimize radiation dose exposure, including the use of iterative  reconstruction and automated exposure control   IV Contrast:  85 mL of iohexol (OMNIPAQUE)  FINDINGS: PULMONARY ARTERIAL TREE:  No pulmonary embolus is seen  Mass effect on the upper lobe pulmonary arterial branches  LUNGS:  Lobulated left upper lobe mass measuring 3 x 3 4 cm extending superiorly and along the fissure  Lobular mass in the lingular region measuring 3 x 2 cm  Nodularity in the right lower lobe peripherally measuring 1 5 cm and 0 9 cm in image 59 and 61  Bilateral scattered patchy groundglass areas seen  There is no tracheal or endobronchial lesion  PLEURA:  Moderate left pleural effusion  Enhancement of the pleura with nodularity seen with a enhancing mass inferiorly and posteriorly measuring 3 0 x 1 8 cm  HEART/GREAT VESSELS:  Unremarkable for patient's age  MEDIASTINUM AND MARGARITA:  Multiple prevascular lymph nodes largest measuring 1 5 x 2 3 cm  Subcentimeter superior mediastinal lymph node  Right paratracheal lymph node measuring 3 1 x 2 1 cm  Subcarinal lymph node complex measuring 5 2 x 4 2 cm  Right hilar lymph node complex measuring 4 4 x 3 4 cm and left suprahilar lymph node complex measuring 3 7 x 3 5 cm  CHEST WALL AND LOWER NECK: 1 2 cm hypodense nodule in the left thyroid lobe  Incidental discovery of one or more thyroid nodule(s) measuring less than 1 5 cm and without suspicious features is noted in this patient who is above 28years old; according to guidelines published in the February 2015 white paper on incidental thyroid nodules in the Journal of the [employer] [facility]), no further evaluation is recommended  VISUALIZED STRUCTURES IN THE UPPER ABDOMEN:  Heterogeneity of the spleen may be due to phase of contrast or pathologic involvement given the other findings  Aortocaval lymph node measuring 1 4 cm  Partially imaged left para-aortic soft tissue mass measuring 6 4 x 4 6 cm and encases the left splenic artery  OSSEOUS STRUCTURES:  Expansile destructive lesion involving the left 9th rib laterally measuring 3 6 x 1 9 cm   Wedge deformity of T9 vertebral body with areas of lucency sclerosis  Impression: 1  No pulmonary embolus  Mass effect on the upper lobe pulmonary arterial branches  Measured RV/LV ratio is within normal limits at less than 0 9   2   Pathologic mediastinal and hilar lymphadenopathy of lymphomatous process  3   Left upper, lingula and right lower lobe nodular masses of metastasis  4   Left para-aortic pathologic lymphadenopathy of lymphomatous process  CT abdomen pelvis for complete evaluation is suggested  5   Expansile destructive lesion involving the left 9th rib laterally and T9 vertebral body pathologic process of metastasis  6   Moderate left pleural effusion  Enhancing left pleura with nodular masses largest inferiorly of metastasis  I personally discussed this study with Tiffanie Haile on 4/5/2021 at 12:19 PM  Workstation performed: [id number]     Ct Abdomen Pelvis W Contrast    Result Date: 4/6/2021  Narrative: CT ABDOMEN AND PELVIS WITH IV CONTRAST INDICATION:   lung masses suspectd mets , primary unknown  COMPARISON:  None  TECHNIQUE:  CT examination of the abdomen and pelvis was performed  Axial, sagittal, and coronal 2D reformatted images were created from the source data and submitted for interpretation  Radiation dose length product (DLP) for this visit:  691 87 mGy-cm   This examination, like all CT scans performed in the [facility], was performed utilizing techniques to minimize radiation dose exposure, including the use of iterative  reconstruction and automated exposure control  IV Contrast:  70 mL of iodixanol (VISIPAQUE) Enteric Contrast:  Enteric contrast was not administered  FINDINGS: ABDOMEN LOWER CHEST:  Loculated left pleural effusion with areas of nodular pleural thickening better evaluated on recently performed CT chest examination  LIVER/BILIARY TREE:  2 5 cm hypodense lesion at the right hepatic lobe  GALLBLADDER:  No calcified gallstones   No pericholecystic inflammatory change  SPLEEN:  Unremarkable  PANCREAS:  Unremarkable  ADRENAL GLANDS:  Unremarkable  KIDNEYS/URETERS:  2 8 cm mass at the left inferior pole  No hydronephrosis  STOMACH AND BOWEL:  Unremarkable  APPENDIX:  No findings to suggest appendicitis  ABDOMINOPELVIC CAVITY:  Retroperitoneal adenopathy including a large left para-aortic lymph node conglomerate measuring 5 8 x 5 3 x 9 3 cm, which compresses the left renal artery and anteriorly displaces the aorta and left renal artery  Mild pelvic ascites  VESSELS:  Unremarkable for patient's age  PELVIS REPRODUCTIVE ORGANS:  Unremarkable for patient's age  URINARY BLADDER:  Unremarkable  ABDOMINAL WALL/INGUINAL REGIONS:  Unremarkable  OSSEOUS STRUCTURES:  Expansile lytic lesion at the left 9th rib and right L4 transverse process  1 4 cm lytic lesion at the right sacral ala bordering the right sacroiliac joint  Impression: Indeterminate mass lesions in the liver and at the left kidney suspicious for metastatic disease  Extensive retroperitoneal adenopathy including a large left para-aortic lymph node conglomerate measuring up to 9 3 cm as detailed above  Osseous metastasis as detailed above  Workstation performed: [id number]       Labs and pertinent reports reviewed  This note has been generated by voice recognition software system  Therefore, there may be spelling, grammar, and or syntax errors  Please contact if questions arise

## 2021-05-04 NOTE — ASSESSMENT & PLAN NOTE
· Likely reactive in the setting of active malignancy  · Follow the platelet count    Results from last 7 days   Lab Units 05/04/21  0637 05/03/21  1947   WBC Thousand/uL 13 23* 13 27*   HEMOGLOBIN g/dL 8 8* 8 7*   HEMATOCRIT % 31 4* 29 6*   PLATELETS Thousands/uL 721* 913*

## 2021-05-04 NOTE — H&P
[address] [facility] [address]  H&P- Herminia Julien 1955, 77 y o  male MRN: [id number]  Unit/Bed#: PS96 Encounter: [id number]  Primary Care Provider: Thong Bell MD   Date and time admitted to hospital: 5/3/2021  7:23 PM    Sepsis [facility])  Assessment & Plan  As evidence by white blood cell count of 13 27 with tachycardia and tachypnea  Most likely secondary to HCAP  Please see additional treatment plan for HCAP    * Acute respiratory failure with hypoxia [facility])  Assessment & Plan  Chief complaint shortness of breath-SpO2 84% on room air upon entering emergency room-accompanied with tachycardia and tachypnea  Patient with known lung mass following Hematology-Oncology and suspect healthcare associated pneumonia in addition-please see additional treatment plan for healthcare associated pneumonia    HCAP (healthcare-associated pneumonia)  Assessment & Plan  Chief complaint-shortness of breath in the setting of patient with metastatic lung cancer from renal origin  Cta Ed Chest Pe Study-5/3/2021-Impression: 1  No evidence of pulmonary embolism  2   Large dense opacity in the left upper lobe and interval increase in patchy groundglass opacities in the right perihilar region likely due to pneumonia in the appropriate clinical setting  3   Known left upper lobe mass is obscured  Interval increase in mass in the lingular region  Redemonstrated right lower lobe nodular masses or metastasis  4   Stable extensive lymphadenopathy  5   Redemonstrated expansile destructive lesion in the 9th rib and interval increase in compression deformity at the T9 vertebral body       Recent hospitalization April 4th through April 7th  Trend procalcitonin  Blood cultures collected  Collect urine antigens  Respiratory protocol in place  Patient currently requiring 5 L O2 supplementation baseline no O2 requirement  Continue prior to admission inhalers  Cefepime and vancomycin ordered    Lung mass  Assessment & Plan  Patient is following outpatient Hematology-Oncology Dr Paulino    Anemia  Assessment & Plan  As evidence by hemoglobin level of 8 7  No signs and symptoms of bleeding  Trend CBC daily hemoglobin down from 10 3 on 04/22  Continue prior to admission iron    Transaminitis  Assessment & Plan  As evidence by AST of 53, ALT is 79, and alk-phos of 143  Trend CMP daily    Thrombocytosis (HCC)  Assessment & Plan  As evidence by a platelet count of 161  Trend CBC  Heparin 5000 units q 8 hours for DVT PPX    Mediastinal lymphadenopathy  Assessment & Plan  Patient following outpatient Hematology-Oncology Nahed Zhao    Elevated d-dimer  Assessment & Plan  As evidence by a D-dimer of 3 91  CTA collected of the chest-no signs of pulmonary embolus  Heparin 5000 units q 8 hours for DVT PPX    Renal cell carcinoma of left kidney [facility])  Assessment & Plan  Currently following with outpatient Hematology-Oncology-Dr Jagdish Ortega    Moderate protein-calorie malnutrition ([facility] [address] )  Assessment & Plan  Malnutrition Findings:           BMI Findings: Body mass index is 19 89 kg/m²  Would benefit from nutritional counseling  Albumin level 1 7      VTE Prophylaxis: Heparin  / sequential compression device   Code Status: FULL  POLST: POLST is not applicable to this patient    Anticipated Length of Stay:  Patient will be admitted on an Inpatient basis with an anticipated length of stay of  Greater than 2 midnights  Justification for Hospital Stay:  Acute respiratory failure with hypoxia, sepsis and healthcare associated pneumonia    Total Time for Visit, including Counseling / Coordination of Care: 45 minutes  Greater than 50% of this total time spent on direct patient counseling and coordination of care  Chief Complaint:   Shortness of breath    History of Present Illness:    Ash Cantu is a 77 y o  male with a past medical history of renal cell carcinoma currently on chemotherapy presented for increased shortness of breath    Patient denies using supplemental oxygen  Patient states has been having increasing dyspnea on exertion for the last couple of days  Also admits to upper back discomfort but he does not feel is pleuritic in nature  Patient follows dr Lili Peter Hematology-Oncology for his renal cell carcinoma  Patient had a recent hospitalization beginning of April and was noted to have a lung mass as well  Biopsy was collected and noted to be metastasis from renal cell carcinoma  Upon presentation to the emergency room patient's SpO2 was 84% on room air  Patient was afebrile tachypneic tachycardic and mildly elevated white blood cell count of 13  Additionally patient was found to have an elevated D-dimer CTA of the chest was collected and noted no signs of a pulmonary embolism however readdress the lung mass and is highly suspicious of pneumonia  Additionally patient was found to have anemia with a hemoglobin of 8 7 down from 10 3 on April 22nd  Patient denies any bleeding  Patient denies lightheadedness dizziness headaches fever chest pain admits to shortness of breath especially with mild exertion  denies abdominal pain nausea vomiting diarrhea denies dysuria urgency or frequency denies melena or hematochezia  Cefepime and vancomycin initiated and will be continued  Review of Systems:    Review of Systems   Respiratory: Positive for shortness of breath (At rest and with mild exertion no orthopnea)  Musculoskeletal: Positive for back pain (upper)  All other systems reviewed and are negative  Past Medical and Surgical History:     History reviewed  No pertinent past medical history  Past Surgical History:   Procedure Laterality Date    IR BIOPSY LIVER MASS  4/7/2021    IR BIOPSY LYMPH NODE  4/13/2021    IR THORACENTESIS  4/6/2021    TENDON REPAIR Right     forearm       Meds/Allergies:    Prior to Admission medications    Medication Sig Start Date End Date Taking?  Authorizing Provider   albuterol (PROVENTIL HFA,VENTOLIN HFA) 90 mcg/act inhaler Inhale 2 puffs every 6 (six) hours as needed for wheezing 4/15/21 5/15/21 Yes Wyvonnia Dancer, MD   benzonatate (TESSALON PERLES) 100 mg capsule Take 1 capsule (100 mg total) by mouth 3 (three) times a day as needed for cough 4/7/21  Yes Leggett Zach, DO   ferrous sulfate 324 (65 Fe) mg Take 1 tablet (324 mg total) by mouth every other day 4/7/21 5/7/21 Yes Leggett Diana Fix, DO   fluticasone-salmeterol (ADVAIR, [place] (ΑΓΛ[place]ΓΓΙΑ)) 250-50 mcg/dose inhaler Inhale 1 puff 2 (two) times a day Rinse mouth after use  4/19/21  Yes Wyvonnia Dancer, MD   oxyCODONE-acetaminophen (PERCOCET) 5-325 mg per tablet Take 1 tablet by mouth every 4 (four) hours as needed for moderate painMax Daily Amount: 6 tablets 4/19/21  Yes Wyvonnia Dancer, MD     I have reviewed home medications using allscripts  Allergies: No Known Allergies    Social History:     Marital Status: Single   Occupation:  Noncontributory    Substance Use History:   Social History     Substance and Sexual Activity   Alcohol Use Never    Frequency: Never     Social History     Tobacco Use   Smoking Status Former Smoker   Smokeless Tobacco Never Used   Tobacco Comment    quit feb 2021     Social History     Substance and Sexual Activity   Drug Use Never       Family History:    Family History   Problem Relation Age of Onset    Heart disease Father     Multiple sclerosis Sister     Heart attack Brother        Physical Exam:     Vitals:   Blood Pressure: 117/72 (05/03/21 2200)  Pulse: (!) 106 (05/03/21 2200)  Temperature: 99 1 °F (37 3 °C) (05/03/21 1933)  Temp Source: Temporal (05/03/21 1933)  Respirations: 22 (05/03/21 2200)  Height: 5' 11" (180 3 cm) (05/03/21 1933)  Weight - Scale: 64 7 kg (142 lb 10 2 oz) (05/03/21 1933)  SpO2: 96 % (05/03/21 2200)    Physical Exam  Constitutional:       Appearance: Normal appearance  HENT:      Mouth/Throat:      Mouth: Mucous membranes are dry  Pharynx: Oropharynx is clear     Eyes: Extraocular Movements: Extraocular movements intact  Pupils: Pupils are equal, round, and reactive to light  Cardiovascular:      Rate and Rhythm: Regular rhythm  Tachycardia present  Pulmonary:      Effort: Pulmonary effort is normal       Breath sounds: Wheezing (Right greater than left) present  Abdominal:      General: Abdomen is flat  Bowel sounds are normal       Palpations: Abdomen is soft  Musculoskeletal:         General: No swelling or tenderness  Skin:     General: Skin is warm and dry  Capillary Refill: Capillary refill takes 2 to 3 seconds  Coloration: Skin is not pale  Neurological:      General: No focal deficit present  Mental Status: He is alert and oriented to person, place, and time  GCS: GCS eye subscore is 4  GCS verbal subscore is 5  GCS motor subscore is 6  Psychiatric:         Mood and Affect: Mood normal          Behavior: Behavior normal          Thought Content: Thought content normal          Judgment: Judgment normal            Additional Data:     Lab Results: I have personally reviewed pertinent reports  Results from last 7 days   Lab Units 05/03/21  1947   WBC Thousand/uL 13 27*   HEMOGLOBIN g/dL 8 7*   HEMATOCRIT % 29 6*   PLATELETS Thousands/uL 913*   NEUTROS PCT % 89*   LYMPHS PCT % 1*   MONOS PCT % 6   EOS PCT % 3     Results from last 7 days   Lab Units 05/03/21  1947   POTASSIUM mmol/L 4 2   CHLORIDE mmol/L 105   CO2 mmol/L 24   BUN mg/dL 19   CREATININE mg/dL 0 72   CALCIUM mg/dL 7 8*   ALK PHOS U/L 143*   ALT U/L 79*   AST U/L 53*     Results from last 7 days   Lab Units 05/03/21 1951   INR  1 33*       Imaging: I have personally reviewed pertinent reports  Xr Chest 1 View Portable    Result Date: 4/5/2021  Narrative: CHEST INDICATION:   dyspnea  Patient has suspected COVID-19  COMPARISON:  None EXAM PERFORMED/VIEWS:  XR CHEST PORTABLE FINDINGS: Cardiomediastinal silhouette appears unremarkable  The left diaphragm is elevated  There are relatively diffuse bilateral interstitial infiltrates in the lungs and there may also be some ill-defined alveolar infiltrate on the left side  The pulmonary vasculature centrally seems somewhat prominent  There seems to be some left pleural fluid along the lateral aspect of the chest   No right-sided effusion  No pneumothorax  Osseous structures appear within normal limits for patient age  Impression: Pattern suggests probable CHF with some interstitial edema and minimal left effusion  There is elevation of left hemidiaphragm  Workstation performed: [id number]     Ir In-patient Thoracentesis    Result Date: 4/6/2021  Narrative: ULTRASOUND-GUIDED THORACENTESIS PROCEDURE SUMMARY: Ultrasound of the left  chest Thoracentesis (therapeutic and diagnostic) Targeted ultrasound evaluation of the liver : Attending(s): Kristen BAILEY  Assistant(s): None CLINICAL HISTORY: Moderate left pleural effusion COMPLICATIONS: No immediate complications TECHNIQUE: The risks, benefits and alternatives of the procedure were fully discussed  All questions were answered  Informed consent for the procedure was obtained and time-out was performed prior to the procedure  The patient was brought to the interventional radiology area and targeted sonographic evaluation of the liver was performed  An ultrasound image was saved  The patient was then placed in the sitting position on the side of a stretcher  The left chest was then examined with ultrasound and the skin was marked  The skin was then prepped, and draped in usual sterile fashion  All elements of maximal sterile barrier technique were followed (cap, mask, sterile gown, sterile gloves, large sterile sheet, hand hygiene, and 2% chlorhexidine for cutaneous antisepsis)  Lidocaine was administered to the skin  Under direct ultrasound guidance, a 5-Faroese SoCAT catheter was advanced into the pleural fluid  An ultrasound image was saved   The catheter was placed to suction drainage  At the end of the procedure the catheter was removed and sterile dressing was applied to the puncture site  Patient tolerated the procedure well  Specimens removed: 800 mL of clear milli-colored pleural fluid  FINDINGS: Sonographic evaluation of the liver demonstrated a 2 3 cm hyperechoic lesion in hepatic segment 7  Moderate-sized left pleural effusion  Impression: Technically successful ultrasound-guided left thoracentesis  Workstation performed: [id number]     Ir Biopsy Liver Mass    Result Date: 4/7/2021  Narrative: PROCEDURE: Ultrasound-guided right hepatic lobe targeted mass biopsy STAFF: BRADLY Viramontes  NUMBER OF IMAGES: Multiple  COMPLICATIONS: None  MEDICATIONS: Versed 3 milligrams iv  Fentanyl 100 micrograms iv  Moderate sedation was monitored by radiology nursing staff  Monitoring of conscious sedation totaled 30 minutes  INDICATION: History of multiple enlarged lymph nodes with a right liver mass  PROCEDURE: Under real-time ultrasound guidance, a 17-gauge coaxial needle was advanced into the right hepatic lobe liver mass  Under ultrasound guidance, a total of 6 1 1 cm  core biopsies were obtained  The tract with embolized with Gelfoam, and the coaxial needle  was removed  FINDINGS: 1  Pre-procedural ultrasound showed the approximately 2 cm hyperechoic rounded right hepatic lobe liver mass    2   Intra-procedural ultrasound confirmed good positioning of the biopsy needle within the liver mass  3   The in-room pathology team could not confirm lesional material  4   Post-procedural ultrasound showed no immediate complication  Impression: Ultrasound-guided right hepatic lobe targeted mass biopsy  PLAN: Given preliminary interpretation of lack of lesional material obtained on this biopsy, I suspect the patient will ultimately require a CT guided biopsy of his left periaortic enlarged lymph nodes   Workstation performed: [id number]     Ir Biopsy Lymph Node    Result Date: 4/16/2021  Narrative: Examination: CT guided percutaneous core needle biopsy of retroperitoneal lymph node  HISTORY:  Multiple lung nodules  Large mass in the retroperitoneum on the left  This appears discontiguous from the kidney  TECHNIQUE: The patient was brought to CT  Informed consent was obtained  The left flank  was prepped and draped in usual sterile fashion  Timeout was performed  Lidocaine was given as local anesthesia  Monitored, intravenous, conscious sedation was provided  Using CT guidance,  a 17-gauge needle was advanced to abut the lesion  An 18-gauge needle was placed coaxially  Core biopsy was performed of   Cores were submitted in formalin  media  The tract was not closed with D stat  Postbiopsy scanning showed nothing to suggest a complication  The patient tolerated the procedure well  There were no immediate complications or complaints  FINDINGS: Postbiopsy scan shows nothing to suggest bleeding  Impression: Technically successful CT-guided biopsy  This is the end of the clinically relevant portion of this report  Subsequent information is for compliance, documentation, and coding purposes  Automated exposure control was utilized, and was minimize, in accordance with the application of the ALARA technique  Chlorhexidine and alcohol was used for cleansing and sterile preparation of the skin  This was allowed to dry prior to the application of sterile draping  Timeout was performed, with all team members present, and in agreement  Confirmation of patient, procedure, laterally, allergies, and all needed equipment was performed  During the course of informed consent, information was communicated, verbally, to the patient regarding the procedure    The patient was informed of; the name of the procedure, nature of the procedure, nature of the condition, risks, benefits, alternatives, and potential complications, as well as the consequences of not having the procedure  All the patient's questions were answered  Informed consent was signed  The patient was examined, and is in satisfactory condition for planned moderate sedation  Mallampati score: 1 Intravenous conscious sedation was provided by an independent  There was continuous monitoring of blood pressure, heart rate, respiratory rate, and oxygen saturation, by an independent trained observer  Conscious sedation time: 20 Minutes Versed dose: 0 5 milligrams Fentanyl dose: 50 micrograms After the procedure, the patient was recovered, and return to their baseline status, and was deemed fit for discharge from   Radiation dose: 177 mGy-cm PROCEDURE: CT guided biopsy of the above-named organ or abnormality Preprocedure diagnosis: Please see "history ", above Postprocedure diagnosis: Same Antibiotics: None Estimated blood loss: Less than 5 mL Specimen: Core biopsies specimens were sent to pathology Anesthesia: Local and monitored intravenous conscious sedation Workstation performed: [id number]     Cta Ed Chest Pe Study    Result Date: 5/3/2021  Narrative: CTA - CHEST WITH IV CONTRAST - PULMONARY ANGIOGRAM INDICATION:   tachycardia, hypoxia, current cancer on chemo  COMPARISON: CT of the chest on April 5, 2021  TECHNIQUE: CTA examination of the chest was performed using angiographic technique according to a protocol specifically tailored to evaluate for pulmonary embolism  Axial, sagittal, and coronal 2D reformatted images were created from the source data and  submitted for interpretation  In addition, coronal 3D MIP postprocessing was performed on the acquisition scanner  Radiation dose length product (DLP) for this visit:  305 93 mGy-cm   This examination, like all CT scans performed in the [facility], was performed utilizing techniques to minimize radiation dose exposure, including the use of iterative  reconstruction and automated exposure control   IV Contrast:  85 mL of iohexol (OMNIPAQUE) was administered intravenously without immediate adverse reaction  FINDINGS: PULMONARY ARTERIAL TREE:  No pulmonary embolus is seen  LUNGS:  Known left upper lobe mass is now obscured by large patchy opacity (series 3, image 45) likely due to pneumonia  Interval increase in patchy groundglass opacities in the right perihilar region (series 3, image 36)  Increase in size of dense patchy opacity in the right apex (series 3, image 26) Interval increase in mass in the lingular region measuring 4 7 x 3 1 cm  Juxtapleural nodules in the left lower lobe are not significantly changed in size  PLEURA:  Stable size of moderate loculated left pleural effusion with nodularity and thickening of the pleura, increased since prior exam  HEART/GREAT VESSELS:  Unremarkable for patient's age  MEDIASTINUM AND MARGARITA:  Stable extensive mediastinal, hilar, and cardiophrenic lymphadenopathy CHEST WALL AND LOWER NECK:   Unremarkable  VISUALIZED STRUCTURES IN THE UPPER ABDOMEN:  Stable left para-aortic mass which is partially visualized  OSSEOUS STRUCTURES:  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  Compression deformity at the T9 vertebral body with increase in moderate vertebral body height loss when compared with prior exam       Impression: 1  No evidence of pulmonary embolism  2   Large dense opacity in the left upper lobe and interval increase in patchy groundglass opacities in the right perihilar region likely due to pneumonia in the appropriate clinical setting  3   Known left upper lobe mass is obscured  Interval increase in mass in the lingular region  Redemonstrated right lower lobe nodular masses or metastasis  4   Stable extensive lymphadenopathy  5   Redemonstrated expansile destructive lesion in the 9th rib and interval increase in compression deformity at the T9 vertebral body   Workstation performed: [id number]     Cta Ed Chest Pe Study    Result Date: 4/5/2021  Narrative: CTA - CHEST WITH IV CONTRAST - PULMONARY ANGIOGRAM INDICATION:   Shortness of breath PE suspected, intermediate prob, positive D-dimer dyspnea, elevated d dimer  COMPARISON: None  TECHNIQUE: CTA examination of the chest was performed using angiographic technique according to a protocol specifically tailored to evaluate for pulmonary embolism  Axial, sagittal, and coronal 2D reformatted images were created from the source data and  submitted for interpretation  In addition, coronal 3D MIP postprocessing was performed on the acquisition scanner  Radiation dose length product (DLP) for this visit:  285 44 mGy-cm   This examination, like all CT scans performed in the [facility], was performed utilizing techniques to minimize radiation dose exposure, including the use of iterative  reconstruction and automated exposure control  IV Contrast:  85 mL of iohexol (OMNIPAQUE)  FINDINGS: PULMONARY ARTERIAL TREE:  No pulmonary embolus is seen  Mass effect on the upper lobe pulmonary arterial branches  LUNGS:  Lobulated left upper lobe mass measuring 3 x 3 4 cm extending superiorly and along the fissure  Lobular mass in the lingular region measuring 3 x 2 cm  Nodularity in the right lower lobe peripherally measuring 1 5 cm and 0 9 cm in image 59 and 61  Bilateral scattered patchy groundglass areas seen  There is no tracheal or endobronchial lesion  PLEURA:  Moderate left pleural effusion  Enhancement of the pleura with nodularity seen with a enhancing mass inferiorly and posteriorly measuring 3 0 x 1 8 cm  HEART/GREAT VESSELS:  Unremarkable for patient's age  MEDIASTINUM AND MARGARITA:  Multiple prevascular lymph nodes largest measuring 1 5 x 2 3 cm  Subcentimeter superior mediastinal lymph node  Right paratracheal lymph node measuring 3 1 x 2 1 cm  Subcarinal lymph node complex measuring 5 2 x 4 2 cm  Right hilar lymph node complex measuring 4 4 x 3 4 cm and left suprahilar lymph node complex measuring 3 7 x 3 5 cm   CHEST WALL AND LOWER NECK: 1 2 cm hypodense nodule in the left thyroid lobe  Incidental discovery of one or more thyroid nodule(s) measuring less than 1 5 cm and without suspicious features is noted in this patient who is above 28years old; according to guidelines published in the February 2015 white paper on incidental thyroid nodules in the Journal of the [employer] of Radiology [facility]), no further evaluation is recommended  VISUALIZED STRUCTURES IN THE UPPER ABDOMEN:  Heterogeneity of the spleen may be due to phase of contrast or pathologic involvement given the other findings  Aortocaval lymph node measuring 1 4 cm  Partially imaged left para-aortic soft tissue mass measuring 6 4 x 4 6 cm and encases the left splenic artery  OSSEOUS STRUCTURES:  Expansile destructive lesion involving the left 9th rib laterally measuring 3 6 x 1 9 cm  Wedge deformity of T9 vertebral body with areas of lucency sclerosis  Impression: 1  No pulmonary embolus  Mass effect on the upper lobe pulmonary arterial branches  Measured RV/LV ratio is within normal limits at less than 0 9   2   Pathologic mediastinal and hilar lymphadenopathy of lymphomatous process  3   Left upper, lingula and right lower lobe nodular masses of metastasis  4   Left para-aortic pathologic lymphadenopathy of lymphomatous process  CT abdomen pelvis for complete evaluation is suggested  5   Expansile destructive lesion involving the left 9th rib laterally and T9 vertebral body pathologic process of metastasis  6   Moderate left pleural effusion  Enhancing left pleura with nodular masses largest inferiorly of metastasis  I personally discussed this study with Andrea Castillo on 4/5/2021 at 12:19 PM  Workstation performed: [id number]     Ct Abdomen Pelvis W Contrast    Result Date: 4/6/2021  Narrative: CT ABDOMEN AND PELVIS WITH IV CONTRAST INDICATION:   lung masses suspectd mets , primary unknown  COMPARISON:  None   TECHNIQUE:  CT examination of the abdomen and pelvis was performed  Axial, sagittal, and coronal 2D reformatted images were created from the source data and submitted for interpretation  Radiation dose length product (DLP) for this visit:  691 87 mGy-cm   This examination, like all CT scans performed in the [facility], was performed utilizing techniques to minimize radiation dose exposure, including the use of iterative  reconstruction and automated exposure control  IV Contrast:  70 mL of iodixanol (VISIPAQUE) Enteric Contrast:  Enteric contrast was not administered  FINDINGS: ABDOMEN LOWER CHEST:  Loculated left pleural effusion with areas of nodular pleural thickening better evaluated on recently performed CT chest examination  LIVER/BILIARY TREE:  2 5 cm hypodense lesion at the right hepatic lobe  GALLBLADDER:  No calcified gallstones  No pericholecystic inflammatory change  SPLEEN:  Unremarkable  PANCREAS:  Unremarkable  ADRENAL GLANDS:  Unremarkable  KIDNEYS/URETERS:  2 8 cm mass at the left inferior pole  No hydronephrosis  STOMACH AND BOWEL:  Unremarkable  APPENDIX:  No findings to suggest appendicitis  ABDOMINOPELVIC CAVITY:  Retroperitoneal adenopathy including a large left para-aortic lymph node conglomerate measuring 5 8 x 5 3 x 9 3 cm, which compresses the left renal artery and anteriorly displaces the aorta and left renal artery  Mild pelvic ascites  VESSELS:  Unremarkable for patient's age  PELVIS REPRODUCTIVE ORGANS:  Unremarkable for patient's age  URINARY BLADDER:  Unremarkable  ABDOMINAL WALL/INGUINAL REGIONS:  Unremarkable  OSSEOUS STRUCTURES:  Expansile lytic lesion at the left 9th rib and right L4 transverse process  1 4 cm lytic lesion at the right sacral ala bordering the right sacroiliac joint  Impression: Indeterminate mass lesions in the liver and at the left kidney suspicious for metastatic disease   Extensive retroperitoneal adenopathy including a large left para-aortic lymph node conglomerate measuring up to 9 3 cm as detailed above  Osseous metastasis as detailed above  Workstation performed: [id number]         Epic / Care Everywhere Records Reviewed: Yes     ** Please Note: This note has been constructed using a voice recognition system   **

## 2021-05-04 NOTE — ASSESSMENT & PLAN NOTE
Malnutrition Findings:           BMI Findings: Body mass index is 19 89 kg/m²         · Consult the dietitian/nutritionist

## 2021-05-04 NOTE — ASSESSMENT & PLAN NOTE
· Check an acute hepatitis panel  · Avoid all hepatotoxic agents  · Follow the liver function tests    Results from last 7 days   Lab Units 05/04/21  0637 05/03/21  1947   SODIUM mmol/L 140 137   POTASSIUM mmol/L 4 2 4 2   CHLORIDE mmol/L 106 105   CO2 mmol/L 25 24   BUN mg/dL 11 19   CREATININE mg/dL 0 53* 0 72   CALCIUM mg/dL 7 5* 7 8*   ALK PHOS U/L 147* 143*   ALT U/L 74 79*   AST U/L 51* 53*

## 2021-05-04 NOTE — ASSESSMENT & PLAN NOTE
· Sepsis was present on admission and secondary to probable post-obstructive pneumonia  · SIRS criteria was met with tachycardia and leukocytosis    · Possible gram-negative pneumonia  · Continue IV cefepime and IV vancomycin  · Check a MRSA nasal screen  · Follow culture results  · Follow the procalcitonin level  · Continue NSS IV fluids at 75 ml/hr

## 2021-05-04 NOTE — ASSESSMENT & PLAN NOTE
Malnutrition Findings:           BMI Findings: Body mass index is 19 89 kg/m²       Would benefit from nutritional counseling  Albumin level 1 7

## 2021-05-04 NOTE — CONSULTS
Consultation - Pulmonary Medicine   iKarra Looney 77 y o  male MRN: [id number]  Unit/Bed#: ADILIA Encounter: [id number]      Assessment/Plan:    1  Acute hypoxic respiratory failure  1  Currently requiring 5 L nasal cannula  No oxygen requirement at baseline   2  Reviewed ABG which revealed persistent hypoxemia despite 5 L nasal cannula- will transition to Vapotherm both for improve  Oxygenation and work of breathing  3  Titrate oxygen maintain SpO2 greater than or equal to 88%   4  Pulmonary toilet: Increase activity as tolerated, out of bed as tolerated, cough and deep breathing as encouraged, incentive spirometry q 1 hour  2  Sepsis secondary to postobstructive pneumonia  1  Present admission with tachycardia, tachypnea, leukocytosis and new infiltrates on CT chest imaging  2  Currently on cefepime and vancomycin cover MDR and Gram-negative organisms- will continue for now pending results of BCx2, MRSA, and sputum cultures   3  Continue supportive care for cough with Mucinex and Tessalon Perles  3  Stage IV renal cell carcinoma with bone and lung metastasis  1  Follows with Dr Kaylee Wilcox- received first chemotherapy 4/22 with Opdivo/Yervoy and Xgeva   2  Heme/onc consult pending   4  Suspected COPD with acute exacerbation  1  Start Solumedrol 40 mg IV q 12 hours   2  Continue Breo 100/25  1 puff daily   3  Continue Atrovent /Xopenex nebs t i d   4  Remains committed to smoking cessation  5  Anemia  1  Trend H&H  2  Treatment per primary team   3  Heme/onc consulted     plan of care discussed with patient and Dr Ryne Bolanos, and Nayana Reed RT  History of Present Illness   Physician Requesting Consult: Nicole Stern DO  Reason for Consult / Principal Problem:  Hcap, acute respiratory failure with hypoxia  Hx and PE limited by: m/a  Chief Complaint:  Shortness of breath  HPI: Kiarra Looney is a 77 y o     male who presented to [address] [facility] with complaints of  Shortness of breath over 2-3 hours before admission  Patient's past medical history positive for stage IV renal cell carcinoma diagnosed 04/13/2021 he started chemotherapy 04/22/2021  Patient reported being in his normal state of health until the day of admission where he reported acute onset of shortness of breath even at rest but worse with activities for 2-3 hours  He also endorsed cough that is occasionally productive of mucopurulent sputum occasional wheeze  Denied fevers but admits to night sweats  Denies sick contacts  Denies GI year urinary symptoms  No leg pain or leg swelling  Denies hemoptysis  In the ED use noted be tachypneic, tachycardic and hypoxic requiring supplemental oxygen that was titrated to 5 L nasal cannula  Laboratory workup revealed leukocytosis, elevated procalcitonin, worsening anemia, thrombocytosis, elevated D-dimer  Patient had CTA chest PE study which was negative for pulmonary embolism but did show worsening right upper lobe mass in his and interval increase in large dense opacity in  Left upper lobe and lingular mass, stable extensive mediastinal and hilar lymphadenopathy, and   Redemonstrated lytic lesion 9th rib and compression deformity in T9 vertebral body  Patient was spot started on broad-spectrum antibiotics, supplemental oxygen and inhalers  Pulmonary was consulted to manage this  Presently, patient is seen sitting upright in bed wearing 5 L nasal cannula in mild respiratory distress  He appears uncomfortable and feels that he is short of breath  He also has cough that is mostly dry but will occasionally bring up yellow sputum  He reports off and on wheeze  He has mid to low back pain presently  Denies pain elsewhere  No chest pain or palpitations  No fevers or chills currently        From a pulmonary perspective, patient does not follow a pulmonologist   He was given Breo and albuterol inhaler by his PCP after diagnosis of the lung metastasis but denies any formal diagnosis of COPD, emphysema, asthma or other lung disease  He does not require supplemental oxygen at baseline  He has never been intubated  He reported good exercise tolerance up until he became sick several months ago  He now gets winded after walking 20 ft  He is a former smoker with a quit date 2 months ago  Prior to that he had at least a 30 pack year history  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  Denies sick contacts  Denies recent travel  Denies bird exposure  Inpatient consult to Pulmonology  Consult performed by: Fabián Bauer PA-C  Consult ordered by: MARILOU Abrams          Review of Systems   All other systems reviewed and are negative  Historical Information   History reviewed  No pertinent past medical history  Past Surgical History:   Procedure Laterality Date    IR BIOPSY LIVER MASS  4/7/2021    IR BIOPSY LYMPH NODE  4/13/2021    IR THORACENTESIS  4/6/2021    TENDON REPAIR Right     forearm     Social History   Social History     Substance and Sexual Activity   Alcohol Use Never    Frequency: Never     Social History     Substance and Sexual Activity   Drug Use Never     Social History     Tobacco Use   Smoking Status Former Smoker   Smokeless Tobacco Never Used   Tobacco Comment    quit feb 2021     E-Cigarette/Vaping    E-Cigarette Use Never User      E-Cigarette/Vaping Substances    Nicotine No     THC No     CBD No     Flavoring No     Other No     Unknown No      Occupational History: retired   See HPI    Family History:   Family History   Problem Relation Age of Onset    Heart disease Father     Multiple sclerosis Sister     Heart attack Brother        Meds/Allergies   all current active meds have been reviewed, pertinent pulmonary meds have been reviewed and current meds:   Current Facility-Administered Medications   Medication Dose Route Frequency    acetaminophen (TYLENOL) tablet 650 mg  650 mg Oral Q6H PRN  albuterol inhalation solution 2 5 mg  2 5 mg Nebulization Q4H PRN    benzonatate (TESSALON PERLES) capsule 100 mg  100 mg Oral TID PRN    cefepime (MAXIPIME) IVPB (premix in dextrose) 2,000 mg 50 mL  2,000 mg Intravenous Q8H    ferrous sulfate tablet 325 mg  325 mg Oral Daily With Breakfast    fluticasone-vilanterol (BREO ELLIPTA) 100-25 mcg/inh inhaler 1 puff  1 puff Inhalation Daily    guaiFENesin (MUCINEX) 12 hr tablet 600 mg  600 mg Oral BID    heparin (porcine) subcutaneous injection 5,000 Units  5,000 Units Subcutaneous Q8H [address]    ipratropium (ATROVENT) 0 02 % inhalation solution 0 5 mg  0 5 mg Nebulization TID    levalbuterol (XOPENEX) inhalation solution 1 25 mg  1 25 mg Nebulization TID    ondansetron (ZOFRAN) injection 4 mg  4 mg Intravenous Q6H PRN    oxyCODONE-acetaminophen (PERCOCET) 5-325 mg per tablet 1 tablet  1 tablet Oral Q4H PRN    sodium chloride 0 9 % infusion  75 mL/hr Intravenous Continuous    vancomycin (VANCOCIN) 1,500 mg in sodium chloride 0 9 % 500 mL IVPB  20 mg/kg (Ideal) Intravenous Q12H       No Known Allergies    Objective   Vitals: Blood pressure 103/59, pulse 96, temperature 99 1 °F (37 3 °C), temperature source Temporal, resp  rate 22, height 5' 11" (1 803 m), weight 64 7 kg (142 lb 10 2 oz), SpO2 94 %  5L NC,Body mass index is 19 89 kg/m²  Intake/Output Summary (Last 24 hours) at 5/4/2021 1123  Last data filed at 5/4/2021 1050  Gross per 24 hour   Intake 2050 ml   Output 300 ml   Net 1750 ml     Invasive Devices     Peripheral Intravenous Line            Peripheral IV 05/03/21 Right Antecubital less than 1 day                Physical Exam  Vitals signs and nursing note reviewed  Constitutional:       General: He is not in acute distress  Appearance: Normal appearance  HENT:      Head: Normocephalic and atraumatic        Right Ear: External ear normal       Left Ear: External ear normal       Nose: Nose normal       Mouth/Throat:      Mouth: Mucous membranes are moist       Pharynx: Oropharynx is clear  Eyes:      Extraocular Movements: Extraocular movements intact  Conjunctiva/sclera: Conjunctivae normal       Pupils: Pupils are equal, round, and reactive to light  Neck:      Musculoskeletal: Normal range of motion and neck supple  No muscular tenderness  Cardiovascular:      Rate and Rhythm: Normal rate and regular rhythm  Pulses: Normal pulses  Heart sounds: No murmur  Pulmonary:      Effort: Pulmonary effort is normal  No respiratory distress  Breath sounds: Wheezing present  No rales  Abdominal:      General: Bowel sounds are normal       Palpations: Abdomen is soft  There is no mass  Tenderness: There is no abdominal tenderness  Hernia: No hernia is present  Musculoskeletal: Normal range of motion  General: No tenderness or deformity  Right lower leg: No edema  Left lower leg: No edema  Skin:     General: Skin is warm and dry  Neurological:      General: No focal deficit present  Mental Status: He is alert and oriented to person, place, and time  Mental status is at baseline  Psychiatric:         Mood and Affect: Mood normal          Behavior: Behavior normal          Thought Content: Thought content normal          Judgment: Judgment normal          Lab Results:   I have personally reviewed pertinent lab results  , ABG:   Lab Results   Component Value Date    PHART 7 410 05/04/2021    ADI0TSQ 38 2 05/04/2021    PO2ART 68 4 (L) 05/04/2021    JOW6VAI 23 7 05/04/2021    BEART -0 8 05/04/2021    SOURCE Radial, Right 05/04/2021   , BNP: No results found for: BNP, CBC:   Lab Results   Component Value Date    WBC 13 23 (H) 05/04/2021    HGB 8 8 (L) 05/04/2021    HCT 31 4 (L) 05/04/2021    MCV 80 (L) 05/04/2021     (H) 05/04/2021    MCH 22 3 (L) 05/04/2021    MCHC 28 0 (L) 05/04/2021    RDW 15 4 (H) 05/04/2021    MPV 10 3 05/04/2021    NRBC 0 05/03/2021   , CMP:   Lab Results Component Value Date    SODIUM 140 05/04/2021    K 4 2 05/04/2021     05/04/2021    CO2 25 05/04/2021    BUN 11 05/04/2021    CREATININE 0 53 (L) 05/04/2021    CALCIUM 7 5 (L) 05/04/2021    AST 51 (H) 05/04/2021    ALT 74 05/04/2021    ALKPHOS 147 (H) 05/04/2021    EGFR 110 05/04/2021   , PT/INR:   Lab Results   Component Value Date    INR 1 33 (H) 05/03/2021   , Troponin:   Lab Results   Component Value Date    TROPONINI 0 02 05/03/2021       Procalcitonin: 0 51    Viral PCR: COVID negative    Imaging Studies: I have personally reviewed pertinent reports  and I have personally reviewed pertinent films in PACS       CTA chest PE study 05/03/2021   No PE  Large dense opacity in left upper lobe and interval increase in patchy ground-glass opacity and right perihilar region and increased consolidated opacity in RUL  Interval increase Maru lingular mass  Redemonstrated right lower lobe nodular masses and metastasis  Stable extensive lymphadenopathy  Redemonstrated  Destructive lesion 9th rib and interval increase in compression deformity of T9 vertebral body  EKG, Pathology, and Other Studies: I have personally reviewed pertinent reports  Echo 04/06/2021   EF 70%  Grade 1 diastolic dysfunction  Estimated peak PA pressure 37 mm Hg  Pulmonary Results (PFTs, PSG): none to review        VTE Prophylaxis: Sequential compression device (Venodyne)  and Heparin    Code Status: Level 1 - Full Code      Portions of the record may have been created with voice recognition software  Occasional wrong word or "sound a like" substitutions may have occurred due to the inherent limitations of voice recognition software  Read the chart carefully and recognize, using context, where substitutions have occurred

## 2021-05-04 NOTE — ASSESSMENT & PLAN NOTE
· Secondary to post-obstructive pneumonia  · Initially required 5 lpm of continuous supplemental oxygen via the nasal cannula to maintain oxygen saturation levels at 92% and above  · Now the patient is requiring the Vapotherm Unit  to maintain oxygen saturation levels at 92% and above  · Low threshold to transfer the patient to ICU level of caRe  · The patient was seen in consultation by Pulmonology  · Respiratory protocol  · COVID-19 testing was negative

## 2021-05-04 NOTE — SEPSIS NOTE
Sepsis Note   Tereso Thornton 77 y o  male MRN: [id number]  Unit/Bed#: RM07 Encounter: [id number]      qSOFA     Row Name 05/03/21 2130 05/03/21 2115 05/03/21 2030 05/03/21 1933       Altered mental status GCS < 15  --  --  --  --     Respiratory Rate > / =22  1  1  1  1     Systolic BP < / =061  0  0  0  0     Q Sofa Score  1  1  1  1         Initial Sepsis Screening     Row Name 05/03/21 2205                Is the patient's history suggestive of a new or worsening infection? (!) Yes (Proceed)  -NR        Suspected source of infection  pneumonia  -NR        Are two or more of the following signs & symptoms of infection both present and new to the patient? (!) Yes (Proceed)  -NR        Indicate SIRS criteria  Tachycardia > 90 bpm;Leukocytosis (WBC > 69538 IJL); Tachypnea > 20 resp per min  -NR        If the answer is yes to both questions, suspicion of sepsis is present  --        If severe sepsis is present AND tissue hypoperfusion perists in the hour after fluid resuscitation or lactate > 4, the patient meets criteria for SEPTIC SHOCK  --        Are any of the following organ dysfunction criteria present within 6 hours of suspected infection and SIRS criteria that are NOT considered to be chronic conditions?   No  -NR        Organ dysfunction  --        Date of presentation of severe sepsis  --        Time of presentation of severe sepsis  --        Tissue hypoperfusion persists in the hour after crystalloid fluid administration, evidenced, by either:  --        Was hypotension present within one hour of the conclusion of crystalloid fluid administration?  --        Date of presentation of septic shock  --        Time of presentation of septic shock  --          User Key  (r) = Recorded By, (t) = Taken By, (c) = Cosigned By    [address] Name Provider Type    NR Diana France DO Physician

## 2021-05-05 PROBLEM — I49.1 PREMATURE ATRIAL CONTRACTIONS: Status: ACTIVE | Noted: 2021-05-05

## 2021-05-05 PROBLEM — E55.9 VITAMIN D DEFICIENCY: Status: ACTIVE | Noted: 2021-05-05

## 2021-05-05 PROBLEM — E83.51 HYPOCALCEMIA: Status: ACTIVE | Noted: 2021-05-05

## 2021-05-05 LAB
25(OH)D3 SERPL-MCNC: 12.9 NG/ML (ref 30–100)
ALBUMIN SERPL BCP-MCNC: 1.6 G/DL (ref 3.5–5)
ALP SERPL-CCNC: 143 U/L (ref 46–116)
ALT SERPL W P-5'-P-CCNC: 70 U/L (ref 12–78)
ANION GAP SERPL CALCULATED.3IONS-SCNC: 6 MMOL/L (ref 4–13)
AST SERPL W P-5'-P-CCNC: 42 U/L (ref 5–45)
BASOPHILS # BLD AUTO: 0.01 THOUSANDS/ΜL (ref 0–0.1)
BASOPHILS NFR BLD AUTO: 0 % (ref 0–1)
BILIRUB SERPL-MCNC: 0.2 MG/DL (ref 0.2–1)
BUN SERPL-MCNC: 13 MG/DL (ref 5–25)
CA-I BLD-SCNC: 1.04 MMOL/L (ref 1.12–1.32)
CALCIUM ALBUM COR SERPL-MCNC: 9.2 MG/DL (ref 8.3–10.1)
CALCIUM SERPL-MCNC: 7.3 MG/DL (ref 8.3–10.1)
CHLORIDE SERPL-SCNC: 106 MMOL/L (ref 100–108)
CO2 SERPL-SCNC: 26 MMOL/L (ref 21–32)
CREAT SERPL-MCNC: 0.52 MG/DL (ref 0.6–1.3)
CRP SERPL QL: 217.4 MG/L
D DIMER PPP FEU-MCNC: 3.4 UG/ML FEU
EOSINOPHIL # BLD AUTO: 0 THOUSAND/ΜL (ref 0–0.61)
EOSINOPHIL NFR BLD AUTO: 0 % (ref 0–6)
ERYTHROCYTE [DISTWIDTH] IN BLOOD BY AUTOMATED COUNT: 15.3 % (ref 11.6–15.1)
FERRITIN SERPL-MCNC: 1687 NG/ML (ref 8–388)
FOLATE SERPL-MCNC: 8.2 NG/ML (ref 3.1–17.5)
GFR SERPL CREATININE-BSD FRML MDRD: 111 ML/MIN/1.73SQ M
GLUCOSE SERPL-MCNC: 139 MG/DL (ref 65–140)
HAV IGM SER QL: NORMAL
HBV CORE IGM SER QL: NORMAL
HBV SURFACE AG SER QL: NORMAL
HCT VFR BLD AUTO: 31.4 % (ref 36.5–49.3)
HCV AB SER QL: NORMAL
HGB BLD-MCNC: 9.1 G/DL (ref 12–17)
IMM GRANULOCYTES # BLD AUTO: 0.09 THOUSAND/UL (ref 0–0.2)
IMM GRANULOCYTES NFR BLD AUTO: 1 % (ref 0–2)
IRON SATN MFR SERPL: 7 %
IRON SERPL-MCNC: 13 UG/DL (ref 65–175)
LYMPHOCYTES # BLD AUTO: 0.29 THOUSANDS/ΜL (ref 0.6–4.47)
LYMPHOCYTES NFR BLD AUTO: 2 % (ref 14–44)
MAGNESIUM SERPL-MCNC: 2.3 MG/DL (ref 1.6–2.6)
MCH RBC QN AUTO: 22.9 PG (ref 26.8–34.3)
MCHC RBC AUTO-ENTMCNC: 29 G/DL (ref 31.4–37.4)
MCV RBC AUTO: 79 FL (ref 82–98)
MONOCYTES # BLD AUTO: 0.68 THOUSAND/ΜL (ref 0.17–1.22)
MONOCYTES NFR BLD AUTO: 6 % (ref 4–12)
MRSA NOSE QL CULT: NORMAL
MRSA NOSE QL CULT: NORMAL
NEUTROPHILS # BLD AUTO: 11.37 THOUSANDS/ΜL (ref 1.85–7.62)
NEUTS SEG NFR BLD AUTO: 91 % (ref 43–75)
NRBC BLD AUTO-RTO: 0 /100 WBCS
PHOSPHATE SERPL-MCNC: 2 MG/DL (ref 2.3–4.1)
PLATELET # BLD AUTO: 914 THOUSANDS/UL (ref 149–390)
PMV BLD AUTO: 8.9 FL (ref 8.9–12.7)
POTASSIUM SERPL-SCNC: 4.1 MMOL/L (ref 3.5–5.3)
PROCALCITONIN SERPL-MCNC: 1.12 NG/ML
PROT SERPL-MCNC: 6.8 G/DL (ref 6.4–8.2)
RBC # BLD AUTO: 3.98 MILLION/UL (ref 3.88–5.62)
SODIUM SERPL-SCNC: 138 MMOL/L (ref 136–145)
TIBC SERPL-MCNC: 182 UG/DL (ref 250–450)
VIT B12 SERPL-MCNC: 656 PG/ML (ref 100–900)
WBC # BLD AUTO: 12.44 THOUSAND/UL (ref 4.31–10.16)

## 2021-05-05 PROCEDURE — 85025 COMPLETE CBC W/AUTO DIFF WBC: CPT | Performed by: INTERNAL MEDICINE

## 2021-05-05 PROCEDURE — 97163 PT EVAL HIGH COMPLEX 45 MIN: CPT

## 2021-05-05 PROCEDURE — 83735 ASSAY OF MAGNESIUM: CPT | Performed by: INTERNAL MEDICINE

## 2021-05-05 PROCEDURE — 82306 VITAMIN D 25 HYDROXY: CPT | Performed by: INTERNAL MEDICINE

## 2021-05-05 PROCEDURE — 94640 AIRWAY INHALATION TREATMENT: CPT

## 2021-05-05 PROCEDURE — 83550 IRON BINDING TEST: CPT | Performed by: INTERNAL MEDICINE

## 2021-05-05 PROCEDURE — 82607 VITAMIN B-12: CPT | Performed by: INTERNAL MEDICINE

## 2021-05-05 PROCEDURE — 82728 ASSAY OF FERRITIN: CPT | Performed by: INTERNAL MEDICINE

## 2021-05-05 PROCEDURE — 85379 FIBRIN DEGRADATION QUANT: CPT | Performed by: INTERNAL MEDICINE

## 2021-05-05 PROCEDURE — 82330 ASSAY OF CALCIUM: CPT | Performed by: INTERNAL MEDICINE

## 2021-05-05 PROCEDURE — 86140 C-REACTIVE PROTEIN: CPT | Performed by: INTERNAL MEDICINE

## 2021-05-05 PROCEDURE — 80074 ACUTE HEPATITIS PANEL: CPT | Performed by: INTERNAL MEDICINE

## 2021-05-05 PROCEDURE — 84100 ASSAY OF PHOSPHORUS: CPT | Performed by: INTERNAL MEDICINE

## 2021-05-05 PROCEDURE — 99232 SBSQ HOSP IP/OBS MODERATE 35: CPT | Performed by: INTERNAL MEDICINE

## 2021-05-05 PROCEDURE — 94760 N-INVAS EAR/PLS OXIMETRY 1: CPT

## 2021-05-05 PROCEDURE — 82746 ASSAY OF FOLIC ACID SERUM: CPT | Performed by: INTERNAL MEDICINE

## 2021-05-05 PROCEDURE — 83540 ASSAY OF IRON: CPT | Performed by: INTERNAL MEDICINE

## 2021-05-05 PROCEDURE — 97167 OT EVAL HIGH COMPLEX 60 MIN: CPT

## 2021-05-05 PROCEDURE — 80053 COMPREHEN METABOLIC PANEL: CPT | Performed by: INTERNAL MEDICINE

## 2021-05-05 PROCEDURE — 84145 PROCALCITONIN (PCT): CPT | Performed by: EMERGENCY MEDICINE

## 2021-05-05 RX ORDER — CALCIUM CARBONATE 500(1250)
1 TABLET ORAL
Status: DISCONTINUED | OUTPATIENT
Start: 2021-05-05 | End: 2021-05-10

## 2021-05-05 RX ORDER — ERGOCALCIFEROL 1.25 MG/1
50000 CAPSULE ORAL WEEKLY
Status: DISCONTINUED | OUTPATIENT
Start: 2021-05-05 | End: 2021-05-11 | Stop reason: HOSPADM

## 2021-05-05 RX ADMIN — CEFEPIME HYDROCHLORIDE 2000 MG: 2 INJECTION, SOLUTION INTRAVENOUS at 21:39

## 2021-05-05 RX ADMIN — ENOXAPARIN SODIUM 40 MG: 40 INJECTION SUBCUTANEOUS at 21:39

## 2021-05-05 RX ADMIN — IPRATROPIUM BROMIDE 0.5 MG: 0.5 SOLUTION RESPIRATORY (INHALATION) at 15:48

## 2021-05-05 RX ADMIN — GUAIFENESIN 600 MG: 600 TABLET, EXTENDED RELEASE ORAL at 08:08

## 2021-05-05 RX ADMIN — LEVALBUTEROL HYDROCHLORIDE 1.25 MG: 1.25 SOLUTION, CONCENTRATE RESPIRATORY (INHALATION) at 08:26

## 2021-05-05 RX ADMIN — FERROUS SULFATE TAB 325 MG (65 MG ELEMENTAL FE) 325 MG: 325 (65 FE) TAB at 08:08

## 2021-05-05 RX ADMIN — Medication 2 TABLET: at 13:19

## 2021-05-05 RX ADMIN — GUAIFENESIN 600 MG: 600 TABLET, EXTENDED RELEASE ORAL at 17:14

## 2021-05-05 RX ADMIN — OXYCODONE HYDROCHLORIDE 10 MG: 10 TABLET ORAL at 17:18

## 2021-05-05 RX ADMIN — IPRATROPIUM BROMIDE 0.5 MG: 0.5 SOLUTION RESPIRATORY (INHALATION) at 20:11

## 2021-05-05 RX ADMIN — ERGOCALCIFEROL 50000 UNITS: 1.25 CAPSULE ORAL at 13:19

## 2021-05-05 RX ADMIN — OXYCODONE HYDROCHLORIDE 10 MG: 10 TABLET ORAL at 08:18

## 2021-05-05 RX ADMIN — METHYLPREDNISOLONE SODIUM SUCCINATE 40 MG: 40 INJECTION, POWDER, FOR SOLUTION INTRAMUSCULAR; INTRAVENOUS at 08:08

## 2021-05-05 RX ADMIN — CEFEPIME HYDROCHLORIDE 2000 MG: 2 INJECTION, SOLUTION INTRAVENOUS at 05:00

## 2021-05-05 RX ADMIN — VANCOMYCIN HYDROCHLORIDE 1500 MG: 1 INJECTION, POWDER, LYOPHILIZED, FOR SOLUTION INTRAVENOUS at 10:11

## 2021-05-05 RX ADMIN — LEVALBUTEROL HYDROCHLORIDE 1.25 MG: 1.25 SOLUTION, CONCENTRATE RESPIRATORY (INHALATION) at 15:48

## 2021-05-05 RX ADMIN — METHYLPREDNISOLONE SODIUM SUCCINATE 40 MG: 40 INJECTION, POWDER, FOR SOLUTION INTRAMUSCULAR; INTRAVENOUS at 21:39

## 2021-05-05 RX ADMIN — CALCIUM 1 TABLET: 500 TABLET ORAL at 13:19

## 2021-05-05 RX ADMIN — LEVALBUTEROL HYDROCHLORIDE 1.25 MG: 1.25 SOLUTION, CONCENTRATE RESPIRATORY (INHALATION) at 20:11

## 2021-05-05 RX ADMIN — IPRATROPIUM BROMIDE 0.5 MG: 0.5 SOLUTION RESPIRATORY (INHALATION) at 08:26

## 2021-05-05 RX ADMIN — CEFEPIME HYDROCHLORIDE 2000 MG: 2 INJECTION, SOLUTION INTRAVENOUS at 13:19

## 2021-05-05 RX ADMIN — FLUTICASONE FUROATE AND VILANTEROL TRIFENATATE 1 PUFF: 100; 25 POWDER RESPIRATORY (INHALATION) at 10:11

## 2021-05-05 RX ADMIN — SODIUM CHLORIDE 75 ML/HR: 0.9 INJECTION, SOLUTION INTRAVENOUS at 10:11

## 2021-05-05 RX ADMIN — VANCOMYCIN HYDROCHLORIDE 1500 MG: 1 INJECTION, POWDER, LYOPHILIZED, FOR SOLUTION INTRAVENOUS at 22:19

## 2021-05-05 NOTE — CASE MANAGEMENT
Pt currently on vapotherm, will follow up at a later time with pt re: initial assessment and discuss dc plans/needs

## 2021-05-05 NOTE — ASSESSMENT & PLAN NOTE
· Avoid all hepatotoxic agents  · Follow the liver function tests    Results from last 7 days   Lab Units 05/05/21  0530 05/04/21  0637 05/03/21  1947   SODIUM mmol/L 138 140 137   POTASSIUM mmol/L 4 1 4 2 4 2   CHLORIDE mmol/L 106 106 105   CO2 mmol/L 26 25 24   BUN mg/dL 13 11 19   CREATININE mg/dL 0 52* 0 53* 0 72   CALCIUM mg/dL 7 3* 7 5* 7 8*   ALK PHOS U/L 143* 147* 143*   ALT U/L 70 74 79*   AST U/L 42 51* 53*     Results for [facility] (MRN [id number]) as of 5/5/2021 12:19   Ref   Range 5/5/2021 05:30   HEPATITIS A IGM ANTIBODY Latest Ref Range: Non-reactive, Equivocal-Suggest Recollect  Non-reactive   HEPATITIS B SURFACE ANTIGEN Latest Ref Range: Non-reactive, NonReactive - Confirmed  Non-reactive   HEPATITIS B CORE IGM ANTIBODY Latest Ref Range: Non-reactive  Non-reactive   HEPATITIS C ANTIBODY Latest Ref Range: Non-reactive  Non-reactive

## 2021-05-05 NOTE — RESPIRATORY THERAPY NOTE
05/05/21 1152   Non-Invasive Information   O2 Interface Device HFNC prongs   Non-Invasive Ventilation Mode HFNC (High flow)   SpO2 94 %   $ Pulse Oximetry Spot Check Charge Completed   Non-Invasive Settings   FiO2 (%) 45   Flow (lpm) 20   Temperature (Set) 33   Non-Invasive Readings   Heater Temperature (Obs) 33   Skin Intervention Skin intact

## 2021-05-05 NOTE — ASSESSMENT & PLAN NOTE
· Secondary to post-obstructive pneumonia  · Initially required 5 lpm of continuous supplemental oxygen via the nasal cannula to maintain oxygen saturation levels at 92% and above  · Now the patient is requiring the Vapotherm Unit to maintain oxygen saturation levels at 92% and above  · Current Vapotherm settings are an FiO2 of 45% at 20 liters/minute  · Low threshold to transfer the patient to ICU level of care  · The patient was seen in consultation by Pulmonology  · Continue methylprednisolone 40 mg IV every 12 hours  · Respiratory protocol  · COVID-19 testing was negative

## 2021-05-05 NOTE — PLAN OF CARE
Problem: Nutrition/Hydration-ADULT  Goal: Nutrient/Hydration intake appropriate for improving, restoring or maintaining nutritional needs  Description: Monitor and assess patient's nutrition/hydration status for malnutrition  Collaborate with interdisciplinary team and initiate plan and interventions as ordered  Monitor patient's weight and dietary intake as ordered or per policy  Utilize nutrition screening tool and intervene as necessary  Determine patient's food preferences and provide high-protein, high-caloric foods as appropriate       INTERVENTIONS:  - Monitor oral intake, urinary output, labs, and treatment plans  - Assess nutrition and hydration status and recommend course of action  - Evaluate amount of meals eaten  - Assist patient with eating if necessary   - Allow adequate time for meals  - Recommend/ encourage appropriate diets, oral nutritional supplements, and vitamin/mineral supplements  - Order, calculate, and assess calorie counts as needed  - Recommend, monitor, and adjust tube feedings and TPN/PPN based on assessed needs  - Assess need for intravenous fluids  - Provide specific nutrition/hydration education as appropriate  - Include patient/family/caregiver in decisions related to nutrition  Outcome: Progressing     Problem: RESPIRATORY - ADULT  Goal: Achieves optimal ventilation and oxygenation  Description: INTERVENTIONS:  - Assess for changes in respiratory status  - Assess for changes in mentation and behavior  - Position to facilitate oxygenation and minimize respiratory effort  - Oxygen administered by appropriate delivery if ordered  - Initiate smoking cessation education as indicated  - Encourage broncho-pulmonary hygiene including cough, deep breathe, Incentive Spirometry  - Assess the need for suctioning and aspirate as needed  - Assess and instruct to report SOB or any respiratory difficulty  - Respiratory Therapy support as indicated  Outcome: Progressing     Problem: Potential for Falls  Goal: Patient will remain free of falls  Description: INTERVENTIONS:  - Assess patient frequently for physical needs  -  Identify cognitive and physical deficits and behaviors that affect risk of falls    -  [place] fall precautions as indicated by assessment   - Educate patient/family on patient safety including physical limitations  - Instruct patient to call for assistance with activity based on assessment  - Modify environment to reduce risk of injury  - Consider OT/PT consult to assist with strengthening/mobility  Outcome: Progressing

## 2021-05-05 NOTE — ASSESSMENT & PLAN NOTE
· The patient was seen in consultation by Hematology/Oncology    · Outpatient follow-up with Dr Jaswinder Fuentes (Hematology/Oncology)

## 2021-05-05 NOTE — ASSESSMENT & PLAN NOTE
· Known metastatic renal cell carcinoma with lung metastases  · Outpatient follow-up with Dr Mino Hemphill (Hematology/Oncology)

## 2021-05-05 NOTE — PROGRESS NOTES
Progress Note - Pulmonary   Antony Colbert 77 y o  male MRN: [id number]  Unit/Bed#: 400-01 Encounter: [id number]    Assessment/Plan:    1  Acute hypoxic respiratory failure  1  Currently on HFNC 25L 40%   2  Titrate oxygen maintain SpO2 greater than or equal to 88%   3  Pulmonary toilet: Increase activity as tolerated, out of bed as tolerated, cough and deep breathing as encouraged, incentive spirometry q 1 hour  2  Sepsis secondary to postobstructive pneumonia  1  Present admission with tachycardia, tachypnea, leukocytosis and new infiltrates on CT chest imaging concerning for worsening mass obstructing distal left upper lobe bronchi   2  Currently on cefepime and vancomycin to cover MDR and Gram-negative organisms- discontinue vancomycin now given negative MRSA   3  Continue supportive care for cough with Mucinex and Tessalon Perles  3  Bilateral pleural effusions  1  S/p left thoracentesis 4/6/21  That yielded exudate pattern with negative cytology cultures  2  Could consider repeat thoracentesis if patient becomes more symptomatic  3  Will repeat CXR as outpatient for continued monitoring   4  Stage IV renal cell carcinoma with bone and lung metastasis  1  Follows with Dr Clovis Mena- received first chemotherapy 4/22 with Opdivo/Yervoy   2  Heme/onc input appreciated   5  Suspected COPD with acute exacerbation  1  Improved on Solumedrol 40 mg IV q 12 hours- will continue  2  Continue Breo 100/25  1 puff daily   3  Continue Atrovent /Xopenex nebs t i d   4  Remains committed to smoking cessation  6  Anemia  1  Trend H&H  2  Treatment per primary team   3  Heme/onc consulted     Plan of care discussed with patient  Will continue to follow         Chief Complaint:    "I feel better "    Subjective:     patient was seen examined today  No events reported  Reports improvement dyspnea  He reports coughing up yellow sputum denies wheeze currently but does state comes and goes  No chest pain or palpitations    Low back pain is improved  No dizziness or headache  No fevers or chills  No hemoptysis  Denies GI or urinary symptoms  Objective:    Vitals: Blood pressure 132/71, pulse 79, temperature (!) 97 4 °F (36 3 °C), resp  rate 17, height 5' 11" (1 803 m), weight 64 7 kg (142 lb 10 2 oz), SpO2 94 %  HFNC 25L 40%,Body mass index is 19 89 kg/m²  Intake/Output Summary (Last 24 hours) at 5/5/2021 1159  Last data filed at 5/5/2021 1011  Gross per 24 hour   Intake 1160 ml   Output 1000 ml   Net 160 ml       Invasive Devices     Peripheral Intravenous Line            Peripheral IV 05/03/21 Right Antecubital 1 day                Physical Exam:     Physical Exam  Vitals signs and nursing note reviewed  Constitutional:       General: He is not in acute distress  Appearance: Normal appearance  HENT:      Head: Normocephalic and atraumatic  Right Ear: External ear normal       Left Ear: External ear normal       Nose: Nose normal       Mouth/Throat:      Mouth: Mucous membranes are moist       Pharynx: Oropharynx is clear  Eyes:      Extraocular Movements: Extraocular movements intact  Conjunctiva/sclera: Conjunctivae normal       Pupils: Pupils are equal, round, and reactive to light  Neck:      Musculoskeletal: Normal range of motion and neck supple  No muscular tenderness  Cardiovascular:      Rate and Rhythm: Normal rate and regular rhythm  Pulses: Normal pulses  Heart sounds: No murmur  Pulmonary:      Breath sounds: Wheezing present  Abdominal:      General: Bowel sounds are normal       Palpations: Abdomen is soft  There is no mass  Tenderness: There is no abdominal tenderness  Hernia: No hernia is present  Musculoskeletal: Normal range of motion  General: No tenderness or deformity  Right lower leg: No edema  Left lower leg: No edema  Skin:     General: Skin is warm and dry  Neurological:      General: No focal deficit present        Mental Status: He is alert and oriented to person, place, and time  Mental status is at baseline  Psychiatric:         Mood and Affect: Mood normal          Behavior: Behavior normal          Thought Content: Thought content normal          Judgment: Judgment normal          Labs: I have personally reviewed pertinent lab results  , ABG: No results found for: PHART, JWK1VLV, PO2ART, EEM3NVP, P5OAXMHV, BEART, SOURCE, BNP: No results found for: BNP, CBC:   Lab Results   Component Value Date    WBC 12 44 (H) 05/05/2021    HGB 9 1 (L) 05/05/2021    HCT 31 4 (L) 05/05/2021    MCV 79 (L) 05/05/2021     (H) 05/05/2021    MCH 22 9 (L) 05/05/2021    MCHC 29 0 (L) 05/05/2021    RDW 15 3 (H) 05/05/2021    MPV 8 9 05/05/2021    NRBC 0 05/05/2021   , CMP:   Lab Results   Component Value Date    SODIUM 138 05/05/2021    K 4 1 05/05/2021     05/05/2021    CO2 26 05/05/2021    BUN 13 05/05/2021    CREATININE 0 52 (L) 05/05/2021    CALCIUM 7 3 (L) 05/05/2021    AST 42 05/05/2021    ALT 70 05/05/2021    ALKPHOS 143 (H) 05/05/2021    EGFR 111 05/05/2021   , PT/INR: No results found for: PT, INR, Troponin: No results found for: TROPONINI    Imaging and other studies: I have personally reviewed pertinent reports           Lower extremity venous duplex negative for acute or chronic DVT bilaterally

## 2021-05-05 NOTE — MALNUTRITION/BMI
This medical record reflects one or more clinical indicators suggestive of malnutrition  Malnutrition Findings:   Adult Malnutrition type: Acute illness  Adult Degree of Malnutrition: Other severe protein calorie malnutrition  Malnutrition Characteristics: Inadequate energy, Weight loss(severe PCM r/t significant wt loss of 9% x 1 month with suspected prolong inadequate po intake  Treat with regular diet and addition of supplements )    BMI Findings: Body mass index is 19 89 kg/m²  See Nutrition note dated 5/5/2021 for additional details  Completed nutrition assessment is viewable in the nutrition documentation

## 2021-05-05 NOTE — PHYSICAL THERAPY NOTE
Physical Therapy Evaluation    Patient Name: Francis Barcenas    INBCN'V Date: 5/5/2021     Problem List  Principal Problem:    Acute respiratory failure with hypoxia ([address] )  Active Problems:    Anemia    Lung mass    Mediastinal lymphadenopathy    Moderate protein-calorie malnutrition (HCC)    Renal cell carcinoma of left kidney (HCC)    HCAP (healthcare-associated pneumonia)    Thrombocytosis (HCC)    Sepsis (HCC)    Elevated d-dimer    Transaminitis    Hypophosphatemia    Hypocalcemia    Vitamin D deficiency    Premature atrial contractions       Past Medical History  History reviewed  No pertinent past medical history       Past Surgical History  Past Surgical History:   Procedure Laterality Date    IR BIOPSY LIVER MASS  4/7/2021    IR BIOPSY LYMPH NODE  4/13/2021    IR THORACENTESIS  4/6/2021    TENDON REPAIR Right     forearm           05/05/21 1400   PT Last Visit   PT Visit Date 05/05/21   Note Type   Note type Evaluation   Pain Assessment   Pain Assessment Tool Pain Assessment not indicated - pt denies pain   Pain Score No Pain   Home Living   Type of Home House   Home Layout Two level;Bed/bath upstairs;Stairs to enter with rails  (2 HIRAM)   Bathroom Shower/Tub Walk-in shower   Bathroom Toilet Raised   Additional Comments pt denies use of DME at baseline   Prior Function   Level of [place] Independent with ADLs and functional mobility   Lives With Alone   ADL Assistance Independent   IADLs Independent   Falls in the last 6 months 0   Comments + driving   Restrictions/Precautions   Weight Bearing Precautions Per Order No   Other Precautions O2;Fall Risk;Multiple lines   General   Family/Caregiver Present No   Cognition   Overall Cognitive Status WFL   Arousal/Participation Alert   Orientation Level Oriented X4   Following Commands Follows all commands and directions without difficulty   RLE Assessment   RLE Assessment WFL   LLE Assessment   LLE Assessment WFL   Bed Mobility   Additional Comments pt OOB at start/end of session   Transfers   Sit to Stand 5  Supervision   Stand to Sit 5  Supervision   Additional Comments no device used   Ambulation/Elevation   Gait pattern   (decreased velocity; cautious)   Gait Assistance 5  Supervision   Additional items Verbal cues   Assistive Device None   Distance 200'   Balance   Static Sitting Good   Dynamic Sitting Good   Static Standing Good   Dynamic Standing Fair +   Ambulatory Fair +   Endurance Deficit   Endurance Deficit Yes   Endurance Deficit Description pt requiring significant amount of supplemental oxygen   Activity Tolerance   Activity Tolerance Patient limited by fatigue   Assessment   Prognosis Good   Problem List Decreased endurance;Decreased mobility; Impaired balance   Assessment Patient is a 77 y o  male evaluated by Physical Therapy s/p admit to [address] [place] [address],[address] on 5/3/2021 with admitting diagnosis of: Shortness of breath, Lung mass, Acute respiratory failure with hypoxia, HCAP (healthcare-associated pneumonia), Renal cell carcinoma of left kidney, and principal problem of: Acute respiratory failure with hypoxia  PT was consulted to assess patient's functional mobility and discharge needs  Ordered are PT Evaluation and treatment with activity level of: up with assistance  Comorbidities affecting patient's physical performance at time of assessment include: anemia, moderate malnutrition, renal cell carcinoma, respiratory failure, bone metastases  Personal factors affecting the patient at time of IE include: lives in 2 story home, step(s) to enter home and inability to navigate community distances  Please locate objective findings from PT assessment regarding body systems outlined above  Upon evaluation, pt able to perform all functional mobility with SUP and occasional verbal cuing  Pt able to ambulate 200' with mild postural sway; assist provided to manage IV pole and O2 tank   SpO2 and HR remained WFL on 15L O2 via non-rebreather  Pt reports mild SOB with exertion and was able to maintain light conversation during ambulation without difficulty  The patient's AM-PAC Basic Mobility Inpatient Short Form Raw Score is 22, Standardized Score is 47 4  A standardized score greater than 42 9 suggests the patient may benefit from discharge to home  Please also refer to the recommendation of the Physical Therapist for safe discharge planning  Pt will benefit from continued PT intervention during LOS to address current deficits, increase LOF, and facilitate safe d/c to next level of care when medically appropriate  D/c recommendation at this time is home with no needs  Goals   Patient Goals to go home   LTG Expiration Date 05/19/21   Long Term Goal #1 Pt will ambulate 450' mod(I) while maintaining good functional dynamic balance  Long Term Goal #2 Pt will ascend/descend 11 stairs with HR and SUP to reflect the ability to safely access the 2nd floor of his home  Plan   Treatment/Interventions Elevations;Gait training; Endurance training   PT Frequency Other (Comment)  (3-5x/week)   Recommendation   PT Discharge Recommendation No rehabilitation needs   AM-PAC Basic Mobility Inpatient   Turning in Bed Without Bedrails 4   Lying on Back to Sitting on Edge of Flat Bed 4   Moving Bed to Chair 4   Standing Up From Chair 4   Walk in Room 3   Climb 3-5 Stairs 3   Basic Mobility Inpatient Raw Score 22   Basic Mobility Standardized Score 47 4     Pt seated in recliner at end of session with all needs in reach

## 2021-05-05 NOTE — CASE MANAGEMENT
Pt is a 30 day re admission was at [facility] from 4/5-4/7/21 for dx of mediastinal lymphadenopathy; lung mass; pleural effusion  Pt did see his PCP; Hem/Onc after dc and had 1 chemo tx  Pt now being readmitted with sepsis likely secondary to HCAP; acute respiratory failure with hypoxia (on vapotherm); lung mass

## 2021-05-05 NOTE — ASSESSMENT & PLAN NOTE
· Check the patient's stool for occult blood x 3 specimens  · Follow the CBC  · Transfuse for a hemoglobin less than 7 g/dl    Results from last 7 days   Lab Units 05/05/21  0530 05/04/21  0637 05/03/21  1947   WBC Thousand/uL 12 44* 13 23* 13 27*   HEMOGLOBIN g/dL 9 1* 8 8* 8 7*   HEMATOCRIT % 31 4* 31 4* 29 6*   PLATELETS Thousands/uL 914* 721* 913*     Results for Paula Don (MRN [id number]) as of 5/5/2021 12:24   Ref   Range 5/5/2021 05:30   Iron Latest Ref Range: 65 - 175 ug/dL 13 (L)   Ferritin Latest Ref Range: 8 - 388 ng/mL 1,687 (H)   Iron Saturation Latest Units: % 7   TIBC Latest Ref Range: 250 - 450 ug/dL 182 (L)   Folate Latest Ref Range: 3 1 - 17 5 ng/mL 8 2   Vitamin B-12 Latest Ref Range: 100 - 900 pg/mL 656

## 2021-05-05 NOTE — RESPIRATORY THERAPY NOTE
05/05/21 0826   Non-Invasive Information   O2 Interface Device HFNC prongs   Non-Invasive Ventilation Mode HFNC (High flow)   SpO2 93 %   $ Pulse Oximetry Spot Check Charge Completed   Non-Invasive Settings   FiO2 (%) 45   Flow (lpm) 20   Temperature (Set) 33   Non-Invasive Readings   Heater Temperature (Obs) 33   Skin Intervention Skin intact

## 2021-05-05 NOTE — ASSESSMENT & PLAN NOTE
· For the vitamin D deficiency, the patient was placed on ergocalciferol 50,000 I  U  PO Qweekly for 8 weekly doses  He will then need a repeat Vitamin D 25-OH level checked in 2 months and will need a daily cholecalciferol supplement prescribed by her PCP based on the repeat Vitamin D 25-OH level results  Results for Claire Aguilar (MRN [id number]) as of 5/5/2021 12:18   Ref   Range 5/5/2021 06:22   Vit D, 25-Hydroxy Latest Ref Range: 30 0 - 100 0 ng/mL 12 9 (L)

## 2021-05-05 NOTE — OCCUPATIONAL THERAPY NOTE
Occupational Therapy Evaluation     Patient Name: Tino Post  PYJXD'N Date: 5/5/2021  Problem List  Principal Problem:    Acute respiratory failure with hypoxia ([address] )  Active Problems:    Anemia    Lung mass    Mediastinal lymphadenopathy    Moderate protein-calorie malnutrition (HCC)    Renal cell carcinoma of left kidney (HCC)    HCAP (healthcare-associated pneumonia)    Thrombocytosis (HCC)    Sepsis (HCC)    Elevated d-dimer    Transaminitis    Hypophosphatemia    Hypocalcemia    Vitamin D deficiency    Premature atrial contractions    Past Medical History  History reviewed  No pertinent past medical history  Past Surgical History  Past Surgical History:   Procedure Laterality Date    IR BIOPSY LIVER MASS  4/7/2021    IR BIOPSY LYMPH NODE  4/13/2021    IR THORACENTESIS  4/6/2021    TENDON REPAIR Right     forearm             05/05/21 1332   OT Last Visit   OT Visit Date 05/05/21   Note Type   Note type Evaluation   Restrictions/Precautions   Weight Bearing Precautions Per Order No   Other Precautions O2;Fall Risk;Multiple lines;Telemetry   Pain Assessment   Pain Assessment Tool Pain Assessment not indicated - pt denies pain   Home Living   Type of [address] [place] [address] Two level;Bed/bath upstairs;Stairs to enter with rails; Other (Comment)  (2 HIRAM; FOS to 2nd c HR)   Bathroom Shower/Tub Walk-in shower   Bathroom Toilet Raised   Bathroom Accessibility Accessible   Home Equipment Other (Comment)  (no DME)   Additional Comments pt with no device at baseline during mobility   Prior Function   Level of [place] Independent with ADLs and functional mobility   Lives With Alone   ADL Assistance Independent   IADLs Independent   Falls in the last 6 months 0   Vocational Retired   Comments pt is (I) with driving   Psychosocial   Psychosocial (WDL) WDL   Subjective   Subjective "I used to mountain bike"   ADL   Where Assessed Chair   LB Dressing Assistance 7  Independent   Additional Comments pt doffs socks and dons nonskids seated in chair    Bed Mobility   Additional Comments pt seated in chair at start and end of session; originally on HFNC and respiratory places pt on nonrebreather at 15L; pt with minimal SOB and SpO2 ranged % during functional tasks   Transfers   Sit to Stand 7  Independent   Stand to Sit 7  Independent   Additional Comments no device; no LOB or instability   Functional Mobility   Functional Mobility 7  Independent   Additional Comments pt performed functional mobility with no device at (I) level ~200ft   Additional items   (no device)   Balance   Static Sitting Good   Dynamic Sitting Good   Static Standing Good   Dynamic Standing Good   Ambulatory Good   Activity Tolerance   Activity Tolerance Patient tolerated treatment well   RUE Assessment   RUE Assessment WFL   LUE Assessment   LUE Assessment WFL   Hand Function   Gross Motor Coordination Functional   Fine Motor Coordination Functional   Sensation   Light Touch No apparent deficits   Sharp/Dull No apparent deficits   Cognition   Overall Cognitive Status WFL   Arousal/Participation Alert   Attention Within functional limits   Orientation Level Oriented X4   Memory Within functional limits   Following Commands Follows all commands and directions without difficulty   Assessment   Assessment Patient is a 77 y o  male admitted to [email or web address] on 5/3/2021 due to Acute respiratory failure with hypoxia ([address] )  Pt performed at (I) level with no OT needs identified at this time Comorbidities affecting pt's physical performance at time of assessment include lung CA  The patient's raw score on the AM-PAC Daily Activity inpatient short form is 24, standardized score is 57 54, greater than 39 4  Patients at this level are likely to benefit from DC to home  From OT standpoint recommend anticipate no needs upon D/C  No further acute OT needs identified at this time   Recommend continued mobilization with hospital staff and restorative services while in the hospital to increase pts endurance and strength upon D/C  From OT standpoint, recommend D/C to home with family support when medically cleared  D/C pt from OT caseload at this time  Goals   Patient Goals to go home    Recommendation   OT Discharge Recommendation No rehabilitation needs   AM-PAC Daily Activity Inpatient   Lower Body Dressing 4   Bathing 4   Toileting 4   Upper Body Dressing 4   Grooming 4   Eating 4   Daily Activity Raw Score 24   Daily Activity Standardized Score (Calc for Raw Score >=11) 57 54   AM-PAC Applied Cognition Inpatient   Following a Speech/Presentation 4   Understanding Ordinary Conversation 4   Taking Medications 4   Remembering Where Things Are Placed or Put Away 4   Remembering List of 4-5 Errands 4   Taking Care of Complicated Tasks 4   Applied Cognition Raw Score 24   Applied Cognition Standardized Score 62 21     Pt is performing at [facility]; OT services will be discontinued at this time  Pt left seated in chair at end of session; all needs within reach; all lines intact

## 2021-05-05 NOTE — ASSESSMENT & PLAN NOTE
· Likely reactive in the setting of active malignancy  · Follow the platelet count    Results from last 7 days   Lab Units 05/05/21  0530 05/04/21  0637 05/03/21  1947   WBC Thousand/uL 12 44* 13 23* 13 27*   HEMOGLOBIN g/dL 9 1* 8 8* 8 7*   HEMATOCRIT % 31 4* 31 4* 29 6*   PLATELETS Thousands/uL 914* 721* 913*

## 2021-05-05 NOTE — ASSESSMENT & PLAN NOTE
· Secondary to active malignancy    Results for Donte Yung (MRN [id number]) as of 5/4/2021 15:19   Ref  Range 5/3/2021 19:47   D-Dimer, Vandanawayne Justice Latest Ref Range: <0 50 ug/ml FEU 3 91 (H)       VAS lower limb venous duplex study, complete bilateral  Status: In process   PACS Images     Show images for VAS lower limb venous duplex study, complete bilateral   Study Result       [facility] REPORT  CLINICAL:  Indications:  Patient presents with bilateral lower extremity edema  Operative History:  No cardiovascular surgeries noted  FINDINGS:     Segment  Right            Left                Impression       Impression         CFV      Normal (Patent)  Normal (Patent)             CONCLUSION:  RIGHT LOWER LIMB:  No evidence of acute or chronic deep vein thrombosis  No evidence of superficial thrombophlebitis noted  Doppler evaluation shows a normal response to augmentation maneuvers  Popliteal, posterior tibial and anterior tibial arterial Doppler waveforms are  triphasic  LEFT LOWER LIMB:  No evidence of acute or chronic deep vein thrombosis  No evidence of superficial thrombophlebitis noted  Doppler evaluation shows a normal response to augmentation maneuvers  Popliteal, posterior tibial and anterior tibial arterial Doppler waveforms are  triphasic  CTA ED chest PE Study  Status: Final result   PACS Images     Show images for CTA ED chest PE Study   Study Result    CTA - CHEST WITH IV CONTRAST - PULMONARY ANGIOGRAM     INDICATION:   tachycardia, hypoxia, current cancer on chemo      COMPARISON: CT of the chest on April 5, 2021      TECHNIQUE: CTA examination of the chest was performed using angiographic technique according to a protocol specifically tailored to evaluate for pulmonary embolism  Axial, sagittal, and coronal 2D reformatted images were created from the source data and   submitted for interpretation    In addition, coronal 3D MIP postprocessing was performed on the acquisition scanner        Radiation dose length product (DLP) for this visit:  305 93 mGy-cm   This examination, like all CT scans performed in the [facility], was performed utilizing techniques to minimize radiation dose exposure, including the use of iterative   reconstruction and automated exposure control      IV Contrast:  85 mL of iohexol (OMNIPAQUE)  was administered intravenously without immediate adverse reaction  FINDINGS:     PULMONARY ARTERIAL TREE:  No pulmonary embolus is seen       LUNGS:  Known left upper lobe mass is now obscured by large patchy opacity (series 3, image 45) likely due to pneumonia  Interval increase in patchy groundglass opacities in the right perihilar region (series 3, image 36)  Increase in size of dense   patchy opacity in the right apex (series 3, image 26) Interval increase in mass in the lingular region measuring 4 7 x 3 1 cm  Juxtapleural nodules in the left lower lobe are not significantly changed in size      PLEURA:  Stable size of moderate loculated left pleural effusion with nodularity and thickening of the pleura, increased since prior exam      HEART/GREAT VESSELS:  Unremarkable for patient's age      MEDIASTINUM AND MARGARITA:  Stable extensive mediastinal, hilar, and cardiophrenic lymphadenopathy     CHEST WALL AND LOWER NECK:   Unremarkable      VISUALIZED STRUCTURES IN THE UPPER ABDOMEN:  Stable left para-aortic mass which is partially visualized      OSSEOUS STRUCTURES:  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  Compression deformity at the T9 vertebral body with increase in moderate vertebral body height loss when compared with prior exam        IMPRESSION:     1  No evidence of pulmonary embolism  2   Large dense opacity in the left upper lobe and interval increase in patchy groundglass opacities in the right perihilar region likely due to pneumonia in the appropriate clinical setting    3   Known left upper lobe mass is obscured  Interval increase in mass in the lingular region  Redemonstrated right lower lobe nodular masses or metastasis  4   Stable extensive lymphadenopathy  5   Redemonstrated expansile destructive lesion in the 9th rib and interval increase in compression deformity at the T9 vertebral body

## 2021-05-05 NOTE — PLAN OF CARE
Problem: PHYSICAL THERAPY ADULT  Goal: Performs mobility at highest level of function for planned discharge setting  See evaluation for individualized goals  Description: Treatment/Interventions: Elevations, Gait training, Endurance training          See flowsheet documentation for full assessment, interventions and recommendations  Note: Prognosis: Good  Problem List: Decreased endurance, Decreased mobility, Impaired balance  Assessment: Patient is a 77 y o  male evaluated by Physical Therapy s/p admit to [address] [place] [address],[address] on 5/3/2021 with admitting diagnosis of: Shortness of breath, Lung mass, Acute respiratory failure with hypoxia, HCAP (healthcare-associated pneumonia), Renal cell carcinoma of left kidney, and principal problem of: Acute respiratory failure with hypoxia  PT was consulted to assess patient's functional mobility and discharge needs  Ordered are PT Evaluation and treatment with activity level of: up with assistance  Comorbidities affecting patient's physical performance at time of assessment include: anemia, moderate malnutrition, renal cell carcinoma, respiratory failure, bone metastases  Personal factors affecting the patient at time of IE include: lives in 2 story home, step(s) to enter home and inability to navigate community distances  Please locate objective findings from PT assessment regarding body systems outlined above  Upon evaluation, pt able to perform all functional mobility with SUP and occasional verbal cuing  Pt able to ambulate 200' with mild postural sway; assist provided to manage IV pole and O2 tank  SpO2 and HR remained WFL on 15L O2 via non-rebreather  Pt reports mild SOB with exertion and was able to maintain light conversation during ambulation without difficulty  The patient's AM-PAC Basic Mobility Inpatient Short Form Raw Score is 22, Standardized Score is 47 4  A standardized score greater than 42 9 suggests the patient may benefit from discharge to home  Please also refer to the recommendation of the Physical Therapist for safe discharge planning  Pt will benefit from continued PT intervention during LOS to address current deficits, increase LOF, and facilitate safe d/c to next level of care when medically appropriate  D/c recommendation at this time is home with no needs  PT Discharge Recommendation: No rehabilitation needs          See flowsheet documentation for full assessment

## 2021-05-05 NOTE — ASSESSMENT & PLAN NOTE
· Known metastatic renal cell carcinoma  · Outpatient follow-up with Dr Jesse Richardson (Hematology/Oncology)

## 2021-05-05 NOTE — ASSESSMENT & PLAN NOTE
· Outpatient Cardiology evaluation    ECG 12 lead  Order: [id number]  Status:  Final result   Visible to patient:  No (inaccessible in [address])   Next appt:  05/11/2021 at 09:00 AM in Hematology and Oncology ([address], [address])   Ref Range & Units 5/3/21 2108   Ventricular Rate     Atrial Rate     OH Interval ms 126    QRSD Interval ms 76    QT Interval ms 348    QTC Interval ms 475    P [place] degrees 69    QRS Axis degrees 58    T Wave Axis degrees 67       Narrative & Impression    Sinus tachycardia with Premature atrial complexes  Otherwise normal ECG  When compared with ECG of 05-APR-2021 09:52,  Premature atrial complexes are now Present  Confirmed by Jem Ortega ([id number]) on 5/4/2021 4:29:54 PM      Specimen Collected: 05/03/21 21:08   Last Resulted: 05/04/21 16:30

## 2021-05-05 NOTE — PROGRESS NOTES
Vancomycin IV Pharmacy-to-Dose Consultation    Moi Arauz is a 77 y o  male who is currently receiving Vancomycin IV with management by the Pharmacy Consult service  Assessment/Plan:  The patient was reviewed  Renal function is stable and no signs or symptoms of nephrotoxicity and/or infusion reactions were documented in the chart  Based on todays assessment, continue current vancomycin (day # 2) dosing of 1500mg Q12H, with a plan for trough to be drawn at 10:30 on 05/06/2021  We will continue to follow the patients culture results and clinical progress daily      Chance Marquez, Pharmacist

## 2021-05-05 NOTE — PROGRESS NOTES
[address] [address]  Progress Note - Tereso Thornton 1955, 77 y o  male MRN: [id number]  Unit/Bed#: 400-01 Encounter: [id number]  Primary Care Provider: Maryann Swanson MD   Date and time admitted to hospital: 5/3/2021  7:23 PM    * Acute respiratory failure with hypoxia [facility])  Assessment & Plan  · Secondary to post-obstructive pneumonia  · Initially required 5 lpm of continuous supplemental oxygen via the nasal cannula to maintain oxygen saturation levels at 92% and above  · Now the patient is requiring the Vapotherm Unit to maintain oxygen saturation levels at 92% and above  · Current Vapotherm settings are an FiO2 of 45% at 20 liters/minute  · Low threshold to transfer the patient to ICU level of care  · The patient was seen in consultation by Pulmonology  · Continue methylprednisolone 40 mg IV every 12 hours  · Respiratory protocol  · COVID-19 testing was negative  Sepsis ([facility] [address] )  Assessment & Plan  · Sepsis was present on admission and secondary to probable post-obstructive pneumonia  · SIRS criteria was met with tachycardia and leukocytosis  · Possible gram-negative pneumonia  · Continue IV cefepime and IV vancomycin  · Check a MRSA nasal screen  · Follow culture results  · Follow the procalcitonin level  · Continue NSS IV fluids at 75 ml/hr    HCAP (healthcare-associated pneumonia)  Assessment & Plan  · Sepsis was present on admission and secondary to probable post-obstructive pneumonia  · SIRS criteria was met with tachycardia and leukocytosis    · Possible gram-negative pneumonia  · Continue IV cefepime and IV vancomycin  · Check a MRSA nasal screen  · Follow culture results  · Follow the procalcitonin level  · Continue NSS IV fluids at 75 ml/hr    Premature atrial contractions  Assessment & Plan  · Outpatient Cardiology evaluation    ECG 12 lead  Order: [id number]  Status:  Final result   Visible to patient:  No (inaccessible in [facility])   Next appt:  05/11/2021 at 09:00 AM in Hematology and Oncology [place], [place])   Ref Range & Units 5/3/21 2108   Ventricular Rate     Atrial Rate     OR Interval ms 126    QRSD Interval ms 76    QT Interval ms 348    QTC Interval ms 475    P [place] degrees 69    QRS Axis degrees 58    T Wave Axis degrees 67       Narrative & Impression    Sinus tachycardia with Premature atrial complexes  Otherwise normal ECG  When compared with ECG of 05-APR-2021 09:52,  Premature atrial complexes are now Present  Confirmed by Ney Barakat ([id number]) on 5/4/2021 4:29:54 PM      Specimen Collected: 05/03/21 21:08   Last Resulted: 05/04/21 16:30               Vitamin D deficiency  Assessment & Plan  · For the vitamin D deficiency, the patient was placed on ergocalciferol 50,000 I  U  PO Qweekly for 8 weekly doses  He will then need a repeat Vitamin D 25-OH level checked in 2 months and will need a daily cholecalciferol supplement prescribed by her PCP based on the repeat Vitamin D 25-OH level results  Results for Chris Carolina (MRN [id number]) as of 5/5/2021 12:18   Ref  Range 5/5/2021 06:22   Vit D, 25-Hydroxy Latest Ref Range: 30 0 - 100 0 ng/mL 12 9 (L)         Hypocalcemia  Assessment & Plan  · Initiate calcium carbonate 500 mg PO Qdaily    Hypophosphatemia  Assessment & Plan  · Give K-phos, 2 tablets PO today  · Follow the phosphorus level    Transaminitis  Assessment & Plan  · Avoid all hepatotoxic agents  · Follow the liver function tests    Results from last 7 days   Lab Units 05/05/21  0530 05/04/21  0637 05/03/21  1947   SODIUM mmol/L 138 140 137   POTASSIUM mmol/L 4 1 4 2 4 2   CHLORIDE mmol/L 106 106 105   CO2 mmol/L 26 25 24   BUN mg/dL 13 11 19   CREATININE mg/dL 0 52* 0 53* 0 72   CALCIUM mg/dL 7 3* 7 5* 7 8*   ALK PHOS U/L 143* 147* 143*   ALT U/L 70 74 79*   AST U/L 42 51* 53*     Results for [facility] (MRN [id number]) as of 5/5/2021 12:19   Ref   Range 5/5/2021 05:30   HEPATITIS A IGM ANTIBODY Latest Ref Range: Non-reactive, Equivocal-Suggest Recollect  Non-reactive   HEPATITIS B SURFACE ANTIGEN Latest Ref Range: Non-reactive, NonReactive - Confirmed  Non-reactive   HEPATITIS B CORE IGM ANTIBODY Latest Ref Range: Non-reactive  Non-reactive   HEPATITIS C ANTIBODY Latest Ref Range: Non-reactive  Non-reactive       Elevated d-dimer  Assessment & Plan  · Secondary to active malignancy    Results for Donte Yung (MRN [id number]) as of 5/4/2021 15:19   Ref  Range 5/3/2021 19:47   D-Dimer, Messi Justice Latest Ref Range: <0 50 ug/ml FEU 3 91 (H)       VAS lower limb venous duplex study, complete bilateral  Status: In process   PACS Images     Show images for VAS lower limb venous duplex study, complete bilateral   Study Result       [facility] REPORT  CLINICAL:  Indications:  Patient presents with bilateral lower extremity edema  Operative History:  No cardiovascular surgeries noted  FINDINGS:     Segment  Right            Left                Impression       Impression         CFV      Normal (Patent)  Normal (Patent)             CONCLUSION:  RIGHT LOWER LIMB:  No evidence of acute or chronic deep vein thrombosis  No evidence of superficial thrombophlebitis noted  Doppler evaluation shows a normal response to augmentation maneuvers  Popliteal, posterior tibial and anterior tibial arterial Doppler waveforms are  triphasic  LEFT LOWER LIMB:  No evidence of acute or chronic deep vein thrombosis  No evidence of superficial thrombophlebitis noted  Doppler evaluation shows a normal response to augmentation maneuvers  Popliteal, posterior tibial and anterior tibial arterial Doppler waveforms are  triphasic       CTA ED chest PE Study  Status: Final result   PACS Images     Show images for CTA ED chest PE Study   Study Result    CTA - CHEST WITH IV CONTRAST - PULMONARY ANGIOGRAM     INDICATION:   tachycardia, hypoxia, current cancer on chemo      COMPARISON: CT of the chest on April 5, 2021      TECHNIQUE: CTA examination of the chest was performed using angiographic technique according to a protocol specifically tailored to evaluate for pulmonary embolism  Axial, sagittal, and coronal 2D reformatted images were created from the source data and   submitted for interpretation  In addition, coronal 3D MIP postprocessing was performed on the acquisition scanner        Radiation dose length product (DLP) for this visit:  305 93 mGy-cm   This examination, like all CT scans performed in the [facility], was performed utilizing techniques to minimize radiation dose exposure, including the use of iterative   reconstruction and automated exposure control      IV Contrast:  85 mL of iohexol (OMNIPAQUE)  was administered intravenously without immediate adverse reaction  FINDINGS:     PULMONARY ARTERIAL TREE:  No pulmonary embolus is seen       LUNGS:  Known left upper lobe mass is now obscured by large patchy opacity (series 3, image 45) likely due to pneumonia  Interval increase in patchy groundglass opacities in the right perihilar region (series 3, image 36)  Increase in size of dense   patchy opacity in the right apex (series 3, image 26) Interval increase in mass in the lingular region measuring 4 7 x 3 1 cm  Juxtapleural nodules in the left lower lobe are not significantly changed in size      PLEURA:  Stable size of moderate loculated left pleural effusion with nodularity and thickening of the pleura, increased since prior exam      HEART/GREAT VESSELS:  Unremarkable for patient's age      MEDIASTINUM AND MARGARITA:  Stable extensive mediastinal, hilar, and cardiophrenic lymphadenopathy     CHEST WALL AND LOWER NECK:   Unremarkable      VISUALIZED STRUCTURES IN THE UPPER ABDOMEN:  Stable left para-aortic mass which is partially visualized      OSSEOUS STRUCTURES:  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    Compression deformity at the T9 vertebral body with increase in moderate vertebral body height loss when compared with prior exam        IMPRESSION:     1  No evidence of pulmonary embolism  2   Large dense opacity in the left upper lobe and interval increase in patchy groundglass opacities in the right perihilar region likely due to pneumonia in the appropriate clinical setting  3   Known left upper lobe mass is obscured  Interval increase in mass in the lingular region  Redemonstrated right lower lobe nodular masses or metastasis  4   Stable extensive lymphadenopathy  5   Redemonstrated expansile destructive lesion in the 9th rib and interval increase in compression deformity at the T9 vertebral body  Thrombocytosis ([address] )  Assessment & Plan  · Likely reactive in the setting of active malignancy  · Follow the platelet count    Results from last 7 days   Lab Units 05/05/21  0530 05/04/21  0637 05/03/21  1947   WBC Thousand/uL 12 44* 13 23* 13 27*   HEMOGLOBIN g/dL 9 1* 8 8* 8 7*   HEMATOCRIT % 31 4* 31 4* 29 6*   PLATELETS Thousands/uL 914* 721* 913*         Renal cell carcinoma of left kidney [facility])  Assessment & Plan  · The patient was seen in consultation by Hematology/Oncology  · Outpatient follow-up with Dr Cathey Alpers (Hematology/Oncology)    Moderate protein-calorie malnutrition [facility])  Assessment & Plan  Malnutrition Findings:           BMI Findings: Body mass index is 19 89 kg/m²         · Consult the dietitian/nutritionist    Mediastinal lymphadenopathy  Assessment & Plan  · Known metastatic renal cell carcinoma  · Outpatient follow-up with Dr Cathey Alpers (Hematology/Oncology)    Lung mass  Assessment & Plan  · Known metastatic renal cell carcinoma with lung metastases  · Outpatient follow-up with Dr Cathey Alpers (Hematology/Oncology)      Anemia  Assessment & Plan  · Check the patient's stool for occult blood x 3 specimens  · Follow the CBC  · Transfuse for a hemoglobin less than 7 g/dl    Results from last 7 days   Lab Units 05/05/21  0530 05/04/21  5601 21  1947   WBC Thousand/uL 12 44* 13 23* 13 27*   HEMOGLOBIN g/dL 9 1* 8 8* 8 7*   HEMATOCRIT % 31 4* 31 4* 29 6*   PLATELETS Thousands/uL 914* 721* 913*     Results for Merrick Mujica (MRN [id number]) as of 2021 12:24   Ref  Range 2021 05:30   Iron Latest Ref Range: 65 - 175 ug/dL 13 (L)   Ferritin Latest Ref Range: 8 - 388 ng/mL 1,687 (H)   Iron Saturation Latest Units: % 7   TIBC Latest Ref Range: 250 - 450 ug/dL 182 (L)   Folate Latest Ref Range: 3 1 - 17 5 ng/mL 8 2   Vitamin B-12 Latest Ref Range: 100 - 900 pg/mL 656     VTE Pharmacologic Prophylaxis:   Pharmacologic: Enoxaparin (Lovenox)  Mechanical VTE Prophylaxis in Place: Yes    Patient Centered Rounds: I have performed bedside rounds with nursing staff today  Discussions with Specialists or Other Care Team Provider: I discussed the case with Pulmonology  Time Spent for Care: 30 minutes  More than 50% of total time spent on counseling and coordination of care as described above  Current Length of Stay: 2 day(s)    Current Patient Status: Inpatient   Certification Statement: The patient will continue to require additional inpatient hospital stay due to the need for IV anibiotic treatment and with the patient requiring the Vapotherm Unit to maintain adequate oxygen saturation levels  Code Status: Level 1 - Full Code      Subjective: The patient was seen and examined  The patient is doing better  The shortness of breath has improved  No chest pain  Objective:     Vitals:   Temp (24hrs), Av 8 °F (36 6 °C), Min:97 4 °F (36 3 °C), Max:98 1 °F (36 7 °C)    Temp:  [97 4 °F (36 3 °C)-98 1 °F (36 7 °C)] 97 4 °F (36 3 °C)  HR:  [79-87] 79  Resp:  [17-20] 17  BP: (132-145)/(60-76) 132/71  SpO2:  [93 %-95 %] 94 %  Body mass index is 19 89 kg/m²  Input and Output Summary (last 24 hours):        Intake/Output Summary (Last 24 hours) at 2021 1226  Last data filed at 2021 1011  Gross per 24 hour   Intake 1160 ml   Output 1000 ml   Net 160 ml       Physical Exam:     Physical Exam  General:  NAD, follows commands  HEENT:  NC/AT, mucous membranes moist  Neck:  Supple, No JVP elevation  CV:  + S1, + S2, RRR  Pulm:  Scattered rhonchi bilaterally, Scant expiratory wheezing bilaterally  Abd:  Soft, Non-tender, Non-distended  Ext:  No clubbing/cyanosis/edema  Skin:  No rashes  Neuro:  Awake, alert, oriented  Psych:  Normal mood and affect      Additional Data:    Labs:    Results from last 7 days   Lab Units 05/05/21  0530   WBC Thousand/uL 12 44*   HEMOGLOBIN g/dL 9 1*   HEMATOCRIT % 31 4*   PLATELETS Thousands/uL 914*   NEUTROS PCT % 91*   LYMPHS PCT % 2*   MONOS PCT % 6   EOS PCT % 0     Results from last 7 days   Lab Units 05/05/21  0530   SODIUM mmol/L 138   POTASSIUM mmol/L 4 1   CHLORIDE mmol/L 106   CO2 mmol/L 26   BUN mg/dL 13   CREATININE mg/dL 0 52*   ANION GAP mmol/L 6   CALCIUM mg/dL 7 3*   ALBUMIN g/dL 1 6*   TOTAL BILIRUBIN mg/dL 0 20   ALK PHOS U/L 143*   ALT U/L 70   AST U/L 42   GLUCOSE RANDOM mg/dL 139     Results from last 7 days   Lab Units 05/03/21  1951   INR  1 33*             Results from last 7 days   Lab Units 05/04/21  0637 05/03/21 2059   LACTIC ACID mmol/L  --  2 0   PROCALCITONIN ng/ml 1 25* 0 51*           * I Have Reviewed All Lab Data Listed Above  * Additional Pertinent Lab Tests Reviewed: [facility]      Recent Cultures (last 7 days):     Results from last 7 days   Lab Units 05/04/21  0124 05/03/21 2059   BLOOD CULTURE   --  No Growth at 24 hrs  No Growth at 24 hrs     LEGIONELLA URINARY ANTIGEN  Negative  --        Last 24 Hours Medication List:   Current Facility-Administered Medications   Medication Dose Route Frequency Provider Last Rate    albuterol  2 5 mg Nebulization Q4H PRN Ellen Degroot DO      benzonatate  100 mg Oral TID PRN MARILOU Ventura      calcium carbonate  1 tablet Oral Daily With Breakfast Ellen Degroot DO      cefepime 2,000 mg Intravenous Q8H MARILOU Rothman 2,000 mg (05/05/21 0500)    enoxaparin  40 mg Subcutaneous Q24H Rick Ca DO      ergocalciferol  50,000 Units Oral Weekly Rick Ca DO      ferrous sulfate  325 mg Oral Daily With Breakfast Rosy Y Swank, JESSIENP      fluticasone-vilanterol  1 puff Inhalation Daily MARILOU Rothman      guaiFENesin  600 mg Oral BID Rosy Y Swank, MARILOU      HYDROmorphone  0 5 mg Intravenous Q4H PRN Rick Ca DO      ipratropium  0 5 mg Nebulization TID Rick Ca DO      levalbuterol  1 25 mg Nebulization TID Rick Ca DO      methylPREDNISolone sodium succinate  40 mg Intravenous Q12H [facility] Froilan Parada PA-C      ondansetron  4 mg Intravenous Q6H PRN MARILOU Rothman      oxyCODONE  5 mg Oral Q4H PRN Rick Ca DO      Or    oxyCODONE  10 mg Oral Q4H PRN Rick Ca DO      potassium-sodium phosphateS  2 tablet Oral Once Rick Ca DO      sodium chloride  75 mL/hr Intravenous Continuous MARILOU Rothman 75 mL/hr (05/05/21 1011)    vancomycin  20 mg/kg (Ideal) Intravenous Q12H Rick Ca DO 1,500 mg (05/05/21 1011)        Today, Patient Was Seen By: Rick Ca DO    ** Please Note: Dictation voice to text software may have been used in the creation of this document   **

## 2021-05-05 NOTE — RESPIRATORY THERAPY NOTE
05/05/21 1548   Non-Invasive Information   O2 Interface Device HFNC prongs   Non-Invasive Ventilation Mode HFNC (High flow)   SpO2 93 %   $ Pulse Oximetry Spot Check Charge Completed   Non-Invasive Settings   FiO2 (%) 45   Flow (lpm) 20   Temperature (Set) 33   Non-Invasive Readings   Heater Temperature (Obs) 33   Skin Intervention Skin intact

## 2021-05-06 ENCOUNTER — APPOINTMENT (INPATIENT)
Dept: RADIOLOGY | Facility: HOSPITAL | Age: 66
DRG: 871 | End: 2021-05-06
Payer: MEDICARE

## 2021-05-06 PROBLEM — I49.3 VENTRICULAR ECTOPY: Status: ACTIVE | Noted: 2021-05-06

## 2021-05-06 PROBLEM — E43 SEVERE PROTEIN-CALORIE MALNUTRITION (HCC): Status: ACTIVE | Noted: 2021-05-06

## 2021-05-06 LAB
ALBUMIN SERPL BCP-MCNC: 1.8 G/DL (ref 3.5–5)
ALP SERPL-CCNC: 135 U/L (ref 46–116)
ALT SERPL W P-5'-P-CCNC: 84 U/L (ref 12–78)
ANION GAP SERPL CALCULATED.3IONS-SCNC: 7 MMOL/L (ref 4–13)
AST SERPL W P-5'-P-CCNC: 44 U/L (ref 5–45)
BASOPHILS # BLD AUTO: 0.02 THOUSANDS/ΜL (ref 0–0.1)
BASOPHILS NFR BLD AUTO: 0 % (ref 0–1)
BILIRUB SERPL-MCNC: 0.18 MG/DL (ref 0.2–1)
BUN SERPL-MCNC: 14 MG/DL (ref 5–25)
CA-I BLD-SCNC: 1.08 MMOL/L (ref 1.12–1.32)
CALCIUM ALBUM COR SERPL-MCNC: 9.4 MG/DL (ref 8.3–10.1)
CALCIUM SERPL-MCNC: 7.6 MG/DL (ref 8.3–10.1)
CHLORIDE SERPL-SCNC: 106 MMOL/L (ref 100–108)
CO2 SERPL-SCNC: 28 MMOL/L (ref 21–32)
CREAT SERPL-MCNC: 0.55 MG/DL (ref 0.6–1.3)
EOSINOPHIL # BLD AUTO: 0 THOUSAND/ΜL (ref 0–0.61)
EOSINOPHIL NFR BLD AUTO: 0 % (ref 0–6)
ERYTHROCYTE [DISTWIDTH] IN BLOOD BY AUTOMATED COUNT: 15.4 % (ref 11.6–15.1)
GFR SERPL CREATININE-BSD FRML MDRD: 109 ML/MIN/1.73SQ M
GLUCOSE SERPL-MCNC: 123 MG/DL (ref 65–140)
HCT VFR BLD AUTO: 31.3 % (ref 36.5–49.3)
HGB BLD-MCNC: 8.9 G/DL (ref 12–17)
IMM GRANULOCYTES # BLD AUTO: 0.13 THOUSAND/UL (ref 0–0.2)
IMM GRANULOCYTES NFR BLD AUTO: 1 % (ref 0–2)
LYMPHOCYTES # BLD AUTO: 0.35 THOUSANDS/ΜL (ref 0.6–4.47)
LYMPHOCYTES NFR BLD AUTO: 2 % (ref 14–44)
MAGNESIUM SERPL-MCNC: 2.4 MG/DL (ref 1.6–2.6)
MCH RBC QN AUTO: 22.4 PG (ref 26.8–34.3)
MCHC RBC AUTO-ENTMCNC: 28.4 G/DL (ref 31.4–37.4)
MCV RBC AUTO: 79 FL (ref 82–98)
MONOCYTES # BLD AUTO: 1.51 THOUSAND/ΜL (ref 0.17–1.22)
MONOCYTES NFR BLD AUTO: 8 % (ref 4–12)
NEUTROPHILS # BLD AUTO: 17.51 THOUSANDS/ΜL (ref 1.85–7.62)
NEUTS SEG NFR BLD AUTO: 89 % (ref 43–75)
NRBC BLD AUTO-RTO: 0 /100 WBCS
PHOSPHATE SERPL-MCNC: 1.7 MG/DL (ref 2.3–4.1)
PLATELET # BLD AUTO: 939 THOUSANDS/UL (ref 149–390)
PMV BLD AUTO: 9.3 FL (ref 8.9–12.7)
POTASSIUM SERPL-SCNC: 4.4 MMOL/L (ref 3.5–5.3)
PROCALCITONIN SERPL-MCNC: 0.7 NG/ML
PROT SERPL-MCNC: 7 G/DL (ref 6.4–8.2)
RBC # BLD AUTO: 3.97 MILLION/UL (ref 3.88–5.62)
SODIUM SERPL-SCNC: 141 MMOL/L (ref 136–145)
WBC # BLD AUTO: 19.52 THOUSAND/UL (ref 4.31–10.16)

## 2021-05-06 PROCEDURE — 84145 PROCALCITONIN (PCT): CPT | Performed by: INTERNAL MEDICINE

## 2021-05-06 PROCEDURE — 82330 ASSAY OF CALCIUM: CPT | Performed by: INTERNAL MEDICINE

## 2021-05-06 PROCEDURE — 83735 ASSAY OF MAGNESIUM: CPT | Performed by: INTERNAL MEDICINE

## 2021-05-06 PROCEDURE — 94640 AIRWAY INHALATION TREATMENT: CPT

## 2021-05-06 PROCEDURE — 71045 X-RAY EXAM CHEST 1 VIEW: CPT

## 2021-05-06 PROCEDURE — 80053 COMPREHEN METABOLIC PANEL: CPT | Performed by: INTERNAL MEDICINE

## 2021-05-06 PROCEDURE — 99232 SBSQ HOSP IP/OBS MODERATE 35: CPT | Performed by: INTERNAL MEDICINE

## 2021-05-06 PROCEDURE — 85025 COMPLETE CBC W/AUTO DIFF WBC: CPT | Performed by: INTERNAL MEDICINE

## 2021-05-06 PROCEDURE — 87070 CULTURE OTHR SPECIMN AEROBIC: CPT | Performed by: NURSE PRACTITIONER

## 2021-05-06 PROCEDURE — 84100 ASSAY OF PHOSPHORUS: CPT | Performed by: INTERNAL MEDICINE

## 2021-05-06 PROCEDURE — 94760 N-INVAS EAR/PLS OXIMETRY 1: CPT

## 2021-05-06 PROCEDURE — 97116 GAIT TRAINING THERAPY: CPT

## 2021-05-06 PROCEDURE — 87205 SMEAR GRAM STAIN: CPT | Performed by: NURSE PRACTITIONER

## 2021-05-06 RX ORDER — METHYLPREDNISOLONE SODIUM SUCCINATE 40 MG/ML
40 INJECTION, POWDER, LYOPHILIZED, FOR SOLUTION INTRAMUSCULAR; INTRAVENOUS EVERY 8 HOURS SCHEDULED
Status: DISCONTINUED | OUTPATIENT
Start: 2021-05-06 | End: 2021-05-07

## 2021-05-06 RX ORDER — FUROSEMIDE 10 MG/ML
40 INJECTION INTRAMUSCULAR; INTRAVENOUS ONCE
Status: COMPLETED | OUTPATIENT
Start: 2021-05-06 | End: 2021-05-06

## 2021-05-06 RX ADMIN — CEFEPIME HYDROCHLORIDE 2000 MG: 2 INJECTION, SOLUTION INTRAVENOUS at 21:13

## 2021-05-06 RX ADMIN — FERROUS SULFATE TAB 325 MG (65 MG ELEMENTAL FE) 325 MG: 325 (65 FE) TAB at 08:01

## 2021-05-06 RX ADMIN — LEVALBUTEROL HYDROCHLORIDE 1.25 MG: 1.25 SOLUTION, CONCENTRATE RESPIRATORY (INHALATION) at 20:56

## 2021-05-06 RX ADMIN — FLUTICASONE FUROATE AND VILANTEROL TRIFENATATE 1 PUFF: 100; 25 POWDER RESPIRATORY (INHALATION) at 08:04

## 2021-05-06 RX ADMIN — METHYLPREDNISOLONE SODIUM SUCCINATE 40 MG: 40 INJECTION, POWDER, FOR SOLUTION INTRAMUSCULAR; INTRAVENOUS at 08:02

## 2021-05-06 RX ADMIN — SODIUM PHOSPHATE, MONOBASIC, MONOHYDRATE AND SODIUM PHOSPHATE, DIBASIC, ANHYDROUS 9 MMOL: 276; 142 INJECTION, SOLUTION INTRAVENOUS at 11:37

## 2021-05-06 RX ADMIN — IPRATROPIUM BROMIDE 0.5 MG: 0.5 SOLUTION RESPIRATORY (INHALATION) at 08:31

## 2021-05-06 RX ADMIN — CEFEPIME HYDROCHLORIDE 2000 MG: 2 INJECTION, SOLUTION INTRAVENOUS at 13:44

## 2021-05-06 RX ADMIN — SODIUM CHLORIDE 75 ML/HR: 0.9 INJECTION, SOLUTION INTRAVENOUS at 02:53

## 2021-05-06 RX ADMIN — METHYLPREDNISOLONE SODIUM SUCCINATE 40 MG: 40 INJECTION, POWDER, FOR SOLUTION INTRAMUSCULAR; INTRAVENOUS at 13:45

## 2021-05-06 RX ADMIN — CEFEPIME HYDROCHLORIDE 2000 MG: 2 INJECTION, SOLUTION INTRAVENOUS at 05:00

## 2021-05-06 RX ADMIN — OXYCODONE HYDROCHLORIDE 10 MG: 10 TABLET ORAL at 15:48

## 2021-05-06 RX ADMIN — METHYLPREDNISOLONE SODIUM SUCCINATE 40 MG: 40 INJECTION, POWDER, FOR SOLUTION INTRAMUSCULAR; INTRAVENOUS at 21:15

## 2021-05-06 RX ADMIN — IPRATROPIUM BROMIDE 0.5 MG: 0.5 SOLUTION RESPIRATORY (INHALATION) at 20:56

## 2021-05-06 RX ADMIN — ENOXAPARIN SODIUM 40 MG: 40 INJECTION SUBCUTANEOUS at 21:16

## 2021-05-06 RX ADMIN — GUAIFENESIN 600 MG: 600 TABLET, EXTENDED RELEASE ORAL at 08:01

## 2021-05-06 RX ADMIN — IPRATROPIUM BROMIDE 0.5 MG: 0.5 SOLUTION RESPIRATORY (INHALATION) at 13:22

## 2021-05-06 RX ADMIN — GUAIFENESIN 600 MG: 600 TABLET, EXTENDED RELEASE ORAL at 17:11

## 2021-05-06 RX ADMIN — OXYCODONE HYDROCHLORIDE 5 MG: 5 TABLET ORAL at 21:26

## 2021-05-06 RX ADMIN — OXYCODONE HYDROCHLORIDE 10 MG: 10 TABLET ORAL at 08:01

## 2021-05-06 RX ADMIN — LEVALBUTEROL HYDROCHLORIDE 1.25 MG: 1.25 SOLUTION, CONCENTRATE RESPIRATORY (INHALATION) at 13:22

## 2021-05-06 RX ADMIN — FUROSEMIDE 40 MG: 10 INJECTION, SOLUTION INTRAMUSCULAR; INTRAVENOUS at 09:16

## 2021-05-06 RX ADMIN — CALCIUM 1 TABLET: 500 TABLET ORAL at 08:02

## 2021-05-06 RX ADMIN — LEVALBUTEROL HYDROCHLORIDE 1.25 MG: 1.25 SOLUTION, CONCENTRATE RESPIRATORY (INHALATION) at 08:31

## 2021-05-06 NOTE — ASSESSMENT & PLAN NOTE
· Likely reactive in the setting of active malignancy  · Follow the platelet count    Results from last 7 days   Lab Units 05/06/21  0512 05/05/21  0530 05/04/21  0637   WBC Thousand/uL 19 52* 12 44* 13 23*   HEMOGLOBIN g/dL 8 9* 9 1* 8 8*   HEMATOCRIT % 31 3* 31 4* 31 4*   PLATELETS Thousands/uL 939* 914* 721*

## 2021-05-06 NOTE — RESPIRATORY THERAPY NOTE
0820 upon arrival pt had increased WOB and insp and exp wheezing  Pt stated," hard to breathe"  Increased vapotherm flow to 30 l/m and FIO2 remained at 40%  resp tx  Given in line, pt stated felt more relief

## 2021-05-06 NOTE — RESPIRATORY THERAPY NOTE
05/05/21 2011   Non-Invasive Information   O2 Interface Device HFNC prongs   Non-Invasive Ventilation Mode HFNC (High flow)   SpO2 94 %   $ Pulse Oximetry Spot Check Charge Completed   Non-Invasive Settings   FiO2 (%) 45   Flow (lpm) 20   Temperature (Set) 33   Non-Invasive Readings   Heater Temperature (Obs) 33   Skin Intervention Skin intact

## 2021-05-06 NOTE — ASSESSMENT & PLAN NOTE
· The patient was seen in consultation by Hematology/Oncology    · Outpatient follow-up with Dr Kellie Holbrook (Hematology/Oncology)

## 2021-05-06 NOTE — ASSESSMENT & PLAN NOTE
· Avoid all hepatotoxic agents  · Follow the liver function tests    Results from last 7 days   Lab Units 05/06/21  0512 05/05/21  0530 05/04/21  0637   SODIUM mmol/L 141 138 140   POTASSIUM mmol/L 4 4 4 1 4 2   CHLORIDE mmol/L 106 106 106   CO2 mmol/L 28 26 25   BUN mg/dL 14 13 11   CREATININE mg/dL 0 55* 0 52* 0 53*   CALCIUM mg/dL 7 6* 7 3* 7 5*   ALK PHOS U/L 135* 143* 147*   ALT U/L 84* 70 74   AST U/L 44 42 51*     Results for Mary Jane Dias (MRN [id number]) as of 5/5/2021 12:19   Ref   Range 5/5/2021 05:30   HEPATITIS A IGM ANTIBODY Latest Ref Range: Non-reactive, Equivocal-Suggest Recollect  Non-reactive   HEPATITIS B SURFACE ANTIGEN Latest Ref Range: Non-reactive, NonReactive - Confirmed  Non-reactive   HEPATITIS B CORE IGM ANTIBODY Latest Ref Range: Non-reactive  Non-reactive   HEPATITIS C ANTIBODY Latest Ref Range: Non-reactive  Non-reactive

## 2021-05-06 NOTE — ASSESSMENT & PLAN NOTE
Malnutrition Findings:   Adult Malnutrition type: Acute illness  Adult Degree of Malnutrition: Other severe protein calorie malnutrition    BMI Findings: Body mass index is 19 89 kg/m²       · Nutritional supplements

## 2021-05-06 NOTE — PROGRESS NOTES
[facility] [address]  Progress Note - Macario [place] 1955, 77 y o  male MRN: [id number]  Unit/Bed#: 400-01 Encounter: [id number]  Primary Care Provider: Brittany Castillo MD   Date and time admitted to hospital: 5/3/2021  7:23 PM    * Acute respiratory failure with hypoxia [facility])  Assessment & Plan  · Secondary to post-obstructive pneumonia  · Initially required 5 lpm of continuous supplemental oxygen via the nasal cannula to maintain oxygen saturation levels at 92% and above  · Now the patient is requiring the Vapotherm Unit to maintain oxygen saturation levels at 92% and above  · Current Vapotherm settings are an FiO2 of 40% at 20 liters/minute  · Worsening respiratory distress on 05/06/2021  · Low threshold to transfer the patient to ICU level of care  · The patient was seen in consultation by Pulmonology  · Increase methylprednisolone to 40 mg IV every 8 hours  · Discontinue the NSS IV fluids on 05/06/2021  · Give lasix 40 mg IV x 1 dose on 05/06/2021  · Respiratory protocol  · COVID-19 testing was negative  Sepsis ([facility] [address] )  Assessment & Plan  · Sepsis was present on admission and secondary to probable post-obstructive pneumonia  · SIRS criteria was met with tachycardia and leukocytosis  · Possible gram-negative pneumonia  · Initially treated with IV cefepime and IV vancomycin  · Discontinue IV vancomycin on 05/06/2021 with a negative MRSA nasal screen  · Follow culture results  · Follow the procalcitonin level  · Discontinue the NSS IV fluids on 05/06/2021    HCAP (healthcare-associated pneumonia)  Assessment & Plan  · Sepsis was present on admission and secondary to probable post-obstructive pneumonia  · SIRS criteria was met with tachycardia and leukocytosis    · Possible gram-negative pneumonia  · Initially treated with IV cefepime and IV vancomycin  · Discontinue IV vancomycin on 05/06/2021 with a negative MRSA nasal screen  · Follow culture results  · Follow the procalcitonin level  · Discontinue the NSS IV fluids on 05/06/2021    Severe protein-calorie malnutrition ([address] )  Assessment & Plan  Malnutrition Findings:   Adult Malnutrition type: Acute illness  Adult Degree of Malnutrition: Other severe protein calorie malnutrition    BMI Findings: Body mass index is 19 89 kg/m²  · Nutritional supplements    Premature atrial contractions  Assessment & Plan  · Outpatient Cardiology evaluation    ECG 12 lead  Order: [id number]  Status:  Final result   Visible to patient:  No (inaccessible in [address])   Next appt:  05/11/2021 at 09:00 AM in Hematology and Oncology ([address], [address])   Ref Range & Units 5/3/21 2108   Ventricular Rate     Atrial Rate     TX Interval ms 126    QRSD Interval ms 76    QT Interval ms 348    QTC Interval ms 475    P [place] degrees 69    QRS Axis degrees 58    T Wave Axis degrees 67       Narrative & Impression    Sinus tachycardia with Premature atrial complexes  Otherwise normal ECG  When compared with ECG of 05-APR-2021 09:52,  Premature atrial complexes are now Present  Confirmed by Miguel Cerrato ([id number]) on 5/4/2021 4:29:54 PM      Specimen Collected: 05/03/21 21:08   Last Resulted: 05/04/21 16:30               Vitamin D deficiency  Assessment & Plan  · For the vitamin D deficiency, the patient was placed on ergocalciferol 50,000 I  U  PO Qweekly for 8 weekly doses  He will then need a repeat Vitamin D 25-OH level checked in 2 months and will need a daily cholecalciferol supplement prescribed by her PCP based on the repeat Vitamin D 25-OH level results  Results for Jules Manuel (MRN [id number]) as of 5/5/2021 12:18   Ref   Range 5/5/2021 06:22   Vit D, 25-Hydroxy Latest Ref Range: 30 0 - 100 0 ng/mL 12 9 (L)         Hypocalcemia  Assessment & Plan  · Initiated on calcium carbonate 500 mg PO Qdaily    Hypophosphatemia  Assessment & Plan  · Give sodium phosphate 9 mmol IV x 1 dose on 05/06/2021  · Follow the phosphorus level    Transaminitis  Assessment & Plan  · Avoid all hepatotoxic agents  · Follow the liver function tests    Results from last 7 days   Lab Units 05/06/21  0512 05/05/21  0530 05/04/21  0637   SODIUM mmol/L 141 138 140   POTASSIUM mmol/L 4 4 4 1 4 2   CHLORIDE mmol/L 106 106 106   CO2 mmol/L 28 26 25   BUN mg/dL 14 13 11   CREATININE mg/dL 0 55* 0 52* 0 53*   CALCIUM mg/dL 7 6* 7 3* 7 5*   ALK PHOS U/L 135* 143* 147*   ALT U/L 84* 70 74   AST U/L 44 42 51*     Results for [facility] (MRN [id number]) as of 5/5/2021 12:19   Ref  Range 5/5/2021 05:30   HEPATITIS A IGM ANTIBODY Latest Ref Range: Non-reactive, Equivocal-Suggest Recollect  Non-reactive   HEPATITIS B SURFACE ANTIGEN Latest Ref Range: Non-reactive, NonReactive - Confirmed  Non-reactive   HEPATITIS B CORE IGM ANTIBODY Latest Ref Range: Non-reactive  Non-reactive   HEPATITIS C ANTIBODY Latest Ref Range: Non-reactive  Non-reactive       Elevated d-dimer  Assessment & Plan  · Secondary to active malignancy    Results for Jules Manuel (MRN [id number]) as of 5/4/2021 15:19   Ref  Range 5/3/2021 19:47   D-Dimer, Payal Butler Latest Ref Range: <0 50 ug/ml FEU 3 91 (H)       VAS lower limb venous duplex study, complete bilateral  Status: In process   PACS Images     Show images for VAS lower limb venous duplex study, complete bilateral   Study Result       [facility] REPORT  CLINICAL:  Indications:  Patient presents with bilateral lower extremity edema  Operative History:  No cardiovascular surgeries noted  FINDINGS:     Segment  Right            Left                Impression       Impression         CFV      Normal (Patent)  Normal (Patent)             CONCLUSION:  RIGHT LOWER LIMB:  No evidence of acute or chronic deep vein thrombosis  No evidence of superficial thrombophlebitis noted  Doppler evaluation shows a normal response to augmentation maneuvers    Popliteal, posterior tibial and anterior tibial arterial Doppler waveforms are  triphasic  LEFT LOWER LIMB:  No evidence of acute or chronic deep vein thrombosis  No evidence of superficial thrombophlebitis noted  Doppler evaluation shows a normal response to augmentation maneuvers  Popliteal, posterior tibial and anterior tibial arterial Doppler waveforms are  triphasic  CTA ED chest PE Study  Status: Final result   PACS Images     Show images for CTA ED chest PE Study   Study Result    CTA - CHEST WITH IV CONTRAST - PULMONARY ANGIOGRAM     INDICATION:   tachycardia, hypoxia, current cancer on chemo      COMPARISON: CT of the chest on April 5, 2021      TECHNIQUE: CTA examination of the chest was performed using angiographic technique according to a protocol specifically tailored to evaluate for pulmonary embolism  Axial, sagittal, and coronal 2D reformatted images were created from the source data and   submitted for interpretation  In addition, coronal 3D MIP postprocessing was performed on the acquisition scanner        Radiation dose length product (DLP) for this visit:  305 93 mGy-cm   This examination, like all CT scans performed in the [facility], was performed utilizing techniques to minimize radiation dose exposure, including the use of iterative   reconstruction and automated exposure control      IV Contrast:  85 mL of iohexol (OMNIPAQUE)  was administered intravenously without immediate adverse reaction  FINDINGS:     PULMONARY ARTERIAL TREE:  No pulmonary embolus is seen       LUNGS:  Known left upper lobe mass is now obscured by large patchy opacity (series 3, image 45) likely due to pneumonia  Interval increase in patchy groundglass opacities in the right perihilar region (series 3, image 36)  Increase in size of dense   patchy opacity in the right apex (series 3, image 26) Interval increase in mass in the lingular region measuring 4 7 x 3 1 cm    Juxtapleural nodules in the left lower lobe are not significantly changed in size      PLEURA:  Stable size of moderate loculated left pleural effusion with nodularity and thickening of the pleura, increased since prior exam      HEART/GREAT VESSELS:  Unremarkable for patient's age      MEDIASTINUM AND MARGARITA:  Stable extensive mediastinal, hilar, and cardiophrenic lymphadenopathy     CHEST WALL AND LOWER NECK:   Unremarkable      VISUALIZED STRUCTURES IN THE UPPER ABDOMEN:  Stable left para-aortic mass which is partially visualized      OSSEOUS STRUCTURES:  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  Compression deformity at the T9 vertebral body with increase in moderate vertebral body height loss when compared with prior exam        IMPRESSION:     1  No evidence of pulmonary embolism  2   Large dense opacity in the left upper lobe and interval increase in patchy groundglass opacities in the right perihilar region likely due to pneumonia in the appropriate clinical setting  3   Known left upper lobe mass is obscured  Interval increase in mass in the lingular region  Redemonstrated right lower lobe nodular masses or metastasis  4   Stable extensive lymphadenopathy  5   Redemonstrated expansile destructive lesion in the 9th rib and interval increase in compression deformity at the T9 vertebral body  Thrombocytosis ([address] )  Assessment & Plan  · Likely reactive in the setting of active malignancy  · Follow the platelet count    Results from last 7 days   Lab Units 05/06/21  0512 05/05/21  0530 05/04/21  0637   WBC Thousand/uL 19 52* 12 44* 13 23*   HEMOGLOBIN g/dL 8 9* 9 1* 8 8*   HEMATOCRIT % 31 3* 31 4* 31 4*   PLATELETS Thousands/uL 939* 914* 721*         Renal cell carcinoma of left kidney [facility])  Assessment & Plan  · The patient was seen in consultation by Hematology/Oncology    · Outpatient follow-up with Dr Kvng Mayer (Hematology/Oncology)    Mediastinal lymphadenopathy  Assessment & Plan  · Known metastatic renal cell carcinoma  · Outpatient follow-up with Dr Henry Nance (Hematology/Oncology)    Lung mass  Assessment & Plan  · Known metastatic renal cell carcinoma with lung metastases  · Outpatient follow-up with Dr Henry Nance (Hematology/Oncology)      Anemia  Assessment & Plan  · Check the patient's stool for occult blood x 3 specimens  · Follow the CBC  · Transfuse for a hemoglobin less than 7 g/dl    Results from last 7 days   Lab Units 21  0512 21  0530 21  0637   WBC Thousand/uL 19 52* 12 44* 13 23*   HEMOGLOBIN g/dL 8 9* 9 1* 8 8*   HEMATOCRIT % 31 3* 31 4* 31 4*   PLATELETS Thousands/uL 939* 914* 721*     Results for Chris Carolina (MRN [id number]) as of 2021 12:24   Ref  Range 2021 05:30   Iron Latest Ref Range: 65 - 175 ug/dL 13 (L)   Ferritin Latest Ref Range: 8 - 388 ng/mL 1,687 (H)   Iron Saturation Latest Units: % 7   TIBC Latest Ref Range: 250 - 450 ug/dL 182 (L)   Folate Latest Ref Range: 3 1 - 17 5 ng/mL 8 2   Vitamin B-12 Latest Ref Range: 100 - 900 pg/mL 656           VTE Pharmacologic Prophylaxis:   Pharmacologic: Enoxaparin (Lovenox)  Mechanical VTE Prophylaxis in Place: Yes    Patient Centered Rounds: I have performed bedside rounds with nursing staff today  Time Spent for Care: 30 minutes  More than 50% of total time spent on counseling and coordination of care as described above  Current Length of Stay: 3 day(s)    Current Patient Status: Inpatient   Certification Statement: The patient will continue to require additional inpatient hospital stay due to the need for IV antibiotic treatment, for IV methylprednisolone treatment, and with the patient requiring the Vapotherm Unit to maintain adequate oxygen saturation levels  Code Status: Level 1 - Full Code      Subjective: The patient was seen and examined  The patient developed worsening shortness of breath this morning  He is also experiencing a productive cough this morning  No chest pain        Objective:     Vitals:   Temp (24hrs), Av 6 °F (36 4 °C), Min:97 4 °F (36 3 °C), Max:97 8 °F (36 6 °C)    Temp:  [97 4 °F (36 3 °C)-97 8 °F (36 6 °C)] 97 8 °F (36 6 °C)  HR:  [76-96] 96  Resp:  [18] 18  BP: (128-152)/(67-84) 147/71  SpO2:  [91 %-94 %] 92 %  Body mass index is 19 89 kg/m²  Input and Output Summary (last 24 hours): Intake/Output Summary (Last 24 hours) at 5/6/2021 1021  Last data filed at 5/6/2021 0801  Gross per 24 hour   Intake 1460 ml   Output 1075 ml   Net 385 ml       Physical Exam:     Physical Exam  General:  NAD, follows commands  HEENT:  NC/AT, mucous membranes moist  Neck:  Supple, No JVP elevation  CV:  + S1, + S2, RRR  Pulm:  Scattered rhonchi bilaterally with expiratory wheezing bilaterally  Abd:  Soft, Non-tender, Non-distended  Ext:  No clubbing/cyanosis/edema  Skin:  No rashes  Neuro:  Awake, alert, oriented  Psych:  Normal mood and affect      Additional Data:    Labs:    Results from last 7 days   Lab Units 05/06/21  0512   WBC Thousand/uL 19 52*   HEMOGLOBIN g/dL 8 9*   HEMATOCRIT % 31 3*   PLATELETS Thousands/uL 939*   NEUTROS PCT % 89*   LYMPHS PCT % 2*   MONOS PCT % 8   EOS PCT % 0     Results from last 7 days   Lab Units 05/06/21  0512   SODIUM mmol/L 141   POTASSIUM mmol/L 4 4   CHLORIDE mmol/L 106   CO2 mmol/L 28   BUN mg/dL 14   CREATININE mg/dL 0 55*   ANION GAP mmol/L 7   CALCIUM mg/dL 7 6*   ALBUMIN g/dL 1 8*   TOTAL BILIRUBIN mg/dL 0 18*   ALK PHOS U/L 135*   ALT U/L 84*   AST U/L 44   GLUCOSE RANDOM mg/dL 123     Results from last 7 days   Lab Units 05/03/21  1951   INR  1 33*             Results from last 7 days   Lab Units 05/05/21  0622 05/04/21  0637 05/03/21  2059   LACTIC ACID mmol/L  --   --  2 0   PROCALCITONIN ng/ml 1 12* 1 25* 0 51*           * I Have Reviewed All Lab Data Listed Above  * Additional Pertinent Lab Tests Reviewed:  [address] Admission Reviewed      Recent Cultures (last 7 days):     Results from last 7 days   Lab Units 05/04/21  0124 05/03/21  9102   BLOOD CULTURE   --  No Growth at 48 hrs  No Growth at 48 hrs  LEGIONELLA URINARY ANTIGEN  Negative  --        Last 24 Hours Medication List:   Current Facility-Administered Medications   Medication Dose Route Frequency Provider Last Rate    albuterol  2 5 mg Nebulization Q4H PRN Mahi Carmona DO      benzonatate  100 mg Oral TID PRN MARILOU Lemus      calcium carbonate  1 tablet Oral Daily With Breakfast Mahi Carmona DO      cefepime  2,000 mg Intravenous Q8H JESSIE LemusNP 2,000 mg (05/06/21 0500)    enoxaparin  40 mg Subcutaneous Q24H Mahi Carmona DO      ergocalciferol  50,000 Units Oral Weekly Mahi Carmona DO      ferrous sulfate  325 mg Oral Daily With Breakfast Rosy Y Swank, MARILOU      fluticasone-vilanterol  1 puff Inhalation Daily Rosy Y Swank, MARILOU      guaiFENesin  600 mg Oral BID Rosy Y Swank, MARILOU      HYDROmorphone  0 5 mg Intravenous Q4H PRN Mahi Carmona DO      ipratropium  0 5 mg Nebulization TID Mahi Carmona DO      levalbuterol  1 25 mg Nebulization TID Mahi Carmona DO      methylPREDNISolone sodium succinate  40 mg Intravenous Q8H [address] Chato Dalton DO      ondansetron  4 mg Intravenous Q6H PRN MARILOU Lemus      oxyCODONE  5 mg Oral Q4H PRN Mahi Carmona DO      Or    oxyCODONE  10 mg Oral Q4H PRN Mahi Carmona DO      sodium phosphate  9 mmol Intravenous Once Mahi Carmona DO          Today, Patient Was Seen By: Mahi Carmona DO    ** Please Note: Dictation voice to text software may have been used in the creation of this document   **

## 2021-05-06 NOTE — PROGRESS NOTES
Progress Note - Pulmonary   Antony Colbert 77 y o  male MRN: [id number]  Unit/Bed#: 400-01 Encounter: [id number]    Assessment/Plan:    1  Acute hypoxic respiratory failure  1  Weaned to HFNC 25L 40% while at bedside without SpO2 dropping below 90%  2  Titrate oxygen maintain SpO2 greater than or equal to 88%   3  Pulmonary toilet: Increase activity as tolerated, out of bed as tolerated, cough and deep breathing as encouraged, incentive spirometry q 1 hour  2  Sepsis secondary to postobstructive pneumonia  1  Present admission with tachycardia, tachypnea, leukocytosis and new infiltrates on CT chest imaging concerning for worsening mass obstructing distal left upper lobe bronchi   2  Continue cefepime for minimum 7-10 days (day#4)  3  Continue supportive care for cough with Mucinex and Tessalon Perles  3  Bilateral pleural effusions (L>R)  1  S/p left thoracentesis 4/6/21 that yielded exudate pattern with negative cytology and cultures  2  Could consider repeat thoracentesis if patient becomes more symptomatic or has persistent oxygen requirements- will discuss with attending  3  Agree with Lasix 40 mg IV once per primary team  4  Will repeat CXR as outpatient for continued monitoring   4  Stage IV renal cell carcinoma with bone and lung metastasis  1  Follows with Dr Melvin Read- received first chemotherapy 4/22 with Opdivo/Yervoy   2  Heme/onc input appreciated   5  Suspected COPD with acute exacerbation  1  Agree with increased Solumedrol today to 40 mg IV q8hrs- plan to decrease to Solumedrol 40 mg IV q12hrs tomorrow  2  Continue Breo 100/25  1 puff daily   3  Continue Atrovent /Xopenex nebs t i d   4  Remains committed to smoking cessation  5  Will need outpatient pulmonary follow-up with PFTs   6  Anemia  1  Trend H&H  2  Treatment per primary team   3  Heme/onc consulted     Plan of care discussed with patient and Dr Monty Tiwari and Yael Sandra RN  Will continue to follow       Chief Complaint:    "I feel better now "    Subjective:    Patient seen examined today  No overnight events reported however patient reports acute worsening shortness of breath increased wheeze medially upon waking  Respiratory therapy reports inspiratory and expiratory requiring nebulizer treatment and increase in L flow to 30 L 40% FiO2 on his high-flow nasal cannula  Primary team increased steroids and gave one time dose of Lasix  Patient still with cough productive clear to yellow sputum  Denies pain  No fevers or chills  He now reports feeling better but not at baseline  Objective:    Vitals: Blood pressure 147/71, pulse 96, temperature 97 8 °F (36 6 °C), resp  rate 18, height 5' 11" (1 803 m), weight 64 7 kg (142 lb 10 2 oz), SpO2 92 %  HFNC 25L 40%,Body mass index is 19 89 kg/m²  Intake/Output Summary (Last 24 hours) at 5/6/2021 1105  Last data filed at 5/6/2021 0801  Gross per 24 hour   Intake 1460 ml   Output 1075 ml   Net 385 ml       Invasive Devices     Peripheral Intravenous Line            Peripheral IV 05/06/21 Left Arm less than 1 day                Physical Exam:     Physical Exam  Vitals signs and nursing note reviewed  Constitutional:       General: He is not in acute distress  Appearance: Normal appearance  HENT:      Head: Normocephalic and atraumatic  Right Ear: External ear normal       Left Ear: External ear normal       Nose: Nose normal       Mouth/Throat:      Mouth: Mucous membranes are moist       Pharynx: Oropharynx is clear  Eyes:      Extraocular Movements: Extraocular movements intact  Conjunctiva/sclera: Conjunctivae normal       Pupils: Pupils are equal, round, and reactive to light  Neck:      Musculoskeletal: Normal range of motion and neck supple  No muscular tenderness  Cardiovascular:      Rate and Rhythm: Normal rate and regular rhythm  Pulses: Normal pulses  Heart sounds: No murmur     Pulmonary:      Comments: Diminished breath sounds bilaterally with course rhonchi (L>R), no wheeze appreciated presently  Abdominal:      General: Bowel sounds are normal       Palpations: Abdomen is soft  There is no mass  Tenderness: There is no abdominal tenderness  Hernia: No hernia is present  Musculoskeletal: Normal range of motion  General: No tenderness or deformity  Right lower leg: Edema present  Left lower leg: Edema present  Skin:     General: Skin is warm and dry  Neurological:      General: No focal deficit present  Mental Status: He is alert and oriented to person, place, and time  Mental status is at baseline  Psychiatric:         Mood and Affect: Mood normal          Behavior: Behavior normal          Thought Content: Thought content normal          Judgment: Judgment normal          Labs: I have personally reviewed pertinent lab results  , ABG: No results found for: PHART, PCM7ZLR, PO2ART, DGA6DKE, X1LGMFOX, BEART, SOURCE, BNP: No results found for: BNP, CBC:   Lab Results   Component Value Date    WBC 19 52 (H) 05/06/2021    HGB 8 9 (L) 05/06/2021    HCT 31 3 (L) 05/06/2021    MCV 79 (L) 05/06/2021     (H) 05/06/2021    MCH 22 4 (L) 05/06/2021    MCHC 28 4 (L) 05/06/2021    RDW 15 4 (H) 05/06/2021    MPV 9 3 05/06/2021    NRBC 0 05/06/2021   , CMP:   Lab Results   Component Value Date    SODIUM 141 05/06/2021    K 4 4 05/06/2021     05/06/2021    CO2 28 05/06/2021    BUN 14 05/06/2021    CREATININE 0 55 (L) 05/06/2021    CALCIUM 7 6 (L) 05/06/2021    AST 44 05/06/2021    ALT 84 (H) 05/06/2021    ALKPHOS 135 (H) 05/06/2021    EGFR 109 05/06/2021   , PT/INR: No results found for: PT, INR, Troponin: No results found for: TROPONINI      Imaging and other studies: I have personally reviewed pertinent reports     and I have personally reviewed pertinent films in PACS      chest x-ray 05/06/2021   Persistent maybe slightly worsening bilateral infiltrates left greater than right bilateral pleural effusions, left greater than right

## 2021-05-06 NOTE — ASSESSMENT & PLAN NOTE
· Secondary to post-obstructive pneumonia  · Initially required 5 lpm of continuous supplemental oxygen via the nasal cannula to maintain oxygen saturation levels at 92% and above  · Now the patient is requiring the Vapotherm Unit to maintain oxygen saturation levels at 92% and above  · Current Vapotherm settings are an FiO2 of 40% at 20 liters/minute  · Worsening respiratory distress on 05/06/2021  · Low threshold to transfer the patient to ICU level of care  · The patient was seen in consultation by Pulmonology  · Increase methylprednisolone to 40 mg IV every 8 hours  · Discontinue the NSS IV fluids on 05/06/2021  · Give lasix 40 mg IV x 1 dose on 05/06/2021  · Respiratory protocol  · COVID-19 testing was negative

## 2021-05-06 NOTE — ASSESSMENT & PLAN NOTE
· Telemetry monitoring  · Keep the patient's magnesium level at 2 mg/dl and above in the setting of ventricular ectopy  · Keep the patient's potassium level at 4 mmol/L and above in the setting of ventricular ectopy

## 2021-05-06 NOTE — PLAN OF CARE
Problem: PHYSICAL THERAPY ADULT  Goal: Performs mobility at highest level of function for planned discharge setting  See evaluation for individualized goals  Description: Treatment/Interventions: Elevations, Gait training, Endurance training          See flowsheet documentation for full assessment, interventions and recommendations  Outcome: Progressing  Note: Prognosis: Good  Problem List: Decreased mobility, Decreased endurance  Assessment: Pt  seen for PT treatment session this date with interventions consisting of  gait training w/ emphasis on improving pt's ability to ambulate  Pt  Currently performing  tx and ambulation at ( SUP) x 1 level of function  The patient's AM-PAC Basic Mobility Inpatient Short Form Raw Score is 22, Standardized Score is 47 4  A standardized score greater than 42 9 suggests the patient may benefit from discharge to home  Please also refer to physical therapy recommendation for safe DC planning  In comparison to previous session, Pt  With improvements in activity tolerance  RT applied rebreather 8L for ambulation  SpO2 95% with exertion  Pt is in need of continued activity in PT to improve strength balance endurance mobility transfers and ambulation with return to maximize LOF  From PT/mobility standpoint, recommendation at time of d/c would be home no rehabilitation needs  in order to promote return to PLOF and independence  PT Discharge Recommendation: No rehabilitation needs          See flowsheet documentation for full assessment

## 2021-05-06 NOTE — PLAN OF CARE
Problem: Nutrition/Hydration-ADULT  Goal: Nutrient/Hydration intake appropriate for improving, restoring or maintaining nutritional needs  Description: Monitor and assess patient's nutrition/hydration status for malnutrition  Collaborate with interdisciplinary team and initiate plan and interventions as ordered  Monitor patient's weight and dietary intake as ordered or per policy  Utilize nutrition screening tool and intervene as necessary  Determine patient's food preferences and provide high-protein, high-caloric foods as appropriate       INTERVENTIONS:  - Monitor oral intake, urinary output, labs, and treatment plans  - Assess nutrition and hydration status and recommend course of action  - Evaluate amount of meals eaten  - Assist patient with eating if necessary   - Allow adequate time for meals  - Recommend/ encourage appropriate diets, oral nutritional supplements, and vitamin/mineral supplements  - Order, calculate, and assess calorie counts as needed  - Recommend, monitor, and adjust tube feedings and TPN/PPN based on assessed needs  - Assess need for intravenous fluids  - Provide specific nutrition/hydration education as appropriate  - Include patient/family/caregiver in decisions related to nutrition  Outcome: Progressing     Problem: RESPIRATORY - ADULT  Goal: Achieves optimal ventilation and oxygenation  Description: INTERVENTIONS:  - Assess for changes in respiratory status  - Assess for changes in mentation and behavior  - Position to facilitate oxygenation and minimize respiratory effort  - Oxygen administered by appropriate delivery if ordered  - Initiate smoking cessation education as indicated  - Encourage broncho-pulmonary hygiene including cough, deep breathe, Incentive Spirometry  - Assess the need for suctioning and aspirate as needed  - Assess and instruct to report SOB or any respiratory difficulty  - Respiratory Therapy support as indicated  Outcome: Progressing     Problem: Potential for Falls  Goal: Patient will remain free of falls  Description: INTERVENTIONS:  - Assess patient frequently for physical needs  -  Identify cognitive and physical deficits and behaviors that affect risk of falls   (oxygen dependence)  -  [place] fall precautions as indicated by assessment  (medium fall risk)  - Educate patient/family on patient safety including physical limitations  - Instruct patient to call for assistance with activity based on assessment  - Modify environment to reduce risk of injury  - Consider OT/PT consult to assist with strengthening/mobility  Outcome: Progressing     Problem: INFECTION - ADULT  Goal: Absence or prevention of progression during hospitalization  Description: INTERVENTIONS:  - Assess and monitor for signs and symptoms of infection  - Monitor lab/diagnostic results  - Monitor all insertion sites, i e  indwelling lines, tubes, and drains  - Monitor endotracheal if appropriate and nasal secretions for changes in amount and color  - [place] appropriate cooling/warming therapies per order  - Administer medications as ordered  - Instruct and encourage patient and family to use good hand hygiene technique  - Identify and instruct in appropriate isolation precautions for identified infection/condition  Outcome: Progressing

## 2021-05-06 NOTE — PHYSICAL THERAPY NOTE
PHYSICAL THERAPY NOTE          Patient Name: Danny Murguia  WSVTI'M Date: 5/6/2021 05/06/21 1335   Note Type   Note Type Treatment   Cognition   Overall Cognitive Status WFL   Subjective   Subjective Pt  would like to ambulate   Bed Mobility   Additional Comments OOB in chair at start and end of PT session   Transfers   Sit to Stand 5  Supervision   Stand to Sit 5  Supervision   Stand pivot 5  Supervision   Ambulation/Elevation   Gait pattern   (decreased gait speed)   Gait Assistance 5  Supervision   Additional items Assist x 1   Assistive Device None   Distance 200'   Balance   Ambulatory Fair +   Endurance Deficit   Endurance Deficit Yes   Activity Tolerance   Activity Tolerance Patient tolerated treatment well   Assessment   Prognosis Good   Problem List Decreased mobility; Decreased endurance   Assessment Pt  seen for PT treatment session this date with interventions consisting of  gait training w/ emphasis on improving pt's ability to ambulate  Pt  Currently performing  tx and ambulation at ( SUP) x 1 level of function  The patient's AM-PAC Basic Mobility Inpatient Short Form Raw Score is 22, Standardized Score is 47 4  A standardized score greater than 42 9 suggests the patient may benefit from discharge to home  Please also refer to physical therapy recommendation for safe DC planning  In comparison to previous session, Pt  With improvements in activity tolerance  RT applied rebreather 8L for ambulation  SpO2 95% with exertion  Pt is in need of continued activity in PT to improve strength balance endurance mobility transfers and ambulation with return to maximize LOF  From PT/mobility standpoint, recommendation at time of d/c would be home no rehabilitation needs  in order to promote return to PLOF and independence  Plan   Treatment/Interventions Elevations;Gait training; Endurance training   Progress Progressing toward goals   AM-PAC Basic Mobility Inpatient   Turning in Bed Without Bedrails 4   Lying on Back to Sitting on Edge of Flat Bed 4   Moving Bed to Chair 4   Standing Up From Chair 4   Walk in Room 3   Climb 3-5 Stairs 3   Basic Mobility Inpatient Raw Score 22   Basic Mobility Standardized Score 47 4   Pt  OOB in chair  with call bell within reach, all lines intact at end of PT session  Discussed with  PT today's treatment and patient's current level of function for care coordination

## 2021-05-06 NOTE — ASSESSMENT & PLAN NOTE
· Check the patient's stool for occult blood x 3 specimens  · Follow the CBC  · Transfuse for a hemoglobin less than 7 g/dl    Results from last 7 days   Lab Units 05/06/21  0512 05/05/21  0530 05/04/21  0637   WBC Thousand/uL 19 52* 12 44* 13 23*   HEMOGLOBIN g/dL 8 9* 9 1* 8 8*   HEMATOCRIT % 31 3* 31 4* 31 4*   PLATELETS Thousands/uL 939* 914* 721*     Results for Concepcion Olmos (MRN [id number]) as of 5/5/2021 12:24   Ref   Range 5/5/2021 05:30   Iron Latest Ref Range: 65 - 175 ug/dL 13 (L)   Ferritin Latest Ref Range: 8 - 388 ng/mL 1,687 (H)   Iron Saturation Latest Units: % 7   TIBC Latest Ref Range: 250 - 450 ug/dL 182 (L)   Folate Latest Ref Range: 3 1 - 17 5 ng/mL 8 2   Vitamin B-12 Latest Ref Range: 100 - 900 pg/mL 656

## 2021-05-06 NOTE — ASSESSMENT & PLAN NOTE
· Sepsis was present on admission and secondary to probable post-obstructive pneumonia  · SIRS criteria was met with tachycardia and leukocytosis    · Possible gram-negative pneumonia  · Initially treated with IV cefepime and IV vancomycin  · Discontinue IV vancomycin on 05/06/2021 with a negative MRSA nasal screen  · Follow culture results  · Follow the procalcitonin level  · Discontinue the NSS IV fluids on 05/06/2021

## 2021-05-06 NOTE — ASSESSMENT & PLAN NOTE
· Known metastatic renal cell carcinoma with lung metastases  · Outpatient follow-up with Dr Fany Lomas (Hematology/Oncology)

## 2021-05-06 NOTE — ASSESSMENT & PLAN NOTE
· Secondary to active malignancy    Results for Daja Gage (MRN [id number]) as of 5/4/2021 15:19   Ref  Range 5/3/2021 19:47   D-Dimer, Jennifer Herndon Latest Ref Range: <0 50 ug/ml FEU 3 91 (H)       VAS lower limb venous duplex study, complete bilateral  Status: In process   PACS Images     Show images for VAS lower limb venous duplex study, complete bilateral   Study Result       [facility] REPORT  CLINICAL:  Indications:  Patient presents with bilateral lower extremity edema  Operative History:  No cardiovascular surgeries noted  FINDINGS:     Segment  Right            Left                Impression       Impression         CFV      Normal (Patent)  Normal (Patent)             CONCLUSION:  RIGHT LOWER LIMB:  No evidence of acute or chronic deep vein thrombosis  No evidence of superficial thrombophlebitis noted  Doppler evaluation shows a normal response to augmentation maneuvers  Popliteal, posterior tibial and anterior tibial arterial Doppler waveforms are  triphasic  LEFT LOWER LIMB:  No evidence of acute or chronic deep vein thrombosis  No evidence of superficial thrombophlebitis noted  Doppler evaluation shows a normal response to augmentation maneuvers  Popliteal, posterior tibial and anterior tibial arterial Doppler waveforms are  triphasic  CTA ED chest PE Study  Status: Final result   PACS Images     Show images for CTA ED chest PE Study   Study Result    CTA - CHEST WITH IV CONTRAST - PULMONARY ANGIOGRAM     INDICATION:   tachycardia, hypoxia, current cancer on chemo      COMPARISON: CT of the chest on April 5, 2021      TECHNIQUE: CTA examination of the chest was performed using angiographic technique according to a protocol specifically tailored to evaluate for pulmonary embolism  Axial, sagittal, and coronal 2D reformatted images were created from the source data and   submitted for interpretation    In addition, coronal 3D MIP postprocessing was performed on the acquisition scanner        Radiation dose length product (DLP) for this visit:  305 93 mGy-cm   This examination, like all CT scans performed in the [facility], was performed utilizing techniques to minimize radiation dose exposure, including the use of iterative   reconstruction and automated exposure control      IV Contrast:  85 mL of iohexol (OMNIPAQUE)  was administered intravenously without immediate adverse reaction  FINDINGS:     PULMONARY ARTERIAL TREE:  No pulmonary embolus is seen       LUNGS:  Known left upper lobe mass is now obscured by large patchy opacity (series 3, image 45) likely due to pneumonia  Interval increase in patchy groundglass opacities in the right perihilar region (series 3, image 36)  Increase in size of dense   patchy opacity in the right apex (series 3, image 26) Interval increase in mass in the lingular region measuring 4 7 x 3 1 cm  Juxtapleural nodules in the left lower lobe are not significantly changed in size      PLEURA:  Stable size of moderate loculated left pleural effusion with nodularity and thickening of the pleura, increased since prior exam      HEART/GREAT VESSELS:  Unremarkable for patient's age      MEDIASTINUM AND MARGARITA:  Stable extensive mediastinal, hilar, and cardiophrenic lymphadenopathy     CHEST WALL AND LOWER NECK:   Unremarkable      VISUALIZED STRUCTURES IN THE UPPER ABDOMEN:  Stable left para-aortic mass which is partially visualized      OSSEOUS STRUCTURES:  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  Compression deformity at the T9 vertebral body with increase in moderate vertebral body height loss when compared with prior exam        IMPRESSION:     1  No evidence of pulmonary embolism  2   Large dense opacity in the left upper lobe and interval increase in patchy groundglass opacities in the right perihilar region likely due to pneumonia in the appropriate clinical setting    3   Known left upper lobe mass is obscured  Interval increase in mass in the lingular region  Redemonstrated right lower lobe nodular masses or metastasis  4   Stable extensive lymphadenopathy  5   Redemonstrated expansile destructive lesion in the 9th rib and interval increase in compression deformity at the T9 vertebral body

## 2021-05-06 NOTE — ASSESSMENT & PLAN NOTE
Malnutrition Findings:   Adult Malnutrition type: Acute illness  Adult Degree of Malnutrition: Other severe protein calorie malnutrition    BMI Findings: Body mass index is 19 89 kg/m²         · Consult the dietitian/nutritionist

## 2021-05-06 NOTE — ASSESSMENT & PLAN NOTE
· Outpatient Cardiology evaluation    ECG 12 lead  Order: [id number]  Status:  Final result   Visible to patient:  No (inaccessible in [address])   Next appt:  05/11/2021 at 09:00 AM in Hematology and Oncology ([address], [address])   Ref Range & Units 5/3/21 2108   Ventricular Rate     Atrial Rate     MT Interval ms 126    QRSD Interval ms 76    QT Interval ms 348    QTC Interval ms 475    P [place] degrees 69    QRS Axis degrees 58    T Wave Axis degrees 67       Narrative & Impression    Sinus tachycardia with Premature atrial complexes  Otherwise normal ECG  When compared with ECG of 05-APR-2021 09:52,  Premature atrial complexes are now Present  Confirmed by Basil Watters ([id number]) on 5/4/2021 4:29:54 PM      Specimen Collected: 05/03/21 21:08   Last Resulted: 05/04/21 16:30

## 2021-05-06 NOTE — ASSESSMENT & PLAN NOTE
· Known metastatic renal cell carcinoma  · Outpatient follow-up with Dr Neeta Garza (Hematology/Oncology)

## 2021-05-06 NOTE — RESPIRATORY THERAPY NOTE
1335 pt taken off vapotherm and placed on 4 l/m humidified nc, after 10 minutes pulse ox 93-94%  Pt states he is felling better than this morning

## 2021-05-06 NOTE — PLAN OF CARE
Problem: Nutrition/Hydration-ADULT  Goal: Nutrient/Hydration intake appropriate for improving, restoring or maintaining nutritional needs  Description: Monitor and assess patient's nutrition/hydration status for malnutrition  Collaborate with interdisciplinary team and initiate plan and interventions as ordered  Monitor patient's weight and dietary intake as ordered or per policy  Utilize nutrition screening tool and intervene as necessary  Determine patient's food preferences and provide high-protein, high-caloric foods as appropriate       INTERVENTIONS:  - Monitor oral intake, urinary output, labs, and treatment plans  - Assess nutrition and hydration status and recommend course of action  - Evaluate amount of meals eaten  - Assist patient with eating if necessary   - Allow adequate time for meals  - Recommend/ encourage appropriate diets, oral nutritional supplements, and vitamin/mineral supplements  - Order, calculate, and assess calorie counts as needed  - Recommend, monitor, and adjust tube feedings and TPN/PPN based on assessed needs  - Assess need for intravenous fluids  - Provide specific nutrition/hydration education as appropriate  - Include patient/family/caregiver in decisions related to nutrition  Outcome: Progressing     Problem: RESPIRATORY - ADULT  Goal: Achieves optimal ventilation and oxygenation  Description: INTERVENTIONS:  - Assess for changes in respiratory status  - Assess for changes in mentation and behavior  - Position to facilitate oxygenation and minimize respiratory effort  - Oxygen administered by appropriate delivery if ordered  - Initiate smoking cessation education as indicated  - Encourage broncho-pulmonary hygiene including cough, deep breathe, Incentive Spirometry  - Assess the need for suctioning and aspirate as needed  - Assess and instruct to report SOB or any respiratory difficulty  - Respiratory Therapy support as indicated  Outcome: Progressing     Problem: Potential for Falls  Goal: Patient will remain free of falls  Description: INTERVENTIONS:  - Assess patient frequently for physical needs  -  Identify cognitive and physical deficits and behaviors that affect risk of falls   (oxygen dependence)  -  [place] fall precautions as indicated by assessment  (medium fall risk)  - Educate patient/family on patient safety including physical limitations  - Instruct patient to call for assistance with activity based on assessment  - Modify environment to reduce risk of injury  - Consider OT/PT consult to assist with strengthening/mobility  Outcome: Progressing     Problem: INFECTION - ADULT  Goal: Absence or prevention of progression during hospitalization  Description: INTERVENTIONS:  - Assess and monitor for signs and symptoms of infection  - Monitor lab/diagnostic results  - Monitor all insertion sites, i e  indwelling lines, tubes, and drains  - Monitor endotracheal if appropriate and nasal secretions for changes in amount and color  - [place] appropriate cooling/warming therapies per order  - Administer medications as ordered  - Instruct and encourage patient and family to use good hand hygiene technique  - Identify and instruct in appropriate isolation precautions for identified infection/condition  Outcome: Progressing

## 2021-05-06 NOTE — RESPIRATORY THERAPY NOTE
05/06/21 1325   Non-Invasive Information   O2 Interface Device HFNC prongs   Non-Invasive Ventilation Mode HFNC (High flow)   Resp Comments pulse ox 93%   Non-Invasive Settings   FiO2 (%) 40   Flow (lpm) 25   Temperature (Set) 33   Non-Invasive Readings   [place] Temperature (Obs) 33

## 2021-05-06 NOTE — ASSESSMENT & PLAN NOTE
· For the vitamin D deficiency, the patient was placed on ergocalciferol 50,000 I  U  PO Qweekly for 8 weekly doses  He will then need a repeat Vitamin D 25-OH level checked in 2 months and will need a daily cholecalciferol supplement prescribed by her PCP based on the repeat Vitamin D 25-OH level results  Results for Gilberto Jones (MRN [id number]) as of 5/5/2021 12:18   Ref   Range 5/5/2021 06:22   Vit D, 25-Hydroxy Latest Ref Range: 30 0 - 100 0 ng/mL 12 9 (L)

## 2021-05-06 NOTE — PLAN OF CARE
Problem: Nutrition/Hydration-ADULT  Goal: Nutrient/Hydration intake appropriate for improving, restoring or maintaining nutritional needs  Description: Monitor and assess patient's nutrition/hydration status for malnutrition  Collaborate with interdisciplinary team and initiate plan and interventions as ordered  Monitor patient's weight and dietary intake as ordered or per policy  Utilize nutrition screening tool and intervene as necessary  Determine patient's food preferences and provide high-protein, high-caloric foods as appropriate       INTERVENTIONS:  - Monitor oral intake, urinary output, labs, and treatment plans  - Assess nutrition and hydration status and recommend course of action  - Evaluate amount of meals eaten  - Assist patient with eating if necessary   - Allow adequate time for meals  - Recommend/ encourage appropriate diets, oral nutritional supplements, and vitamin/mineral supplements  - Order, calculate, and assess calorie counts as needed  - Recommend, monitor, and adjust tube feedings and TPN/PPN based on assessed needs  - Assess need for intravenous fluids  - Provide specific nutrition/hydration education as appropriate  - Include patient/family/caregiver in decisions related to nutrition  Outcome: Progressing     Problem: RESPIRATORY - ADULT  Goal: Achieves optimal ventilation and oxygenation  Description: INTERVENTIONS:  - Assess for changes in respiratory status  - Assess for changes in mentation and behavior  - Position to facilitate oxygenation and minimize respiratory effort  - Oxygen administered by appropriate delivery if ordered  - Initiate smoking cessation education as indicated  - Encourage broncho-pulmonary hygiene including cough, deep breathe, Incentive Spirometry  - Assess the need for suctioning and aspirate as needed  - Assess and instruct to report SOB or any respiratory difficulty  - Respiratory Therapy support as indicated  Outcome: Progressing     Problem: METABOLIC, FLUID AND ELECTROLYTES - ADULT  Goal: Fluid balance maintained  Description: INTERVENTIONS:  - Monitor labs   - Monitor I/O and WT  - Instruct patient on fluid and nutrition as appropriate  - Assess for signs & symptoms of volume excess or deficit  Outcome: Progressing     Problem: Potential for Falls  Goal: Patient will remain free of falls  Description: INTERVENTIONS:  - Assess patient frequently for physical needs  -  Identify cognitive and physical deficits and behaviors that affect risk of falls   (oxygen dependence)  -  [place] fall precautions as indicated by assessment  (medium fall risk)  - Educate patient/family on patient safety including physical limitations  - Instruct patient to call for assistance with activity based on assessment  - Modify environment to reduce risk of injury  - Consider OT/PT consult to assist with strengthening/mobility  Outcome: Progressing     Problem: INFECTION - ADULT  Goal: Absence or prevention of progression during hospitalization  Description: INTERVENTIONS:  - Assess and monitor for signs and symptoms of infection  - Monitor lab/diagnostic results  - Monitor all insertion sites, i e  indwelling lines, tubes, and drains  - Monitor endotracheal if appropriate and nasal secretions for changes in amount and color  - [place] appropriate cooling/warming therapies per order  - Administer medications as ordered  - Instruct and encourage patient and family to use good hand hygiene technique  - Identify and instruct in appropriate isolation precautions for identified infection/condition  Outcome: Progressing

## 2021-05-07 PROBLEM — I51.89 DIASTOLIC DYSFUNCTION: Status: ACTIVE | Noted: 2021-05-07

## 2021-05-07 LAB
ALBUMIN SERPL BCP-MCNC: 1.9 G/DL (ref 3.5–5)
ALP SERPL-CCNC: 135 U/L (ref 46–116)
ALT SERPL W P-5'-P-CCNC: 100 U/L (ref 12–78)
ANION GAP SERPL CALCULATED.3IONS-SCNC: 6 MMOL/L (ref 4–13)
AST SERPL W P-5'-P-CCNC: 43 U/L (ref 5–45)
BASOPHILS # BLD AUTO: 0.02 THOUSANDS/ΜL (ref 0–0.1)
BASOPHILS NFR BLD AUTO: 0 % (ref 0–1)
BILIRUB SERPL-MCNC: 0.22 MG/DL (ref 0.2–1)
BUN SERPL-MCNC: 17 MG/DL (ref 5–25)
CALCIUM ALBUM COR SERPL-MCNC: 9.4 MG/DL (ref 8.3–10.1)
CALCIUM SERPL-MCNC: 7.7 MG/DL (ref 8.3–10.1)
CHLORIDE SERPL-SCNC: 106 MMOL/L (ref 100–108)
CO2 SERPL-SCNC: 30 MMOL/L (ref 21–32)
CREAT SERPL-MCNC: 0.61 MG/DL (ref 0.6–1.3)
EOSINOPHIL # BLD AUTO: 0 THOUSAND/ΜL (ref 0–0.61)
EOSINOPHIL NFR BLD AUTO: 0 % (ref 0–6)
ERYTHROCYTE [DISTWIDTH] IN BLOOD BY AUTOMATED COUNT: 15.4 % (ref 11.6–15.1)
GFR SERPL CREATININE-BSD FRML MDRD: 104 ML/MIN/1.73SQ M
GLUCOSE SERPL-MCNC: 136 MG/DL (ref 65–140)
HCT VFR BLD AUTO: 32.1 % (ref 36.5–49.3)
HGB BLD-MCNC: 9.3 G/DL (ref 12–17)
IMM GRANULOCYTES # BLD AUTO: 0.12 THOUSAND/UL (ref 0–0.2)
IMM GRANULOCYTES NFR BLD AUTO: 1 % (ref 0–2)
LYMPHOCYTES # BLD AUTO: 0.27 THOUSANDS/ΜL (ref 0.6–4.47)
LYMPHOCYTES NFR BLD AUTO: 2 % (ref 14–44)
MAGNESIUM SERPL-MCNC: 2.5 MG/DL (ref 1.6–2.6)
MCH RBC QN AUTO: 22.9 PG (ref 26.8–34.3)
MCHC RBC AUTO-ENTMCNC: 29 G/DL (ref 31.4–37.4)
MCV RBC AUTO: 79 FL (ref 82–98)
MONOCYTES # BLD AUTO: 1.74 THOUSAND/ΜL (ref 0.17–1.22)
MONOCYTES NFR BLD AUTO: 10 % (ref 4–12)
NEUTROPHILS # BLD AUTO: 15.27 THOUSANDS/ΜL (ref 1.85–7.62)
NEUTS SEG NFR BLD AUTO: 87 % (ref 43–75)
NRBC BLD AUTO-RTO: 0 /100 WBCS
NT-PROBNP SERPL-MCNC: 3194 PG/ML
PHOSPHATE SERPL-MCNC: 2.1 MG/DL (ref 2.3–4.1)
PLATELET # BLD AUTO: 905 THOUSANDS/UL (ref 149–390)
PMV BLD AUTO: 9 FL (ref 8.9–12.7)
POTASSIUM SERPL-SCNC: 5 MMOL/L (ref 3.5–5.3)
PROCALCITONIN SERPL-MCNC: 0.42 NG/ML
PROT SERPL-MCNC: 7.5 G/DL (ref 6.4–8.2)
RBC # BLD AUTO: 4.07 MILLION/UL (ref 3.88–5.62)
SODIUM SERPL-SCNC: 142 MMOL/L (ref 136–145)
WBC # BLD AUTO: 17.42 THOUSAND/UL (ref 4.31–10.16)

## 2021-05-07 PROCEDURE — 99232 SBSQ HOSP IP/OBS MODERATE 35: CPT | Performed by: INTERNAL MEDICINE

## 2021-05-07 PROCEDURE — 84100 ASSAY OF PHOSPHORUS: CPT | Performed by: INTERNAL MEDICINE

## 2021-05-07 PROCEDURE — 85025 COMPLETE CBC W/AUTO DIFF WBC: CPT | Performed by: INTERNAL MEDICINE

## 2021-05-07 PROCEDURE — 80053 COMPREHEN METABOLIC PANEL: CPT | Performed by: INTERNAL MEDICINE

## 2021-05-07 PROCEDURE — 94760 N-INVAS EAR/PLS OXIMETRY 1: CPT

## 2021-05-07 PROCEDURE — 94640 AIRWAY INHALATION TREATMENT: CPT

## 2021-05-07 PROCEDURE — 83880 ASSAY OF NATRIURETIC PEPTIDE: CPT | Performed by: INTERNAL MEDICINE

## 2021-05-07 PROCEDURE — 97116 GAIT TRAINING THERAPY: CPT

## 2021-05-07 PROCEDURE — 84145 PROCALCITONIN (PCT): CPT | Performed by: INTERNAL MEDICINE

## 2021-05-07 PROCEDURE — 83735 ASSAY OF MAGNESIUM: CPT | Performed by: INTERNAL MEDICINE

## 2021-05-07 RX ORDER — FUROSEMIDE 10 MG/ML
40 INJECTION INTRAMUSCULAR; INTRAVENOUS ONCE
Status: COMPLETED | OUTPATIENT
Start: 2021-05-07 | End: 2021-05-07

## 2021-05-07 RX ORDER — METHYLPREDNISOLONE SODIUM SUCCINATE 40 MG/ML
40 INJECTION, POWDER, LYOPHILIZED, FOR SOLUTION INTRAMUSCULAR; INTRAVENOUS EVERY 12 HOURS SCHEDULED
Status: DISCONTINUED | OUTPATIENT
Start: 2021-05-07 | End: 2021-05-08

## 2021-05-07 RX ADMIN — GUAIFENESIN 600 MG: 600 TABLET, EXTENDED RELEASE ORAL at 08:03

## 2021-05-07 RX ADMIN — LEVALBUTEROL HYDROCHLORIDE 1.25 MG: 1.25 SOLUTION, CONCENTRATE RESPIRATORY (INHALATION) at 20:37

## 2021-05-07 RX ADMIN — IPRATROPIUM BROMIDE 0.5 MG: 0.5 SOLUTION RESPIRATORY (INHALATION) at 15:23

## 2021-05-07 RX ADMIN — CEFEPIME HYDROCHLORIDE 2000 MG: 2 INJECTION, SOLUTION INTRAVENOUS at 05:07

## 2021-05-07 RX ADMIN — FERROUS SULFATE TAB 325 MG (65 MG ELEMENTAL FE) 325 MG: 325 (65 FE) TAB at 08:03

## 2021-05-07 RX ADMIN — CEFEPIME HYDROCHLORIDE 2000 MG: 2 INJECTION, SOLUTION INTRAVENOUS at 21:09

## 2021-05-07 RX ADMIN — IPRATROPIUM BROMIDE 0.5 MG: 0.5 SOLUTION RESPIRATORY (INHALATION) at 09:35

## 2021-05-07 RX ADMIN — ENOXAPARIN SODIUM 40 MG: 40 INJECTION SUBCUTANEOUS at 21:09

## 2021-05-07 RX ADMIN — SODIUM PHOSPHATE, MONOBASIC, MONOHYDRATE AND SODIUM PHOSPHATE, DIBASIC, ANHYDROUS 9 MMOL: 276; 142 INJECTION, SOLUTION INTRAVENOUS at 10:51

## 2021-05-07 RX ADMIN — IPRATROPIUM BROMIDE 0.5 MG: 0.5 SOLUTION RESPIRATORY (INHALATION) at 20:37

## 2021-05-07 RX ADMIN — FUROSEMIDE 40 MG: 10 INJECTION, SOLUTION INTRAMUSCULAR; INTRAVENOUS at 09:04

## 2021-05-07 RX ADMIN — CALCIUM 1 TABLET: 500 TABLET ORAL at 08:03

## 2021-05-07 RX ADMIN — FLUTICASONE FUROATE AND VILANTEROL TRIFENATATE 1 PUFF: 100; 25 POWDER RESPIRATORY (INHALATION) at 08:03

## 2021-05-07 RX ADMIN — LEVALBUTEROL HYDROCHLORIDE 1.25 MG: 1.25 SOLUTION, CONCENTRATE RESPIRATORY (INHALATION) at 09:35

## 2021-05-07 RX ADMIN — OXYCODONE HYDROCHLORIDE 10 MG: 10 TABLET ORAL at 19:20

## 2021-05-07 RX ADMIN — OXYCODONE HYDROCHLORIDE 10 MG: 10 TABLET ORAL at 15:12

## 2021-05-07 RX ADMIN — GUAIFENESIN 600 MG: 600 TABLET, EXTENDED RELEASE ORAL at 17:55

## 2021-05-07 RX ADMIN — METHYLPREDNISOLONE SODIUM SUCCINATE 40 MG: 40 INJECTION, POWDER, FOR SOLUTION INTRAMUSCULAR; INTRAVENOUS at 05:08

## 2021-05-07 RX ADMIN — LEVALBUTEROL HYDROCHLORIDE 1.25 MG: 1.25 SOLUTION, CONCENTRATE RESPIRATORY (INHALATION) at 15:23

## 2021-05-07 RX ADMIN — METHYLPREDNISOLONE SODIUM SUCCINATE 40 MG: 40 INJECTION, POWDER, FOR SOLUTION INTRAMUSCULAR; INTRAVENOUS at 21:10

## 2021-05-07 RX ADMIN — CEFEPIME HYDROCHLORIDE 2000 MG: 2 INJECTION, SOLUTION INTRAVENOUS at 13:50

## 2021-05-07 RX ADMIN — OXYCODONE HYDROCHLORIDE 10 MG: 10 TABLET ORAL at 08:07

## 2021-05-07 NOTE — ASSESSMENT & PLAN NOTE
Malnutrition Findings:   Adult Malnutrition type: Acute illness  Adult Degree of Malnutrition: Other severe protein calorie malnutrition    BMI Findings: Body mass index is 19 89 kg/m²       · Continue the nutritional supplements

## 2021-05-07 NOTE — ASSESSMENT & PLAN NOTE
· Known metastatic renal cell carcinoma with lung metastases  · Outpatient follow-up with Dr Collins Service (Hematology/Oncology)

## 2021-05-07 NOTE — PLAN OF CARE
Problem: PHYSICAL THERAPY ADULT  Goal: Performs mobility at highest level of function for planned discharge setting  See evaluation for individualized goals  Description: Treatment/Interventions: Elevations, Gait training, Endurance training          See flowsheet documentation for full assessment, interventions and recommendations  Outcome: Progressing  Note: Prognosis: Good  Problem List: Decreased endurance  Assessment: Pt  seen for PT treatment session this date with interventions consisting of  gait training w/ emphasis on improving pt's ability to ambulate  Pt  Currently performing  tx and ambulation at (SUP ) x 1 level of function  The patient's AM-PAC Basic Mobility Inpatient Short Form Raw Score is 23, Standardized Score is 50  88  A standardized score greater than 42 9 suggests the patient may benefit from discharge to home  Please also refer to physical therapy recommendation for safe DC planning  In comparison to previous session, Pt  With improvements in activity tolerance  SpO2 96-95% with exertion  Increased gait speed compared to previous session  Pt is in need of continued activity in PT to improve strength balance endurance mobility transfers and ambulation with return to maximize LOF  From PT/mobility standpoint, recommendation at time of d/c would be home no rehabilitation needs  in order to promote return to PLOF and independence  PT Discharge Recommendation: No rehabilitation needs          See flowsheet documentation for full assessment

## 2021-05-07 NOTE — ASSESSMENT & PLAN NOTE
· Avoid all hepatotoxic agents  · Follow the liver function tests    Results from last 7 days   Lab Units 05/07/21  0447 05/06/21  0512 05/05/21  0530   SODIUM mmol/L 142 141 138   POTASSIUM mmol/L 5 0 4 4 4 1   CHLORIDE mmol/L 106 106 106   CO2 mmol/L 30 28 26   BUN mg/dL 17 14 13   CREATININE mg/dL 0 61 0 55* 0 52*   CALCIUM mg/dL 7 7* 7 6* 7 3*   ALK PHOS U/L 135* 135* 143*   ALT U/L 100* 84* 70   AST U/L 43 44 42     Results for Gilberto Jones (MRN [id number]) as of 5/5/2021 12:19   Ref   Range 5/5/2021 05:30   HEPATITIS A IGM ANTIBODY Latest Ref Range: Non-reactive, Equivocal-Suggest Recollect  Non-reactive   HEPATITIS B SURFACE ANTIGEN Latest Ref Range: Non-reactive, NonReactive - Confirmed  Non-reactive   HEPATITIS B CORE IGM ANTIBODY Latest Ref Range: Non-reactive  Non-reactive   HEPATITIS C ANTIBODY Latest Ref Range: Non-reactive  Non-reactive

## 2021-05-07 NOTE — ASSESSMENT & PLAN NOTE
· Check the patient's stool for occult blood x 3 specimens  · Follow the CBC  · Transfuse for a hemoglobin less than 7 g/dl    Results from last 7 days   Lab Units 05/07/21  0447 05/06/21  0512 05/05/21  0530   WBC Thousand/uL 17 42* 19 52* 12 44*   HEMOGLOBIN g/dL 9 3* 8 9* 9 1*   HEMATOCRIT % 32 1* 31 3* 31 4*   PLATELETS Thousands/uL 905* 939* 914*     Results for Terri Moseley (MRN [id number]) as of 5/5/2021 12:24   Ref   Range 5/5/2021 05:30   Iron Latest Ref Range: 65 - 175 ug/dL 13 (L)   Ferritin Latest Ref Range: 8 - 388 ng/mL 1,687 (H)   Iron Saturation Latest Units: % 7   TIBC Latest Ref Range: 250 - 450 ug/dL 182 (L)   Folate Latest Ref Range: 3 1 - 17 5 ng/mL 8 2   Vitamin B-12 Latest Ref Range: 100 - 900 pg/mL 656

## 2021-05-07 NOTE — ASSESSMENT & PLAN NOTE
· For the vitamin D deficiency, the patient was placed on ergocalciferol 50,000 I  U  PO Qweekly for 8 weekly doses  He will then need a repeat Vitamin D 25-OH level checked in 2 months and will need a daily cholecalciferol supplement prescribed by her PCP based on the repeat Vitamin D 25-OH level results  Results for Jules Manuel (MRN [id number]) as of 5/5/2021 12:18   Ref   Range 5/5/2021 06:22   Vit D, 25-Hydroxy Latest Ref Range: 30 0 - 100 0 ng/mL 12 9 (L)

## 2021-05-07 NOTE — CASE MANAGEMENT
Continuing to follow pt  Pt off of vapotherm, currently requiring 4l NC  IV Cefepime (day#4); cultures pending  Plans at this time are home on dc with OP follow up  Cm will follow and assist in dc planning

## 2021-05-07 NOTE — ASSESSMENT & PLAN NOTE
· Sepsis was present on admission and secondary to probable post-obstructive pneumonia  · SIRS criteria was met with tachycardia and leukocytosis    · Possible gram-negative pneumonia  · Initially treated with IV cefepime and IV vancomycin  · Discontinued IV vancomycin on 05/06/2021 with a negative MRSA nasal screen  · Continue IV cefepime (day #4)  · Follow culture results  · Follow the procalcitonin level  · Discontinued the NSS IV fluids on 05/06/2021

## 2021-05-07 NOTE — PHYSICAL THERAPY NOTE
PHYSICAL THERAPY NOTE          Patient Name: Toyin Alfonso  EHZCW'K Date: 5/7/2021 05/07/21 0900   Note Type   Note Type Treatment   Restrictions/Precautions   Weight Bearing Precautions Per Order No   Other Precautions O2;Fall Risk   General   Family/Caregiver Present No   Cognition   Overall Cognitive Status WFL   Following Commands Follows all commands and directions without difficulty   Subjective   Subjective c/o mild SOB with ambulation   Bed Mobility   Additional Comments OOB in chair at start and end of PT session   Transfers   Sit to Stand 5  Supervision   Additional items Armrests   Stand to Sit 5  Supervision   Additional items Armrests   Ambulation/Elevation   Gait pattern   (decreased gait speed)   Gait Assistance 5  Supervision   Additional items Assist x 1   Assistive Device None   Distance 200' x 3 with seated rest breaks   Balance   Ambulatory Fair +   Endurance Deficit   Endurance Deficit Yes   Activity Tolerance   Activity Tolerance Patient limited by fatigue   Assessment   Prognosis Good   Problem List Decreased endurance   Assessment Pt  seen for PT treatment session this date with interventions consisting of  gait training w/ emphasis on improving pt's ability to ambulate  Pt  Currently performing  tx and ambulation at (SUP ) x 1 level of function  The patient's AM-PAC Basic Mobility Inpatient Short Form Raw Score is 23, Standardized Score is 50  88  A standardized score greater than 42 9 suggests the patient may benefit from discharge to home  Please also refer to physical therapy recommendation for safe DC planning  In comparison to previous session, Pt  With improvements in activity tolerance  SpO2 96-95% with exertion  Increased gait speed compared to previous session      Pt is in need of continued activity in PT to improve strength balance endurance mobility transfers and ambulation with return to maximize LOF  From PT/mobility standpoint, recommendation at time of d/c would be home no rehabilitation needs  in order to promote return to PLOF and independence  Goals   LTG Expiration Date 05/19/21   PT Treatment Day 2   Plan   Treatment/Interventions Gait training;Elevations; Endurance training   AM-PAC Basic Mobility Inpatient   Turning in Bed Without Bedrails 4   Lying on Back to Sitting on Edge of Flat Bed 4   Moving Bed to Chair 4   Standing Up From Chair 4   Walk in Room 4   Climb 3-5 Stairs 3   Basic Mobility Inpatient Raw Score 23   Basic Mobility Standardized Score 50 88   Pt  OOB in chair  with call bell within reach, all lines intact at end of PT session  Discussed with  PT today's treatment and patient's current level of function for care coordination

## 2021-05-07 NOTE — ASSESSMENT & PLAN NOTE
· Secondary to post-obstructive pneumonia  · Initially required 5 lpm of continuous supplemental oxygen via the nasal cannula to maintain oxygen saturation levels at 92% and above  · The patient then required the Vapotherm Unit to maintain oxygen saturation levels at 92% and above  · The patient was successfully weaned off the Vapotherm Unit on 05/06/2021  · The patient is currently requiring 4 lpm of continuous supplemental oxygen via the nasal cannula to maintain oxygen saturation levels at 92% and above  · The patient was seen in consultation by Pulmonology  · Decrease methylprednisolone to 40 mg IV every 12 hours on 05/07/2021  · Discontinued the NSS IV fluids on 05/06/2021  · Received lasix 40 mg IV x 1 dose on 05/06/2021  · Give an additional lasix 40 mg IV x 1 dose on 05/07/2021  · Respiratory protocol  · COVID-19 testing was negative

## 2021-05-07 NOTE — RESPIRATORY THERAPY NOTE
1046 pt not wearing vapotherm since yesterday morning, pt on 4 l/m NC humidified, discontinued order and removed from room

## 2021-05-07 NOTE — ASSESSMENT & PLAN NOTE
· Received lasix 40 mg IV x 1 dose on 05/06/2021  · Give an additional lasix 40 mg IV x 1 dose on 05/07/2021  · Daily weights  · Strict intake/output

## 2021-05-07 NOTE — ASSESSMENT & PLAN NOTE
· Known metastatic renal cell carcinoma  · Outpatient follow-up with Dr Doug Talamantes (Hematology/Oncology)

## 2021-05-07 NOTE — PROGRESS NOTES
Progress Note - Pulmonary   Antony Colbert 77 y o  male MRN: [id number]  Unit/Bed#: 400-01 Encounter: [id number]    Assessment/Plan:    1  Acute hypoxic respiratory failure  1  Weaned from HFNC yesterday to 4L NC- seen on 4L NC with SpO2 90%  2  Titrate oxygen maintain SpO2 greater than or equal to 88%   3  Pulmonary toilet: Increase activity as tolerated, out of bed as tolerated, cough and deep breathing as encouraged, incentive spirometry q 1 hour  2  Sepsis secondary to postobstructive pneumonia  1  Present admission with tachycardia, tachypnea, leukocytosis and new infiltrates on CT chest imaging concerning for worsening mass obstructing distal left upper lobe bronchi   2  Continue cefepime for minimum 7 days  Or until PCT is <80% peak (day#5)  3  Continue supportive care for cough with Mucinex and Tessalon Perles  3  Bilateral pleural effusions (L>R)  1  S/p left thoracentesis 4/6/21 that yielded exudate pattern with negative cytology and cultures  2  Could consider repeat thoracentesis if patient becomes more symptomatic or has persistent oxygen requirements- if so would recommend recheck cytology as there is high probability of effusion being malignant and if it reaccumulates quickly we could consider Asept catheter placement  3  Agree with diuretics per primary team  4  Will repeat CXR as outpatient for continued monitoring   4  Stage IV renal cell carcinoma with bone and lung metastasis  1  Follows with Dr Clovis Mena- received first chemotherapy 4/22 with Opdivo/Yervoy   2  Heme/onc input appreciated   5  Suspected COPD with acute exacerbation  1  Decrease Solu-medrol to 40 mg IV q12 hrs today- if patient continues to clinically improve will transition to prednisone 40 mg po tomorrow and plan to taper by 10 mg every dy until competion  2  Continue Breo 100/25  1 puff daily   3  Continue Atrovent /Xopenex nebs t i d   4  Remains committed to smoking cessation  5   Will need outpatient pulmonary follow-up with PFTs   6  Anemia  1  Trend H&H- improving  2  Treatment per primary team   3  Heme/onc input appreciated     Chief Complaint:    "I feel a lot better than yesterday "    Subjective:      Patient was seen examined today  No overnight events reported  Patient is seen sitting in recliner wearing 4 L nasal cannula no acute distress  Patient reports feeling much better compared to yesterday  His breathing is improved but still with dyspnea on exertion, cough and intermittent wheeze  No chest pain or palpitations  No fevers or chills  Denies dizziness or headache  No GI or urinary symptoms  Still with increased foot and lower leg swelling  Objective:    Vitals: Blood pressure 157/74, pulse 91, temperature (!) 97 3 °F (36 3 °C), temperature source Oral, resp  rate (!) 25, height 5' 11" (1 803 m), weight 64 7 kg (142 lb 10 2 oz), SpO2 93 %  4L NC,Body mass index is 19 89 kg/m²  Intake/Output Summary (Last 24 hours) at 5/7/2021 1111  Last data filed at 5/7/2021 0850  Gross per 24 hour   Intake 660 ml   Output 1125 ml   Net -465 ml       Invasive Devices     Peripheral Intravenous Line            Peripheral IV 05/06/21 Left Arm 1 day                Physical Exam:     Physical Exam  Vitals signs and nursing note reviewed  Constitutional:       General: He is not in acute distress  Appearance: Normal appearance  HENT:      Head: Normocephalic and atraumatic  Right Ear: External ear normal       Left Ear: External ear normal       Nose: Nose normal       Mouth/Throat:      Mouth: Mucous membranes are moist       Pharynx: Oropharynx is clear  Eyes:      Extraocular Movements: Extraocular movements intact  Conjunctiva/sclera: Conjunctivae normal       Pupils: Pupils are equal, round, and reactive to light  Neck:      Musculoskeletal: Normal range of motion and neck supple  No muscular tenderness  Cardiovascular:      Rate and Rhythm: Normal rate and regular rhythm        Pulses: Normal pulses  Heart sounds: No murmur  Pulmonary:      Effort: Pulmonary effort is normal       Breath sounds: Wheezing (DONALDO expiratory wheeze) present  No rhonchi  Abdominal:      General: Bowel sounds are normal       Palpations: Abdomen is soft  There is no mass  Tenderness: There is no abdominal tenderness  Hernia: No hernia is present  Musculoskeletal: Normal range of motion  General: No tenderness or deformity  Right lower leg: Edema present  Left lower leg: Edema present  Skin:     General: Skin is warm and dry  Neurological:      General: No focal deficit present  Mental Status: He is alert and oriented to person, place, and time  Mental status is at baseline  Psychiatric:         Mood and Affect: Mood normal          Behavior: Behavior normal          Thought Content: Thought content normal          Judgment: Judgment normal          Labs: I have personally reviewed pertinent lab results  , ABG: No results found for: PHART, UIK8SCI, PO2ART, EBC8MKZ, H1TSPWWL, BEART, SOURCE, BNP: No results found for: BNP, CBC:   Lab Results   Component Value Date    WBC 17 42 (H) 05/07/2021    HGB 9 3 (L) 05/07/2021    HCT 32 1 (L) 05/07/2021    MCV 79 (L) 05/07/2021     (H) 05/07/2021    MCH 22 9 (L) 05/07/2021    MCHC 29 0 (L) 05/07/2021    RDW 15 4 (H) 05/07/2021    MPV 9 0 05/07/2021    NRBC 0 05/07/2021   , CMP:   Lab Results   Component Value Date    SODIUM 142 05/07/2021    K 5 0 05/07/2021     05/07/2021    CO2 30 05/07/2021    BUN 17 05/07/2021    CREATININE 0 61 05/07/2021    CALCIUM 7 7 (L) 05/07/2021    AST 43 05/07/2021     (H) 05/07/2021    ALKPHOS 135 (H) 05/07/2021    EGFR 104 05/07/2021   , PT/INR: No results found for: PT, INR, Troponin: No results found for: TROPONINI        Imaging and other studies: none to review

## 2021-05-07 NOTE — PROGRESS NOTES
[facility] [address] [place]  Progress Note - Harry Border 1955, 77 y o  male MRN: [id number]  Unit/Bed#: 400-01 Encounter: [id number]  Primary Care Provider: Manuel Bray MD   Date and time admitted to hospital: 5/3/2021  7:23 PM    * Acute respiratory failure with hypoxia [facility])  Assessment & Plan  · Secondary to post-obstructive pneumonia  · Initially required 5 lpm of continuous supplemental oxygen via the nasal cannula to maintain oxygen saturation levels at 92% and above  · The patient then required the Vapotherm Unit to maintain oxygen saturation levels at 92% and above  · The patient was successfully weaned off the Vapotherm Unit on 05/06/2021  · The patient is currently requiring 4 lpm of continuous supplemental oxygen via the nasal cannula to maintain oxygen saturation levels at 92% and above  · The patient was seen in consultation by Pulmonology  · Decrease methylprednisolone to 40 mg IV every 12 hours on 05/07/2021  · Discontinued the NSS IV fluids on 05/06/2021  · Received lasix 40 mg IV x 1 dose on 05/06/2021  · Give an additional lasix 40 mg IV x 1 dose on 05/07/2021  · Respiratory protocol  · COVID-19 testing was negative  Sepsis ([facility] [address] )  Assessment & Plan  · Sepsis was present on admission and secondary to probable post-obstructive pneumonia  · SIRS criteria was met with tachycardia and leukocytosis  · Possible gram-negative pneumonia  · Initially treated with IV cefepime and IV vancomycin  · Discontinued IV vancomycin on 05/06/2021 with a negative MRSA nasal screen  · Continue IV cefepime (day #4)  · Follow culture results  · Follow the procalcitonin level  · Discontinued the NSS IV fluids on 05/06/2021    HCAP (healthcare-associated pneumonia)  Assessment & Plan  · Sepsis was present on admission and secondary to probable post-obstructive pneumonia  · SIRS criteria was met with tachycardia and leukocytosis    · Possible gram-negative pneumonia  · Initially treated with IV cefepime and IV vancomycin  · Discontinued IV vancomycin on 05/06/2021 with a negative MRSA nasal screen  · Continue IV cefepime (day #4)  · Follow culture results  · Follow the procalcitonin level  · Discontinued the NSS IV fluids on 27/31/8112    Diastolic dysfunction  Assessment & Plan  · Received lasix 40 mg IV x 1 dose on 05/06/2021  · Give an additional lasix 40 mg IV x 1 dose on 05/07/2021  · Daily weights  · Strict intake/output    Ventricular ectopy  Assessment & Plan  · Telemetry monitoring  · Keep the patient's magnesium level at 2 mg/dl and above in the setting of ventricular ectopy  · Keep the patient's potassium level at 4 mmol/L and above in the setting of ventricular ectopy      Severe protein-calorie malnutrition (HCC)  Assessment & Plan  Malnutrition Findings:   Adult Malnutrition type: Acute illness  Adult Degree of Malnutrition: Other severe protein calorie malnutrition    BMI Findings: Body mass index is 19 89 kg/m²       · Continue the nutritional supplements    Premature atrial contractions  Assessment & Plan  · Outpatient Cardiology evaluation    ECG 12 lead  Order: [id number]  Status:  Final result   Visible to patient:  No (inaccessible in [employer])   Next appt:  05/11/2021 at 09:00 AM in Hematology and Oncology ([address], [address])   Ref Range & Units 5/3/21 2108   Ventricular Rate     Atrial Rate     NH Interval ms 126    QRSD Interval ms 76    QT Interval ms 348    QTC Interval ms 475    P [place] degrees 69    QRS Axis degrees 58    T Wave Axis degrees 67       Narrative & Impression    Sinus tachycardia with Premature atrial complexes  Otherwise normal ECG  When compared with ECG of 05-APR-2021 09:52,  Premature atrial complexes are now Present  Confirmed by Livia Belle ([id number]) on 5/4/2021 4:29:54 PM      Specimen Collected: 05/03/21 21:08   Last Resulted: 05/04/21 16:30               Vitamin D deficiency  Assessment & Plan  · For the vitamin D deficiency, the patient was placed on ergocalciferol 50,000 I  U  PO Qweekly for 8 weekly doses  He will then need a repeat Vitamin D 25-OH level checked in 2 months and will need a daily cholecalciferol supplement prescribed by her PCP based on the repeat Vitamin D 25-OH level results  Results for Wilder Zaldivar (MRN [id number]) as of 5/5/2021 12:18   Ref  Range 5/5/2021 06:22   Vit D, 25-Hydroxy Latest Ref Range: 30 0 - 100 0 ng/mL 12 9 (L)         Hypocalcemia  Assessment & Plan  · Initiated on calcium carbonate 500 mg PO Qdaily    Hypophosphatemia  Assessment & Plan  · Give sodium phosphate 9 mmol IV x 1 dose on 05/07/2021  · Follow the phosphorus level    Transaminitis  Assessment & Plan  · Avoid all hepatotoxic agents  · Follow the liver function tests    Results from last 7 days   Lab Units 05/07/21  0447 05/06/21  0512 05/05/21  0530   SODIUM mmol/L 142 141 138   POTASSIUM mmol/L 5 0 4 4 4 1   CHLORIDE mmol/L 106 106 106   CO2 mmol/L 30 28 26   BUN mg/dL 17 14 13   CREATININE mg/dL 0 61 0 55* 0 52*   CALCIUM mg/dL 7 7* 7 6* 7 3*   ALK PHOS U/L 135* 135* 143*   ALT U/L 100* 84* 70   AST U/L 43 44 42     Results for Wilder Zaldivar (MRN [id number]) as of 5/5/2021 12:19   Ref  Range 5/5/2021 05:30   HEPATITIS A IGM ANTIBODY Latest Ref Range: Non-reactive, Equivocal-Suggest Recollect  Non-reactive   HEPATITIS B SURFACE ANTIGEN Latest Ref Range: Non-reactive, NonReactive - Confirmed  Non-reactive   HEPATITIS B CORE IGM ANTIBODY Latest Ref Range: Non-reactive  Non-reactive   HEPATITIS C ANTIBODY Latest Ref Range: Non-reactive  Non-reactive       Elevated d-dimer  Assessment & Plan  · Secondary to active malignancy    Results for Wilder Zaldivar (MRN [id number]) as of 5/4/2021 15:19   Ref  Range 5/3/2021 19:47   D-Dimer, Narcisa Emmanuel Latest Ref Range: <0 50 ug/ml FEU 3 91 (H)       VAS lower limb venous duplex study, complete bilateral  Status:  In process   PACS Images     Show images for VAS lower limb venous duplex study, complete bilateral   Study Result       [facility] REPORT  CLINICAL:  Indications:  Patient presents with bilateral lower extremity edema  Operative History:  No cardiovascular surgeries noted  FINDINGS:     Segment  Right            Left                Impression       Impression         CFV      Normal (Patent)  Normal (Patent)             CONCLUSION:  RIGHT LOWER LIMB:  No evidence of acute or chronic deep vein thrombosis  No evidence of superficial thrombophlebitis noted  Doppler evaluation shows a normal response to augmentation maneuvers  Popliteal, posterior tibial and anterior tibial arterial Doppler waveforms are  triphasic  LEFT LOWER LIMB:  No evidence of acute or chronic deep vein thrombosis  No evidence of superficial thrombophlebitis noted  Doppler evaluation shows a normal response to augmentation maneuvers  Popliteal, posterior tibial and anterior tibial arterial Doppler waveforms are  triphasic  CTA ED chest PE Study  Status: Final result   PACS Images     Show images for CTA ED chest PE Study   Study Result    CTA - CHEST WITH IV CONTRAST - PULMONARY ANGIOGRAM     INDICATION:   tachycardia, hypoxia, current cancer on chemo      COMPARISON: CT of the chest on April 5, 2021      TECHNIQUE: CTA examination of the chest was performed using angiographic technique according to a protocol specifically tailored to evaluate for pulmonary embolism  Axial, sagittal, and coronal 2D reformatted images were created from the source data and   submitted for interpretation  In addition, coronal 3D MIP postprocessing was performed on the acquisition scanner        Radiation dose length product (DLP) for this visit:  305 93 mGy-cm     This examination, like all CT scans performed in the [facility], was performed utilizing techniques to minimize radiation dose exposure, including the use of iterative   reconstruction and automated exposure control      IV Contrast: 85 mL of iohexol (OMNIPAQUE)  was administered intravenously without immediate adverse reaction  FINDINGS:     PULMONARY ARTERIAL TREE:  No pulmonary embolus is seen       LUNGS:  Known left upper lobe mass is now obscured by large patchy opacity (series 3, image 45) likely due to pneumonia  Interval increase in patchy groundglass opacities in the right perihilar region (series 3, image 36)  Increase in size of dense   patchy opacity in the right apex (series 3, image 26) Interval increase in mass in the lingular region measuring 4 7 x 3 1 cm  Juxtapleural nodules in the left lower lobe are not significantly changed in size      PLEURA:  Stable size of moderate loculated left pleural effusion with nodularity and thickening of the pleura, increased since prior exam      HEART/GREAT VESSELS:  Unremarkable for patient's age      MEDIASTINUM AND MARGARITA:  Stable extensive mediastinal, hilar, and cardiophrenic lymphadenopathy     CHEST WALL AND LOWER NECK:   Unremarkable      VISUALIZED STRUCTURES IN THE UPPER ABDOMEN:  Stable left para-aortic mass which is partially visualized      OSSEOUS STRUCTURES:  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  Compression deformity at the T9 vertebral body with increase in moderate vertebral body height loss when compared with prior exam        IMPRESSION:     1  No evidence of pulmonary embolism  2   Large dense opacity in the left upper lobe and interval increase in patchy groundglass opacities in the right perihilar region likely due to pneumonia in the appropriate clinical setting  3   Known left upper lobe mass is obscured  Interval increase in mass in the lingular region  Redemonstrated right lower lobe nodular masses or metastasis  4   Stable extensive lymphadenopathy  5   Redemonstrated expansile destructive lesion in the 9th rib and interval increase in compression deformity at the T9 vertebral body           Thrombocytosis ([address] )  Assessment & Plan  · Likely reactive in the setting of active malignancy  · Follow the platelet count    Results from last 7 days   Lab Units 05/07/21  0447 05/06/21  0512 05/05/21  0530   WBC Thousand/uL 17 42* 19 52* 12 44*   HEMOGLOBIN g/dL 9 3* 8 9* 9 1*   HEMATOCRIT % 32 1* 31 3* 31 4*   PLATELETS Thousands/uL 905* 939* 914*         Renal cell carcinoma of left kidney [facility])  Assessment & Plan  · The patient was seen in consultation by Hematology/Oncology  · Outpatient follow-up with Dr Kellie Holbrook (Hematology/Oncology)    Mediastinal lymphadenopathy  Assessment & Plan  · Known metastatic renal cell carcinoma  · Outpatient follow-up with Dr Kellie Holbrook (Hematology/Oncology)    Lung mass  Assessment & Plan  · Known metastatic renal cell carcinoma with lung metastases  · Outpatient follow-up with Dr Kellie Holbrook (Hematology/Oncology)      Anemia  Assessment & Plan  · Check the patient's stool for occult blood x 3 specimens  · Follow the CBC  · Transfuse for a hemoglobin less than 7 g/dl    Results from last 7 days   Lab Units 05/07/21  0447 05/06/21  0512 05/05/21  0530   WBC Thousand/uL 17 42* 19 52* 12 44*   HEMOGLOBIN g/dL 9 3* 8 9* 9 1*   HEMATOCRIT % 32 1* 31 3* 31 4*   PLATELETS Thousands/uL 905* 939* 914*     Results for Jagjit Ritchie (MRN [id number]) as of 5/5/2021 12:24   Ref  Range 5/5/2021 05:30   Iron Latest Ref Range: 65 - 175 ug/dL 13 (L)   Ferritin Latest Ref Range: 8 - 388 ng/mL 1,687 (H)   Iron Saturation Latest Units: % 7   TIBC Latest Ref Range: 250 - 450 ug/dL 182 (L)   Folate Latest Ref Range: 3 1 - 17 5 ng/mL 8 2   Vitamin B-12 Latest Ref Range: 100 - 900 pg/mL 656           VTE Pharmacologic Prophylaxis:   Pharmacologic: Enoxaparin (Lovenox)  Mechanical VTE Prophylaxis in Place: Yes    Patient Centered Rounds: I have performed bedside rounds with nursing staff today  Discussions with Specialists or Other Care Team Provider: I discussed the case with Pulmonology      Time Spent for Care: 30 minutes  More than 50% of total time spent on counseling and coordination of care as described above  Current Length of Stay: 4 day(s)    Current Patient Status: Inpatient   Certification Statement: The patient will continue to require additional inpatient hospital stay due to the need for IV antibiotic treatment, for IV methylprednisolone treatment, and with the patient requiring continuous supplemental oxygen to maintain adequate oxygenation levels  Code Status: Level 1 - Full Code      Subjective: The patient was seen and examined  The patient is doing better  The shortness of breath is improving  No chest pain  Objective:     Vitals:   Temp (24hrs), Av 6 °F (36 4 °C), Min:97 3 °F (36 3 °C), Max:97 9 °F (36 6 °C)    Temp:  [97 3 °F (36 3 °C)-97 9 °F (36 6 °C)] 97 3 °F (36 3 °C)  HR:  [] 91  Resp:  [16-25] 25  BP: (137-157)/(71-74) 157/74  SpO2:  [91 %-93 %] 93 %  Body mass index is 19 89 kg/m²  Input and Output Summary (last 24 hours):        Intake/Output Summary (Last 24 hours) at 2021 1115  Last data filed at 2021 0850  Gross per 24 hour   Intake 660 ml   Output 1125 ml   Net -465 ml       Physical Exam:     Physical Exam  General:  NAD, follows commands, conversational dyspnea is present  HEENT:  NC/AT, mucous membranes moist  Neck:  Supple, No JVP elevation  CV:  + S1, + S2, RRR  Pulm:  Scattered rhonchi bilaterally, Decreased expiratory wheezing bilaterally, Bibasilar crackles  Abd:  Soft, Non-tender, Non-distended  Ext:  No clubbing/cyanosis, Edema of the bilateral lower extremities  Skin:  No rashes  Neuro:  Awake, alert, oriented  Psych:  Normal mood and affect      Additional Data:    Labs:    Results from last 7 days   Lab Units 21   WBC Thousand/uL 17 42*   HEMOGLOBIN g/dL 9 3*   HEMATOCRIT % 32 1*   PLATELETS Thousands/uL 905*   NEUTROS PCT % 87*   LYMPHS PCT % 2*   MONOS PCT % 10   EOS PCT % 0     Results from last 7 days   Lab Units 21 SODIUM mmol/L 142   POTASSIUM mmol/L 5 0   CHLORIDE mmol/L 106   CO2 mmol/L 30   BUN mg/dL 17   CREATININE mg/dL 0 61   ANION GAP mmol/L 6   CALCIUM mg/dL 7 7*   ALBUMIN g/dL 1 9*   TOTAL BILIRUBIN mg/dL 0 22   ALK PHOS U/L 135*   ALT U/L 100*   AST U/L 43   GLUCOSE RANDOM mg/dL 136     Results from last 7 days   Lab Units 05/03/21  1951   INR  1 33*             Results from last 7 days   Lab Units 05/06/21  0512 05/05/21  0622 05/04/21  0637 05/03/21 2059   LACTIC ACID mmol/L  --   --   --  2 0   PROCALCITONIN ng/ml 0 70* 1 12* 1 25* 0 51*           * I Have Reviewed All Lab Data Listed Above  * Additional Pertinent Lab Tests Reviewed: [address] Admission Reviewed      Recent Cultures (last 7 days):     Results from last 7 days   Lab Units 05/06/21  1525 05/04/21  0124 05/03/21 2059   BLOOD CULTURE   --   --  No Growth at 72 hrs  No Growth at 72 hrs     SPUTUM CULTURE  Culture too young- will reincubate  --   --    LEGIONELLA URINARY ANTIGEN   --  Negative  --        Last 24 Hours Medication List:   Current Facility-Administered Medications   Medication Dose Route Frequency Provider Last Rate    albuterol  2 5 mg Nebulization Q4H PRN Ngozi Hassan, DO      benzonatate  100 mg Oral TID PRN MARILOU Burton      calcium carbonate  1 tablet Oral Daily With Breakfast Ngozi Hassan DO      cefepime  2,000 mg Intravenous Q8H JESSIE BurtonNP 2,000 mg (05/07/21 0507)    enoxaparin  40 mg Subcutaneous Q24H Ngozi Hassan, DO      ergocalciferol  50,000 Units Oral Weekly Ngozi Hassan DO      ferrous sulfate  325 mg Oral Daily With Breakfast Rosy Y Swank, JESSIENP      fluticasone-vilanterol  1 puff Inhalation Daily Rosy Y Swank, JESSIENP      guaiFENesin  600 mg Oral BID Rosy Y Swank, MARILOU      HYDROmorphone  0 5 mg Intravenous Q4H PRN Ngozi Hassan, DO      ipratropium  0 5 mg Nebulization TID Ngozi Hassan, DO      levalbuterol  1 25 mg Nebulization TID Ilean Salts, DO      methylPREDNISolone sodium succinate  40 mg Intravenous Q12H [facility] Froilan Parada PA-C      ondansetron  4 mg Intravenous Q6H PRN MARILOU Franklin      oxyCODONE  5 mg Oral Q4H PRN Ilean Salts, DO      Or    oxyCODONE  10 mg Oral Q4H PRN Ilean Salts, DO      sodium phosphate  9 mmol Intravenous Once Ilean Salts, DO 9 mmol (05/07/21 1051)        Today, Patient Was Seen By: Ilean Salts, DO    ** Please Note: Dictation voice to text software may have been used in the creation of this document   **

## 2021-05-07 NOTE — ASSESSMENT & PLAN NOTE
· Likely reactive in the setting of active malignancy  · Follow the platelet count    Results from last 7 days   Lab Units 05/07/21  0447 05/06/21  0512 05/05/21  0530   WBC Thousand/uL 17 42* 19 52* 12 44*   HEMOGLOBIN g/dL 9 3* 8 9* 9 1*   HEMATOCRIT % 32 1* 31 3* 31 4*   PLATELETS Thousands/uL 905* 939* 914*

## 2021-05-07 NOTE — ASSESSMENT & PLAN NOTE
· The patient was seen in consultation by Hematology/Oncology    · Outpatient follow-up with Dr Evangelist Oneal (Hematology/Oncology)

## 2021-05-07 NOTE — ASSESSMENT & PLAN NOTE
· Outpatient Cardiology evaluation    ECG 12 lead  Order: [id number]  Status:  Final result   Visible to patient:  No (inaccessible in [address])   Next appt:  05/11/2021 at 09:00 AM in Hematology and Oncology ([address], [address])   Ref Range & Units 5/3/21 2108   Ventricular Rate     Atrial Rate     AL Interval ms 126    QRSD Interval ms 76    QT Interval ms 348    QTC Interval ms 475    P [place] degrees 69    QRS Axis degrees 58    T Wave Axis degrees 67       Narrative & Impression    Sinus tachycardia with Premature atrial complexes  Otherwise normal ECG  When compared with ECG of 05-APR-2021 09:52,  Premature atrial complexes are now Present  Confirmed by Myrtle Cespedes ([id number]) on 5/4/2021 4:29:54 PM      Specimen Collected: 05/03/21 21:08   Last Resulted: 05/04/21 16:30

## 2021-05-07 NOTE — ASSESSMENT & PLAN NOTE
· Secondary to active malignancy    Results for Yoana Warren (MRN [id number]) as of 5/4/2021 15:19   Ref  Range 5/3/2021 19:47   D-Dimer, Eliseo Vilma Latest Ref Range: <0 50 ug/ml FEU 3 91 (H)       VAS lower limb venous duplex study, complete bilateral  Status: In process   PACS Images     Show images for VAS lower limb venous duplex study, complete bilateral   Study Result       [facility] V[facility]ULAR CENTER REPORT  CLINICAL:  Indications:  Patient presents with bilateral lower extremity edema  Operative History:  No cardiovascular surgeries noted  FINDINGS:     Segment  Right            Left                Impression       Impression         CFV      Normal (Patent)  Normal (Patent)             CONCLUSION:  RIGHT LOWER LIMB:  No evidence of acute or chronic deep vein thrombosis  No evidence of superficial thrombophlebitis noted  Doppler evaluation shows a normal response to augmentation maneuvers  Popliteal, posterior tibial and anterior tibial arterial Doppler waveforms are  triphasic  LEFT LOWER LIMB:  No evidence of acute or chronic deep vein thrombosis  No evidence of superficial thrombophlebitis noted  Doppler evaluation shows a normal response to augmentation maneuvers  Popliteal, posterior tibial and anterior tibial arterial Doppler waveforms are  triphasic  CTA ED chest PE Study  Status: Final result   PACS Images     Show images for CTA ED chest PE Study   Study Result    CTA - CHEST WITH IV CONTRAST - PULMONARY ANGIOGRAM     INDICATION:   tachycardia, hypoxia, current cancer on chemo      COMPARISON: CT of the chest on April 5, 2021      TECHNIQUE: CTA examination of the chest was performed using angiographic technique according to a protocol specifically tailored to evaluate for pulmonary embolism  Axial, sagittal, and coronal 2D reformatted images were created from the source data and   submitted for interpretation    In addition, coronal 3D MIP postprocessing was performed on the acquisition scanner        Radiation dose length product (DLP) for this visit:  305 93 mGy-cm   This examination, like all CT scans performed in the [facility], was performed utilizing techniques to minimize radiation dose exposure, including the use of iterative   reconstruction and automated exposure control      IV Contrast:  85 mL of iohexol (OMNIPAQUE)  was administered intravenously without immediate adverse reaction  FINDINGS:     PULMONARY ARTERIAL TREE:  No pulmonary embolus is seen       LUNGS:  Known left upper lobe mass is now obscured by large patchy opacity (series 3, image 45) likely due to pneumonia  Interval increase in patchy groundglass opacities in the right perihilar region (series 3, image 36)  Increase in size of dense   patchy opacity in the right apex (series 3, image 26) Interval increase in mass in the lingular region measuring 4 7 x 3 1 cm  Juxtapleural nodules in the left lower lobe are not significantly changed in size      PLEURA:  Stable size of moderate loculated left pleural effusion with nodularity and thickening of the pleura, increased since prior exam      HEART/GREAT VESSELS:  Unremarkable for patient's age      MEDIASTINUM AND MARGARITA:  Stable extensive mediastinal, hilar, and cardiophrenic lymphadenopathy     CHEST WALL AND LOWER NECK:   Unremarkable      VISUALIZED STRUCTURES IN THE UPPER ABDOMEN:  Stable left para-aortic mass which is partially visualized      OSSEOUS STRUCTURES:  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  Compression deformity at the T9 vertebral body with increase in moderate vertebral body height loss when compared with prior exam        IMPRESSION:     1  No evidence of pulmonary embolism  2   Large dense opacity in the left upper lobe and interval increase in patchy groundglass opacities in the right perihilar region likely due to pneumonia in the appropriate clinical setting    3   Known left upper lobe mass is obscured  Interval increase in mass in the lingular region  Redemonstrated right lower lobe nodular masses or metastasis  4   Stable extensive lymphadenopathy  5   Redemonstrated expansile destructive lesion in the 9th rib and interval increase in compression deformity at the T9 vertebral body

## 2021-05-08 ENCOUNTER — APPOINTMENT (INPATIENT)
Dept: RADIOLOGY | Facility: HOSPITAL | Age: 66
DRG: 871 | End: 2021-05-08
Payer: MEDICARE

## 2021-05-08 LAB
ALBUMIN SERPL BCP-MCNC: 1.7 G/DL (ref 3.5–5)
ALP SERPL-CCNC: 115 U/L (ref 46–116)
ALT SERPL W P-5'-P-CCNC: 102 U/L (ref 12–78)
ANION GAP SERPL CALCULATED.3IONS-SCNC: 4 MMOL/L (ref 4–13)
AST SERPL W P-5'-P-CCNC: 38 U/L (ref 5–45)
BACTERIA SPT RESP CULT: ABNORMAL
BASOPHILS # BLD AUTO: 0.02 THOUSANDS/ΜL (ref 0–0.1)
BASOPHILS NFR BLD AUTO: 0 % (ref 0–1)
BILIRUB SERPL-MCNC: 0.18 MG/DL (ref 0.2–1)
BUN SERPL-MCNC: 16 MG/DL (ref 5–25)
CALCIUM ALBUM COR SERPL-MCNC: 9.1 MG/DL (ref 8.3–10.1)
CALCIUM SERPL-MCNC: 7.3 MG/DL (ref 8.3–10.1)
CHLORIDE SERPL-SCNC: 106 MMOL/L (ref 100–108)
CO2 SERPL-SCNC: 32 MMOL/L (ref 21–32)
CREAT SERPL-MCNC: 0.48 MG/DL (ref 0.6–1.3)
EOSINOPHIL # BLD AUTO: 0 THOUSAND/ΜL (ref 0–0.61)
EOSINOPHIL NFR BLD AUTO: 0 % (ref 0–6)
ERYTHROCYTE [DISTWIDTH] IN BLOOD BY AUTOMATED COUNT: 15.4 % (ref 11.6–15.1)
GFR SERPL CREATININE-BSD FRML MDRD: 115 ML/MIN/1.73SQ M
GLUCOSE SERPL-MCNC: 118 MG/DL (ref 65–140)
GRAM STN SPEC: ABNORMAL
HCT VFR BLD AUTO: 28.9 % (ref 36.5–49.3)
HGB BLD-MCNC: 8.3 G/DL (ref 12–17)
IMM GRANULOCYTES # BLD AUTO: 0.14 THOUSAND/UL (ref 0–0.2)
IMM GRANULOCYTES NFR BLD AUTO: 1 % (ref 0–2)
LYMPHOCYTES # BLD AUTO: 0.27 THOUSANDS/ΜL (ref 0.6–4.47)
LYMPHOCYTES NFR BLD AUTO: 2 % (ref 14–44)
MAGNESIUM SERPL-MCNC: 2.4 MG/DL (ref 1.6–2.6)
MCH RBC QN AUTO: 22.4 PG (ref 26.8–34.3)
MCHC RBC AUTO-ENTMCNC: 28.7 G/DL (ref 31.4–37.4)
MCV RBC AUTO: 78 FL (ref 82–98)
MONOCYTES # BLD AUTO: 1.41 THOUSAND/ΜL (ref 0.17–1.22)
MONOCYTES NFR BLD AUTO: 9 % (ref 4–12)
NEUTROPHILS # BLD AUTO: 14.5 THOUSANDS/ΜL (ref 1.85–7.62)
NEUTS SEG NFR BLD AUTO: 88 % (ref 43–75)
NRBC BLD AUTO-RTO: 0 /100 WBCS
PHOSPHATE SERPL-MCNC: 1.7 MG/DL (ref 2.3–4.1)
PLATELET # BLD AUTO: 793 THOUSANDS/UL (ref 149–390)
PMV BLD AUTO: 9.7 FL (ref 8.9–12.7)
POTASSIUM SERPL-SCNC: 4.2 MMOL/L (ref 3.5–5.3)
PROCALCITONIN SERPL-MCNC: 0.26 NG/ML
PROT SERPL-MCNC: 6.6 G/DL (ref 6.4–8.2)
RBC # BLD AUTO: 3.71 MILLION/UL (ref 3.88–5.62)
SODIUM SERPL-SCNC: 142 MMOL/L (ref 136–145)
WBC # BLD AUTO: 16.34 THOUSAND/UL (ref 4.31–10.16)

## 2021-05-08 PROCEDURE — 71045 X-RAY EXAM CHEST 1 VIEW: CPT

## 2021-05-08 PROCEDURE — 94640 AIRWAY INHALATION TREATMENT: CPT

## 2021-05-08 PROCEDURE — 80053 COMPREHEN METABOLIC PANEL: CPT | Performed by: INTERNAL MEDICINE

## 2021-05-08 PROCEDURE — 83735 ASSAY OF MAGNESIUM: CPT | Performed by: INTERNAL MEDICINE

## 2021-05-08 PROCEDURE — 94760 N-INVAS EAR/PLS OXIMETRY 1: CPT

## 2021-05-08 PROCEDURE — 85025 COMPLETE CBC W/AUTO DIFF WBC: CPT | Performed by: INTERNAL MEDICINE

## 2021-05-08 PROCEDURE — 84100 ASSAY OF PHOSPHORUS: CPT | Performed by: INTERNAL MEDICINE

## 2021-05-08 PROCEDURE — 99232 SBSQ HOSP IP/OBS MODERATE 35: CPT | Performed by: INTERNAL MEDICINE

## 2021-05-08 PROCEDURE — 84145 PROCALCITONIN (PCT): CPT | Performed by: INTERNAL MEDICINE

## 2021-05-08 RX ORDER — FUROSEMIDE 10 MG/ML
40 INJECTION INTRAMUSCULAR; INTRAVENOUS
Status: DISCONTINUED | OUTPATIENT
Start: 2021-05-08 | End: 2021-05-11 | Stop reason: HOSPADM

## 2021-05-08 RX ORDER — METHYLPREDNISOLONE SODIUM SUCCINATE 40 MG/ML
40 INJECTION, POWDER, LYOPHILIZED, FOR SOLUTION INTRAMUSCULAR; INTRAVENOUS EVERY 8 HOURS SCHEDULED
Status: DISCONTINUED | OUTPATIENT
Start: 2021-05-08 | End: 2021-05-10

## 2021-05-08 RX ADMIN — CALCIUM 1 TABLET: 500 TABLET ORAL at 07:56

## 2021-05-08 RX ADMIN — METHYLPREDNISOLONE SODIUM SUCCINATE 40 MG: 40 INJECTION, POWDER, FOR SOLUTION INTRAMUSCULAR; INTRAVENOUS at 22:15

## 2021-05-08 RX ADMIN — CEFEPIME HYDROCHLORIDE 2000 MG: 2 INJECTION, SOLUTION INTRAVENOUS at 05:13

## 2021-05-08 RX ADMIN — FUROSEMIDE 40 MG: 10 INJECTION, SOLUTION INTRAMUSCULAR; INTRAVENOUS at 09:55

## 2021-05-08 RX ADMIN — GUAIFENESIN 600 MG: 600 TABLET, EXTENDED RELEASE ORAL at 17:15

## 2021-05-08 RX ADMIN — CEFEPIME HYDROCHLORIDE 2000 MG: 2 INJECTION, SOLUTION INTRAVENOUS at 22:16

## 2021-05-08 RX ADMIN — METHYLPREDNISOLONE SODIUM SUCCINATE 40 MG: 40 INJECTION, POWDER, FOR SOLUTION INTRAMUSCULAR; INTRAVENOUS at 08:00

## 2021-05-08 RX ADMIN — LEVALBUTEROL HYDROCHLORIDE 1.25 MG: 1.25 SOLUTION, CONCENTRATE RESPIRATORY (INHALATION) at 20:07

## 2021-05-08 RX ADMIN — IPRATROPIUM BROMIDE 0.5 MG: 0.5 SOLUTION RESPIRATORY (INHALATION) at 20:07

## 2021-05-08 RX ADMIN — FLUTICASONE FUROATE AND VILANTEROL TRIFENATATE 1 PUFF: 100; 25 POWDER RESPIRATORY (INHALATION) at 08:00

## 2021-05-08 RX ADMIN — FERROUS SULFATE TAB 325 MG (65 MG ELEMENTAL FE) 325 MG: 325 (65 FE) TAB at 07:56

## 2021-05-08 RX ADMIN — METHYLPREDNISOLONE SODIUM SUCCINATE 40 MG: 40 INJECTION, POWDER, FOR SOLUTION INTRAMUSCULAR; INTRAVENOUS at 14:17

## 2021-05-08 RX ADMIN — Medication 2 TABLET: at 14:16

## 2021-05-08 RX ADMIN — Medication 2 TABLET: at 08:01

## 2021-05-08 RX ADMIN — LEVALBUTEROL HYDROCHLORIDE 1.25 MG: 1.25 SOLUTION, CONCENTRATE RESPIRATORY (INHALATION) at 13:52

## 2021-05-08 RX ADMIN — FUROSEMIDE 40 MG: 10 INJECTION, SOLUTION INTRAMUSCULAR; INTRAVENOUS at 17:15

## 2021-05-08 RX ADMIN — OXYCODONE HYDROCHLORIDE 10 MG: 10 TABLET ORAL at 13:53

## 2021-05-08 RX ADMIN — IPRATROPIUM BROMIDE 0.5 MG: 0.5 SOLUTION RESPIRATORY (INHALATION) at 13:52

## 2021-05-08 RX ADMIN — OXYCODONE HYDROCHLORIDE 10 MG: 10 TABLET ORAL at 22:16

## 2021-05-08 RX ADMIN — OXYCODONE HYDROCHLORIDE 10 MG: 10 TABLET ORAL at 17:53

## 2021-05-08 RX ADMIN — ENOXAPARIN SODIUM 40 MG: 40 INJECTION SUBCUTANEOUS at 22:15

## 2021-05-08 RX ADMIN — CEFEPIME HYDROCHLORIDE 2000 MG: 2 INJECTION, SOLUTION INTRAVENOUS at 14:17

## 2021-05-08 RX ADMIN — GUAIFENESIN 600 MG: 600 TABLET, EXTENDED RELEASE ORAL at 08:00

## 2021-05-08 RX ADMIN — IPRATROPIUM BROMIDE 0.5 MG: 0.5 SOLUTION RESPIRATORY (INHALATION) at 07:20

## 2021-05-08 RX ADMIN — OXYCODONE HYDROCHLORIDE 10 MG: 10 TABLET ORAL at 07:58

## 2021-05-08 RX ADMIN — LEVALBUTEROL HYDROCHLORIDE 1.25 MG: 1.25 SOLUTION, CONCENTRATE RESPIRATORY (INHALATION) at 07:20

## 2021-05-08 NOTE — PLAN OF CARE
Problem: Nutrition/Hydration-ADULT  Goal: Nutrient/Hydration intake appropriate for improving, restoring or maintaining nutritional needs  Description: Monitor and assess patient's nutrition/hydration status for malnutrition  Collaborate with interdisciplinary team and initiate plan and interventions as ordered  Monitor patient's weight and dietary intake as ordered or per policy  Utilize nutrition screening tool and intervene as necessary  Determine patient's food preferences and provide high-protein, high-caloric foods as appropriate       INTERVENTIONS:  - Monitor oral intake, urinary output, labs, and treatment plans  - Assess nutrition and hydration status and recommend course of action  - Evaluate amount of meals eaten  - Assist patient with eating if necessary   - Allow adequate time for meals  - Recommend/ encourage appropriate diets, oral nutritional supplements, and vitamin/mineral supplements  - Order, calculate, and assess calorie counts as needed  - Recommend, monitor, and adjust tube feedings and TPN/PPN based on assessed needs  - Assess need for intravenous fluids  - Provide specific nutrition/hydration education as appropriate  - Include patient/family/caregiver in decisions related to nutrition  Outcome: Progressing     Problem: RESPIRATORY - ADULT  Goal: Achieves optimal ventilation and oxygenation  Description: INTERVENTIONS:  - Assess for changes in respiratory status  - Assess for changes in mentation and behavior  - Position to facilitate oxygenation and minimize respiratory effort  - Oxygen administered by appropriate delivery if ordered  - Initiate smoking cessation education as indicated  - Encourage broncho-pulmonary hygiene including cough, deep breathe, Incentive Spirometry  - Assess the need for suctioning and aspirate as needed  - Assess and instruct to report SOB or any respiratory difficulty  - Respiratory Therapy support as indicated  Outcome: Progressing     Problem: Potential for Falls  Goal: Patient will remain free of falls  Description: INTERVENTIONS:  - Assess patient frequently for physical needs  -  Identify cognitive and physical deficits and behaviors that affect risk of falls   (oxygen dependence)  -  Goff fall precautions as indicated by assessment  (medium fall risk)  - Educate patient/family on patient safety including physical limitations  - Instruct patient to call for assistance with activity based on assessment  - Modify environment to reduce risk of injury  - Consider OT/PT consult to assist with strengthening/mobility  Outcome: Progressing     Problem: INFECTION - ADULT  Goal: Absence or prevention of progression during hospitalization  Description: INTERVENTIONS:  - Assess and monitor for signs and symptoms of infection  - Monitor lab/diagnostic results  - Monitor all insertion sites, i e  indwelling lines, tubes, and drains  - Monitor endotracheal if appropriate and nasal secretions for changes in amount and color  - Goff appropriate cooling/warming therapies per order  - Administer medications as ordered  - Instruct and encourage patient and family to use good hand hygiene technique  - Identify and instruct in appropriate isolation precautions for identified infection/condition  Outcome: Progressing     Problem: METABOLIC, FLUID AND ELECTROLYTES - ADULT  Goal: Fluid balance maintained  Description: INTERVENTIONS:  - Monitor labs   - Monitor I/O and WT  - Instruct patient on fluid and nutrition as appropriate  - Assess for signs & symptoms of volume excess or deficit  Outcome: Progressing  Goal: Electrolytes maintained within normal limits  Description: INTERVENTIONS:  - Monitor labs and assess patient for signs and symptoms of electrolyte imbalances  - Administer electrolyte replacement as ordered  - Monitor response to electrolyte replacements, including repeat lab results as appropriate  - Instruct patient on fluid and nutrition as appropriate  Outcome: Progressing

## 2021-05-08 NOTE — PLAN OF CARE
Problem: Nutrition/Hydration-ADULT  Goal: Nutrient/Hydration intake appropriate for improving, restoring or maintaining nutritional needs  Description: Monitor and assess patient's nutrition/hydration status for malnutrition  Collaborate with interdisciplinary team and initiate plan and interventions as ordered  Monitor patient's weight and dietary intake as ordered or per policy  Utilize nutrition screening tool and intervene as necessary  Determine patient's food preferences and provide high-protein, high-caloric foods as appropriate       INTERVENTIONS:  - Monitor oral intake, urinary output, labs, and treatment plans  - Assess nutrition and hydration status and recommend course of action  - Evaluate amount of meals eaten  - Assist patient with eating if necessary   - Allow adequate time for meals  - Recommend/ encourage appropriate diets, oral nutritional supplements, and vitamin/mineral supplements  - Order, calculate, and assess calorie counts as needed  - Recommend, monitor, and adjust tube feedings and TPN/PPN based on assessed needs  - Assess need for intravenous fluids  - Provide specific nutrition/hydration education as appropriate  - Include patient/family/caregiver in decisions related to nutrition  Outcome: Progressing     Problem: RESPIRATORY - ADULT  Goal: Achieves optimal ventilation and oxygenation  Description: INTERVENTIONS:  - Assess for changes in respiratory status  - Assess for changes in mentation and behavior  - Position to facilitate oxygenation and minimize respiratory effort  - Oxygen administered by appropriate delivery if ordered  - Initiate smoking cessation education as indicated  - Encourage broncho-pulmonary hygiene including cough, deep breathe, Incentive Spirometry  - Assess the need for suctioning and aspirate as needed  - Assess and instruct to report SOB or any respiratory difficulty  - Respiratory Therapy support as indicated  Outcome: Progressing     Problem: METABOLIC, FLUID AND ELECTROLYTES - ADULT  Goal: Fluid balance maintained  Description: INTERVENTIONS:  - Monitor labs   - Monitor I/O and WT  - Instruct patient on fluid and nutrition as appropriate  - Assess for signs & symptoms of volume excess or deficit  Outcome: Progressing  Goal: Electrolytes maintained within normal limits  Description: INTERVENTIONS:  - Monitor labs and assess patient for signs and symptoms of electrolyte imbalances  - Administer electrolyte replacement as ordered  - Monitor response to electrolyte replacements, including repeat lab results as appropriate  - Instruct patient on fluid and nutrition as appropriate  Outcome: Progressing     Problem: Potential for Falls  Goal: Patient will remain free of falls  Description: INTERVENTIONS:  - Assess patient frequently for physical needs  -  Identify cognitive and physical deficits and behaviors that affect risk of falls   (oxygen dependence)  -  Mobile fall precautions as indicated by assessment  (medium fall risk)  - Educate patient/family on patient safety including physical limitations  - Instruct patient to call for assistance with activity based on assessment  - Modify environment to reduce risk of injury  - Consider OT/PT consult to assist with strengthening/mobility  Outcome: Progressing     Problem: INFECTION - ADULT  Goal: Absence or prevention of progression during hospitalization  Description: INTERVENTIONS:  - Assess and monitor for signs and symptoms of infection  - Monitor lab/diagnostic results  - Monitor all insertion sites, i e  indwelling lines, tubes, and drains  - Monitor endotracheal if appropriate and nasal secretions for changes in amount and color  - Mobile appropriate cooling/warming therapies per order  - Administer medications as ordered  - Instruct and encourage patient and family to use good hand hygiene technique  - Identify and instruct in appropriate isolation precautions for identified infection/condition  Outcome: Progressing

## 2021-05-08 NOTE — ASSESSMENT & PLAN NOTE
· Known metastatic renal cell carcinoma  · Outpatient follow-up with Dr Maureen Regalado (Hematology/Oncology)

## 2021-05-08 NOTE — ASSESSMENT & PLAN NOTE
· The patient was seen in consultation by Hematology/Oncology    · Outpatient follow-up with Dr Maru Guidry (Hematology/Oncology)

## 2021-05-08 NOTE — ASSESSMENT & PLAN NOTE
· Check the patient's stool for occult blood x 3 specimens  · Follow the CBC  · Transfuse for a hemoglobin less than 7 g/dl    Results from last 7 days   Lab Units 05/08/21  0436 05/07/21  0447 05/06/21  0512   WBC Thousand/uL 16 34* 17 42* 19 52*   HEMOGLOBIN g/dL 8 3* 9 3* 8 9*   HEMATOCRIT % 28 9* 32 1* 31 3*   PLATELETS Thousands/uL 793* 905* 939*     Results for Radha Roman (MRN [id number]) as of 5/5/2021 12:24   Ref   Range 5/5/2021 05:30   Iron Latest Ref Range: 65 - 175 ug/dL 13 (L)   Ferritin Latest Ref Range: 8 - 388 ng/mL 1,687 (H)   Iron Saturation Latest Units: % 7   TIBC Latest Ref Range: 250 - 450 ug/dL 182 (L)   Folate Latest Ref Range: 3 1 - 17 5 ng/mL 8 2   Vitamin B-12 Latest Ref Range: 100 - 900 pg/mL 656

## 2021-05-08 NOTE — ASSESSMENT & PLAN NOTE
· Avoid all hepatotoxic agents  · Follow the liver function tests    Results from last 7 days   Lab Units 05/08/21  0436 05/07/21  0447 05/06/21  0512   SODIUM mmol/L 142 142 141   POTASSIUM mmol/L 4 2 5 0 4 4   CHLORIDE mmol/L 106 106 106   CO2 mmol/L 32 30 28   BUN mg/dL 16 17 14   CREATININE mg/dL 0 48* 0 61 0 55*   CALCIUM mg/dL 7 3* 7 7* 7 6*   ALK PHOS U/L 115 135* 135*   ALT U/L 102* 100* 84*   AST U/L 38 43 44     Results for Elizabeth Sheriff (MRN [id number]) as of 5/5/2021 12:19   Ref   Range 5/5/2021 05:30   HEPATITIS A IGM ANTIBODY Latest Ref Range: Non-reactive, Equivocal-Suggest Recollect  Non-reactive   HEPATITIS B SURFACE ANTIGEN Latest Ref Range: Non-reactive, NonReactive - Confirmed  Non-reactive   HEPATITIS B CORE IGM ANTIBODY Latest Ref Range: Non-reactive  Non-reactive   HEPATITIS C ANTIBODY Latest Ref Range: Non-reactive  Non-reactive

## 2021-05-08 NOTE — ASSESSMENT & PLAN NOTE
· For the vitamin D deficiency, the patient was placed on ergocalciferol 50,000 I  U  PO Qweekly for 8 weekly doses  He will then need a repeat Vitamin D 25-OH level checked in 2 months and will need a daily cholecalciferol supplement prescribed by her PCP based on the repeat Vitamin D 25-OH level results  Results for Ramona Navarro (MRN [id number]) as of 5/5/2021 12:18   Ref   Range 5/5/2021 06:22   Vit D, 25-Hydroxy Latest Ref Range: 30 0 - 100 0 ng/mL 12 9 (L)

## 2021-05-08 NOTE — ASSESSMENT & PLAN NOTE
· Secondary to post-obstructive pneumonia  · Initially required 5 lpm of continuous supplemental oxygen via the nasal cannula at the time of admission to maintain oxygen saturation levels at 92% and above  · The patient then required the Vapotherm Unit to maintain oxygen saturation levels at 92% and above  · The patient was successfully weaned off the Vapotherm Unit on 05/06/2021  · The patient is currently requiring 5 5 lpm of continuous supplemental oxygen via the nasal cannula to maintain oxygen saturation levels at 92% and above  · The patient was seen in consultation by Pulmonology  · Decreased methylprednisolone to 40 mg IV every 12 hours on 05/07/2021  · Increase methylprednisolone to 40 mg IV every 8 hours on 05/08/2021 with increasing supplemental oxygen requirements  · Discontinued the NSS IV fluids on 05/06/2021  · Received lasix 40 mg IV x 1 dose on 05/06/2021  · Received an additional lasix 40 mg IV x 1 dose on 05/07/2021  · Initiated lasix 40 mg IV BID on 05/08/2021  · Respiratory protocol  · COVID-19 testing was negative

## 2021-05-08 NOTE — RESPIRATORY THERAPY NOTE
05/07/21 2039   Inhalation Therapy Tx   $ Inhalation Therapy Performed Yes   $ Pulse Oximetry Spot Check Charge Completed   Pre-Treatment Pulse 84   Pre-Treatment Respirations 20   Duration 20   Breath Sounds Pre-Treatment Bilateral Diminished; Expiratory wheeze   Breath Sounds Post-Treatment Bilateral Diminished; Expiratory wheezes   Post-Treatment Pulse 92   Post-Treatment Respirations 20   Delivery Source Air;UDN   Position Up in chair   Treatment Tolerance Tolerated well   Resp Comments Patient received in his room, he was resting, not in respiratory distress at rest  Treatment given without incident  Cough on command, dry, npc   Patient was found on 4 5 lpm nasal cannula   Cough Description   Sputum Amount None

## 2021-05-08 NOTE — ASSESSMENT & PLAN NOTE
· Likely reactive in the setting of active malignancy  · Follow the platelet count    Results from last 7 days   Lab Units 05/08/21  0436 05/07/21  0447 05/06/21  0512   WBC Thousand/uL 16 34* 17 42* 19 52*   HEMOGLOBIN g/dL 8 3* 9 3* 8 9*   HEMATOCRIT % 28 9* 32 1* 31 3*   PLATELETS Thousands/uL 793* 905* 939*

## 2021-05-08 NOTE — ASSESSMENT & PLAN NOTE
· Secondary to active malignancy    Results for Tatiana Cyr (MRN [id number]) as of 5/4/2021 15:19   Ref  Range 5/3/2021 19:47   D-Dimer, Vick Tubbs Latest Ref Range: <0 50 ug/ml FEU 3 91 (H)       VAS lower limb venous duplex study, complete bilateral  Status: In process   PACS Images     Show images for VAS lower limb venous duplex study, complete bilateral   Study Result       [facility] REPORT  CLINICAL:  Indications:  Patient presents with bilateral lower extremity edema  Operative History:  No cardiovascular surgeries noted  FINDINGS:     Segment  Right            Left                Impression       Impression         CFV      Normal (Patent)  Normal (Patent)             CONCLUSION:  RIGHT LOWER LIMB:  No evidence of acute or chronic deep vein thrombosis  No evidence of superficial thrombophlebitis noted  Doppler evaluation shows a normal response to augmentation maneuvers  Popliteal, posterior tibial and anterior tibial arterial Doppler waveforms are  triphasic  LEFT LOWER LIMB:  No evidence of acute or chronic deep vein thrombosis  No evidence of superficial thrombophlebitis noted  Doppler evaluation shows a normal response to augmentation maneuvers  Popliteal, posterior tibial and anterior tibial arterial Doppler waveforms are  triphasic  CTA ED chest PE Study  Status: Final result   PACS Images     Show images for CTA ED chest PE Study   Study Result    CTA - CHEST WITH IV CONTRAST - PULMONARY ANGIOGRAM     INDICATION:   tachycardia, hypoxia, current cancer on chemo      COMPARISON: CT of the chest on April 5, 2021      TECHNIQUE: CTA examination of the chest was performed using angiographic technique according to a protocol specifically tailored to evaluate for pulmonary embolism  Axial, sagittal, and coronal 2D reformatted images were created from the source data and   submitted for interpretation    In addition, coronal 3D MIP postprocessing was performed on the acquisition scanner        Radiation dose length product (DLP) for this visit:  305 93 mGy-cm   This examination, like all CT scans performed in the [facility], was performed utilizing techniques to minimize radiation dose exposure, including the use of iterative   reconstruction and automated exposure control      IV Contrast:  85 mL of iohexol (OMNIPAQUE)  was administered intravenously without immediate adverse reaction  FINDINGS:     PULMONARY ARTERIAL TREE:  No pulmonary embolus is seen       LUNGS:  Known left upper lobe mass is now obscured by large patchy opacity (series 3, image 45) likely due to pneumonia  Interval increase in patchy groundglass opacities in the right perihilar region (series 3, image 36)  Increase in size of dense   patchy opacity in the right apex (series 3, image 26) Interval increase in mass in the lingular region measuring 4 7 x 3 1 cm  Juxtapleural nodules in the left lower lobe are not significantly changed in size      PLEURA:  Stable size of moderate loculated left pleural effusion with nodularity and thickening of the pleura, increased since prior exam      HEART/GREAT VESSELS:  Unremarkable for patient's age      MEDIASTINUM AND MARGARITA:  Stable extensive mediastinal, hilar, and cardiophrenic lymphadenopathy     CHEST WALL AND LOWER NECK:   Unremarkable      VISUALIZED STRUCTURES IN THE UPPER ABDOMEN:  Stable left para-aortic mass which is partially visualized      OSSEOUS STRUCTURES:  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  Compression deformity at the T9 vertebral body with increase in moderate vertebral body height loss when compared with prior exam        IMPRESSION:     1  No evidence of pulmonary embolism  2   Large dense opacity in the left upper lobe and interval increase in patchy groundglass opacities in the right perihilar region likely due to pneumonia in the appropriate clinical setting    3   Known left upper lobe mass is obscured  Interval increase in mass in the lingular region  Redemonstrated right lower lobe nodular masses or metastasis  4   Stable extensive lymphadenopathy  5   Redemonstrated expansile destructive lesion in the 9th rib and interval increase in compression deformity at the T9 vertebral body

## 2021-05-08 NOTE — ASSESSMENT & PLAN NOTE
· Known metastatic renal cell carcinoma with lung metastases  · Outpatient follow-up with Dr Kvng Mayer (Hematology/Oncology)

## 2021-05-08 NOTE — NURSING NOTE
Pt satting low 80s on 4 5L nasal cannula  Increased pts o2 to 6L, increased HOB  Made Sukh CORTEZ aware via [employer]

## 2021-05-08 NOTE — ASSESSMENT & PLAN NOTE
· Received lasix 40 mg IV x 1 dose on 05/06/2021  · Received an additional lasix 40 mg IV x 1 dose on 05/07/2021  · Initiated lasix 40 mg IV BID on 05/08/2021  · Daily weights  · Strict intake/output

## 2021-05-08 NOTE — ASSESSMENT & PLAN NOTE
· Sepsis was present on admission and secondary to probable post-obstructive pneumonia  · SIRS criteria was met with tachycardia and leukocytosis    · Possible gram-negative pneumonia  · Initially treated with IV cefepime and IV vancomycin  · Discontinued IV vancomycin on 05/06/2021 with a negative MRSA nasal screen  · Continue IV cefepime (day #5)  · Follow culture results  · Follow the procalcitonin level  · Discontinued the NSS IV fluids on 05/06/2021

## 2021-05-08 NOTE — QUICK NOTE
Patient had a 15 beat run of vtach  K is > 4  Mg > 2  Continue tele monitoring, and if recurs or persists would consider initiating beta blocker therapy

## 2021-05-08 NOTE — ASSESSMENT & PLAN NOTE
· Outpatient Cardiology evaluation    ECG 12 lead  Order: [id number]  Status:  Final result   Visible to patient:  No (inaccessible in [address])   Next appt:  05/11/2021 at 09:00 AM in Hematology and Oncology ([address] [place] [address])   Ref Range & Units 5/3/21 2108   Ventricular Rate     Atrial Rate     FL Interval ms 126    QRSD Interval ms 76    QT Interval ms 348    QTC Interval ms 475    P Dike degrees 69    QRS Axis degrees 58    T Wave Axis degrees 67       Narrative & Impression    Sinus tachycardia with Premature atrial complexes  Otherwise normal ECG  When compared with ECG of 05-APR-2021 09:52,  Premature atrial complexes are now Present  Confirmed by [facility] Devyn ([id number]) on 5/4/2021 4:29:54 PM      Specimen Collected: 05/03/21 21:08   Last Resulted: 05/04/21 16:30

## 2021-05-09 LAB
ALBUMIN SERPL BCP-MCNC: 1.7 G/DL (ref 3.5–5)
ALP SERPL-CCNC: 126 U/L (ref 46–116)
ALT SERPL W P-5'-P-CCNC: 130 U/L (ref 12–78)
ANION GAP SERPL CALCULATED.3IONS-SCNC: 5 MMOL/L (ref 4–13)
AST SERPL W P-5'-P-CCNC: 43 U/L (ref 5–45)
BACTERIA BLD CULT: NORMAL
BACTERIA BLD CULT: NORMAL
BASOPHILS # BLD AUTO: 0.02 THOUSANDS/ΜL (ref 0–0.1)
BASOPHILS NFR BLD AUTO: 0 % (ref 0–1)
BILIRUB SERPL-MCNC: 0.23 MG/DL (ref 0.2–1)
BUN SERPL-MCNC: 19 MG/DL (ref 5–25)
CA-I BLD-SCNC: 1.03 MMOL/L (ref 1.12–1.32)
CALCIUM ALBUM COR SERPL-MCNC: 9.2 MG/DL (ref 8.3–10.1)
CALCIUM SERPL-MCNC: 7.4 MG/DL (ref 8.3–10.1)
CHLORIDE SERPL-SCNC: 103 MMOL/L (ref 100–108)
CO2 SERPL-SCNC: 34 MMOL/L (ref 21–32)
CREAT SERPL-MCNC: 0.49 MG/DL (ref 0.6–1.3)
EOSINOPHIL # BLD AUTO: 0 THOUSAND/ΜL (ref 0–0.61)
EOSINOPHIL NFR BLD AUTO: 0 % (ref 0–6)
ERYTHROCYTE [DISTWIDTH] IN BLOOD BY AUTOMATED COUNT: 15.6 % (ref 11.6–15.1)
GFR SERPL CREATININE-BSD FRML MDRD: 114 ML/MIN/1.73SQ M
GLUCOSE SERPL-MCNC: 129 MG/DL (ref 65–140)
HCT VFR BLD AUTO: 29 % (ref 36.5–49.3)
HGB BLD-MCNC: 8.4 G/DL (ref 12–17)
IMM GRANULOCYTES # BLD AUTO: 0.12 THOUSAND/UL (ref 0–0.2)
IMM GRANULOCYTES NFR BLD AUTO: 1 % (ref 0–2)
LYMPHOCYTES # BLD AUTO: 0.28 THOUSANDS/ΜL (ref 0.6–4.47)
LYMPHOCYTES NFR BLD AUTO: 2 % (ref 14–44)
MAGNESIUM SERPL-MCNC: 2.4 MG/DL (ref 1.6–2.6)
MCH RBC QN AUTO: 22.6 PG (ref 26.8–34.3)
MCHC RBC AUTO-ENTMCNC: 29 G/DL (ref 31.4–37.4)
MCV RBC AUTO: 78 FL (ref 82–98)
MONOCYTES # BLD AUTO: 1.55 THOUSAND/ΜL (ref 0.17–1.22)
MONOCYTES NFR BLD AUTO: 9 % (ref 4–12)
NEUTROPHILS # BLD AUTO: 15.62 THOUSANDS/ΜL (ref 1.85–7.62)
NEUTS SEG NFR BLD AUTO: 88 % (ref 43–75)
NRBC BLD AUTO-RTO: 0 /100 WBCS
PHOSPHATE SERPL-MCNC: 1.9 MG/DL (ref 2.3–4.1)
PLATELET # BLD AUTO: 774 THOUSANDS/UL (ref 149–390)
PMV BLD AUTO: 9.5 FL (ref 8.9–12.7)
POTASSIUM SERPL-SCNC: 4.4 MMOL/L (ref 3.5–5.3)
PROCALCITONIN SERPL-MCNC: 0.18 NG/ML
PROT SERPL-MCNC: 6.7 G/DL (ref 6.4–8.2)
RBC # BLD AUTO: 3.72 MILLION/UL (ref 3.88–5.62)
SODIUM SERPL-SCNC: 142 MMOL/L (ref 136–145)
WBC # BLD AUTO: 17.59 THOUSAND/UL (ref 4.31–10.16)

## 2021-05-09 PROCEDURE — 99232 SBSQ HOSP IP/OBS MODERATE 35: CPT | Performed by: INTERNAL MEDICINE

## 2021-05-09 PROCEDURE — 94760 N-INVAS EAR/PLS OXIMETRY 1: CPT

## 2021-05-09 PROCEDURE — 94640 AIRWAY INHALATION TREATMENT: CPT

## 2021-05-09 PROCEDURE — 82330 ASSAY OF CALCIUM: CPT | Performed by: INTERNAL MEDICINE

## 2021-05-09 PROCEDURE — 84100 ASSAY OF PHOSPHORUS: CPT | Performed by: INTERNAL MEDICINE

## 2021-05-09 PROCEDURE — 83735 ASSAY OF MAGNESIUM: CPT | Performed by: INTERNAL MEDICINE

## 2021-05-09 PROCEDURE — 85025 COMPLETE CBC W/AUTO DIFF WBC: CPT | Performed by: INTERNAL MEDICINE

## 2021-05-09 PROCEDURE — 84145 PROCALCITONIN (PCT): CPT | Performed by: INTERNAL MEDICINE

## 2021-05-09 PROCEDURE — 80053 COMPREHEN METABOLIC PANEL: CPT | Performed by: INTERNAL MEDICINE

## 2021-05-09 RX ADMIN — FUROSEMIDE 40 MG: 10 INJECTION, SOLUTION INTRAMUSCULAR; INTRAVENOUS at 17:27

## 2021-05-09 RX ADMIN — FERROUS SULFATE TAB 325 MG (65 MG ELEMENTAL FE) 325 MG: 325 (65 FE) TAB at 08:15

## 2021-05-09 RX ADMIN — CEFEPIME HYDROCHLORIDE 2000 MG: 2 INJECTION, SOLUTION INTRAVENOUS at 21:04

## 2021-05-09 RX ADMIN — METHYLPREDNISOLONE SODIUM SUCCINATE 40 MG: 40 INJECTION, POWDER, FOR SOLUTION INTRAMUSCULAR; INTRAVENOUS at 21:04

## 2021-05-09 RX ADMIN — GUAIFENESIN 600 MG: 600 TABLET, EXTENDED RELEASE ORAL at 17:27

## 2021-05-09 RX ADMIN — SODIUM PHOSPHATE, MONOBASIC, MONOHYDRATE AND SODIUM PHOSPHATE, DIBASIC, ANHYDROUS 12 MMOL: 276; 142 INJECTION, SOLUTION INTRAVENOUS at 08:15

## 2021-05-09 RX ADMIN — OXYCODONE HYDROCHLORIDE 10 MG: 10 TABLET ORAL at 17:41

## 2021-05-09 RX ADMIN — CALCIUM 1 TABLET: 500 TABLET ORAL at 08:15

## 2021-05-09 RX ADMIN — IPRATROPIUM BROMIDE 0.5 MG: 0.5 SOLUTION RESPIRATORY (INHALATION) at 08:36

## 2021-05-09 RX ADMIN — GUAIFENESIN 600 MG: 600 TABLET, EXTENDED RELEASE ORAL at 08:15

## 2021-05-09 RX ADMIN — FLUTICASONE FUROATE AND VILANTEROL TRIFENATATE 1 PUFF: 100; 25 POWDER RESPIRATORY (INHALATION) at 08:24

## 2021-05-09 RX ADMIN — IPRATROPIUM BROMIDE 0.5 MG: 0.5 SOLUTION RESPIRATORY (INHALATION) at 15:14

## 2021-05-09 RX ADMIN — OXYCODONE HYDROCHLORIDE 10 MG: 10 TABLET ORAL at 13:40

## 2021-05-09 RX ADMIN — ENOXAPARIN SODIUM 40 MG: 40 INJECTION SUBCUTANEOUS at 21:04

## 2021-05-09 RX ADMIN — OXYCODONE HYDROCHLORIDE 10 MG: 10 TABLET ORAL at 21:45

## 2021-05-09 RX ADMIN — METHYLPREDNISOLONE SODIUM SUCCINATE 40 MG: 40 INJECTION, POWDER, FOR SOLUTION INTRAMUSCULAR; INTRAVENOUS at 05:17

## 2021-05-09 RX ADMIN — IPRATROPIUM BROMIDE 0.5 MG: 0.5 SOLUTION RESPIRATORY (INHALATION) at 21:50

## 2021-05-09 RX ADMIN — CEFEPIME HYDROCHLORIDE 2000 MG: 2 INJECTION, SOLUTION INTRAVENOUS at 13:43

## 2021-05-09 RX ADMIN — LEVALBUTEROL HYDROCHLORIDE 1.25 MG: 1.25 SOLUTION, CONCENTRATE RESPIRATORY (INHALATION) at 21:50

## 2021-05-09 RX ADMIN — FUROSEMIDE 40 MG: 10 INJECTION, SOLUTION INTRAMUSCULAR; INTRAVENOUS at 08:16

## 2021-05-09 RX ADMIN — LEVALBUTEROL HYDROCHLORIDE 1.25 MG: 1.25 SOLUTION, CONCENTRATE RESPIRATORY (INHALATION) at 08:36

## 2021-05-09 RX ADMIN — LEVALBUTEROL HYDROCHLORIDE 1.25 MG: 1.25 SOLUTION, CONCENTRATE RESPIRATORY (INHALATION) at 15:14

## 2021-05-09 RX ADMIN — METHYLPREDNISOLONE SODIUM SUCCINATE 40 MG: 40 INJECTION, POWDER, FOR SOLUTION INTRAMUSCULAR; INTRAVENOUS at 14:08

## 2021-05-09 RX ADMIN — CEFEPIME HYDROCHLORIDE 2000 MG: 2 INJECTION, SOLUTION INTRAVENOUS at 05:17

## 2021-05-09 NOTE — ASSESSMENT & PLAN NOTE
· Likely secondary to metastatic disease  · Avoid all hepatotoxic agents  · Follow the liver function tests    Results from last 7 days   Lab Units 05/09/21  0458 05/08/21  0436 05/07/21  0447   SODIUM mmol/L 142 142 142   POTASSIUM mmol/L 4 4 4 2 5 0   CHLORIDE mmol/L 103 106 106   CO2 mmol/L 34* 32 30   BUN mg/dL 19 16 17   CREATININE mg/dL 0 49* 0 48* 0 61   CALCIUM mg/dL 7 4* 7 3* 7 7*   ALK PHOS U/L 126* 115 135*   ALT U/L 130* 102* 100*   AST U/L 43 38 43     Results for Mary Jane Dias (MRN [id number]) as of 5/5/2021 12:19   Ref   Range 5/5/2021 05:30   HEPATITIS A IGM ANTIBODY Latest Ref Range: Non-reactive, Equivocal-Suggest Recollect  Non-reactive   HEPATITIS B SURFACE ANTIGEN Latest Ref Range: Non-reactive, NonReactive - Confirmed  Non-reactive   HEPATITIS B CORE IGM ANTIBODY Latest Ref Range: Non-reactive  Non-reactive   HEPATITIS C ANTIBODY Latest Ref Range: Non-reactive  Non-reactive

## 2021-05-09 NOTE — ASSESSMENT & PLAN NOTE
· Likely reactive in the setting of active malignancy  · Follow the platelet count    Results from last 7 days   Lab Units 05/09/21  0457 05/08/21  0436 05/07/21  0447   WBC Thousand/uL 17 59* 16 34* 17 42*   HEMOGLOBIN g/dL 8 4* 8 3* 9 3*   HEMATOCRIT % 29 0* 28 9* 32 1*   PLATELETS Thousands/uL 774* 793* 905*

## 2021-05-09 NOTE — ASSESSMENT & PLAN NOTE
· Sepsis was present on admission and secondary to probable post-obstructive pneumonia  · SIRS criteria was met with tachycardia and leukocytosis    · Possible gram-negative pneumonia  · Initially treated with IV cefepime and IV vancomycin  · Discontinued IV vancomycin on 05/06/2021 with a negative MRSA nasal screen  · Continue IV cefepime (day #6) to complete a 7-day antibiotic course  · Follow culture results  · Follow the procalcitonin level  · Discontinued the NSS IV fluids on 05/06/2021

## 2021-05-09 NOTE — ASSESSMENT & PLAN NOTE
· Non-sustained ventricular tachycardia on telemetry monitoring  · Keep the patient's magnesium level at 2 mg/dl and above in the setting of ventricular ectopy  · Keep the patient's potassium level at 4 mmol/L and above in the setting of ventricular ectopy  · Outpatient cardiology evaluation for a Zio patch

## 2021-05-09 NOTE — ASSESSMENT & PLAN NOTE
· The patient was seen in consultation by Hematology/Oncology    · Outpatient follow-up with Dr Diane Devlin (Hematology/Oncology)

## 2021-05-09 NOTE — ASSESSMENT & PLAN NOTE
· Secondary to active malignancy    Results for Cara Cruz (MRN [id number]) as of 5/4/2021 15:19   Ref  Range 5/3/2021 19:47   D-Dimer, Carroll Ridleyu Latest Ref Range: <0 50 ug/ml FEU 3 91 (H)       VAS lower limb venous duplex study, complete bilateral  Status: In process   PACS Images     Show images for VAS lower limb venous duplex study, complete bilateral   Study Result       [facility] REPORT  CLINICAL:  Indications:  Patient presents with bilateral lower extremity edema  Operative History:  No cardiovascular surgeries noted  FINDINGS:     Segment  Right            Left                Impression       Impression         CFV      Normal (Patent)  Normal (Patent)             CONCLUSION:  RIGHT LOWER LIMB:  No evidence of acute or chronic deep vein thrombosis  No evidence of superficial thrombophlebitis noted  Doppler evaluation shows a normal response to augmentation maneuvers  Popliteal, posterior tibial and anterior tibial arterial Doppler waveforms are  triphasic  LEFT LOWER LIMB:  No evidence of acute or chronic deep vein thrombosis  No evidence of superficial thrombophlebitis noted  Doppler evaluation shows a normal response to augmentation maneuvers  Popliteal, posterior tibial and anterior tibial arterial Doppler waveforms are  triphasic  CTA ED chest PE Study  Status: Final result   PACS Images     Show images for CTA ED chest PE Study   Study Result    CTA - CHEST WITH IV CONTRAST - PULMONARY ANGIOGRAM     INDICATION:   tachycardia, hypoxia, current cancer on chemo      COMPARISON: CT of the chest on April 5, 2021      TECHNIQUE: CTA examination of the chest was performed using angiographic technique according to a protocol specifically tailored to evaluate for pulmonary embolism  Axial, sagittal, and coronal 2D reformatted images were created from the source data and   submitted for interpretation    In addition, coronal 3D MIP postprocessing was performed on the acquisition scanner        Radiation dose length product (DLP) for this visit:  305 93 mGy-cm   This examination, like all CT scans performed in the [facility], was performed utilizing techniques to minimize radiation dose exposure, including the use of iterative   reconstruction and automated exposure control      IV Contrast:  85 mL of iohexol (OMNIPAQUE)  was administered intravenously without immediate adverse reaction  FINDINGS:     PULMONARY ARTERIAL TREE:  No pulmonary embolus is seen       LUNGS:  Known left upper lobe mass is now obscured by large patchy opacity (series 3, image 45) likely due to pneumonia  Interval increase in patchy groundglass opacities in the right perihilar region (series 3, image 36)  Increase in size of dense   patchy opacity in the right apex (series 3, image 26) Interval increase in mass in the lingular region measuring 4 7 x 3 1 cm  Juxtapleural nodules in the left lower lobe are not significantly changed in size      PLEURA:  Stable size of moderate loculated left pleural effusion with nodularity and thickening of the pleura, increased since prior exam      HEART/GREAT VESSELS:  Unremarkable for patient's age      MEDIASTINUM AND MARGARITA:  Stable extensive mediastinal, hilar, and cardiophrenic lymphadenopathy     CHEST WALL AND LOWER NECK:   Unremarkable      VISUALIZED STRUCTURES IN THE UPPER ABDOMEN:  Stable left para-aortic mass which is partially visualized      OSSEOUS STRUCTURES:  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  Compression deformity at the T9 vertebral body with increase in moderate vertebral body height loss when compared with prior exam        IMPRESSION:     1  No evidence of pulmonary embolism  2   Large dense opacity in the left upper lobe and interval increase in patchy groundglass opacities in the right perihilar region likely due to pneumonia in the appropriate clinical setting    3   Known left upper lobe mass is obscured  Interval increase in mass in the lingular region  Redemonstrated right lower lobe nodular masses or metastasis  4   Stable extensive lymphadenopathy  5   Redemonstrated expansile destructive lesion in the 9th rib and interval increase in compression deformity at the T9 vertebral body

## 2021-05-09 NOTE — PROGRESS NOTES
[address] [facility] [address]  Progress Note - Danny Murguia 1955, 77 y o  male MRN: [id number]  Unit/Bed#: 400-01 Encounter: [id number]  Primary Care Provider: Regine Levy MD   Date and time admitted to hospital: 5/3/2021  7:23 PM    * Acute respiratory failure with hypoxia [facility])  Assessment & Plan  · Secondary to post-obstructive pneumonia  · Initially required 5 lpm of continuous supplemental oxygen via the nasal cannula at the time of admission to maintain oxygen saturation levels at 92% and above  · The patient then required the Vapotherm Unit to maintain oxygen saturation levels at 92% and above  · The patient was successfully weaned off the Vapotherm Unit on 05/06/2021  · The patient is currently requiring 5 5-6 lpm of continuous supplemental oxygen via the nasal cannula to maintain oxygen saturation levels at 92% and above  · The patient was seen in consultation by Pulmonology  · Decreased methylprednisolone to 40 mg IV every 12 hours on 05/07/2021  · Increased methylprednisolone to 40 mg IV every 8 hours on 05/08/2021 with increasing supplemental oxygen requirements  · Discontinued the NSS IV fluids on 05/06/2021  · Received lasix 40 mg IV x 1 dose on 05/06/2021  · Received an additional lasix 40 mg IV x 1 dose on 05/07/2021  · Initiated lasix 40 mg IV BID on 05/08/2021  · Respiratory protocol  · COVID-19 testing was negative  Sepsis ([facility] [address] )  Assessment & Plan  · Sepsis was present on admission and secondary to probable post-obstructive pneumonia  · SIRS criteria was met with tachycardia and leukocytosis    · Possible gram-negative pneumonia  · Initially treated with IV cefepime and IV vancomycin  · Discontinued IV vancomycin on 05/06/2021 with a negative MRSA nasal screen  · Continue IV cefepime (day #6) to complete a 7-day antibiotic course  · Follow culture results  · Follow the procalcitonin level  · Discontinued the NSS IV fluids on 05/06/2021    HCAP (healthcare-associated pneumonia)  Assessment & Plan  · Sepsis was present on admission and secondary to probable post-obstructive pneumonia  · SIRS criteria was met with tachycardia and leukocytosis  · Possible gram-negative pneumonia  · Initially treated with IV cefepime and IV vancomycin  · Discontinued IV vancomycin on 05/06/2021 with a negative MRSA nasal screen  · Continue IV cefepime (day #6) to complete a 7-day antibiotic course  · Follow culture results  · Follow the procalcitonin level  · Discontinued the NSS IV fluids on 70/97/1555    Diastolic dysfunction  Assessment & Plan  · Received lasix 40 mg IV x 1 dose on 05/06/2021  · Received an additional lasix 40 mg IV x 1 dose on 05/07/2021  · Initiated lasix 40 mg IV BID on 05/08/2021  · Daily weights  · Strict intake/output    Ventricular ectopy  Assessment & Plan  · Non-sustained ventricular tachycardia on telemetry monitoring  · Keep the patient's magnesium level at 2 mg/dl and above in the setting of ventricular ectopy  · Keep the patient's potassium level at 4 mmol/L and above in the setting of ventricular ectopy  · Outpatient cardiology evaluation for a Zio patch      Severe protein-calorie malnutrition ([facility] [address] )  Assessment & Plan  Malnutrition Findings:   Adult Malnutrition type: Acute illness  Adult Degree of Malnutrition: Other severe protein calorie malnutrition    BMI Findings: Body mass index is 19 56 kg/m²       · Continue the nutritional supplements    Premature atrial contractions  Assessment & Plan  · Outpatient Cardiology evaluation for a Zio patch    ECG 12 lead  Order: [id number]  Status:  Final result   Visible to patient:  No (inaccessible in [place])   Next appt:  05/11/2021 at 09:00 AM in Hematology and Oncology [place], [place])   Ref Range & Units 5/3/21 2108   Ventricular Rate     Atrial Rate     PA Interval ms 126    QRSD Interval ms 76    QT Interval ms 348    QTC Interval ms 475    P [place] degrees 69    QRS Axis degrees 62    T Wave Axis degrees 67       Narrative & Impression    Sinus tachycardia with Premature atrial complexes  Otherwise normal ECG  When compared with ECG of 05-APR-2021 09:52,  Premature atrial complexes are now Present  Confirmed by Nikolay Bradley ([id number]) on 5/4/2021 4:29:54 PM      Specimen Collected: 05/03/21 21:08   Last Resulted: 05/04/21 16:30               Vitamin D deficiency  Assessment & Plan  · For the vitamin D deficiency, the patient was placed on ergocalciferol 50,000 I  U  PO Qweekly for 8 weekly doses  He will then need a repeat Vitamin D 25-OH level checked in 2 months and will need a daily cholecalciferol supplement prescribed by her PCP based on the repeat Vitamin D 25-OH level results  Results for Merrick Mujica (MRN [id number]) as of 5/5/2021 12:18   Ref  Range 5/5/2021 06:22   Vit D, 25-Hydroxy Latest Ref Range: 30 0 - 100 0 ng/mL 12 9 (L)         Hypocalcemia  Assessment & Plan  · Initiated on calcium carbonate 500 mg PO Qdaily   Outpatient follow-up with PCP in regards to this matter    Hypophosphatemia  Assessment & Plan  · Replete with sodium phosphate 12 mmol IV x 1 dose on 05/09/2021  · Follow the phosphorus level    Transaminitis  Assessment & Plan  · Likely secondary to metastatic disease  · Avoid all hepatotoxic agents  · Follow the liver function tests    Results from last 7 days   Lab Units 05/09/21  0458 05/08/21  0436 05/07/21  0447   SODIUM mmol/L 142 142 142   POTASSIUM mmol/L 4 4 4 2 5 0   CHLORIDE mmol/L 103 106 106   CO2 mmol/L 34* 32 30   BUN mg/dL 19 16 17   CREATININE mg/dL 0 49* 0 48* 0 61   CALCIUM mg/dL 7 4* 7 3* 7 7*   ALK PHOS U/L 126* 115 135*   ALT U/L 130* 102* 100*   AST U/L 43 38 43     Results for [facility] (MRN [id number]) as of 5/5/2021 12:19   Ref   Range 5/5/2021 05:30   HEPATITIS A IGM ANTIBODY Latest Ref Range: Non-reactive, Equivocal-Suggest Recollect  Non-reactive   HEPATITIS B SURFACE ANTIGEN Latest Ref Range: Non-reactive, NonReactive - Confirmed  Non-reactive   HEPATITIS B CORE IGM ANTIBODY Latest Ref Range: Non-reactive  Non-reactive   HEPATITIS C ANTIBODY Latest Ref Range: Non-reactive  Non-reactive       Elevated d-dimer  Assessment & Plan  · Secondary to active malignancy    Results for Jules Manuel (MRN [id number]) as of 5/4/2021 15:19   Ref  Range 5/3/2021 19:47   D-Dimer, Payal Butler Latest Ref Range: <0 50 ug/ml FEU 3 91 (H)       VAS lower limb venous duplex study, complete bilateral  Status: In process   PACS Images     Show images for VAS lower limb venous duplex study, complete bilateral   Study Result       [facility] REPORT  CLINICAL:  Indications:  Patient presents with bilateral lower extremity edema  Operative History:  No cardiovascular surgeries noted  FINDINGS:     Segment  Right            Left                Impression       Impression         CFV      Normal (Patent)  Normal (Patent)             CONCLUSION:  RIGHT LOWER LIMB:  No evidence of acute or chronic deep vein thrombosis  No evidence of superficial thrombophlebitis noted  Doppler evaluation shows a normal response to augmentation maneuvers  Popliteal, posterior tibial and anterior tibial arterial Doppler waveforms are  triphasic  LEFT LOWER LIMB:  No evidence of acute or chronic deep vein thrombosis  No evidence of superficial thrombophlebitis noted  Doppler evaluation shows a normal response to augmentation maneuvers  Popliteal, posterior tibial and anterior tibial arterial Doppler waveforms are  triphasic       CTA ED chest PE Study  Status: Final result   PACS Images     Show images for CTA ED chest PE Study   Study Result    CTA - CHEST WITH IV CONTRAST - PULMONARY ANGIOGRAM     INDICATION:   tachycardia, hypoxia, current cancer on chemo      COMPARISON: CT of the chest on April 5, 2021      TECHNIQUE: CTA examination of the chest was performed using angiographic technique according to a protocol specifically tailored to evaluate for pulmonary embolism  Axial, sagittal, and coronal 2D reformatted images were created from the source data and   submitted for interpretation  In addition, coronal 3D MIP postprocessing was performed on the acquisition scanner        Radiation dose length product (DLP) for this visit:  305 93 mGy-cm   This examination, like all CT scans performed in the [facility], was performed utilizing techniques to minimize radiation dose exposure, including the use of iterative   reconstruction and automated exposure control      IV Contrast:  85 mL of iohexol (OMNIPAQUE)  was administered intravenously without immediate adverse reaction  FINDINGS:     PULMONARY ARTERIAL TREE:  No pulmonary embolus is seen       LUNGS:  Known left upper lobe mass is now obscured by large patchy opacity (series 3, image 45) likely due to pneumonia  Interval increase in patchy groundglass opacities in the right perihilar region (series 3, image 36)  Increase in size of dense   patchy opacity in the right apex (series 3, image 26) Interval increase in mass in the lingular region measuring 4 7 x 3 1 cm  Juxtapleural nodules in the left lower lobe are not significantly changed in size      PLEURA:  Stable size of moderate loculated left pleural effusion with nodularity and thickening of the pleura, increased since prior exam      HEART/GREAT VESSELS:  Unremarkable for patient's age      MEDIASTINUM AND MARGARITA:  Stable extensive mediastinal, hilar, and cardiophrenic lymphadenopathy     CHEST WALL AND LOWER NECK:   Unremarkable      VISUALIZED STRUCTURES IN THE UPPER ABDOMEN:  Stable left para-aortic mass which is partially visualized      OSSEOUS STRUCTURES:  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  Compression deformity at the T9 vertebral body with increase in moderate vertebral body height loss when compared with prior exam        IMPRESSION:     1  No evidence of pulmonary embolism    2   Large dense opacity in the left upper lobe and interval increase in patchy groundglass opacities in the right perihilar region likely due to pneumonia in the appropriate clinical setting  3   Known left upper lobe mass is obscured  Interval increase in mass in the lingular region  Redemonstrated right lower lobe nodular masses or metastasis  4   Stable extensive lymphadenopathy  5   Redemonstrated expansile destructive lesion in the 9th rib and interval increase in compression deformity at the T9 vertebral body  Thrombocytosis ([address] )  Assessment & Plan  · Likely reactive in the setting of active malignancy  · Follow the platelet count    Results from last 7 days   Lab Units 05/09/21  0457 05/08/21  0436 05/07/21  0447   WBC Thousand/uL 17 59* 16 34* 17 42*   HEMOGLOBIN g/dL 8 4* 8 3* 9 3*   HEMATOCRIT % 29 0* 28 9* 32 1*   PLATELETS Thousands/uL 774* 793* 905*         Renal cell carcinoma of left kidney [facility])  Assessment & Plan  · The patient was seen in consultation by Hematology/Oncology  · Outpatient follow-up with Dr Guillermo Moreno (Hematology/Oncology)    Mediastinal lymphadenopathy  Assessment & Plan  · Known metastatic renal cell carcinoma  · Outpatient follow-up with Dr Guillermo Moreno (Hematology/Oncology)    Lung mass  Assessment & Plan  · Known metastatic renal cell carcinoma with lung metastases  · Outpatient follow-up with Dr Guillermo Moreno (Hematology/Oncology)      Anemia  Assessment & Plan  · Check the patient's stool for occult blood x 3 specimens  · Follow the CBC  · Transfuse for a hemoglobin less than 7 g/dl    Results from last 7 days   Lab Units 05/09/21  0457 05/08/21  0436 05/07/21  0447   WBC Thousand/uL 17 59* 16 34* 17 42*   HEMOGLOBIN g/dL 8 4* 8 3* 9 3*   HEMATOCRIT % 29 0* 28 9* 32 1*   PLATELETS Thousands/uL 774* 793* 905*     Results for Natacha Coleman (MRN [id number]) as of 5/5/2021 12:24   Ref   Range 5/5/2021 05:30   Iron Latest Ref Range: 65 - 175 ug/dL 13 (L)   Ferritin Latest Ref Range: 8 - 388 ng/mL 1,687 (H)   Iron Saturation Latest Units: % 7   TIBC Latest Ref Range: 250 - 450 ug/dL 182 (L)   Folate Latest Ref Range: 3 1 - 17 5 ng/mL 8 2   Vitamin B-12 Latest Ref Range: 100 - 900 pg/mL 656           VTE Pharmacologic Prophylaxis:   Pharmacologic: Enoxaparin (Lovenox)  Mechanical VTE Prophylaxis in Place: Yes    Patient Centered Rounds: I have performed bedside rounds with nursing staff today  Time Spent for Care: 30 minutes  More than 50% of total time spent on counseling and coordination of care as described above  Current Length of Stay: 6 day(s)    Current Patient Status: Inpatient   Certification Statement: The patient will continue to require additional inpatient hospital stay due to the need for IV antibiotic treatment, for IV methylprednisolone treatment, for IV lasix treatment, and with the patient requiring 5 5-6 lpm of continuous supplemental oxygen via the nasal cannula to maintain adequate oxygen saturation levels  Code Status: Level 1 - Full Code      Subjective: The patient was seen and examined  The patient is doing better  The shortness of breath is improving  No chest pain  No abdominal pain  No nausea or vomiting  Objective:     Vitals:   Temp (24hrs), Av 9 °F (36 6 °C), Min:97 7 °F (36 5 °C), Max:98 1 °F (36 7 °C)    Temp:  [97 7 °F (36 5 °C)-98 1 °F (36 7 °C)] 97 8 °F (36 6 °C)  HR:  [] 112  Resp:  [17-24] 24  BP: (136-143)/(66-81) 136/72  SpO2:  [89 %-95 %] 94 %  Body mass index is 19 56 kg/m²  Input and Output Summary (last 24 hours):        Intake/Output Summary (Last 24 hours) at 2021 1815  Last data filed at 2021 1304  Gross per 24 hour   Intake 960 ml   Output 2455 ml   Net -1495 ml       Physical Exam:     Physical Exam  General:  NAD, follows commands  HEENT:  NC/AT, mucous membranes moist  Neck:  Supple, No JVP elevation  CV:  + S1, + S2, Tachycardic, Regular rhythm  Pulm:  Scattered rhonchi bilaterally, Decreased expiratory wheezing bilaterally  Abd:  Soft, Non-tender, Non-distended  Ext:  No clubbing/cyanosis/edema  Skin:  No rashes  Neuro:  Awake, alert, oriented  Psych:  Normal mood and affect      Additional Data:    Labs:    Results from last 7 days   Lab Units 05/09/21  0457   WBC Thousand/uL 17 59*   HEMOGLOBIN g/dL 8 4*   HEMATOCRIT % 29 0*   PLATELETS Thousands/uL 774*   NEUTROS PCT % 88*   LYMPHS PCT % 2*   MONOS PCT % 9   EOS PCT % 0     Results from last 7 days   Lab Units 05/09/21  0458   SODIUM mmol/L 142   POTASSIUM mmol/L 4 4   CHLORIDE mmol/L 103   CO2 mmol/L 34*   BUN mg/dL 19   CREATININE mg/dL 0 49*   ANION GAP mmol/L 5   CALCIUM mg/dL 7 4*   ALBUMIN g/dL 1 7*   TOTAL BILIRUBIN mg/dL 0 23   ALK PHOS U/L 126*   ALT U/L 130*   AST U/L 43   GLUCOSE RANDOM mg/dL 129     Results from last 7 days   Lab Units 05/03/21  1951   INR  1 33*             Results from last 7 days   Lab Units 05/09/21  0458 05/08/21  0436 05/07/21  0447 05/06/21  0512 05/05/21  0622  05/03/21 2059   LACTIC ACID mmol/L  --   --   --   --   --   --  2 0   PROCALCITONIN ng/ml 0 18 0 26* 0 42* 0 70* 1 12*   < > 0 51*    < > = values in this interval not displayed  * I Have Reviewed All Lab Data Listed Above  * Additional Pertinent Lab Tests Reviewed: [facility]      Recent Cultures (last 7 days):     Results from last 7 days   Lab Units 05/06/21  1525 05/04/21  0124 05/03/21 2059   BLOOD CULTURE   --   --  No Growth After 5 Days  No Growth After 5 Days     SPUTUM CULTURE  2+ Growth of   --   --    GRAM STAIN RESULT  1+ Epithelial cells per low power field*  No Polys*  1+ Gram negative rods*  1+ Gram positive rods*  1+ Gram positive cocci in pairs*  --   --    LEGIONELLA URINARY ANTIGEN   --  Negative  --        Last 24 Hours Medication List:   Current Facility-Administered Medications   Medication Dose Route Frequency Provider Last Rate    albuterol  2 5 mg Nebulization Q4H JHONATHAN Larson Krystle,       benzonatate  100 mg Oral TID PRN Alfornia Northbrook, CRNP      calcium carbonate  1 tablet Oral Daily With Breakfast Francia Chandler,       cefepime  2,000 mg Intravenous Q8H Alfornia Northbrook, CRNP 2,000 mg (05/09/21 1343)    enoxaparin  40 mg Subcutaneous Q24H Francia Chandler, DO      ergocalciferol  50,000 Units Oral Weekly Francia Chandler,       ferrous sulfate  325 mg Oral Daily With Breakfast Rosy Y Swank, CRNP      fluticasone-vilanterol  1 puff Inhalation Daily Alfornia Northbrook, CRNP      furosemide  40 mg Intravenous BID (diuretic) Francia Chandler,       guaiFENesin  600 mg Oral BID Rosy Y Swank, CRNP      HYDROmorphone  0 5 mg Intravenous Q4H PRN Francia Chandler,       ipratropium  0 5 mg Nebulization TID Francia Chandler,       levalbuterol  1 25 mg Nebulization TID Francia Chandler,       methylPREDNISolone sodium succinate  40 mg Intravenous Q8H [facility] Chato Dalton,       ondansetron  4 mg Intravenous Q6H PRN Jaylinia Northbrook, CRNP      oxyCODONE  5 mg Oral Q4H PRN Francia Chandler DO      Or    oxyCODONE  10 mg Oral Q4H PRN Francia Chandler DO          Today, Patient Was Seen By: Francia Chandler DO    ** Please Note: Dictation voice to text software may have been used in the creation of this document   **

## 2021-05-09 NOTE — ASSESSMENT & PLAN NOTE
· Outpatient Cardiology evaluation for a Zio patch    ECG 12 lead  Order: [id number]  Status:  Final result   Visible to patient:  No (inaccessible in [address])   Next appt:  05/11/2021 at 09:00 AM in Hematology and Oncology ([address], [address])   Ref Range & Units 5/3/21 2108   Ventricular Rate     Atrial Rate     MT Interval ms 126    QRSD Interval ms 76    QT Interval ms 348    QTC Interval ms 475    P [place] degrees 69    QRS Axis degrees 58    T Wave Axis degrees 67       Narrative & Impression    Sinus tachycardia with Premature atrial complexes  Otherwise normal ECG  When compared with ECG of 05-APR-2021 09:52,  Premature atrial complexes are now Present  Confirmed by Doug Marshall ([id number]) on 5/4/2021 4:29:54 PM      Specimen Collected: 05/03/21 21:08   Last Resulted: 05/04/21 16:30

## 2021-05-09 NOTE — ASSESSMENT & PLAN NOTE
· For the vitamin D deficiency, the patient was placed on ergocalciferol 50,000 I  U  PO Qweekly for 8 weekly doses  He will then need a repeat Vitamin D 25-OH level checked in 2 months and will need a daily cholecalciferol supplement prescribed by her PCP based on the repeat Vitamin D 25-OH level results  Results for Chris Carolina (MRN [id number]) as of 5/5/2021 12:18   Ref   Range 5/5/2021 06:22   Vit D, 25-Hydroxy Latest Ref Range: 30 0 - 100 0 ng/mL 12 9 (L)

## 2021-05-09 NOTE — ASSESSMENT & PLAN NOTE
· Initiated on calcium carbonate 500 mg PO Qdaily   Outpatient follow-up with PCP in regards to this matter

## 2021-05-09 NOTE — ASSESSMENT & PLAN NOTE
· Known metastatic renal cell carcinoma with lung metastases  · Outpatient follow-up with Dr Ayala Graff (Hematology/Oncology)

## 2021-05-09 NOTE — RESPIRATORY THERAPY NOTE
05/08/21 2009   Inhalation Therapy Tx   $ Inhalation Therapy Performed Yes   $ Pulse Oximetry Spot Check Charge Completed   SpO2 91 %   Pre-Treatment Pulse 88   Pre-Treatment Respirations 20   Duration 20   Breath Sounds Pre-Treatment Bilateral Diminished; Expiratory wheeze   Breath Sounds Post-Treatment Bilateral Diminished; Expiratory wheezes   Post-Treatment Pulse 86   Post-Treatment Respirations 20   Delivery Source Air;UDN   Position Up in chair   Treatment Tolerance Tolerated well   Resp Comments Patient stated he is feeling a little better, he states he is not coughing up too much stuff up at this time  He has the incentive spirometer at bedside and states he is using it, good return technique   Patient is on 6 lpm nasal cannula

## 2021-05-09 NOTE — ASSESSMENT & PLAN NOTE
Malnutrition Findings:   Adult Malnutrition type: Acute illness  Adult Degree of Malnutrition: Other severe protein calorie malnutrition    BMI Findings: Body mass index is 19 56 kg/m²       · Continue the nutritional supplements

## 2021-05-09 NOTE — ASSESSMENT & PLAN NOTE
· Known metastatic renal cell carcinoma  · Outpatient follow-up with Dr Ayala Graff (Hematology/Oncology)

## 2021-05-09 NOTE — ASSESSMENT & PLAN NOTE
· Check the patient's stool for occult blood x 3 specimens  · Follow the CBC  · Transfuse for a hemoglobin less than 7 g/dl    Results from last 7 days   Lab Units 05/09/21  0457 05/08/21  0436 05/07/21  0447   WBC Thousand/uL 17 59* 16 34* 17 42*   HEMOGLOBIN g/dL 8 4* 8 3* 9 3*   HEMATOCRIT % 29 0* 28 9* 32 1*   PLATELETS Thousands/uL 774* 793* 905*     Results for Cara Cruz (MRN [id number]) as of 5/5/2021 12:24   Ref   Range 5/5/2021 05:30   Iron Latest Ref Range: 65 - 175 ug/dL 13 (L)   Ferritin Latest Ref Range: 8 - 388 ng/mL 1,687 (H)   Iron Saturation Latest Units: % 7   TIBC Latest Ref Range: 250 - 450 ug/dL 182 (L)   Folate Latest Ref Range: 3 1 - 17 5 ng/mL 8 2   Vitamin B-12 Latest Ref Range: 100 - 900 pg/mL 656

## 2021-05-09 NOTE — ASSESSMENT & PLAN NOTE
· Secondary to post-obstructive pneumonia  · Initially required 5 lpm of continuous supplemental oxygen via the nasal cannula at the time of admission to maintain oxygen saturation levels at 92% and above  · The patient then required the Vapotherm Unit to maintain oxygen saturation levels at 92% and above  · The patient was successfully weaned off the Vapotherm Unit on 05/06/2021  · The patient is currently requiring 5 5-6 lpm of continuous supplemental oxygen via the nasal cannula to maintain oxygen saturation levels at 92% and above  · The patient was seen in consultation by Pulmonology  · Decreased methylprednisolone to 40 mg IV every 12 hours on 05/07/2021  · Increased methylprednisolone to 40 mg IV every 8 hours on 05/08/2021 with increasing supplemental oxygen requirements  · Discontinued the NSS IV fluids on 05/06/2021  · Received lasix 40 mg IV x 1 dose on 05/06/2021  · Received an additional lasix 40 mg IV x 1 dose on 05/07/2021  · Initiated lasix 40 mg IV BID on 05/08/2021  · Respiratory protocol  · COVID-19 testing was negative

## 2021-05-10 LAB
ALBUMIN SERPL BCP-MCNC: 1.7 G/DL (ref 3.5–5)
ALP SERPL-CCNC: 129 U/L (ref 46–116)
ALT SERPL W P-5'-P-CCNC: 178 U/L (ref 12–78)
ANION GAP SERPL CALCULATED.3IONS-SCNC: 6 MMOL/L (ref 4–13)
AST SERPL W P-5'-P-CCNC: 53 U/L (ref 5–45)
BASOPHILS # BLD MANUAL: 0 THOUSAND/UL (ref 0–0.1)
BASOPHILS NFR MAR MANUAL: 0 % (ref 0–1)
BILIRUB SERPL-MCNC: 0.27 MG/DL (ref 0.2–1)
BUN SERPL-MCNC: 20 MG/DL (ref 5–25)
CA-I BLD-SCNC: 1.1 MMOL/L (ref 1.12–1.32)
CALCIUM ALBUM COR SERPL-MCNC: 9.8 MG/DL (ref 8.3–10.1)
CALCIUM SERPL-MCNC: 8 MG/DL (ref 8.3–10.1)
CHLORIDE SERPL-SCNC: 103 MMOL/L (ref 100–108)
CO2 SERPL-SCNC: 34 MMOL/L (ref 21–32)
CREAT SERPL-MCNC: 0.53 MG/DL (ref 0.6–1.3)
EOSINOPHIL # BLD MANUAL: 0 THOUSAND/UL (ref 0–0.4)
EOSINOPHIL NFR BLD MANUAL: 0 % (ref 0–6)
ERYTHROCYTE [DISTWIDTH] IN BLOOD BY AUTOMATED COUNT: 15.8 % (ref 11.6–15.1)
GFR SERPL CREATININE-BSD FRML MDRD: 110 ML/MIN/1.73SQ M
GLUCOSE SERPL-MCNC: 120 MG/DL (ref 65–140)
HCT VFR BLD AUTO: 31.9 % (ref 36.5–49.3)
HGB BLD-MCNC: 9.4 G/DL (ref 12–17)
HYPERCHROMIA BLD QL SMEAR: PRESENT
LYMPHOCYTES # BLD AUTO: 0.38 THOUSAND/UL (ref 0.6–4.47)
LYMPHOCYTES # BLD AUTO: 2 % (ref 14–44)
MAGNESIUM SERPL-MCNC: 2.3 MG/DL (ref 1.6–2.6)
MCH RBC QN AUTO: 23 PG (ref 26.8–34.3)
MCHC RBC AUTO-ENTMCNC: 29.5 G/DL (ref 31.4–37.4)
MCV RBC AUTO: 78 FL (ref 82–98)
MONOCYTES # BLD AUTO: 0.38 THOUSAND/UL (ref 0–1.22)
MONOCYTES NFR BLD: 2 % (ref 4–12)
NEUTROPHILS # BLD MANUAL: 18.2 THOUSAND/UL (ref 1.85–7.62)
NEUTS SEG NFR BLD AUTO: 96 % (ref 43–75)
NRBC BLD AUTO-RTO: 0 /100 WBCS
PHOSPHATE SERPL-MCNC: 2.5 MG/DL (ref 2.3–4.1)
PLATELET # BLD AUTO: 808 THOUSANDS/UL (ref 149–390)
PLATELET BLD QL SMEAR: ABNORMAL
PMV BLD AUTO: 9.7 FL (ref 8.9–12.7)
POTASSIUM SERPL-SCNC: 4.5 MMOL/L (ref 3.5–5.3)
PROCALCITONIN SERPL-MCNC: 0.15 NG/ML
PROT SERPL-MCNC: 7 G/DL (ref 6.4–8.2)
RBC # BLD AUTO: 4.08 MILLION/UL (ref 3.88–5.62)
SODIUM SERPL-SCNC: 143 MMOL/L (ref 136–145)
TOTAL CELLS COUNTED SPEC: 100
WBC # BLD AUTO: 18.96 THOUSAND/UL (ref 4.31–10.16)

## 2021-05-10 PROCEDURE — 94760 N-INVAS EAR/PLS OXIMETRY 1: CPT

## 2021-05-10 PROCEDURE — 85027 COMPLETE CBC AUTOMATED: CPT | Performed by: INTERNAL MEDICINE

## 2021-05-10 PROCEDURE — 84145 PROCALCITONIN (PCT): CPT | Performed by: INTERNAL MEDICINE

## 2021-05-10 PROCEDURE — 82330 ASSAY OF CALCIUM: CPT | Performed by: INTERNAL MEDICINE

## 2021-05-10 PROCEDURE — 97116 GAIT TRAINING THERAPY: CPT

## 2021-05-10 PROCEDURE — 83735 ASSAY OF MAGNESIUM: CPT | Performed by: INTERNAL MEDICINE

## 2021-05-10 PROCEDURE — 80053 COMPREHEN METABOLIC PANEL: CPT | Performed by: INTERNAL MEDICINE

## 2021-05-10 PROCEDURE — 94640 AIRWAY INHALATION TREATMENT: CPT

## 2021-05-10 PROCEDURE — 99233 SBSQ HOSP IP/OBS HIGH 50: CPT | Performed by: INTERNAL MEDICINE

## 2021-05-10 PROCEDURE — 99232 SBSQ HOSP IP/OBS MODERATE 35: CPT | Performed by: INTERNAL MEDICINE

## 2021-05-10 PROCEDURE — 84100 ASSAY OF PHOSPHORUS: CPT | Performed by: INTERNAL MEDICINE

## 2021-05-10 PROCEDURE — 85007 BL SMEAR W/DIFF WBC COUNT: CPT | Performed by: INTERNAL MEDICINE

## 2021-05-10 RX ORDER — METHYLPREDNISOLONE SODIUM SUCCINATE 40 MG/ML
40 INJECTION, POWDER, LYOPHILIZED, FOR SOLUTION INTRAMUSCULAR; INTRAVENOUS EVERY 12 HOURS SCHEDULED
Status: DISCONTINUED | OUTPATIENT
Start: 2021-05-10 | End: 2021-05-10

## 2021-05-10 RX ORDER — CALCIUM CARBONATE 500(1250)
1 TABLET ORAL 2 TIMES DAILY WITH MEALS
Status: DISCONTINUED | OUTPATIENT
Start: 2021-05-10 | End: 2021-05-11 | Stop reason: HOSPADM

## 2021-05-10 RX ORDER — PREDNISONE 20 MG/1
40 TABLET ORAL DAILY
Status: DISCONTINUED | OUTPATIENT
Start: 2021-05-11 | End: 2021-05-11 | Stop reason: HOSPADM

## 2021-05-10 RX ADMIN — METHYLPREDNISOLONE SODIUM SUCCINATE 40 MG: 40 INJECTION, POWDER, FOR SOLUTION INTRAMUSCULAR; INTRAVENOUS at 05:26

## 2021-05-10 RX ADMIN — GUAIFENESIN 600 MG: 600 TABLET, EXTENDED RELEASE ORAL at 17:00

## 2021-05-10 RX ADMIN — LEVALBUTEROL HYDROCHLORIDE 1.25 MG: 1.25 SOLUTION, CONCENTRATE RESPIRATORY (INHALATION) at 14:19

## 2021-05-10 RX ADMIN — LEVALBUTEROL HYDROCHLORIDE 1.25 MG: 1.25 SOLUTION, CONCENTRATE RESPIRATORY (INHALATION) at 07:42

## 2021-05-10 RX ADMIN — OXYCODONE HYDROCHLORIDE 10 MG: 10 TABLET ORAL at 16:59

## 2021-05-10 RX ADMIN — IPRATROPIUM BROMIDE 0.5 MG: 0.5 SOLUTION RESPIRATORY (INHALATION) at 07:42

## 2021-05-10 RX ADMIN — GUAIFENESIN 600 MG: 600 TABLET, EXTENDED RELEASE ORAL at 09:00

## 2021-05-10 RX ADMIN — LEVALBUTEROL HYDROCHLORIDE 1.25 MG: 1.25 SOLUTION, CONCENTRATE RESPIRATORY (INHALATION) at 20:26

## 2021-05-10 RX ADMIN — FLUTICASONE FUROATE AND VILANTEROL TRIFENATATE 1 PUFF: 100; 25 POWDER RESPIRATORY (INHALATION) at 09:00

## 2021-05-10 RX ADMIN — OXYCODONE HYDROCHLORIDE 10 MG: 10 TABLET ORAL at 21:53

## 2021-05-10 RX ADMIN — FUROSEMIDE 40 MG: 10 INJECTION, SOLUTION INTRAMUSCULAR; INTRAVENOUS at 16:59

## 2021-05-10 RX ADMIN — CALCIUM 1 TABLET: 500 TABLET ORAL at 09:00

## 2021-05-10 RX ADMIN — ENOXAPARIN SODIUM 40 MG: 40 INJECTION SUBCUTANEOUS at 21:48

## 2021-05-10 RX ADMIN — CEFEPIME HYDROCHLORIDE 2000 MG: 2 INJECTION, SOLUTION INTRAVENOUS at 05:26

## 2021-05-10 RX ADMIN — IPRATROPIUM BROMIDE 0.5 MG: 0.5 SOLUTION RESPIRATORY (INHALATION) at 14:19

## 2021-05-10 RX ADMIN — CALCIUM 1 TABLET: 500 TABLET ORAL at 16:59

## 2021-05-10 RX ADMIN — FERROUS SULFATE TAB 325 MG (65 MG ELEMENTAL FE) 325 MG: 325 (65 FE) TAB at 09:00

## 2021-05-10 RX ADMIN — IPRATROPIUM BROMIDE 0.5 MG: 0.5 SOLUTION RESPIRATORY (INHALATION) at 20:26

## 2021-05-10 RX ADMIN — OXYCODONE HYDROCHLORIDE 10 MG: 10 TABLET ORAL at 09:12

## 2021-05-10 RX ADMIN — FUROSEMIDE 40 MG: 10 INJECTION, SOLUTION INTRAMUSCULAR; INTRAVENOUS at 09:00

## 2021-05-10 NOTE — ASSESSMENT & PLAN NOTE
Malnutrition Findings:   Adult Malnutrition type: Acute illness  Adult Degree of Malnutrition: Other severe protein calorie malnutrition    BMI Findings: Body mass index is 19 19 kg/m²       Continue nutritional supplements

## 2021-05-10 NOTE — PHYSICAL THERAPY NOTE
PHYSICAL THERAPY NOTE          Patient Name: Tino Post  WNPFT'E Date: 5/10/2021     05/10/21 6673   Note Type   Note Type Treatment   Restrictions/Precautions   Weight Bearing Precautions Per Order No   Cognition   Overall Cognitive Status WFL   Subjective   Subjective Pt would like to ambulate  Feeling better   Transfers   Sit to Stand 5  Supervision   Additional items Armrests   Stand to Sit 5  Supervision   Additional items Armrests   Stand pivot 5  Supervision   Ambulation/Elevation   Gait pattern   (decreased gait speed)   Gait Assistance 5  Supervision   Assistive Device None   Distance 450' x 2 standing rest break ~1 min   Balance   Ambulatory Fair +   Endurance Deficit   Endurance Deficit Yes   Activity Tolerance   Activity Tolerance Patient tolerated treatment well   Assessment   Prognosis Good   Problem List Decreased endurance; Impaired balance;Decreased mobility   Assessment Pt  seen for PT treatment session this date with interventions consisting of  therapeutic exercises, bed mobility, transfers and  gait training w/ emphasis on improving pt's ability to ambulate  Pt  Currently performing  tx and ambulation at (SUP ) x 1 level of function  The patient's AM-PAC Basic Mobility Inpatient Short Form Raw Score is 23, Standardized Score is 50  88  A standardized score greater than 42 9 suggests the patient may benefit from discharge to home  Please also refer to physical therapy recommendation for safe DC planning  In comparison to previous session, Pt  With improvements in activity tolerance  SpO2 94-97%, increased with ambulation  Mild SOB with 4 L O2  Pt is in need of continued activity in PT to improve strength balance endurance mobility transfers and ambulation with return to maximize LOF   From PT/mobility standpoint, recommendation at time of d/c would be home no rehab needs  in order to promote return to [facility] and independence  Goals   LTG Expiration Date 05/19/21   Plan   Treatment/Interventions Elevations; Endurance training;Gait training   Progress Progressing toward goals   AM-PAC Basic Mobility Inpatient   Turning in Bed Without Bedrails 4   Lying on Back to Sitting on Edge of Flat Bed 4   Moving Bed to Chair 4   Standing Up From Chair 4   Walk in Room 4   Climb 3-5 Stairs 3   Basic Mobility Inpatient Raw Score 23   Basic Mobility Standardized Score 50 88   Pt  OOB in chair  with call bell within reach, all lines intact  at end of PT session  Discussed with  PT today's treatment and patient's current level of function for care coordination

## 2021-05-10 NOTE — ASSESSMENT & PLAN NOTE
Iron studies with anemia of chronic disease  Stool for occult blood has been ordered and still pending  Continue monitor hemoglobin - currently appears stable

## 2021-05-10 NOTE — ASSESSMENT & PLAN NOTE
With initial sepsis - pneumonia is likely postobstructive in nature  Sepsis originally present  Cultures are unrevealing  Patient has improved, with now normalized procalcitonin levels with a 7 day course of IV cefepime

## 2021-05-10 NOTE — ASSESSMENT & PLAN NOTE
Non-sustained ventricular tachycardia on telemetry monitoring -   · Keep the patient's magnesium level at 2, potassium above 4 in the setting of ventricular ectopy  · Consider outpatient cardiology evaluation and Zio patch monitoring

## 2021-05-10 NOTE — ASSESSMENT & PLAN NOTE
Likely medication effect in the setting of IV cefepime use - has concluded course  Will monitor with daily LFTs

## 2021-05-10 NOTE — PLAN OF CARE
Problem: PHYSICAL THERAPY ADULT  Goal: Performs mobility at highest level of function for planned discharge setting  See evaluation for individualized goals  Description: Treatment/Interventions: Elevations, Gait training, Endurance training          See flowsheet documentation for full assessment, interventions and recommendations  Outcome: Progressing  Note: Prognosis: Good  Problem List: Decreased endurance, Impaired balance, Decreased mobility  Assessment: Pt  seen for PT treatment session this date with interventions consisting of  therapeutic exercises, bed mobility, transfers and  gait training w/ emphasis on improving pt's ability to ambulate  Pt  Currently performing  tx and ambulation at (SUP ) x 1 level of function  The patient's AM-PAC Basic Mobility Inpatient Short Form Raw Score is 23, Standardized Score is 50  88  A standardized score greater than 42 9 suggests the patient may benefit from discharge to home  Please also refer to physical therapy recommendation for safe DC planning  In comparison to previous session, Pt  With improvements in activity tolerance  SpO2 94-97%, increased with ambulation  Mild SOB with 4 L O2  Pt is in need of continued activity in PT to improve strength balance endurance mobility transfers and ambulation with return to maximize LOF  From PT/mobility standpoint, recommendation at time of d/c would be home no rehab needs  in order to promote return to PLOF and independence  PT Discharge Recommendation: No rehabilitation needs          See flowsheet documentation for full assessment

## 2021-05-10 NOTE — ASSESSMENT & PLAN NOTE
History of renal cell CA with known metastatic disease, diagnosed 04/13/2021  Status post initial chemotherapy treatment on 04/22  The patient was seen in consultation by Hematology/Oncology - scheduled for follow-up with Dr Nura Ordonez on 05/11/2021

## 2021-05-10 NOTE — ASSESSMENT & PLAN NOTE
Likely due to post-obstructive pneumonia, patient initially required 5 lpm of continuous supplemental O2, then Vapotherm to maintain oxygen levels  · Successfully weaned off the Vapotherm Unit on 05/06/2021 - currently down to 3L by NC  · Did not initially tolerate down titration of IV steroids - will attempt to decrease again to every 12 hours today  · Also being treated with IV furosemide - patient has component of diastolic CHF  Currently on 40 mg IV BID since 05/08/2021  · COVID-19 testing was checked and negative  · Was on Abx therapy with IV cefepime - discontinue today as patient has received 7 days with normalized procalcitonin  · Pulmonology consulted and following  · Has a history of pleural effusions:  - S/p left thoracentesis 4/6/21- exudative pattern with negative cytology and cultures  - Per pulmonology, consider repeat thoracentesis if required, and consideration for Asept catheter placement if needed  - Plan for repeat CXR as outpatient

## 2021-05-10 NOTE — PROGRESS NOTES
[facility] [address] [place]  Progress Note - Moi Arauz 1955, 77 y o  male MRN: [id number]  Unit/Bed#: 400-01 Encounter: [id number]  Primary Care Provider: Dayton Lopez MD   Date and time admitted to hospital: 5/3/2021  7:23 PM    * Acute respiratory failure with hypoxia [facility])  Assessment & Plan  Likely due to post-obstructive pneumonia, patient initially required 5 lpm of continuous supplemental O2, then Vapotherm to maintain oxygen levels  · Successfully weaned off the Vapotherm Unit on 05/06/2021 - currently down to 3L by NC  · Did not initially tolerate down titration of IV steroids - will attempt to decrease again to every 12 hours today  · Also being treated with IV furosemide - patient has component of diastolic CHF  Currently on 40 mg IV BID since 05/08/2021  · COVID-19 testing was checked and negative  · Was on Abx therapy with IV cefepime - discontinue today as patient has received 7 days with normalized procalcitonin  · Pulmonology consulted and following  · Has a history of pleural effusions:  - S/p left thoracentesis 4/6/21- exudative pattern with negative cytology and cultures  - Per pulmonology, consider repeat thoracentesis if required, and consideration for Asept catheter placement if needed  - Plan for repeat CXR as outpatient  HCAP (healthcare-associated pneumonia)  Assessment & Plan  With initial sepsis - pneumonia is likely postobstructive in nature  Sepsis originally present  Cultures are unrevealing  Patient has improved, with now normalized procalcitonin levels with a 7 day course of IV cefepime  Renal cell carcinoma of left kidney [facility])  Assessment & Plan  History of renal cell CA with known metastatic disease, diagnosed 04/13/2021  Status post initial chemotherapy treatment on 04/22  The patient was seen in consultation by Hematology/Oncology - scheduled for follow-up with Dr Leny Bird on 05/11/2021       Lung mass  Assessment & Plan  Known metastatic renal cell carcinoma with lung metastases  Outpatient follow-up with Dr Gracie Chester (Hematology/Oncology)  Diastolic dysfunction  Assessment & Plan  ECHO with GIDD  Received lasix 40 mg IV x 1 dose on 05/06 & 5/07, and initiated on IV BID on 5/8  Continue to monitor daily weights, I/O's, and current diuretic regimen  Monitor renal function  Ventricular ectopy  Assessment & Plan  Non-sustained ventricular tachycardia on telemetry monitoring -   · Keep the patient's magnesium level at 2, potassium above 4 in the setting of ventricular ectopy  · Consider outpatient cardiology evaluation and Zio patch monitoring  Transaminitis  Assessment & Plan  Likely medication effect in the setting of IV cefepime use - has concluded course  Will monitor with daily LFTs  Anemia  Assessment & Plan  Iron studies with anemia of chronic disease  Stool for occult blood has been ordered and still pending  Continue monitor hemoglobin - currently appears stable  Thrombocytosis ([facility] [address] )  Assessment & Plan  Likely reactive in the setting of active malignancy and infection  Continue to monitor  Sepsis [facility])  Assessment & Plan  Initial sepsis based on presentation with tachycardia, leukocytosis, with likely pulmonary source  Treatment as above - procalcitonin is now normalized with 7 day course of IV cefepime, being discontinued today  Continued leukocytosis is on the basis of steroid use - tachycardia is now with ambulation and in the setting of hypoxia  Sepsis appears resolved  Hypocalcemia  Assessment & Plan  Initiated on calcium carbonate 500 mg PO Qdaily - will increase dose today  Also with evidence of vitamin-D deficiency - continue aggressive repletion  Outpatient follow-up with PCP  Vitamin D deficiency  Assessment & Plan  For the vitamin D deficiency, initiated on ergocalciferol 50,000 units weekly for 8 weekly doses    He will then need a repeat Vitamin D 25-OH level checked in 2 months and will need a daily cholecalciferol supplement prescribed by her PCP based on the repeat Vitamin D 25-OH level results  Severe protein-calorie malnutrition ([address] )  Assessment & Plan  Malnutrition Findings:   Adult Malnutrition type: Acute illness  Adult Degree of Malnutrition: Other severe protein calorie malnutrition    BMI Findings: Body mass index is 19 19 kg/m²  Continue nutritional supplements      VTE Pharmacologic Prophylaxis:   Pharmacologic: Enoxaparin (Lovenox)  Mechanical VTE Prophylaxis in Place: Yes    Patient Centered Rounds: I have performed bedside rounds with nursing staff today  Discussions with Specialists or Other Care Team Provider: Reviewed Pulmonology and Hematology notes  Education and Discussions with Family / Patient: Yes    Time Spent for Care: 45 minutes  More than 50% of total time spent on counseling and coordination of care as described above  Current Length of Stay: 7 day(s)    Current Patient Status: Inpatient   Certification Statement: The patient will continue to require additional inpatient hospital stay due to Continued need for IV steroids and furosemide  Discharge Plan:  TBD    Code Status: Level 1 - Full Code      Subjective:   Patient seen and examined - reports feeling improved overall, and has lowering supplemental oxygen requirement  No overnight events reported  Remains afebrile  Objective:     Vitals:   Temp (24hrs), Av 5 °F (36 4 °C), Min:97 1 °F (36 2 °C), Max:97 8 °F (36 6 °C)    Temp:  [97 1 °F (36 2 °C)-97 8 °F (36 6 °C)] 97 5 °F (36 4 °C)  HR:  [] 88  Resp:  [18-20] 20  BP: (136-151)/(69-80) 137/69  SpO2:  [91 %-96 %] 93 %  Body mass index is 19 19 kg/m²  Input and Output Summary (last 24 hours):        Intake/Output Summary (Last 24 hours) at 5/10/2021 1006  Last data filed at 5/10/2021 0155  Gross per 24 hour   Intake 360 ml   Output 1900 ml   Net -1540 ml       Physical Exam:     Physical Exam  Vitals signs and nursing note reviewed  Constitutional:       Appearance: He is well-developed  HENT:      Head: Normocephalic and atraumatic  Eyes:      Conjunctiva/sclera: Conjunctivae normal    Neck:      Musculoskeletal: Neck supple  Cardiovascular:      Rate and Rhythm: Regular rhythm  Tachycardia present  Heart sounds: No murmur  Comments: Tachycardic at time of exam - patient had just returned from ambulation in the hallways  Pulmonary:      Effort: Pulmonary effort is normal  No respiratory distress  Breath sounds: Wheezing and rhonchi present  Comments: Scattered bilateral rhonchi, minimal wheezing  Abdominal:      Palpations: Abdomen is soft  Tenderness: There is no abdominal tenderness  There is no guarding or rebound  Musculoskeletal:         General: Swelling present  No deformity  Skin:     General: Skin is warm and dry  Neurological:      General: No focal deficit present  Mental Status: He is alert and oriented to person, place, and time     Psychiatric:         Mood and Affect: Mood normal          Behavior: Behavior normal         Additional Data:     Labs:    Results from last 7 days   Lab Units 05/10/21  0448 05/09/21  0457   WBC Thousand/uL 18 96* 17 59*   HEMOGLOBIN g/dL 9 4* 8 4*   HEMATOCRIT % 31 9* 29 0*   PLATELETS Thousands/uL 808* 774*   NEUTROS PCT %  --  88*   LYMPHS PCT %  --  2*   LYMPHO PCT % 2*  --    MONOS PCT %  --  9   MONO PCT % 2*  --    EOS PCT % 0 0     Results from last 7 days   Lab Units 05/10/21  0448   SODIUM mmol/L 143   POTASSIUM mmol/L 4 5   CHLORIDE mmol/L 103   CO2 mmol/L 34*   BUN mg/dL 20   CREATININE mg/dL 0 53*   ANION GAP mmol/L 6   CALCIUM mg/dL 8 0*   ALBUMIN g/dL 1 7*   TOTAL BILIRUBIN mg/dL 0 27   ALK PHOS U/L 129*   ALT U/L 178*   AST U/L 53*   GLUCOSE RANDOM mg/dL 120     Results from last 7 days   Lab Units 05/03/21  1951   INR  1 33*             Results from last 7 days   Lab Units 05/09/21  0458 05/08/21  0436 05/07/21  0447 05/06/21  0512 05/05/21  0622  05/03/21 2059   LACTIC ACID mmol/L  --   --   --   --   --   --  2 0   PROCALCITONIN ng/ml 0 18 0 26* 0 42* 0 70* 1 12*   < > 0 51*    < > = values in this interval not displayed  * I Have Reviewed All Lab Data Listed Above  * Additional Pertinent Lab Tests Reviewed: [address] Admission Reviewed    Imaging:    Imaging Reports Reviewed Today Include: CT Chest, CXR, ECHO     Recent Cultures (last 7 days):     Results from last 7 days   Lab Units 05/06/21  1525 05/04/21  0124 05/03/21 2059   BLOOD CULTURE   --   --  No Growth After 5 Days  No Growth After 5 Days     SPUTUM CULTURE  2+ Growth of   --   --    GRAM STAIN RESULT  1+ Epithelial cells per low power field*  No Polys*  1+ Gram negative rods*  1+ Gram positive rods*  1+ Gram positive cocci in pairs*  --   --    LEGIONELLA URINARY ANTIGEN   --  Negative  --        Last 24 Hours Medication List:   Current Facility-Administered Medications   Medication Dose Route Frequency Provider Last Rate    albuterol  2 5 mg Nebulization Q4H PRN Abbi Quiñonez, DO      benzonatate  100 mg Oral TID PRN MARILOU Pacheco      calcium carbonate  1 tablet Oral BID With Meals Bijal Horn MD      enoxaparin  40 mg Subcutaneous Q24H Abbi Quiñonez, DO      ergocalciferol  50,000 Units Oral Weekly Abbi Quiñonez, DO      ferrous sulfate  325 mg Oral Daily With Breakfast Rosy Y Swank, MARILOU      fluticasone-vilanterol  1 puff Inhalation Daily MARILOU Pacheco      furosemide  40 mg Intravenous BID (diuretic) Abbi Quiñonez, DO      guaiFENesin  600 mg Oral BID Rosy Y Swank, MARILOU      HYDROmorphone  0 5 mg Intravenous Q4H PRN Abbi Quiñonez, DO      ipratropium  0 5 mg Nebulization TID Abbi Quiñonez, DO      levalbuterol  1 25 mg Nebulization TID Abbi Quiñonez, DO      methylPREDNISolone sodium succinate  40 mg Intravenous Q12H [address] Bijal Horn MD      ondansetron  4 mg Intravenous Q6H PRN MARILOU Godoy      oxyCODONE  5 mg Oral Q4H PRN Ben Yang DO      Or    oxyCODONE  10 mg Oral Q4H PRN Ben Yang DO          Today, Patient Was Seen By: Simone Logan MD    ** Please Note: Dictation voice to text software may have been used in the creation of this document   **

## 2021-05-10 NOTE — ASSESSMENT & PLAN NOTE
Initiated on calcium carbonate 500 mg PO Qdaily - will increase dose today  Also with evidence of vitamin-D deficiency - continue aggressive repletion  Outpatient follow-up with PCP

## 2021-05-10 NOTE — ASSESSMENT & PLAN NOTE
For the vitamin D deficiency, initiated on ergocalciferol 50,000 units weekly for 8 weekly doses  He will then need a repeat Vitamin D 25-OH level checked in 2 months and will need a daily cholecalciferol supplement prescribed by her PCP based on the repeat Vitamin D 25-OH level results

## 2021-05-10 NOTE — ASSESSMENT & PLAN NOTE
ECHO with GIDD  Received lasix 40 mg IV x 1 dose on 05/06 & 5/07, and initiated on IV BID on 5/8  Continue to monitor daily weights, I/O's, and current diuretic regimen  Monitor renal function

## 2021-05-10 NOTE — PLAN OF CARE
Problem: Nutrition/Hydration-ADULT  Goal: Nutrient/Hydration intake appropriate for improving, restoring or maintaining nutritional needs  Description: Monitor and assess patient's nutrition/hydration status for malnutrition  Collaborate with interdisciplinary team and initiate plan and interventions as ordered  Monitor patient's weight and dietary intake as ordered or per policy  Utilize nutrition screening tool and intervene as necessary  Determine patient's food preferences and provide high-protein, high-caloric foods as appropriate       INTERVENTIONS:  - Monitor oral intake, urinary output, labs, and treatment plans  - Assess nutrition and hydration status and recommend course of action  - Evaluate amount of meals eaten  - Assist patient with eating if necessary   - Allow adequate time for meals  - Recommend/ encourage appropriate diets, oral nutritional supplements, and vitamin/mineral supplements  - Order, calculate, and assess calorie counts as needed  - Recommend, monitor, and adjust tube feedings and TPN/PPN based on assessed needs  - Assess need for intravenous fluids  - Provide specific nutrition/hydration education as appropriate  - Include patient/family/caregiver in decisions related to nutrition  Outcome: Progressing     Problem: RESPIRATORY - ADULT  Goal: Achieves optimal ventilation and oxygenation  Description: INTERVENTIONS:  - Assess for changes in respiratory status  - Assess for changes in mentation and behavior  - Position to facilitate oxygenation and minimize respiratory effort  - Oxygen administered by appropriate delivery if ordered  - Initiate smoking cessation education as indicated  - Encourage broncho-pulmonary hygiene including cough, deep breathe, Incentive Spirometry  - Assess the need for suctioning and aspirate as needed  - Assess and instruct to report SOB or any respiratory difficulty  - Respiratory Therapy support as indicated  Outcome: Progressing     Problem: Potential for Falls  Goal: Patient will remain free of falls  Description: INTERVENTIONS:  - Assess patient frequently for physical needs  -  Identify cognitive and physical deficits and behaviors that affect risk of falls   (oxygen dependence)  -  [place] fall precautions as indicated by assessment  (medium fall risk)  - Educate patient/family on patient safety including physical limitations  - Instruct patient to call for assistance with activity based on assessment  - Modify environment to reduce risk of injury  - Consider OT/PT consult to assist with strengthening/mobility  Outcome: Progressing     Problem: INFECTION - ADULT  Goal: Absence or prevention of progression during hospitalization  Description: INTERVENTIONS:  - Assess and monitor for signs and symptoms of infection  - Monitor lab/diagnostic results  - Monitor all insertion sites, i e  indwelling lines, tubes, and drains  - Monitor endotracheal if appropriate and nasal secretions for changes in amount and color  - [place] appropriate cooling/warming therapies per order  - Administer medications as ordered  - Instruct and encourage patient and family to use good hand hygiene technique  - Identify and instruct in appropriate isolation precautions for identified infection/condition  Outcome: Progressing     Problem: METABOLIC, FLUID AND ELECTROLYTES - ADULT  Goal: Fluid balance maintained  Description: INTERVENTIONS:  - Monitor labs   - Monitor I/O and WT  - Instruct patient on fluid and nutrition as appropriate  - Assess for signs & symptoms of volume excess or deficit  Outcome: Progressing  Goal: Electrolytes maintained within normal limits  Description: INTERVENTIONS:  - Monitor labs and assess patient for signs and symptoms of electrolyte imbalances  - Administer electrolyte replacement as ordered  - Monitor response to electrolyte replacements, including repeat lab results as appropriate  - Instruct patient on fluid and nutrition as appropriate  Outcome: Progressing     Problem: PAIN - ADULT  Goal: Verbalizes/displays adequate comfort level or baseline comfort level  Description: Interventions:  - Encourage patient to monitor pain and request assistance  - Assess pain using appropriate pain scale  - Administer analgesics based on type and severity of pain and evaluate response  - Implement non-pharmacological measures as appropriate and evaluate response  - Consider cultural and social influences on pain and pain management  - Notify physician/advanced practitioner if interventions unsuccessful or patient reports new pain  Outcome: Progressing     Problem: SAFETY ADULT  Goal: Patient will remain free of falls  Description: INTERVENTIONS:  - Assess patient frequently for physical needs  -  Identify cognitive and physical deficits and behaviors that affect risk of falls   (oxygen dependence)  -  [place] fall precautions as indicated by assessment  (medium fall risk)  - Educate patient/family on patient safety including physical limitations  - Instruct patient to call for assistance with activity based on assessment  - Modify environment to reduce risk of injury  - Consider OT/PT consult to assist with strengthening/mobility  Outcome: Progressing  Goal: Maintain or return to baseline ADL function  Description: INTERVENTIONS:  -  Assess patient's ability to carry out ADLs; assess patient's baseline for ADL function and identify physical deficits which impact ability to perform ADLs (bathing, care of mouth/teeth, toileting, grooming, dressing, etc )  - Assess/evaluate cause of self-care deficits   - Assess range of motion  - Assess patient's mobility; develop plan if impaired  - Assess patient's need for assistive devices and provide as appropriate  - Encourage maximum independence but intervene and supervise when necessary  - Involve family in performance of ADLs  - Assess for home care needs following discharge   - Consider OT consult to assist with ADL evaluation and planning for discharge  - Provide patient education as appropriate  Outcome: Progressing  Goal: Maintain or return mobility status to optimal level  Description: INTERVENTIONS:  - Assess patient's baseline mobility status (ambulation, transfers, stairs, etc )    - Identify cognitive and physical deficits and behaviors that affect mobility  - Identify mobility aids required to assist with transfers and/or ambulation (gait belt, sit-to-stand, lift, walker, cane, etc )  - [place] fall precautions as indicated by assessment  - Record patient progress and toleration of activity level on Mobility SBAR; progress patient to next Phase/Stage  - Instruct patient to call for assistance with activity based on assessment  - Consider rehabilitation consult to assist with strengthening/weightbearing, etc   Outcome: Progressing     Problem: DISCHARGE PLANNING  Goal: Discharge to home or other facility with appropriate resources  Description: INTERVENTIONS:  - Identify barriers to discharge w/patient and caregiver  - Arrange for needed discharge resources and transportation as appropriate  - Identify discharge learning needs (meds, wound care, etc )  - Arrange for interpretive services to assist at discharge as needed  - Refer to Case Management Department for coordinating discharge planning if the patient needs post-hospital services based on physician/advanced practitioner order or complex needs related to functional status, cognitive ability, or social support system  Outcome: Progressing     Problem: Knowledge Deficit  Goal: Patient/family/caregiver demonstrates understanding of disease process, treatment plan, medications, and discharge instructions  Description: Complete learning assessment and assess knowledge base    Interventions:  - Provide teaching at level of understanding  - Provide teaching via preferred learning methods  Outcome: Progressing     Problem: CARDIOVASCULAR - ADULT  Goal: Maintains optimal cardiac output and hemodynamic stability  Description: INTERVENTIONS:  - Monitor I/O, vital signs and rhythm  - Monitor for S/S and trends of decreased cardiac output  - Administer and titrate ordered vasoactive medications to optimize hemodynamic stability  - Assess quality of pulses, skin color and temperature  - Assess for signs of decreased coronary artery perfusion  - Instruct patient to report change in severity of symptoms  Outcome: Progressing  Goal: Absence of cardiac dysrhythmias or at baseline rhythm  Description: INTERVENTIONS:  - Continuous cardiac monitoring, vital signs, obtain 12 lead EKG if ordered  - Administer antiarrhythmic and heart rate control medications as ordered  - Monitor electrolytes and administer replacement therapy as ordered  Outcome: Progressing

## 2021-05-10 NOTE — ASSESSMENT & PLAN NOTE
Known metastatic renal cell carcinoma with lung metastases  Outpatient follow-up with Dr Mushtaq Gillette (Hematology/Oncology)

## 2021-05-10 NOTE — ASSESSMENT & PLAN NOTE
Initial sepsis based on presentation with tachycardia, leukocytosis, with likely pulmonary source  Treatment as above - procalcitonin is now normalized with 7 day course of IV cefepime, being discontinued today  Continued leukocytosis is on the basis of steroid use - tachycardia is now with ambulation and in the setting of hypoxia  Sepsis appears resolved

## 2021-05-10 NOTE — PROGRESS NOTES
Progress Note - Pulmonary   Antony Colbert 77 y o  male MRN: [id number]  Unit/Bed#: 400-01 Encounter: [id number]    Assessment/Plan:    1  Acute hypoxic respiratory failure  1  Weaned to 3L NC this morning without SpO2 dropping below 90%  2  Titrate oxygen maintain SpO2 greater than or equal to 88%   3  Pulmonary toilet: Increase activity as tolerated, out of bed as tolerated, cough and deep breathing as encouraged, incentive spirometry q 1 hour  4  Recommend home O2 evaluation prior to discharge   2  Sepsis secondary to postobstructive pneumonia  1  Present admission with tachycardia, tachypnea, leukocytosis and new infiltrates on CT chest imaging concerning for worsening mass obstructing distal left upper lobe bronchi   2  PCT has normalized and patient has completed 7 days of cefepime- agree with discontinuation of cefepime per primary team  3  Continue supportive care for cough with Mucinex and Tessalon Perles  4  Repeat CT chest in 6-8 weeks   3  Bilateral pleural effusions (L>R)  1  S/p left thoracentesis 4/6/21 that yielded exudate pattern with negative cytology and cultures  2  Could consider repeat thoracentesis if patient becomes more symptomatic or has persistent oxygen requirements- if so would recommend recheck cytology as there is high probability of effusion being malignant and if it reaccumulates quickly we could consider Asept catheter placement  3  Agree with diuretics per primary team (currently on Lasix 40 mg IV BID)  4  Will repeat CT chest in 6-8 weeks as outpatient for continued monitoring   4  Stage IV renal cell carcinoma with bone and lung metastasis  1  Follows with Dr Chelle Wilson- received first chemotherapy 4/22 with Opdivo/Yervoy   2  Heme/onc input appreciated   3  Repeat CT chest in 6-8 weeks   5  Suspected COPD with acute exacerbation  1  Discontinue Solumedrol after morning dose today  2  Start prednisone 40 mg po tomorrow and taper by 10 mg every 3 days   3   Continue Breo 100/25  1 puff daily   4  Continue Atrovent /Xopenex nebs t i d   5  Remains committed to smoking cessation  6  Will need outpatient pulmonary follow-up with PFTs   6  Anemia  1  Trend H&H- improving  2  Treatment per primary team   3  Heme/onc input appreciated        Chief Complaint:    "I feel pretty good "    Subjective:    Patient was seen examined today  No overnight events reported  Patient seen on 3 L nasal cannula sitting in recliner no acute distress  Patient significant improvement in his breathing  For cough productive of yellow sputum  No wheeze currently  Denies chest pain  Has some chronic back pain currently  Still has leg swelling more than his usual      Objective:    Vitals: Blood pressure 137/69, pulse 88, temperature 97 5 °F (36 4 °C), resp  rate 20, height 5' 11" (1 803 m), weight 62 4 kg (137 lb 9 1 oz), SpO2 93 %  3L NC,Body mass index is 19 19 kg/m²  Intake/Output Summary (Last 24 hours) at 5/10/2021 1056  Last data filed at 5/10/2021 0155  Gross per 24 hour   Intake 360 ml   Output 1900 ml   Net -1540 ml       Invasive Devices     Peripheral Intravenous Line            Peripheral IV 05/10/21 Left;Upper;Ventral (anterior) Arm less than 1 day                Physical Exam:     Physical Exam  Vitals signs and nursing note reviewed  Constitutional:       General: He is not in acute distress  Appearance: Normal appearance  HENT:      Head: Normocephalic and atraumatic  Right Ear: External ear normal       Left Ear: External ear normal       Nose: Nose normal       Mouth/Throat:      Mouth: Mucous membranes are moist       Pharynx: Oropharynx is clear  Eyes:      Extraocular Movements: Extraocular movements intact  Conjunctiva/sclera: Conjunctivae normal       Pupils: Pupils are equal, round, and reactive to light  Neck:      Musculoskeletal: Normal range of motion and neck supple  No muscular tenderness     Cardiovascular:      Rate and Rhythm: Normal rate and regular rhythm  Pulses: Normal pulses  Heart sounds: No murmur  Pulmonary:      Effort: Pulmonary effort is normal       Breath sounds: No wheezing, rhonchi or rales  Comments: Diminished breath sounds but otherwise clear to auscultation without rhonchi, rales, wheeze  Abdominal:      General: Bowel sounds are normal       Palpations: Abdomen is soft  There is no mass  Tenderness: There is no abdominal tenderness  Hernia: No hernia is present  Musculoskeletal: Normal range of motion  General: No tenderness or deformity  Right lower leg: Edema present  Left lower leg: Edema present  Skin:     General: Skin is warm and dry  Neurological:      General: No focal deficit present  Mental Status: He is alert and oriented to person, place, and time  Mental status is at baseline  Psychiatric:         Mood and Affect: Mood normal          Behavior: Behavior normal          Thought Content: Thought content normal          Judgment: Judgment normal          Labs: I have personally reviewed pertinent lab results  , ABG: No results found for: PHART, KIS1FRP, PO2ART, YJL8SYZ, S4EIPPUI, BEART, SOURCE, BNP: No results found for: BNP, CBC:   Lab Results   Component Value Date    WBC 18 96 (H) 05/10/2021    HGB 9 4 (L) 05/10/2021    HCT 31 9 (L) 05/10/2021    MCV 78 (L) 05/10/2021     (H) 05/10/2021    MCH 23 0 (L) 05/10/2021    MCHC 29 5 (L) 05/10/2021    RDW 15 8 (H) 05/10/2021    MPV 9 7 05/10/2021    NRBC 0 05/10/2021   , CMP:   Lab Results   Component Value Date    SODIUM 143 05/10/2021    K 4 5 05/10/2021     05/10/2021    CO2 34 (H) 05/10/2021    BUN 20 05/10/2021    CREATININE 0 53 (L) 05/10/2021    CALCIUM 8 0 (L) 05/10/2021    AST 53 (H) 05/10/2021     (H) 05/10/2021    ALKPHOS 129 (H) 05/10/2021    EGFR 110 05/10/2021   , PT/INR: No results found for: PT, INR, Troponin: No results found for: TROPONINI      Imaging and other studies: I have personally reviewed pertinent reports  and I have personally reviewed pertinent films in PACS    Chest x-ray 05/08/2021  Patchy multifocal infiltratesleft greater than right, slightly worse compared to 05/06/21  Left pleural effusion   persistent masses/consolidations in left lung

## 2021-05-10 NOTE — CASE MANAGEMENT
Continuing to follow pt acute respiratory failure with hypoxia; HCAP; renal cell Ca left kidney; lung mass  Pt weaned off vapotherm on 5/6, currently on 3l 02  Possible dc home tomorrow with OP follow up and will check to see if pt qualifies for home 02 on dc

## 2021-05-10 NOTE — RESPIRATORY THERAPY NOTE
05/09/21 2152   Inhalation Therapy Tx   $ Inhalation Therapy Performed Yes   $ Pulse Oximetry Spot Check Charge Completed   SpO2 94 %   Pre-Treatment Pulse 95   Pre-Treatment Respirations 20   Duration 20   Breath Sounds Pre-Treatment Bilateral Diminished; Expiratory wheeze   Breath Sounds Post-Treatment Bilateral Diminished; Expiratory wheezes   Post-Treatment Pulse 92   Post-Treatment Respirations 20   Delivery Source Air;UDN   Position Up in chair   Treatment Tolerance Tolerated well   Resp Comments Patient stating he is feeling better   cough on command dry

## 2021-05-11 ENCOUNTER — TELEPHONE (OUTPATIENT)
Dept: HEMATOLOGY ONCOLOGY | Facility: CLINIC | Age: 66
End: 2021-05-11

## 2021-05-11 VITALS
BODY MASS INDEX: 18.61 KG/M2 | HEIGHT: 71 IN | RESPIRATION RATE: 18 BRPM | SYSTOLIC BLOOD PRESSURE: 125 MMHG | TEMPERATURE: 97.6 F | WEIGHT: 132.94 LBS | DIASTOLIC BLOOD PRESSURE: 72 MMHG | OXYGEN SATURATION: 91 % | HEART RATE: 120 BPM

## 2021-05-11 LAB
ALBUMIN SERPL BCP-MCNC: 1.6 G/DL (ref 3.5–5)
ALP SERPL-CCNC: 140 U/L (ref 46–116)
ALT SERPL W P-5'-P-CCNC: 161 U/L (ref 12–78)
ANION GAP SERPL CALCULATED.3IONS-SCNC: 6 MMOL/L (ref 4–13)
AST SERPL W P-5'-P-CCNC: 51 U/L (ref 5–45)
BASOPHILS # BLD AUTO: 0.02 THOUSANDS/ΜL (ref 0–0.1)
BASOPHILS NFR BLD AUTO: 0 % (ref 0–1)
BILIRUB SERPL-MCNC: 0.37 MG/DL (ref 0.2–1)
BUN SERPL-MCNC: 21 MG/DL (ref 5–25)
CALCIUM ALBUM COR SERPL-MCNC: 9.9 MG/DL (ref 8.3–10.1)
CALCIUM SERPL-MCNC: 8 MG/DL (ref 8.3–10.1)
CHLORIDE SERPL-SCNC: 100 MMOL/L (ref 100–108)
CO2 SERPL-SCNC: 34 MMOL/L (ref 21–32)
CREAT SERPL-MCNC: 0.54 MG/DL (ref 0.6–1.3)
DME PARACHUTE DELIVERY DATE ACTUAL: NORMAL
DME PARACHUTE DELIVERY DATE EXPECTED: NORMAL
DME PARACHUTE DELIVERY DATE REQUESTED: NORMAL
DME PARACHUTE ITEM DESCRIPTION: NORMAL
DME PARACHUTE ORDER STATUS: NORMAL
DME PARACHUTE SUPPLIER NAME: NORMAL
DME PARACHUTE SUPPLIER PHONE: NORMAL
EOSINOPHIL # BLD AUTO: 0.01 THOUSAND/ΜL (ref 0–0.61)
EOSINOPHIL NFR BLD AUTO: 0 % (ref 0–6)
ERYTHROCYTE [DISTWIDTH] IN BLOOD BY AUTOMATED COUNT: 15.9 % (ref 11.6–15.1)
GFR SERPL CREATININE-BSD FRML MDRD: 109 ML/MIN/1.73SQ M
GLUCOSE SERPL-MCNC: 88 MG/DL (ref 65–140)
HCT VFR BLD AUTO: 31.1 % (ref 36.5–49.3)
HGB BLD-MCNC: 9 G/DL (ref 12–17)
IMM GRANULOCYTES # BLD AUTO: 0.13 THOUSAND/UL (ref 0–0.2)
IMM GRANULOCYTES NFR BLD AUTO: 1 % (ref 0–2)
LYMPHOCYTES # BLD AUTO: 0.37 THOUSANDS/ΜL (ref 0.6–4.47)
LYMPHOCYTES NFR BLD AUTO: 2 % (ref 14–44)
MAGNESIUM SERPL-MCNC: 2.2 MG/DL (ref 1.6–2.6)
MCH RBC QN AUTO: 22.2 PG (ref 26.8–34.3)
MCHC RBC AUTO-ENTMCNC: 28.9 G/DL (ref 31.4–37.4)
MCV RBC AUTO: 77 FL (ref 82–98)
MONOCYTES # BLD AUTO: 2.13 THOUSAND/ΜL (ref 0.17–1.22)
MONOCYTES NFR BLD AUTO: 12 % (ref 4–12)
NEUTROPHILS # BLD AUTO: 15.05 THOUSANDS/ΜL (ref 1.85–7.62)
NEUTS SEG NFR BLD AUTO: 85 % (ref 43–75)
NRBC BLD AUTO-RTO: 0 /100 WBCS
PLATELET # BLD AUTO: 714 THOUSANDS/UL (ref 149–390)
PMV BLD AUTO: 9.8 FL (ref 8.9–12.7)
POTASSIUM SERPL-SCNC: 4 MMOL/L (ref 3.5–5.3)
PROCALCITONIN SERPL-MCNC: 0.2 NG/ML
PROT SERPL-MCNC: 6.4 G/DL (ref 6.4–8.2)
RBC # BLD AUTO: 4.06 MILLION/UL (ref 3.88–5.62)
SODIUM SERPL-SCNC: 140 MMOL/L (ref 136–145)
WBC # BLD AUTO: 17.71 THOUSAND/UL (ref 4.31–10.16)

## 2021-05-11 PROCEDURE — 83735 ASSAY OF MAGNESIUM: CPT | Performed by: INTERNAL MEDICINE

## 2021-05-11 PROCEDURE — 80053 COMPREHEN METABOLIC PANEL: CPT | Performed by: INTERNAL MEDICINE

## 2021-05-11 PROCEDURE — 99232 SBSQ HOSP IP/OBS MODERATE 35: CPT | Performed by: PHYSICIAN ASSISTANT

## 2021-05-11 PROCEDURE — 94760 N-INVAS EAR/PLS OXIMETRY 1: CPT

## 2021-05-11 PROCEDURE — 99239 HOSP IP/OBS DSCHRG MGMT >30: CPT | Performed by: INTERNAL MEDICINE

## 2021-05-11 PROCEDURE — 94640 AIRWAY INHALATION TREATMENT: CPT

## 2021-05-11 PROCEDURE — 94761 N-INVAS EAR/PLS OXIMETRY MLT: CPT

## 2021-05-11 PROCEDURE — 84145 PROCALCITONIN (PCT): CPT | Performed by: INTERNAL MEDICINE

## 2021-05-11 PROCEDURE — 97116 GAIT TRAINING THERAPY: CPT

## 2021-05-11 PROCEDURE — 85025 COMPLETE CBC W/AUTO DIFF WBC: CPT | Performed by: INTERNAL MEDICINE

## 2021-05-11 RX ORDER — PANTOPRAZOLE SODIUM 40 MG/1
40 TABLET, DELAYED RELEASE ORAL DAILY
Qty: 30 TABLET | Refills: 0 | Status: SHIPPED | OUTPATIENT
Start: 2021-05-11 | End: 2021-05-18 | Stop reason: SDUPTHER

## 2021-05-11 RX ORDER — ERGOCALCIFEROL 1.25 MG/1
50000 CAPSULE ORAL WEEKLY
Qty: 7 CAPSULE | Refills: 0 | Status: SHIPPED | OUTPATIENT
Start: 2021-05-12

## 2021-05-11 RX ORDER — CALCIUM CARBONATE 500(1250)
1 TABLET ORAL 2 TIMES DAILY WITH MEALS
Qty: 60 TABLET | Refills: 0 | Status: SHIPPED | OUTPATIENT
Start: 2021-05-11

## 2021-05-11 RX ORDER — PREDNISONE 20 MG/1
TABLET ORAL
Qty: 15 TABLET | Refills: 0 | Status: SHIPPED | OUTPATIENT
Start: 2021-05-12 | End: 2021-05-24

## 2021-05-11 RX ADMIN — LEVALBUTEROL HYDROCHLORIDE 1.25 MG: 1.25 SOLUTION, CONCENTRATE RESPIRATORY (INHALATION) at 08:43

## 2021-05-11 RX ADMIN — IPRATROPIUM BROMIDE 0.5 MG: 0.5 SOLUTION RESPIRATORY (INHALATION) at 08:43

## 2021-05-11 RX ADMIN — FUROSEMIDE 40 MG: 10 INJECTION, SOLUTION INTRAMUSCULAR; INTRAVENOUS at 08:04

## 2021-05-11 RX ADMIN — PREDNISONE 40 MG: 20 TABLET ORAL at 08:04

## 2021-05-11 RX ADMIN — GUAIFENESIN 600 MG: 600 TABLET, EXTENDED RELEASE ORAL at 08:08

## 2021-05-11 RX ADMIN — FERROUS SULFATE TAB 325 MG (65 MG ELEMENTAL FE) 325 MG: 325 (65 FE) TAB at 08:04

## 2021-05-11 RX ADMIN — FLUTICASONE FUROATE AND VILANTEROL TRIFENATATE 1 PUFF: 100; 25 POWDER RESPIRATORY (INHALATION) at 08:05

## 2021-05-11 RX ADMIN — CALCIUM 1 TABLET: 500 TABLET ORAL at 08:04

## 2021-05-11 NOTE — PHYSICAL THERAPY NOTE
PHYSICAL THERAPY NOTE          Patient Name: Zohaib Varner  OFRLT'W Date: 5/11/2021 05/11/21 0857   Note Type   Note Type Treatment   Restrictions/Precautions   Weight Bearing Precautions Per Order No   Other Precautions O2   Cognition   Overall Cognitive Status WFL   Following Commands Follows all commands and directions without difficulty   Subjective   Subjective c/o Minimal SOB with exertion   Transfers   Sit to Stand 7  Independent   Stand to Sit 7  Independent   Ambulation/Elevation   Gait pattern   (decreased gait speed)   Balance   Ambulatory Fair +   Endurance Deficit   Endurance Deficit Yes   Activity Tolerance   Activity Tolerance Patient tolerated treatment well   Assessment   Prognosis Good   Problem List Decreased endurance   Assessment Pt  Seen for gait training to improve endurance  Mild SOB with exertion  SpO2 93-95% with 3 L O2  The patient's AM-PAC Basic Mobility Inpatient Short Form Raw Score is 23, Standardized Score is 50  88  A standardized score greater than 42 9 suggests the patient may benefit from discharge to home  Please also refer to the recommendation of the Physical Therapist for safe discharge planning  Recommend d/c home no rehabilitation needs  Goals   LTG Expiration Date 05/19/21   Plan   Treatment/Interventions Functional transfer training;LE strengthening/ROM; Therapeutic exercise; Endurance training;Bed mobility;Gait training   Progress Progressing toward goals   Recommendation   PT Discharge Recommendation No rehabilitation needs   AM-PAC Basic Mobility Inpatient   Turning in Bed Without Bedrails 4   Lying on Back to Sitting on Edge of Flat Bed 4   Moving Bed to Chair 4   Standing Up From Chair 4   Walk in Room 4   Climb 3-5 Stairs 3   Basic Mobility Inpatient Raw Score 23   Basic Mobility Standardized Score 50 88   Pt   OOB in chair  with call bell within reach, all lines intact  at end of PT session  Discussed with  PT today's treatment and patient's current level of function for care coordination

## 2021-05-11 NOTE — ASSESSMENT & PLAN NOTE
Initiated on calcium carbonate 500 mg PO Qdaily - increased dose yesterday  Ionized calcium now only minimally low  Also with evidence of vitamin-D deficiency - continue aggressive repletion  Outpatient follow-up with PCP

## 2021-05-11 NOTE — TELEPHONE ENCOUNTER
Patient no show for visit , [facility] Silvia Mckeon NP is aware

## 2021-05-11 NOTE — PROGRESS NOTES
Progress Note - Pulmonary   Antony Colbert 77 y o  male MRN: [id number]  Unit/Bed#: 400-01 Encounter: [id number]    Assessment/Plan:    1  Acute hypoxic respiratory failure  1  Weaned to 3L NC this morning without SpO2 dropping below 90%  2  Titrate oxygen maintain SpO2 greater than or equal to 88%   3  Pulmonary toilet: Increase activity as tolerated, out of bed as tolerated, cough and deep breathing as encouraged, incentive spirometry q 1 hour  4  Home O2 eval indicates patient requires 2 L nasal cannula continuously  2  Sepsis secondary to postobstructive pneumonia  1  Present admission with tachycardia, tachypnea, leukocytosis and new infiltrates on CT chest imaging concerning for worsening mass obstructing distal left upper lobe bronchi   2  PCT has normalized and patient has completed 7 days of cefepime- agree with discontinuation of cefepime per primary team  3  Continue supportive care for cough with Mucinex and Tessalon Perles  4  Repeat CT chest per oncology recommendations   3  Bilateral pleural effusions (L>R)  1  S/p left thoracentesis 4/6/21 that yielded exudate pattern with negative cytology and cultures  2  Could consider repeat thoracentesis if patient becomes more symptomatic or has persistent oxygen requirements- if so would recommend recheck cytology as there is high probability of effusion being malignant and if it reaccumulates quickly we could consider Asept catheter placement  3  Agree with diuretics per primary team (currently on Lasix 40 mg IV BID)  4  Will repeat CXR as outpatient for continued monitoring if symptoms worsen otherwise imaging per oncology recommendations   4  Stage IV renal cell carcinoma with bone and lung metastasis  1  Follows with Dr Bryan Fishman- received first chemotherapy 4/22 with Opdivo/Yervoy   2  Heme/onc input appreciated   3  Next infusion scheduled for this Thursday- continue per oncology recommendations   5  Suspected COPD with acute exacerbation  1   Started prednisone 40 mg po today and remains stable  Continue prednisone and plan to taper by 10 mg every 3 days until completion   2  Continue Breo 100/25  1 puff daily   3  Continue Atrovent /Xopenex nebs t i d   4  Remains committed to smoking cessation  5  Will need outpatient pulmonary follow-up with PFTs   6  Anemia  1  Trend H&H- improving  2  Treatment per primary team   3  Heme/onc input appreciated       Outpatient Pulmonary follow-up per discharge instructions      Patient is stable from a pulmonary standpoint  Will sign off  Call with questions  Chief Complaint:    "I feel good "    Subjective:      Patient was seen examined today  No overnight events reported  Patient seen on 2 L nasal cannula  He reports that his respiratory symptoms are back at baseline  Denies significant cough or sputum production today  Denies wheeze or chest tightness  No chest pain or palpitations  Reports some dyspnea on exertion but this improves on a daily basis  He is working well with PT/ OT  Feels like he is ready to go and is anxious to get back there  Objective:    Vitals: Blood pressure 125/72, pulse (!) 120, temperature 97 6 °F (36 4 °C), temperature source Oral, resp  rate 18, height 5' 11" (1 803 m), weight 60 3 kg (132 lb 15 oz), SpO2 91 %  2L NC,Body mass index is 18 54 kg/m²  Intake/Output Summary (Last 24 hours) at 5/11/2021 1112  Last data filed at 5/11/2021 0831  Gross per 24 hour   Intake 900 ml   Output 2475 ml   Net -1575 ml       Invasive Devices     Peripheral Intravenous Line            Peripheral IV 05/10/21 Left;Upper;Ventral (anterior) Arm 1 day                Physical Exam:     Physical Exam  Vitals signs and nursing note reviewed  Constitutional:       General: He is not in acute distress  Appearance: Normal appearance  HENT:      Head: Normocephalic and atraumatic        Right Ear: External ear normal       Left Ear: External ear normal       Nose: Nose normal  Mouth/Throat:      Mouth: Mucous membranes are moist       Pharynx: Oropharynx is clear  Eyes:      Extraocular Movements: Extraocular movements intact  Conjunctiva/sclera: Conjunctivae normal       Pupils: Pupils are equal, round, and reactive to light  Neck:      Musculoskeletal: Normal range of motion and neck supple  No muscular tenderness  Cardiovascular:      Rate and Rhythm: Normal rate and regular rhythm  Pulses: Normal pulses  Heart sounds: No murmur  Pulmonary:      Effort: Pulmonary effort is normal       Breath sounds: No wheezing, rhonchi or rales  Comments: Diminished breath sounds with faint left-sided rales, no wheeze  Abdominal:      General: Bowel sounds are normal       Palpations: Abdomen is soft  There is no mass  Tenderness: There is no abdominal tenderness  Hernia: No hernia is present  Musculoskeletal: Normal range of motion  General: No tenderness or deformity  Right lower leg: No edema  Left lower leg: No edema  Skin:     General: Skin is warm and dry  Neurological:      General: No focal deficit present  Mental Status: He is alert and oriented to person, place, and time  Mental status is at baseline  Psychiatric:         Mood and Affect: Mood normal          Behavior: Behavior normal          Thought Content: Thought content normal          Judgment: Judgment normal          Labs: I have personally reviewed pertinent lab results  , ABG: No results found for: PHART, NVS8MZD, PO2ART, BUO0EGY, O3BUKSCB, BEART, SOURCE, BNP: No results found for: BNP, CBC:   Lab Results   Component Value Date    WBC 17 71 (H) 05/11/2021    HGB 9 0 (L) 05/11/2021    HCT 31 1 (L) 05/11/2021    MCV 77 (L) 05/11/2021     (H) 05/11/2021    MCH 22 2 (L) 05/11/2021    MCHC 28 9 (L) 05/11/2021    RDW 15 9 (H) 05/11/2021    MPV 9 8 05/11/2021    NRBC 0 05/11/2021   , CMP:   Lab Results   Component Value Date    SODIUM 140 05/11/2021    K 4 0 05/11/2021     05/11/2021    CO2 34 (H) 05/11/2021    BUN 21 05/11/2021    CREATININE 0 54 (L) 05/11/2021    CALCIUM 8 0 (L) 05/11/2021    AST 51 (H) 05/11/2021     (H) 05/11/2021    ALKPHOS 140 (H) 05/11/2021    EGFR 109 05/11/2021   , PT/INR: No results found for: PT, INR, Troponin: No results found for: TROPONINI      Imaging and other studies: none to review today

## 2021-05-11 NOTE — ASSESSMENT & PLAN NOTE
For the vitamin D deficiency, initiated on ergocalciferol 50,000 units weekly for 8 weekly doses  He will need repeat levels checked in 2 months followed by daily cholecalciferol supplement prescribed by PCP based on the repeat Vitamin D 25-OH level results

## 2021-05-11 NOTE — ASSESSMENT & PLAN NOTE
Likely due to post-obstructive pneumonia, patient initially required 5 lpm of continuous supplemental O2, then Vapotherm to maintain oxygen levels  · COVID-19 testing was checked and negative  · Successfully weaned off the Vapotherm Unit on 05/06/2021 - currently down to room air, 2L by NC with ambulation  · Did not initially tolerate down titration of IV steroids - now tolerating decrease  Plan on slow steroid wean  · Was also treated with IV furosemide - patient has component of diastolic CHF  Received 40 mg IV BID for a total of 3 days  · Was on Abx therapy with IV Cefepime - discontinue 5/10 following at 7 day course with normalized procalcitonin  · Pulmonology was consulted and followed patient while hospitalized  Will insure outpatient follow-up as well  · Has a history of pleural effusions:  - S/p left thoracentesis 4/6/21- exudative pattern with negative cytology and cultures  - Per pulmonology, consider repeat thoracentesis if required, and consideration for Asept catheter placement if needed  - Plan for repeat CXR as outpatient - will schedule

## 2021-05-11 NOTE — TELEPHONE ENCOUNTER
Can you please defer pt's treatment for one week   (per CE/CRNP)I will change the date on the next order  Thank you

## 2021-05-11 NOTE — NURSING NOTE
Went over D/C instruction with patient in depth - states that he understands all instructions  Patient went neela alycia O2 - 2 L at all times that was verified with patient  No medical changes with patient - D/C in stable condition

## 2021-05-11 NOTE — ASSESSMENT & PLAN NOTE
Iron studies with anemia of chronic disease  Stool for occult blood has been ordered and unfortunately not collected  Continue monitor hemoglobin - currently appears stable  Repeat CBC as outpatient

## 2021-05-11 NOTE — ASSESSMENT & PLAN NOTE
ECHO with GIDD  Received lasix 40 mg IV x 1 dose on 05/06 & 5/07, and initiated on IV BID on 5/8  Daily weights, I/O's were monitored on current diuretic regimen  Monitor renal function

## 2021-05-11 NOTE — ASSESSMENT & PLAN NOTE
Malnutrition Findings:   Adult Malnutrition type: Acute illness  Adult Degree of Malnutrition: Other severe protein calorie malnutrition    BMI Findings: Body mass index is 18 54 kg/m²       Continue nutritional supplements

## 2021-05-11 NOTE — ASSESSMENT & PLAN NOTE
One episode of Non-sustained ventricular tachycardia on telemetry monitoring -   · Keep the patient's magnesium level at 2, potassium above 4 in the setting of ventricular ectopy  · No further episodes reported

## 2021-05-11 NOTE — ASSESSMENT & PLAN NOTE
Likely medication effect in the setting of IV Cefepime use - has concluded course  LFTs remain elevated but with slight improvement today  Will repeat as outpatient

## 2021-05-11 NOTE — CASE MANAGEMENT
Discussed with patient preferences on discharge;understanding how to manage health at home; purpose of taking medications; importance of follow up care/appointments; and symptoms to watch out for once discharged home  Pt is being dc'd home on this date with OP follow up and home 02 arranged with [employer]  Pt was provided with 2p ortable tanks to go home and [employer] aware pt leaving here shortly to go home  Pt's brother to transport him home

## 2021-05-11 NOTE — DISCHARGE SUMMARY
[facility] [address]  Discharge- Janes Del 1955, 77 y o  male MRN: [id number]  Unit/Bed#: 400-01 Encounter: [id number]  Primary Care Provider: Alpa Christopher MD   Date and time admitted to hospital: 5/3/2021  7:23 PM    * Acute respiratory failure with hypoxia [facility])  Assessment & Plan  Likely due to post-obstructive pneumonia, patient initially required 5 lpm of continuous supplemental O2, then Vapotherm to maintain oxygen levels  · COVID-19 testing was checked and negative  · Successfully weaned off the Vapotherm Unit on 05/06/2021 - currently down to room air, 2L by NC with ambulation  · Did not initially tolerate down titration of IV steroids - now tolerating decrease  Plan on slow steroid wean  · Was also treated with IV furosemide - patient has component of diastolic CHF  Received 40 mg IV BID for a total of 3 days  · Was on Abx therapy with IV Cefepime - discontinue 5/10 following at 7 day course with normalized procalcitonin  · Pulmonology was consulted and followed patient while hospitalized  Will insure outpatient follow-up as well  · Has a history of pleural effusions:  - S/p left thoracentesis 4/6/21- exudative pattern with negative cytology and cultures  - Per pulmonology, consider repeat thoracentesis if required, and consideration for Asept catheter placement if needed  - Plan for repeat CT as outpatient to be coordinated by Heme/Onc  HCAP (healthcare-associated pneumonia)  Assessment & Plan  With initial sepsis - pneumonia is likely postobstructive in nature  Sepsis originally present  Cultures are unrevealing  Patient has improved, with now normalized procalcitonin levels with a 7 day course of IV cefepime  Renal cell carcinoma of left kidney [facility])  Assessment & Plan  History of renal cell CA with known metastatic disease, diagnosed 04/13/2021  Status post initial chemotherapy treatment on 04/22    The patient was seen in consultation by Hematology/Oncology - scheduled for follow-up with Dr Memo Grove on 05/11/2021  Lung mass  Assessment & Plan  Known metastatic renal cell carcinoma with lung metastases  Outpatient follow-up with Dr Memo Grove (Hematology/Oncology)  Diastolic dysfunction  Assessment & Plan  ECHO with GIDD  Received lasix 40 mg IV x 1 dose on 05/06 & 5/07, and initiated on IV BID on 5/8  Daily weights, I/O's were monitored on current diuretic regimen  Monitor renal function  Ventricular ectopy  Assessment & Plan  One episode of Non-sustained ventricular tachycardia on telemetry monitoring -   · Keep the patient's magnesium level at 2, potassium above 4 in the setting of ventricular ectopy  · No further episodes reported  Transaminitis  Assessment & Plan  Likely medication effect in the setting of IV Cefepime use - has concluded course  LFTs remain elevated but with slight improvement today  Will repeat as outpatient  Anemia  Assessment & Plan  Iron studies with anemia of chronic disease  Stool for occult blood has been ordered and unfortunately not collected  Continue monitor hemoglobin - currently appears stable  Repeat CBC as outpatient  Thrombocytosis ([address] )  Assessment & Plan  Likely reactive in the setting of active malignancy and infection - improved today but remains elevated  Continue to monitor with outpatient CBC  Sepsis [facility])  Assessment & Plan  Initial sepsis based on presentation with tachycardia, leukocytosis, with likely pulmonary source  Treatment as above - procalcitonin is now normalized with 7 day course of IV cefepime discontinued yesterday  Continued leukocytosis is on the basis of steroid use - tachycardia is now with ambulation and in the setting of hypoxia  Sepsis remains resolved  Hypocalcemia  Assessment & Plan  Initiated on calcium carbonate 500 mg PO Qdaily - increased dose yesterday  Ionized calcium now only minimally low    Also with evidence of vitamin-D deficiency - continue aggressive repletion  Outpatient follow-up with PCP  Vitamin D deficiency  Assessment & Plan  For the vitamin D deficiency, initiated on ergocalciferol 50,000 units weekly for 8 weekly doses  He will need repeat levels checked in 2 months followed by daily cholecalciferol supplement prescribed by PCP based on the repeat Vitamin D 25-OH level results  Severe protein-calorie malnutrition ([address] )  Assessment & Plan  Malnutrition Findings:   Adult Malnutrition type: Acute illness  Adult Degree of Malnutrition: Other severe protein calorie malnutrition    BMI Findings: Body mass index is 18 54 kg/m²  Continue nutritional supplements    Discharging Physician / Practitioner: Lainey Tiwari MD  PCP: Paxton Barney MD  Admission Date:   Admission Orders (From admission, onward)     Ordered        05/03/21 2159  Inpatient Admission  Once                   Discharge Date: 05/11/21    Resolved Problems  Date Reviewed: 5/11/2021    None          Consultations During Hospital Stay:  · Hematology/Oncology  · Pulmonology    Procedures Performed:   · None    Significant Findings / Test Results:   Xr Chest Portable    Result Date: 5/8/2021  Impression: Patchy opacification in the right upper lobe, most likely pneumonia, progressed since 5/6/2021  Persistent masses and areas of consolidation in the left lung  Workstation performed: [id number]     Xr Chest Portable    Result Date: 5/6/2021  Impression: Increasing left upper and lower lung field consolidations consistent with the patient's known masses  Superimposed pneumonia is not excluded  Multifocal right consolidations concerning for pneumonia and/or malignancy  Workstation performed: [id number]     Cta Ed Chest Pe Study    Result Date: 5/3/2021  Impression: 1  No evidence of pulmonary embolism   2   Large dense opacity in the left upper lobe and interval increase in patchy groundglass opacities in the right perihilar region likely due to pneumonia in the appropriate clinical setting  3   Known left upper lobe mass is obscured  Interval increase in mass in the lingular region  Redemonstrated right lower lobe nodular masses or metastasis  4   Stable extensive lymphadenopathy  5   Redemonstrated expansile destructive lesion in the 9th rib and interval increase in compression deformity at the T9 vertebral body  Workstation performed: [id number]     VASCULAR CENTER REPORT  CLINICAL:  Indications:  Patient presents with bilateral lower extremity edema  Operative History:  No cardiovascular surgeries noted  FINDINGS:     Segment  Right            Left                Impression       Impression         CFV      Normal (Patent)  Normal (Patent)       Incidental Findings:   · As above     Test Results Pending at Discharge (will require follow up): · None     Outpatient Tests Requested:  · CBC, CMP in 3-5 days    Complications:  None    Reason for Admission:  Acute hypoxic respiratory failure    HPI from original H&P:     Janes Marie is a 77 y o  male with a past medical history of renal cell carcinoma currently on chemotherapy presented for increased shortness of breath  Patient denies using supplemental oxygen  Patient states has been having increasing dyspnea on exertion for the last couple of days  Also admits to upper back discomfort but he does not feel is pleuritic in nature  Patient follows dr Gregory Salazar Hematology-Oncology for his renal cell carcinoma  Patient had a recent hospitalization beginning of April and was noted to have a lung mass as well  Biopsy was collected and noted to be metastasis from renal cell carcinoma  Upon presentation to the emergency room patient's SpO2 was 84% on room air  Patient was afebrile tachypneic tachycardic and mildly elevated white blood cell count of 13   Additionally patient was found to have an elevated D-dimer CTA of the chest was collected and noted no signs of a pulmonary embolism however readdress the lung mass and is highly suspicious of pneumonia  Additionally patient was found to have anemia with a hemoglobin of 8 7 down from 10 3 on April 22nd  Patient denies any bleeding  Patient denies lightheadedness dizziness headaches fever chest pain admits to shortness of breath especially with mild exertion  denies abdominal pain nausea vomiting diarrhea denies dysuria urgency or frequency denies melena or hematochezia  Cefepime and vancomycin initiated and will be continued  Please see above list of diagnoses and related plan for additional information  Condition at Discharge: stable     Discharge Day Visit / Exam:     Vitals: Blood Pressure: 125/72 (05/11/21 0700)  Pulse: (!) 120 (05/11/21 0907)  Temperature: 97 6 °F (36 4 °C) (05/11/21 0700)  Temp Source: Oral (05/11/21 0700)  Respirations: 18 (05/11/21 0700)  Height: 5' 11" (180 3 cm) (05/03/21 1933)  Weight - Scale: 60 3 kg (132 lb 15 oz) (05/11/21 0600)  SpO2: 91 % (05/11/21 0925)  Exam:   Vitals signs and nursing note reviewed  Constitutional:       Appearance: He is well-developed  HENT:      Head: Normocephalic and atraumatic  Eyes:      Conjunctiva/sclera: Conjunctivae normal    Neck:      Musculoskeletal: Neck supple  Cardiovascular:      Rate and Rhythm: Regular rhythm  Tachycardia present  Heart sounds: No murmur  Comments: Tachycardic at time of exam - patient had just returned from ambulation in the hallways  Pulmonary:      Effort: Pulmonary effort is normal  No respiratory distress  Breath sounds: Wheezing and rhonchi present  Comments: Scattered bilateral rhonchi, minimal wheezing  Abdominal:      Palpations: Abdomen is soft  Tenderness: There is no abdominal tenderness  There is no guarding or rebound  Musculoskeletal:         General: Swelling present  No deformity  Skin:     General: Skin is warm and dry  Neurological:      General: No focal deficit present        Mental Status: He is alert and oriented to person, place, and time  Psychiatric:         Mood and Affect: Mood normal          Behavior: Behavior normal      Discharge instructions/Information to patient and family:   See after visit summary for information provided to patient and family  Provisions for Follow-Up Care:  See after visit summary for information related to follow-up care and any pertinent home health orders  Disposition:     Home    For Discharges to [facility] SNF:   · Not Applicable to this Patient - Not Applicable to this Patient    Planned Readmission: No     Discharge Statement:  I spent 40 minutes discharging the patient  This time was spent on the day of discharge  I had direct contact with the patient on the day of discharge  Greater than 50% of the total time was spent examining patient, answering all patient questions, arranging and discussing plan of care with patient as well as directly providing post-discharge instructions  Additional time then spent on discharge activities  Discharge Medications:  See after visit summary for reconciled discharge medications provided to patient and family        ** Please Note: This note has been constructed using a voice recognition system **

## 2021-05-11 NOTE — PLAN OF CARE
Problem: Nutrition/Hydration-ADULT  Goal: Nutrient/Hydration intake appropriate for improving, restoring or maintaining nutritional needs  Description: Monitor and assess patient's nutrition/hydration status for malnutrition  Collaborate with interdisciplinary team and initiate plan and interventions as ordered  Monitor patient's weight and dietary intake as ordered or per policy  Utilize nutrition screening tool and intervene as necessary  Determine patient's food preferences and provide high-protein, high-caloric foods as appropriate       INTERVENTIONS:  - Monitor oral intake, urinary output, labs, and treatment plans  - Assess nutrition and hydration status and recommend course of action  - Evaluate amount of meals eaten  - Assist patient with eating if necessary   - Allow adequate time for meals  - Recommend/ encourage appropriate diets, oral nutritional supplements, and vitamin/mineral supplements  - Order, calculate, and assess calorie counts as needed  - Recommend, monitor, and adjust tube feedings and TPN/PPN based on assessed needs  - Assess need for intravenous fluids  - Provide specific nutrition/hydration education as appropriate  - Include patient/family/caregiver in decisions related to nutrition  Outcome: Adequate for Discharge     Problem: RESPIRATORY - ADULT  Goal: Achieves optimal ventilation and oxygenation  Description: INTERVENTIONS:  - Assess for changes in respiratory status  - Assess for changes in mentation and behavior  - Position to facilitate oxygenation and minimize respiratory effort  - Oxygen administered by appropriate delivery if ordered  - Initiate smoking cessation education as indicated  - Encourage broncho-pulmonary hygiene including cough, deep breathe, Incentive Spirometry  - Assess the need for suctioning and aspirate as needed  - Assess and instruct to report SOB or any respiratory difficulty  - Respiratory Therapy support as indicated  Outcome: Adequate for Discharge Problem: Potential for Falls  Goal: Patient will remain free of falls  Description: INTERVENTIONS:  - Assess patient frequently for physical needs  -  Identify cognitive and physical deficits and behaviors that affect risk of falls   (oxygen dependence)  -  [place] fall precautions as indicated by assessment  (medium fall risk)  - Educate patient/family on patient safety including physical limitations  - Instruct patient to call for assistance with activity based on assessment  - Modify environment to reduce risk of injury  - Consider OT/PT consult to assist with strengthening/mobility  Outcome: Adequate for Discharge     Problem: INFECTION - ADULT  Goal: Absence or prevention of progression during hospitalization  Description: INTERVENTIONS:  - Assess and monitor for signs and symptoms of infection  - Monitor lab/diagnostic results  - Monitor all insertion sites, i e  indwelling lines, tubes, and drains  - Monitor endotracheal if appropriate and nasal secretions for changes in amount and color  - [place] appropriate cooling/warming therapies per order  - Administer medications as ordered  - Instruct and encourage patient and family to use good hand hygiene technique  - Identify and instruct in appropriate isolation precautions for identified infection/condition  Outcome: Adequate for Discharge     Problem: METABOLIC, FLUID AND ELECTROLYTES - ADULT  Goal: Fluid balance maintained  Description: INTERVENTIONS:  - Monitor labs   - Monitor I/O and WT  - Instruct patient on fluid and nutrition as appropriate  - Assess for signs & symptoms of volume excess or deficit  Outcome: Adequate for Discharge  Goal: Electrolytes maintained within normal limits  Description: INTERVENTIONS:  - Monitor labs and assess patient for signs and symptoms of electrolyte imbalances  - Administer electrolyte replacement as ordered  - Monitor response to electrolyte replacements, including repeat lab results as appropriate  - Instruct patient on fluid and nutrition as appropriate  Outcome: Adequate for Discharge     Problem: PAIN - ADULT  Goal: Verbalizes/displays adequate comfort level or baseline comfort level  Description: Interventions:  - Encourage patient to monitor pain and request assistance  - Assess pain using appropriate pain scale  - Administer analgesics based on type and severity of pain and evaluate response  - Implement non-pharmacological measures as appropriate and evaluate response  - Consider cultural and social influences on pain and pain management  - Notify physician/advanced practitioner if interventions unsuccessful or patient reports new pain  Outcome: Adequate for Discharge     Problem: SAFETY ADULT  Goal: Patient will remain free of falls  Description: INTERVENTIONS:  - Assess patient frequently for physical needs  -  Identify cognitive and physical deficits and behaviors that affect risk of falls   (oxygen dependence)  -  [place] fall precautions as indicated by assessment  (medium fall risk)  - Educate patient/family on patient safety including physical limitations  - Instruct patient to call for assistance with activity based on assessment  - Modify environment to reduce risk of injury  - Consider OT/PT consult to assist with strengthening/mobility  Outcome: Adequate for Discharge  Goal: Maintain or return to baseline ADL function  Description: INTERVENTIONS:  -  Assess patient's ability to carry out ADLs; assess patient's baseline for ADL function and identify physical deficits which impact ability to perform ADLs (bathing, care of mouth/teeth, toileting, grooming, dressing, etc )  - Assess/evaluate cause of self-care deficits   - Assess range of motion  - Assess patient's mobility; develop plan if impaired  - Assess patient's need for assistive devices and provide as appropriate  - Encourage maximum independence but intervene and supervise when necessary  - Involve family in performance of ADLs  - Assess for home care needs following discharge   - Consider OT consult to assist with ADL evaluation and planning for discharge  - Provide patient education as appropriate  Outcome: Adequate for Discharge  Goal: Maintain or return mobility status to optimal level  Description: INTERVENTIONS:  - Assess patient's baseline mobility status (ambulation, transfers, stairs, etc )    - Identify cognitive and physical deficits and behaviors that affect mobility  - Identify mobility aids required to assist with transfers and/or ambulation (gait belt, sit-to-stand, lift, walker, cane, etc )  - [place] fall precautions as indicated by assessment  - Record patient progress and toleration of activity level on Mobility SBAR; progress patient to next Phase/Stage  - Instruct patient to call for assistance with activity based on assessment  - Consider rehabilitation consult to assist with strengthening/weightbearing, etc   Outcome: Adequate for Discharge     Problem: DISCHARGE PLANNING  Goal: Discharge to home or other facility with appropriate resources  Description: INTERVENTIONS:  - Identify barriers to discharge w/patient and caregiver  - Arrange for needed discharge resources and transportation as appropriate  - Identify discharge learning needs (meds, wound care, etc )  - Arrange for interpretive services to assist at discharge as needed  - Refer to Case Management Department for coordinating discharge planning if the patient needs post-hospital services based on physician/advanced practitioner order or complex needs related to functional status, cognitive ability, or social support system  Outcome: Adequate for Discharge     Problem: Knowledge Deficit  Goal: Patient/family/caregiver demonstrates understanding of disease process, treatment plan, medications, and discharge instructions  Description: Complete learning assessment and assess knowledge base    Interventions:  - Provide teaching at level of understanding  - Provide teaching via preferred learning methods  Outcome: Adequate for Discharge     Problem: CARDIOVASCULAR - ADULT  Goal: Maintains optimal cardiac output and hemodynamic stability  Description: INTERVENTIONS:  - Monitor I/O, vital signs and rhythm  - Monitor for S/S and trends of decreased cardiac output  - Administer and titrate ordered vasoactive medications to optimize hemodynamic stability  - Assess quality of pulses, skin color and temperature  - Assess for signs of decreased coronary artery perfusion  - Instruct patient to report change in severity of symptoms  Outcome: Adequate for Discharge  Goal: Absence of cardiac dysrhythmias or at baseline rhythm  Description: INTERVENTIONS:  - Continuous cardiac monitoring, vital signs, obtain 12 lead EKG if ordered  - Administer antiarrhythmic and heart rate control medications as ordered  - Monitor electrolytes and administer replacement therapy as ordered  Outcome: Adequate for Discharge

## 2021-05-11 NOTE — ASSESSMENT & PLAN NOTE
Likely reactive in the setting of active malignancy and infection - improved today but remains elevated  Continue to monitor with outpatient CBC

## 2021-05-11 NOTE — ASSESSMENT & PLAN NOTE
Initial sepsis based on presentation with tachycardia, leukocytosis, with likely pulmonary source  Treatment as above - procalcitonin is now normalized with 7 day course of IV cefepime discontinued yesterday  Continued leukocytosis is on the basis of steroid use - tachycardia is now with ambulation and in the setting of hypoxia  Sepsis remains resolved

## 2021-05-11 NOTE — PLAN OF CARE
Problem: PHYSICAL THERAPY ADULT  Goal: Performs mobility at highest level of function for planned discharge setting  See evaluation for individualized goals  Description: Treatment/Interventions: Elevations, Gait training, Endurance training          See flowsheet documentation for full assessment, interventions and recommendations  Outcome: Progressing  Note: Prognosis: Good  Problem List: Decreased endurance  Assessment: Pt  Seen for gait training to improve endurance  Mild SOB with exertion  SpO2 93-95% with 3 L O2  The patient's AM-PAC Basic Mobility Inpatient Short Form Raw Score is 23, Standardized Score is 50  88  A standardized score greater than 42 9 suggests the patient may benefit from discharge to home  Please also refer to the recommendation of the Physical Therapist for safe discharge planning  Recommend d/c home no rehabilitation needs  PT Discharge Recommendation: No rehabilitation needs          See flowsheet documentation for full assessment

## 2021-05-11 NOTE — RESPIRATORY THERAPY NOTE
Home Oxygen Qualifying Test       Patient name: Sylwia Meza        : 1955   Date of Test:  May 11, 2021  Diagnosis:      Home Oxygen Test:    **Medicare Guidelines require item(s) 1-5 on all ambulatory patients or 1 and 2 on non-ambulatory patients  1   Baseline SPO2 on Room Air at rest 86 %  2   SPO2 during exercise on Room Air na %  During exercise monitor SpO2  If SPO2 increases >=89% with ambulation do not add supplemental             oxygen  If <= 88% on room air add O2 via NC and titrate patient  Patient must be ambulated with O2 and titrated to > 88% with exertion  3   SPO2 on Oxygen at rest 93 % 2 lpm     4   SPO2 during exercise on Oxygen  91% a liter flow of 2 lpm     5   Exercise performed:          walking, distance 900 (feet)          [x]  Supplemental Home Oxygen is indicated  []  Client does not qualify for home oxygen        Respiratory Additional Notes- tino Bergman, RT

## 2021-05-11 NOTE — ASSESSMENT & PLAN NOTE
Known metastatic renal cell carcinoma with lung metastases  Outpatient follow-up with Dr Stan Sim (Hematology/Oncology)

## 2021-05-12 ENCOUNTER — TRANSITIONAL CARE MANAGEMENT (OUTPATIENT)
Dept: INTERNAL MEDICINE CLINIC | Facility: CLINIC | Age: 66
End: 2021-05-12

## 2021-05-12 DIAGNOSIS — R91.8 LUNG MASS: ICD-10-CM

## 2021-05-12 DIAGNOSIS — C79.51 BONE METASTASES (HCC): ICD-10-CM

## 2021-05-12 DIAGNOSIS — R59.0 MEDIASTINAL LYMPHADENOPATHY: ICD-10-CM

## 2021-05-12 RX ORDER — OXYCODONE HYDROCHLORIDE AND ACETAMINOPHEN 5; 325 MG/1; MG/1
1 TABLET ORAL EVERY 4 HOURS PRN
Qty: 42 TABLET | Refills: 0 | Status: SHIPPED | OUTPATIENT
Start: 2021-05-12 | End: 2021-06-01 | Stop reason: SDUPTHER

## 2021-05-13 ENCOUNTER — HOSPITAL ENCOUNTER (OUTPATIENT)
Dept: INFUSION CENTER | Facility: HOSPITAL | Age: 66
Discharge: HOME | End: 2021-05-13
Attending: INTERNAL MEDICINE

## 2021-05-14 ENCOUNTER — APPOINTMENT (OUTPATIENT)
Dept: LAB | Facility: HOSPITAL | Age: 66
End: 2021-05-14
Attending: INTERNAL MEDICINE
Payer: MEDICARE

## 2021-05-14 DIAGNOSIS — D64.9 ANEMIA, UNSPECIFIED TYPE: ICD-10-CM

## 2021-05-14 DIAGNOSIS — E83.51 HYPOCALCEMIA: ICD-10-CM

## 2021-05-14 LAB
ALBUMIN SERPL BCP-MCNC: 1.8 G/DL (ref 3.5–5)
ALP SERPL-CCNC: 155 U/L (ref 46–116)
ALT SERPL W P-5'-P-CCNC: 151 U/L (ref 12–78)
ANION GAP SERPL CALCULATED.3IONS-SCNC: 9 MMOL/L (ref 4–13)
ANISOCYTOSIS BLD QL SMEAR: PRESENT
AST SERPL W P-5'-P-CCNC: 47 U/L (ref 5–45)
BASOPHILS # BLD MANUAL: 0 THOUSAND/UL (ref 0–0.1)
BASOPHILS NFR MAR MANUAL: 0 % (ref 0–1)
BILIRUB SERPL-MCNC: 0.31 MG/DL (ref 0.2–1)
BUN SERPL-MCNC: 16 MG/DL (ref 5–25)
CALCIUM ALBUM COR SERPL-MCNC: 10.1 MG/DL (ref 8.3–10.1)
CALCIUM SERPL-MCNC: 8.3 MG/DL (ref 8.3–10.1)
CHLORIDE SERPL-SCNC: 104 MMOL/L (ref 100–108)
CO2 SERPL-SCNC: 28 MMOL/L (ref 21–32)
CREAT SERPL-MCNC: 0.54 MG/DL (ref 0.6–1.3)
EOSINOPHIL # BLD MANUAL: 0 THOUSAND/UL (ref 0–0.4)
EOSINOPHIL NFR BLD MANUAL: 0 % (ref 0–6)
ERYTHROCYTE [DISTWIDTH] IN BLOOD BY AUTOMATED COUNT: 15.9 % (ref 11.6–15.1)
GFR SERPL CREATININE-BSD FRML MDRD: 109 ML/MIN/1.73SQ M
GLUCOSE SERPL-MCNC: 142 MG/DL (ref 65–140)
HCT VFR BLD AUTO: 32.1 % (ref 36.5–49.3)
HGB BLD-MCNC: 9.3 G/DL (ref 12–17)
HYPERCHROMIA BLD QL SMEAR: PRESENT
LYMPHOCYTES # BLD AUTO: 0.43 THOUSAND/UL (ref 0.6–4.47)
LYMPHOCYTES # BLD AUTO: 2 % (ref 14–44)
MCH RBC QN AUTO: 22.3 PG (ref 26.8–34.3)
MCHC RBC AUTO-ENTMCNC: 29 G/DL (ref 31.4–37.4)
MCV RBC AUTO: 77 FL (ref 82–98)
MONOCYTES # BLD AUTO: 0.85 THOUSAND/UL (ref 0–1.22)
MONOCYTES NFR BLD: 4 % (ref 4–12)
NEUTROPHILS # BLD MANUAL: 20.07 THOUSAND/UL (ref 1.85–7.62)
NEUTS BAND NFR BLD MANUAL: 3 % (ref 0–8)
NEUTS SEG NFR BLD AUTO: 91 % (ref 43–75)
NRBC BLD AUTO-RTO: 0 /100 WBCS
PLATELET # BLD AUTO: 678 THOUSANDS/UL (ref 149–390)
PLATELET BLD QL SMEAR: ABNORMAL
PMV BLD AUTO: 9.5 FL (ref 8.9–12.7)
POTASSIUM SERPL-SCNC: 4.2 MMOL/L (ref 3.5–5.3)
PROT SERPL-MCNC: 6.9 G/DL (ref 6.4–8.2)
RBC # BLD AUTO: 4.17 MILLION/UL (ref 3.88–5.62)
SODIUM SERPL-SCNC: 141 MMOL/L (ref 136–145)
TOTAL CELLS COUNTED SPEC: 100
WBC # BLD AUTO: 21.35 THOUSAND/UL (ref 4.31–10.16)

## 2021-05-14 PROCEDURE — 85007 BL SMEAR W/DIFF WBC COUNT: CPT

## 2021-05-14 PROCEDURE — 80053 COMPREHEN METABOLIC PANEL: CPT

## 2021-05-14 PROCEDURE — 36415 COLL VENOUS BLD VENIPUNCTURE: CPT

## 2021-05-14 PROCEDURE — 85027 COMPLETE CBC AUTOMATED: CPT

## 2021-05-14 RX ORDER — SODIUM CHLORIDE 9 MG/ML
20 INJECTION, SOLUTION INTRAVENOUS ONCE
Status: CANCELLED | OUTPATIENT
Start: 2021-05-21

## 2021-05-18 ENCOUNTER — OFFICE VISIT (OUTPATIENT)
Dept: INTERNAL MEDICINE CLINIC | Facility: CLINIC | Age: 66
End: 2021-05-18
Payer: MEDICARE

## 2021-05-18 VITALS
TEMPERATURE: 97.5 F | SYSTOLIC BLOOD PRESSURE: 138 MMHG | WEIGHT: 141 LBS | HEIGHT: 71 IN | BODY MASS INDEX: 19.74 KG/M2 | DIASTOLIC BLOOD PRESSURE: 60 MMHG | HEART RATE: 104 BPM | OXYGEN SATURATION: 96 %

## 2021-05-18 DIAGNOSIS — C64.2 RENAL CELL CARCINOMA OF LEFT KIDNEY (HCC): ICD-10-CM

## 2021-05-18 DIAGNOSIS — B37.0 THRUSH: ICD-10-CM

## 2021-05-18 DIAGNOSIS — R60.9 PERIPHERAL EDEMA: ICD-10-CM

## 2021-05-18 DIAGNOSIS — J18.9 HCAP (HEALTHCARE-ASSOCIATED PNEUMONIA): Primary | ICD-10-CM

## 2021-05-18 DIAGNOSIS — C79.51 BONE METASTASES (HCC): ICD-10-CM

## 2021-05-18 DIAGNOSIS — A41.9 SEPSIS, DUE TO UNSPECIFIED ORGANISM, UNSPECIFIED WHETHER ACUTE ORGAN DYSFUNCTION PRESENT (HCC): ICD-10-CM

## 2021-05-18 DIAGNOSIS — E43 SEVERE PROTEIN-CALORIE MALNUTRITION (HCC): ICD-10-CM

## 2021-05-18 DIAGNOSIS — D64.9 ANEMIA, UNSPECIFIED TYPE: ICD-10-CM

## 2021-05-18 DIAGNOSIS — J96.01 ACUTE RESPIRATORY FAILURE WITH HYPOXIA (HCC): ICD-10-CM

## 2021-05-18 PROCEDURE — 99496 TRANSJ CARE MGMT HIGH F2F 7D: CPT | Performed by: FAMILY MEDICINE

## 2021-05-18 RX ORDER — FUROSEMIDE 20 MG/1
20 TABLET ORAL 3 TIMES WEEKLY
Qty: 20 TABLET | Refills: 5 | Status: SHIPPED | OUTPATIENT
Start: 2021-05-19 | End: 2021-06-23 | Stop reason: SDUPTHER

## 2021-05-18 RX ORDER — PANTOPRAZOLE SODIUM 40 MG/1
40 TABLET, DELAYED RELEASE ORAL DAILY
Qty: 90 TABLET | Refills: 1 | Status: SHIPPED | OUTPATIENT
Start: 2021-05-18

## 2021-05-19 ENCOUNTER — APPOINTMENT (OUTPATIENT)
Dept: LAB | Facility: HOSPITAL | Age: 66
End: 2021-05-19
Attending: INTERNAL MEDICINE
Payer: MEDICARE

## 2021-05-19 DIAGNOSIS — C64.2 RENAL CELL CARCINOMA OF LEFT KIDNEY (HCC): ICD-10-CM

## 2021-05-19 LAB
ALBUMIN SERPL BCP-MCNC: 1.6 G/DL (ref 3.5–5)
ALP SERPL-CCNC: 182 U/L (ref 46–116)
ALT SERPL W P-5'-P-CCNC: 103 U/L (ref 12–78)
ANION GAP SERPL CALCULATED.3IONS-SCNC: 6 MMOL/L (ref 4–13)
AST SERPL W P-5'-P-CCNC: 29 U/L (ref 5–45)
BASOPHILS # BLD AUTO: 0.02 THOUSANDS/ΜL (ref 0–0.1)
BASOPHILS NFR BLD AUTO: 0 % (ref 0–1)
BILIRUB SERPL-MCNC: 0.24 MG/DL (ref 0.2–1)
BUN SERPL-MCNC: 11 MG/DL (ref 5–25)
CALCIUM ALBUM COR SERPL-MCNC: 9.8 MG/DL (ref 8.3–10.1)
CALCIUM SERPL-MCNC: 7.9 MG/DL (ref 8.3–10.1)
CHLORIDE SERPL-SCNC: 101 MMOL/L (ref 100–108)
CO2 SERPL-SCNC: 28 MMOL/L (ref 21–32)
CREAT SERPL-MCNC: 0.52 MG/DL (ref 0.6–1.3)
EOSINOPHIL # BLD AUTO: 0.01 THOUSAND/ΜL (ref 0–0.61)
EOSINOPHIL NFR BLD AUTO: 0 % (ref 0–6)
ERYTHROCYTE [DISTWIDTH] IN BLOOD BY AUTOMATED COUNT: 16.5 % (ref 11.6–15.1)
GFR SERPL CREATININE-BSD FRML MDRD: 111 ML/MIN/1.73SQ M
GLUCOSE SERPL-MCNC: 123 MG/DL (ref 65–140)
HCT VFR BLD AUTO: 27.7 % (ref 36.5–49.3)
HGB BLD-MCNC: 8 G/DL (ref 12–17)
IMM GRANULOCYTES # BLD AUTO: 0.14 THOUSAND/UL (ref 0–0.2)
IMM GRANULOCYTES NFR BLD AUTO: 1 % (ref 0–2)
LYMPHOCYTES # BLD AUTO: 0.38 THOUSANDS/ΜL (ref 0.6–4.47)
LYMPHOCYTES NFR BLD AUTO: 2 % (ref 14–44)
MCH RBC QN AUTO: 22 PG (ref 26.8–34.3)
MCHC RBC AUTO-ENTMCNC: 28.9 G/DL (ref 31.4–37.4)
MCV RBC AUTO: 76 FL (ref 82–98)
MONOCYTES # BLD AUTO: 1.3 THOUSAND/ΜL (ref 0.17–1.22)
MONOCYTES NFR BLD AUTO: 8 % (ref 4–12)
NEUTROPHILS # BLD AUTO: 14.31 THOUSANDS/ΜL (ref 1.85–7.62)
NEUTS SEG NFR BLD AUTO: 89 % (ref 43–75)
NRBC BLD AUTO-RTO: 0 /100 WBCS
PLATELET # BLD AUTO: 681 THOUSANDS/UL (ref 149–390)
PMV BLD AUTO: 9.3 FL (ref 8.9–12.7)
POTASSIUM SERPL-SCNC: 4 MMOL/L (ref 3.5–5.3)
PROT SERPL-MCNC: 6.6 G/DL (ref 6.4–8.2)
RBC # BLD AUTO: 3.64 MILLION/UL (ref 3.88–5.62)
SODIUM SERPL-SCNC: 135 MMOL/L (ref 136–145)
TSH SERPL DL<=0.05 MIU/L-ACNC: 0.96 UIU/ML (ref 0.36–3.74)
WBC # BLD AUTO: 16.16 THOUSAND/UL (ref 4.31–10.16)

## 2021-05-19 PROCEDURE — 36415 COLL VENOUS BLD VENIPUNCTURE: CPT

## 2021-05-19 PROCEDURE — 84443 ASSAY THYROID STIM HORMONE: CPT

## 2021-05-19 PROCEDURE — 85025 COMPLETE CBC W/AUTO DIFF WBC: CPT

## 2021-05-19 PROCEDURE — 80053 COMPREHEN METABOLIC PANEL: CPT

## 2021-05-19 NOTE — PROGRESS NOTES
Assessment/Plan:     No problem-specific Assessment & Plan notes found for this encounter  Diagnoses and all orders for this visit:    HCAP (healthcare-associated pneumonia)    Acute respiratory failure with hypoxia ([facility] )    Renal cell carcinoma of left kidney (HCC)    Bone metastases ([facility] )    Sepsis, due to unspecified organism, unspecified whether acute organ dysfunction present ([facility] )    Severe protein-calorie malnutrition ([facility] )    Anemia, unspecified type  -     pantoprazole (PROTONIX) 40 mg tablet; Take 1 tablet (40 mg total) by mouth daily    Peripheral edema  -     furosemide (LASIX) 20 mg tablet; Take 1 tablet (20 mg total) by mouth 3 (three) times a week If needed for leg swelling    Thrush  -     nystatin (MYCOSTATIN) 500,000 units/5 mL suspension; Apply 5 mL (500,000 Units total) to the mouth or throat 4 (four) times a day for 7 days         orders and recommendations as noted above  Reviewed recent hospital records with him  Complete the prednisone  Continue with the oxygen  Follow-up with Pulmonary as planned  Continue with the Advair as previously  Continue with the Tessalon if needed  He does have some significant peripheral edema and did require some Lasix while hospitalized  Advised him to take the Lasix 2 to 3 times a week to help deal with the peripheral edema  Watch for any increasing shortness of breath or worsening peripheral edema  Follow-up with Cardiology as planned  Continue follow-up with Oncology for treatment of metastatic renal cell carcinoma  Try to increase protein intake  Continue with laboratory testing as per oncology is recommendation  Will have him follow up in about 4-6 weeks or sooner if needed  Subjective:     Patient ID: Zohaib Varner is a 77 y o  male  He presents for follow-up after recent hospitalization for acute respiratory failure with hypoxia associated with healthcare associated pneumonia which was likely postobstructive    He had relatively sudden worsening of his underlying shortness of breath  Felt increasingly weak and presented to the emergency room  He initially required 5 L of oxygen and then Vapotherm to maintain oxygen levels  He responded slowly to IV steroids as well as IV Lasix for diastolic congestive heart failure  He was treated with IV cefepime  Pulmonary was consulted  He gradually improved from the pneumonia and the sepsis symptoms  He was discharged home on 2 L of oxygen which he continues on  Has noticed some increasing peripheral edema since returning home  Feels that the shortness of breath has improved somewhat  Continues with the inhalers  Continues with Advair  Continues with the pain in the back  Percocet has help with this somewhat  He has noticed some oral irritation and some white patches in his mouth  Denies any chest pain or palpitations  Still with occasional cough but not nearly as severe  Appetite has been improving  Has been eating multiple meals and has been trying to increase protein intake  Sleeping somewhat better  Review of Systems   Constitutional: Positive for activity change, appetite change and fatigue  Negative for chills and fever  HENT: Positive for mouth sores  Negative for congestion and hearing loss  Eyes: Negative for pain and visual disturbance  Respiratory: Positive for shortness of breath  Negative for cough and chest tightness  Cardiovascular: Positive for leg swelling  Negative for chest pain and palpitations  Gastrointestinal: Negative for abdominal pain, blood in stool, diarrhea, nausea and vomiting  Endocrine: Negative for polydipsia, polyphagia and polyuria  Genitourinary: Negative for dysuria  Musculoskeletal: Positive for back pain  Negative for arthralgias and gait problem  Skin: Negative for color change  Neurological: Negative for dizziness, numbness and headaches  Hematological: Does not bruise/bleed easily  Psychiatric/Behavioral: Positive for sleep disturbance  Negative for behavioral problems and decreased concentration  The patient is not nervous/anxious  The following portions of the patient's history were reviewed and updated as appropriate:   He  has no past medical history on file  He   Patient Active Problem List    Diagnosis Date Noted    Peripheral edema 05/18/2021    Thrush 36/62/6918    Diastolic dysfunction 21/06/0914    Severe protein-calorie malnutrition ([facility] ) 05/06/2021    Ventricular ectopy 05/06/2021    Hypocalcemia 05/05/2021    Vitamin D deficiency 05/05/2021    Premature atrial contractions 05/05/2021    Hypophosphatemia 05/04/2021    HCAP (healthcare-associated pneumonia) 05/03/2021    Thrombocytosis ([place] [address] ) 05/03/2021    Sepsis ([place] [address] ) 05/03/2021    Elevated d-dimer 05/03/2021    Transaminitis 05/03/2021    Acute respiratory failure with hypoxia ([place] [address] ) 05/03/2021    Renal cell carcinoma of left kidney ([place] [address] ) 04/16/2021    Bone metastases ([place] [address] ) 04/15/2021    Pulmonary emphysema (HCC) 04/15/2021    Tachycardia 04/06/2021    Shortness of breath 04/05/2021    Anemia 04/05/2021    Leucocytosis 04/05/2021    Pleural effusion 04/05/2021    Lung mass 04/05/2021    Mediastinal lymphadenopathy 04/05/2021     He  has a past surgical history that includes Tendon repair (Right); IR thoracentesis (4/6/2021); IR biopsy liver mass (4/7/2021); and IR biopsy lymph node (4/13/2021)  His family history includes Heart attack in his brother; Heart disease in his father; Multiple sclerosis in his sister  He  reports that he has quit smoking  He has never used smokeless tobacco  He reports that he does not drink alcohol or use drugs    Current Outpatient Medications   Medication Sig Dispense Refill    benzonatate (TESSALON PERLES) 100 mg capsule Take 1 capsule (100 mg total) by mouth 3 (three) times a day as needed for cough 20 capsule 0    calcium carbonate (OYSTER SHELL,OSCAL) 500 mg Take 1 tablet by mouth 2 (two) times a day with meals 60 tablet 0    ergocalciferol (VITAMIN D2) 50,000 units Take 1 capsule (50,000 Units total) by mouth once a week 7 capsule 0    fluticasone-salmeterol (ADVAIR, WIXELA) 250-50 mcg/dose inhaler Inhale 1 puff 2 (two) times a day Rinse mouth after use  1 Inhaler 3    oxyCODONE-acetaminophen (PERCOCET) 5-325 mg per tablet Take 1 tablet by mouth every 4 (four) hours as needed for moderate painMax Daily Amount: 6 tablets 42 tablet 0    pantoprazole (PROTONIX) 40 mg tablet Take 1 tablet (40 mg total) by mouth daily 90 tablet 1    predniSONE 20 mg tablet Take 2 tablets (40 mg total) by mouth daily for 3 days, THEN 1 5 tablets (30 mg total) daily for 3 days, THEN 1 tablet (20 mg total) daily for 3 days, THEN 0 5 tablets (10 mg total) daily for 3 days  15 tablet 0    ferrous sulfate 324 (65 Fe) mg Take 1 tablet (324 mg total) by mouth every other day 15 tablet 0    [START ON 5/19/2021] furosemide (LASIX) 20 mg tablet Take 1 tablet (20 mg total) by mouth 3 (three) times a week If needed for leg swelling 20 tablet 5    nystatin (MYCOSTATIN) 500,000 units/5 mL suspension Apply 5 mL (500,000 Units total) to the mouth or throat 4 (four) times a day for 7 days 140 mL 1     No current facility-administered medications for this visit  Current Outpatient Medications on File Prior to Visit   Medication Sig    benzonatate (TESSALON PERLES) 100 mg capsule Take 1 capsule (100 mg total) by mouth 3 (three) times a day as needed for cough    calcium carbonate (OYSTER SHELL,OSCAL) 500 mg Take 1 tablet by mouth 2 (two) times a day with meals    ergocalciferol (VITAMIN D2) 50,000 units Take 1 capsule (50,000 Units total) by mouth once a week    fluticasone-salmeterol (ADVAIR, WIXELA) 250-50 mcg/dose inhaler Inhale 1 puff 2 (two) times a day Rinse mouth after use      oxyCODONE-acetaminophen (PERCOCET) 5-325 mg per tablet Take 1 tablet by mouth every 4 (four) hours as needed for moderate painMax Daily Amount: 6 tablets    predniSONE 20 mg tablet Take 2 tablets (40 mg total) by mouth daily for 3 days, THEN 1 5 tablets (30 mg total) daily for 3 days, THEN 1 tablet (20 mg total) daily for 3 days, THEN 0 5 tablets (10 mg total) daily for 3 days   [DISCONTINUED] pantoprazole (PROTONIX) 40 mg tablet Take 1 tablet (40 mg total) by mouth daily    ferrous sulfate 324 (65 Fe) mg Take 1 tablet (324 mg total) by mouth every other day     No current facility-administered medications on file prior to visit  He has No Known Allergies       Objective:     Physical Exam  Vitals signs and nursing note reviewed  Constitutional:       General: He is not in acute distress  Appearance: He is well-developed, well-groomed and underweight  HENT:      Head:      Comments:   Moist mucous membranes; thrush noted throughout the oral cavity  Eyes:      General: Lids are normal  No scleral icterus  Neck:      Musculoskeletal: Neck supple  Cardiovascular:      Rate and Rhythm: Regular rhythm  Tachycardia present  Heart sounds: No murmur  Pulmonary:      Effort: No tachypnea or respiratory distress  Breath sounds: Decreased breath sounds present  No wheezing  Comments:  scattered rhonchi most notable in right upper abdomen  Lymphadenopathy:      Cervical: No cervical adenopathy  Neurological:      Mental Status: He is alert  Psychiatric:         Mood and Affect: Mood and affect normal          Speech: Speech normal          Behavior: Behavior is cooperative  Vitals:    05/18/21 1148   BP: 138/60   BP Location: Left arm   Pulse: 104   Temp: 97 5 °F (36 4 °C)   TempSrc: Temporal   SpO2: 96%   Weight: 64 kg (141 lb)   Height: 5' 11" (1 803 m)       Xr Chest Portable    Result Date: 5/8/2021  Narrative: CHEST INDICATION:   hypoxia, pneumonia  78-year-old male with known masses in the left upper lobe and right lower lobe   COMPARISON:  May 6, 2021 EXAM PERFORMED/VIEWS:  XR CHEST PORTABLE FINDINGS: Cardiomediastinal silhouette appears unremarkable  Left upper lobe and lower lobe opacification, unchanged since 5/6/2021  Increased opacification in the right upper lobe  Small left pleural effusion  No evidence of pneumothorax  Osseous structures appear within normal limits for patient age  Impression: Patchy opacification in the right upper lobe, most likely pneumonia, progressed since 5/6/2021  Persistent masses and areas of consolidation in the left lung  Workstation performed: [id number]     Xr Chest Portable    Result Date: 5/6/2021  Narrative: CHEST INDICATION:   shortness of breath  COMPARISON:  April 5, 2021 EXAM PERFORMED/VIEWS:  XR CHEST PORTABLE FINDINGS: Heart shadow is obscured by adjacent opacity  Left upper lung field consolidation with increasing left lower lobe consolidation  Patchy ill-defined multifocal right consolidations  The patient has a known left upper lobe mass and lingular mass  Osseous structures appear within normal limits for patient age  Impression: Increasing left upper and lower lung field consolidations consistent with the patient's known masses  Superimposed pneumonia is not excluded  Multifocal right consolidations concerning for pneumonia and/or malignancy  Workstation performed: [id number]     Cta Ed Chest Pe Study    Result Date: 5/3/2021  Narrative: CTA - CHEST WITH IV CONTRAST - PULMONARY ANGIOGRAM INDICATION:   tachycardia, hypoxia, current cancer on chemo  COMPARISON: CT of the chest on April 5, 2021  TECHNIQUE: CTA examination of the chest was performed using angiographic technique according to a protocol specifically tailored to evaluate for pulmonary embolism  Axial, sagittal, and coronal 2D reformatted images were created from the source data and  submitted for interpretation  In addition, coronal 3D MIP postprocessing was performed on the acquisition scanner    Radiation dose length product (DLP) for this visit:  305 93 mGy-cm   This examination, like all CT scans performed in the [facility], was performed utilizing techniques to minimize radiation dose exposure, including the use of iterative  reconstruction and automated exposure control  IV Contrast:  85 mL of iohexol (OMNIPAQUE)  was administered intravenously without immediate adverse reaction  FINDINGS: PULMONARY ARTERIAL TREE:  No pulmonary embolus is seen  LUNGS:  Known left upper lobe mass is now obscured by large patchy opacity (series 3, image 45) likely due to pneumonia  Interval increase in patchy groundglass opacities in the right perihilar region (series 3, image 36)  Increase in size of dense patchy opacity in the right apex (series 3, image 26) Interval increase in mass in the lingular region measuring 4 7 x 3 1 cm  Juxtapleural nodules in the left lower lobe are not significantly changed in size  PLEURA:  Stable size of moderate loculated left pleural effusion with nodularity and thickening of the pleura, increased since prior exam  HEART/GREAT VESSELS:  Unremarkable for patient's age  MEDIASTINUM AND MARGARITA:  Stable extensive mediastinal, hilar, and cardiophrenic lymphadenopathy CHEST WALL AND LOWER NECK:   Unremarkable  VISUALIZED STRUCTURES IN THE UPPER ABDOMEN:  Stable left para-aortic mass which is partially visualized  OSSEOUS STRUCTURES:  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  Compression deformity at the T9 vertebral body with increase in moderate vertebral body height loss when compared with prior exam       Impression: 1  No evidence of pulmonary embolism  2   Large dense opacity in the left upper lobe and interval increase in patchy groundglass opacities in the right perihilar region likely due to pneumonia in the appropriate clinical setting  3   Known left upper lobe mass is obscured  Interval increase in mass in the lingular region  Redemonstrated right lower lobe nodular masses or metastasis   4  Stable extensive lymphadenopathy  5   Redemonstrated expansile destructive lesion in the 9th rib and interval increase in compression deformity at the T9 vertebral body  Workstation performed: [id number]     Vas Lower Limb Venous Duplex Study, Complete Bilateral    Result Date: 5/4/2021  Narrative:  [facility] REPORT CLINICAL: Indications: Patient presents with bilateral lower extremity edema  Operative History: No cardiovascular surgeries noted  FINDINGS:  Segment  Right            Left              Impression       Impression       CFV      Normal (Patent)  Normal (Patent)     CONCLUSION:  Impression: RIGHT LOWER LIMB: No evidence of acute or chronic deep vein thrombosis  No evidence of superficial thrombophlebitis noted  Doppler evaluation shows a normal response to augmentation maneuvers  Popliteal, posterior tibial and anterior tibial arterial Doppler waveforms are triphasic  LEFT LOWER LIMB: No evidence of acute or chronic deep vein thrombosis  No evidence of superficial thrombophlebitis noted  Doppler evaluation shows a normal response to augmentation maneuvers  Popliteal, posterior tibial and anterior tibial arterial Doppler waveforms are triphasic  SIGNATURE: Electronically Signed by: Kingsley Fierro on 2021-05-04 03:36:08 PM      Appointment on 05/14/2021   Component Date Value Ref Range Status    Sodium 05/14/2021 141  136 - 145 mmol/L Final    Potassium 05/14/2021 4 2  3 5 - 5 3 mmol/L Final    Chloride 05/14/2021 104  100 - 108 mmol/L Final    CO2 05/14/2021 28  21 - 32 mmol/L Final    ANION GAP 05/14/2021 9  4 - 13 mmol/L Final    BUN 05/14/2021 16  5 - 25 mg/dL Final    Creatinine 05/14/2021 0 54* 0 60 - 1 30 mg/dL Final    Standardized to IDMS reference method    Glucose 05/14/2021 142* 65 - 140 mg/dL Final    If the patient is fasting, the ADA then defines impaired fasting glucose as > 100 mg/dL and diabetes as > or equal to 123 mg/dL    Specimen collection should occur prior to Sulfasalazine administration due to the potential for falsely depressed results  Specimen collection should occur prior to Sulfapyridine administration due to the potential for falsely elevated results   Calcium 05/14/2021 8 3  8 3 - 10 1 mg/dL Final    Corrected Calcium 05/14/2021 10 1  8 3 - 10 1 mg/dL Final    AST 05/14/2021 47* 5 - 45 U/L Final    Specimen collection should occur prior to Sulfasalazine administration due to the potential for falsely depressed results   ALT 05/14/2021 151* 12 - 78 U/L Final    Specimen collection should occur prior to Sulfasalazine administration due to the potential for falsely depressed results   Alkaline Phosphatase 05/14/2021 155* 46 - 116 U/L Final    Total Protein 05/14/2021 6 9  6 4 - 8 2 g/dL Final    Albumin 05/14/2021 1 8* 3 5 - 5 0 g/dL Final    Total Bilirubin 05/14/2021 0 31  0 20 - 1 00 mg/dL Final    Use of this assay is not recommended for patients undergoing treatment with eltrombopag due to the potential for falsely elevated results   eGFR 05/14/2021 109  ml/min/1 73sq m Final    WBC 05/14/2021 21 35* 4 31 - 10 16 Thousand/uL Final    RBC 05/14/2021 4 17  3 88 - 5 62 Million/uL Final    Hemoglobin 05/14/2021 9 3* 12 0 - 17 0 g/dL Final    Hematocrit 05/14/2021 32 1* 36 5 - 49 3 % Final    MCV 05/14/2021 77* 82 - 98 fL Final    MCH 05/14/2021 22 3* 26 8 - 34 3 pg Final    MCHC 05/14/2021 29 0* 31 4 - 37 4 g/dL Final    RDW 05/14/2021 15 9* 11 6 - 15 1 % Final    MPV 05/14/2021 9 5  8 9 - 12 7 fL Final    Platelets 23/91/3668 678* 149 - 390 Thousands/uL Final    nRBC 05/14/2021 0  /100 WBCs Final    This is an appended report  These results have been appended to a previously preliminary verified report      Segmented % 05/14/2021 91* 43 - 75 % Final    Bands % 05/14/2021 3  0 - 8 % Final    Lymphocytes % 05/14/2021 2* 14 - 44 % Final    Monocytes % 05/14/2021 4  4 - 12 % Final    Eosinophils, % 05/14/2021 0  0 - 6 % Final    Basophils % 05/14/2021 0  0 - 1 % Final    Absolute Neutrophils 05/14/2021 20 07* 1 85 - 7 62 Thousand/uL Final    Lymphocytes Absolute 05/14/2021 0 43* 0 60 - 4 47 Thousand/uL Final    Monocytes Absolute 05/14/2021 0 85  0 00 - 1 22 Thousand/uL Final    Eosinophils Absolute 05/14/2021 0 00  0 00 - 0 40 Thousand/uL Final    Basophils Absolute 05/14/2021 0 00  0 00 - 0 10 Thousand/uL Final    Total Counted 05/14/2021 100   Final    Anisocytosis 05/14/2021 Present   Final    Hypochromia 05/14/2021 Present   Final    Platelet Estimate 96/58/1102 Increased* Adequate Final   No results displayed because visit has over 200 results        Hospital Outpatient Visit on 04/22/2021   Component Date Value Ref Range Status    WBC 04/22/2021 13 97* 4 31 - 10 16 Thousand/uL Final    RBC 04/22/2021 4 38  3 88 - 5 62 Million/uL Final    Hemoglobin 04/22/2021 10 3* 12 0 - 17 0 g/dL Final    Hematocrit 04/22/2021 35 2* 36 5 - 49 3 % Final    MCV 04/22/2021 80* 82 - 98 fL Final    MCH 04/22/2021 23 5* 26 8 - 34 3 pg Final    MCHC 04/22/2021 29 3* 31 4 - 37 4 g/dL Final    RDW 04/22/2021 14 6  11 6 - 15 1 % Final    MPV 04/22/2021 8 6* 8 9 - 12 7 fL Final    Platelets 93/98/2881 815* 149 - 390 Thousands/uL Final    nRBC 04/22/2021 0  /100 WBCs Final    Neutrophils Relative 04/22/2021 82* 43 - 75 % Final    Immat GRANS % 04/22/2021 0  0 - 2 % Final    Lymphocytes Relative 04/22/2021 4* 14 - 44 % Final    Monocytes Relative 04/22/2021 13* 4 - 12 % Final    Eosinophils Relative 04/22/2021 0  0 - 6 % Final    Basophils Relative 04/22/2021 1  0 - 1 % Final    Neutrophils Absolute 04/22/2021 11 37* 1 85 - 7 62 Thousands/µL Final    Immature Grans Absolute 04/22/2021 0 05  0 00 - 0 20 Thousand/uL Final    Lymphocytes Absolute 04/22/2021 0 57* 0 60 - 4 47 Thousands/µL Final    Monocytes Absolute 04/22/2021 1 84* 0 17 - 1 22 Thousand/µL Final    Eosinophils Absolute 04/22/2021 0 06  0 00 - 0 61 Thousand/µL Final    Basophils Absolute 04/22/2021 0 08  0 00 - 0 10 Thousands/µL Final    Sodium 04/22/2021 136  136 - 145 mmol/L Final    Potassium 04/22/2021 4 6  3 5 - 5 3 mmol/L Final    Chloride 04/22/2021 100  100 - 108 mmol/L Final    CO2 04/22/2021 29  21 - 32 mmol/L Final    ANION GAP 04/22/2021 7  4 - 13 mmol/L Final    BUN 04/22/2021 12  5 - 25 mg/dL Final    Creatinine 04/22/2021 0 79  0 60 - 1 30 mg/dL Final    Standardized to IDMS reference method    Glucose 04/22/2021 96  65 - 140 mg/dL Final    If the patient is fasting, the ADA then defines impaired fasting glucose as > 100 mg/dL and diabetes as > or equal to 123 mg/dL  Specimen collection should occur prior to Sulfasalazine administration due to the potential for falsely depressed results  Specimen collection should occur prior to Sulfapyridine administration due to the potential for falsely elevated results   Calcium 04/22/2021 8 9  8 3 - 10 1 mg/dL Final    Corrected Calcium 04/22/2021 10 4* 8 3 - 10 1 mg/dL Final    AST 04/22/2021 46* 5 - 45 U/L Final    Specimen collection should occur prior to Sulfasalazine administration due to the potential for falsely depressed results   ALT 04/22/2021 87* 12 - 78 U/L Final    Specimen collection should occur prior to Sulfasalazine administration due to the potential for falsely depressed results   Alkaline Phosphatase 04/22/2021 126* 46 - 116 U/L Final    Total Protein 04/22/2021 7 9  6 4 - 8 2 g/dL Final    Albumin 04/22/2021 2 1* 3 5 - 5 0 g/dL Final    Total Bilirubin 04/22/2021 0 28  0 20 - 1 00 mg/dL Final    Use of this assay is not recommended for patients undergoing treatment with eltrombopag due to the potential for falsely elevated results      eGFR 04/22/2021 94  ml/min/1 73sq m Final    TSH 3RD GENERATON 04/22/2021 1 339  0 358 - 3 740 uIU/mL Final   Appointment on 04/20/2021   Component Date Value Ref Range Status    Sodium 04/20/2021 134* 136 - 145 mmol/L Final    Potassium 04/20/2021 4 6  3 5 - 5 3 mmol/L Final    Chloride 04/20/2021 100  100 - 108 mmol/L Final    CO2 04/20/2021 29  21 - 32 mmol/L Final    ANION GAP 04/20/2021 5  4 - 13 mmol/L Final    BUN 04/20/2021 13  5 - 25 mg/dL Final    Creatinine 04/20/2021 0 80  0 60 - 1 30 mg/dL Final    Standardized to IDMS reference method    Glucose 04/20/2021 110  65 - 140 mg/dL Final    If the patient is fasting, the ADA then defines impaired fasting glucose as > 100 mg/dL and diabetes as > or equal to 123 mg/dL  Specimen collection should occur prior to Sulfasalazine administration due to the potential for falsely depressed results  Specimen collection should occur prior to Sulfapyridine administration due to the potential for falsely elevated results   Calcium 04/20/2021 8 9  8 3 - 10 1 mg/dL Final    Corrected Calcium 04/20/2021 10 5* 8 3 - 10 1 mg/dL Final    AST 04/20/2021 39  5 - 45 U/L Final    Specimen collection should occur prior to Sulfasalazine administration due to the potential for falsely depressed results   ALT 04/20/2021 99* 12 - 78 U/L Final    Specimen collection should occur prior to Sulfasalazine administration due to the potential for falsely depressed results   Alkaline Phosphatase 04/20/2021 130* 46 - 116 U/L Final    Total Protein 04/20/2021 7 6  6 4 - 8 2 g/dL Final    Albumin 04/20/2021 2 0* 3 5 - 5 0 g/dL Final    Total Bilirubin 04/20/2021 0 40  0 20 - 1 00 mg/dL Final    Use of this assay is not recommended for patients undergoing treatment with eltrombopag due to the potential for falsely elevated results      eGFR 04/20/2021 93  ml/min/1 73sq m Final   Hospital Outpatient Visit on 04/13/2021   Component Date Value Ref Range Status    Case Report 04/13/2021    Final                    Value:Surgical Pathology Report                         Case: [id number]                                   Authorizing Provider:  Shell Wick MD        Collected:           04/13/2021 4559 Ordering Location:     [facility] Received:            04/13/2021 0911                                     CAT Scan                                                                     Pathologist:           La Robertson MD                                                               Specimen:    Lymph Node, left daniel-aortic                                                               Final Diagnosis 04/13/2021    Final                    Value: This result contains rich text formatting which cannot be displayed here   Additional Information 04/13/2021    Final                    Value: This result contains rich text formatting which cannot be displayed here  Felicity Hardin Description 04/13/2021    Final                    Value: This result contains rich text formatting which cannot be displayed here   Clinical Information 04/13/2021    Final                    Value:enlarged lymph node    CTA  LUNGS:    Lobulated left upper lobe mass measuring 3 x 3 4 cm extending superiorly and along the fissure  Lobular mass in the lingular region measuring 3 x 2 cm  Nodularity in the right lower lobe peripherally measuring 1 5 cm and 0 9 cm in image 59 and 61  Bilateral scattered patchy groundglass areas seen  There is no tracheal or endobronchial lesion      PLEURA:  Moderate left pleural effusion  Enhancement of the pleura with nodularity seen with a enhancing mass inferiorly and posteriorly measuring 3 0 x 1 8 cm      HEART/GREAT VESSELS:  Unremarkable for patient's age      MEDIASTINUM AND MARGARITA:  Multiple prevascular lymph nodes largest measuring 1 5 x 2 3 cm  Subcentimeter superior mediastinal lymph node  Right paratracheal lymph node measuring 3 1 x 2 1 cm  Subcarinal lymph node complex measuring 5 2 x 4 2 cm    Right   hilar lymph node complex measuring 4 4 x 3 4 cm and left suprahilar lymph node complex measuring 3 7 x 3 5                           cm      KIDNEYS/URETERS:  2 8 cm mass at the left inferior pole  No hydronephrosis   Miscellaneous Lab Test Result 04/13/2021 lymphoperoliferative disorders   Final    REFERRAL LAB 04/13/2021 [facility]   Final   Admission on 04/05/2021, Discharged on 04/07/2021   Component Date Value Ref Range Status    Sodium 04/06/2021 137  136 - 145 mmol/L Final    Potassium 04/06/2021 4 0  3 5 - 5 3 mmol/L Final    Chloride 04/06/2021 104  100 - 108 mmol/L Final    CO2 04/06/2021 25  21 - 32 mmol/L Final    ANION GAP 04/06/2021 8  4 - 13 mmol/L Final    BUN 04/06/2021 12  5 - 25 mg/dL Final    Creatinine 04/06/2021 0 68  0 60 - 1 30 mg/dL Final    Standardized to IDMS reference method    Glucose 04/06/2021 95  65 - 140 mg/dL Final    If the patient is fasting, the ADA then defines impaired fasting glucose as > 100 mg/dL and diabetes as > or equal to 123 mg/dL  Specimen collection should occur prior to Sulfasalazine administration due to the potential for falsely depressed results  Specimen collection should occur prior to Sulfapyridine administration due to the potential for falsely elevated results   Calcium 04/06/2021 9 0  8 3 - 10 1 mg/dL Final    Corrected Calcium 04/06/2021 10 4* 8 3 - 10 1 mg/dL Final    AST 04/06/2021 42  5 - 45 U/L Final    Specimen collection should occur prior to Sulfasalazine administration due to the potential for falsely depressed results   ALT 04/06/2021 83* 12 - 78 U/L Final    Specimen collection should occur prior to Sulfasalazine and/or Sulfapyridine administration due to the potential for falsely depressed results   Alkaline Phosphatase 04/06/2021 116  46 - 116 U/L Final    Total Protein 04/06/2021 7 7  6 4 - 8 2 g/dL Final    Albumin 04/06/2021 2 2* 3 5 - 5 0 g/dL Final    Total Bilirubin 04/06/2021 0 38  0 20 - 1 00 mg/dL Final    Use of this assay is not recommended for patients undergoing treatment with eltrombopag due to the potential for falsely elevated results      eGFR 04/06/2021 100  ml/min/1 73sq m Final    WBC 04/06/2021 12 82* 4 31 - 10 16 Thousand/uL Final    RBC 04/06/2021 4 50  3 88 - 5 62 Million/uL Final    Hemoglobin 04/06/2021 11 2* 12 0 - 17 0 g/dL Final    Hematocrit 04/06/2021 36 0* 36 5 - 49 3 % Final    MCV 04/06/2021 80* 82 - 98 fL Final    MCH 04/06/2021 24 9* 26 8 - 34 3 pg Final    MCHC 04/06/2021 31 1* 31 4 - 37 4 g/dL Final    RDW 04/06/2021 14 0  11 6 - 15 1 % Final    MPV 04/06/2021 9 1  8 9 - 12 7 fL Final    Platelets 14/90/2777 822* 149 - 390 Thousands/uL Final    nRBC 04/06/2021 0  /100 WBCs Final    Neutrophils Relative 04/06/2021 78* 43 - 75 % Final    Immat GRANS % 04/06/2021 1  0 - 2 % Final    Lymphocytes Relative 04/06/2021 6* 14 - 44 % Final    Monocytes Relative 04/06/2021 13* 4 - 12 % Final    Eosinophils Relative 04/06/2021 1  0 - 6 % Final    Basophils Relative 04/06/2021 1  0 - 1 % Final    Neutrophils Absolute 04/06/2021 10 21* 1 85 - 7 62 Thousands/µL Final    Immature Grans Absolute 04/06/2021 0 07  0 00 - 0 20 Thousand/uL Final    Lymphocytes Absolute 04/06/2021 0 71  0 60 - 4 47 Thousands/µL Final    Monocytes Absolute 04/06/2021 1 66* 0 17 - 1 22 Thousand/µL Final    Eosinophils Absolute 04/06/2021 0 10  0 00 - 0 61 Thousand/µL Final    Basophils Absolute 04/06/2021 0 07  0 00 - 0 10 Thousands/µL Final    LD 04/06/2021 361* 81 - 234 U/L Final    Retic Ct Abs 04/06/2021 49,100  14,356-105,094 Final    Retic Ct Pct 04/06/2021 1 09  0 37 - 1 87 % Final    Iron Saturation 04/06/2021 8  % Final    TIBC 04/06/2021 227* 250 - 450 ug/dL Final    Iron 04/06/2021 18* 65 - 175 ug/dL Final    Patients treated with metal-binding drugs (ie  Deferoxamine) may have depressed iron values      Ferritin 04/06/2021 1,055* 8 - 388 ng/mL Final    TSH 3RD GENERATON 04/06/2021 1 210  0 358 - 3 740 uIU/mL Final    Site 04/06/2021 Left Plueral   Final    Color, Fluid 04/06/2021 Yellow  Clear, Colorless,Yellow Final    Clarity, Fluid 04/06/2021 Hazy* Clear Final    WBC, Fluid 04/06/2021 3,729  /ul Final    The reference range and other method performance specifications have not been established for this body fluid  The test result must be integrated into the clinical context for interpretation   Body Fluid Culture, Sterile 04/06/2021 No growth   Final    Gram Stain Result 04/06/2021 Rare Mononuclear Cells   Final    Gram Stain Result 04/06/2021 No Polys or Bacteria seen   Final    Glucose, Fluid 04/06/2021 94  mg/dL Final    The reference range and other method performance specifications have not been established for this body fluid  The test result must be integrated into the clinical context for interpretation   LD, Fluid 04/06/2021 481  U/L Final    The reference range and other method performance specifications have not been established for this body fluid  The test result must be integrated into the clinical context for interpretation   Case Report 04/06/2021    Final                    Value:Non-gynecologic Cytology                          Case: [id number]                                  Authorizing Provider:  Cachorro Bergman DO              Collected:           04/06/2021 1620              Ordering Location:     [address] [place] [address]      Received:            04/06/2021 [address] [place] [address]                                                              Pathologist:           Kena Murillo MD                                                         Specimens:   A) - Thoracentesis, Left                                                                            B) - Thoracentesis, Cell Block                                                             Final Diagnosis 04/06/2021    Final                    Value: This result contains rich text formatting which cannot be displayed here  Xiao Duron 04/06/2021    Final                    Value: This result contains rich text formatting which cannot be displayed here     Additional Information 04/06/2021    Final                    Value: This result contains rich text formatting which cannot be displayed here   PH BODY FLUID 04/06/2021 7 7   Final    The reference range and other method performance specifications have not been established for this body fluid  The test result must be integrated into the clinical context for interpretation   Protein, Fluid 04/06/2021 5 1  g/dL Final    The reference range and other method performance specifications have not been established for this body fluid  The test result must be integrated into the clinical context for interpretation      Total Counted 04/06/2021 100   Final    Neutrophils % (Fluid) 04/06/2021 3  % Final    Lymphs % (Fluid) 04/06/2021 94  % Final    Monocytes % (Fluid) 04/06/2021 3  % Final    WBC 04/07/2021 13 02* 4 31 - 10 16 Thousand/uL Final    RBC 04/07/2021 4 41  3 88 - 5 62 Million/uL Final    Hemoglobin 04/07/2021 11 1* 12 0 - 17 0 g/dL Final    Hematocrit 04/07/2021 35 5* 36 5 - 49 3 % Final    MCV 04/07/2021 81* 82 - 98 fL Final    MCH 04/07/2021 25 2* 26 8 - 34 3 pg Final    MCHC 04/07/2021 31 3* 31 4 - 37 4 g/dL Final    RDW 04/07/2021 13 9  11 6 - 15 1 % Final    MPV 04/07/2021 9 0  8 9 - 12 7 fL Final    Platelets 73/95/3060 773* 149 - 390 Thousands/uL Final    nRBC 04/07/2021 0  /100 WBCs Final    Neutrophils Relative 04/07/2021 79* 43 - 75 % Final    Immat GRANS % 04/07/2021 1  0 - 2 % Final    Lymphocytes Relative 04/07/2021 5* 14 - 44 % Final    Monocytes Relative 04/07/2021 13* 4 - 12 % Final    Eosinophils Relative 04/07/2021 1  0 - 6 % Final    Basophils Relative 04/07/2021 1  0 - 1 % Final    Neutrophils Absolute 04/07/2021 10 47* 1 85 - 7 62 Thousands/µL Final    Immature Grans Absolute 04/07/2021 0 06  0 00 - 0 20 Thousand/uL Final    Lymphocytes Absolute 04/07/2021 0 58* 0 60 - 4 47 Thousands/µL Final    Monocytes Absolute 04/07/2021 1 71* 0 17 - 1 22 Thousand/µL Final    Eosinophils Absolute 04/07/2021 0 10  0 00 - 0 61 Thousand/µL Final    Basophils Absolute 04/07/2021 0 10  0 00 - 0 10 Thousands/µL Final    Sodium 04/07/2021 138  136 - 145 mmol/L Final    Potassium 04/07/2021 4 1  3 5 - 5 3 mmol/L Final    Chloride 04/07/2021 106  100 - 108 mmol/L Final    CO2 04/07/2021 26  21 - 32 mmol/L Final    ANION GAP 04/07/2021 6  4 - 13 mmol/L Final    BUN 04/07/2021 14  5 - 25 mg/dL Final    Creatinine 04/07/2021 0 69  0 60 - 1 30 mg/dL Final    Standardized to IDMS reference method    Glucose 04/07/2021 97  65 - 140 mg/dL Final    If the patient is fasting, the ADA then defines impaired fasting glucose as > 100 mg/dL and diabetes as > or equal to 123 mg/dL  Specimen collection should occur prior to Sulfasalazine administration due to the potential for falsely depressed results  Specimen collection should occur prior to Sulfapyridine administration due to the potential for falsely elevated results   Calcium 04/07/2021 8 7  8 3 - 10 1 mg/dL Final    Corrected Calcium 04/07/2021 10 3* 8 3 - 10 1 mg/dL Final    AST 04/07/2021 25  5 - 45 U/L Final    Specimen collection should occur prior to Sulfasalazine administration due to the potential for falsely depressed results   ALT 04/07/2021 64  12 - 78 U/L Final    Specimen collection should occur prior to Sulfasalazine and/or Sulfapyridine administration due to the potential for falsely depressed results   Alkaline Phosphatase 04/07/2021 100  46 - 116 U/L Final    Total Protein 04/07/2021 7 3  6 4 - 8 2 g/dL Final    Albumin 04/07/2021 2 0* 3 5 - 5 0 g/dL Final    Total Bilirubin 04/07/2021 0 39  0 20 - 1 00 mg/dL Final    Use of this assay is not recommended for patients undergoing treatment with eltrombopag due to the potential for falsely elevated results      eGFR 04/07/2021 99  ml/min/1 73sq m Final    Case Report 04/07/2021    Final                    Value:Surgical Pathology Report Case: [id number]                                   Authorizing Provider:  Olivier Neumann MD PhD          Collected:           04/07/2021 1255              Ordering Location:     [address]      Received:            04/07/2021 [place] [address]                                                              Pathologist:           Vik Fragoso MD                                                                 Specimen:    Liver, liver mass                                                                          Final Diagnosis 04/07/2021    Final                    Value: This result contains rich text formatting which cannot be displayed here   Additional Information 04/07/2021    Final                    Value: This result contains rich text formatting which cannot be displayed here  Kenny Pastel Description 04/07/2021    Final                    Value: This result contains rich text formatting which cannot be displayed here      Clinical Information 04/07/2021    Final                    Value:liver mass lung mass   Admission on 04/05/2021, Discharged on 04/05/2021   Component Date Value Ref Range Status    WBC 04/05/2021 12 24* 4 31 - 10 16 Thousand/uL Final    RBC 04/05/2021 4 60  3 88 - 5 62 Million/uL Final    Hemoglobin 04/05/2021 11 4* 12 0 - 17 0 g/dL Final    Hematocrit 04/05/2021 37 4  36 5 - 49 3 % Final    MCV 04/05/2021 81* 82 - 98 fL Final    MCH 04/05/2021 24 8* 26 8 - 34 3 pg Final    MCHC 04/05/2021 30 5* 31 4 - 37 4 g/dL Final    RDW 04/05/2021 13 7  11 6 - 15 1 % Final    MPV 04/05/2021 8 8* 8 9 - 12 7 fL Final    Platelets 11/21/2443 841* 149 - 390 Thousands/uL Final    nRBC 04/05/2021 0  /100 WBCs Final    Neutrophils Relative 04/05/2021 82* 43 - 75 % Final    Immat GRANS % 04/05/2021 0  0 - 2 % Final    Lymphocytes Relative 04/05/2021 4* 14 - 44 % Final    Monocytes Relative 04/05/2021 13* 4 - 12 % Final    Eosinophils Relative 04/05/2021 0  0 - 6 % Final    Basophils Relative 04/05/2021 1  0 - 1 % Final    Neutrophils Absolute 04/05/2021 9 94* 1 85 - 7 62 Thousands/µL Final    Immature Grans Absolute 04/05/2021 0 05  0 00 - 0 20 Thousand/uL Final    Lymphocytes Absolute 04/05/2021 0 52* 0 60 - 4 47 Thousands/µL Final    Monocytes Absolute 04/05/2021 1 61* 0 17 - 1 22 Thousand/µL Final    Eosinophils Absolute 04/05/2021 0 03  0 00 - 0 61 Thousand/µL Final    Basophils Absolute 04/05/2021 0 09  0 00 - 0 10 Thousands/µL Final    Protime 04/05/2021 15 1* 11 6 - 14 5 seconds Final    INR 04/05/2021 1 21* 0 84 - 1 19 Final    PTT 04/05/2021 39* 23 - 37 seconds Final    Therapeutic Heparin Range =  60-90 seconds    Sodium 04/05/2021 136  136 - 145 mmol/L Final    Potassium 04/05/2021 3 8  3 5 - 5 3 mmol/L Final    Chloride 04/05/2021 102  100 - 108 mmol/L Final    CO2 04/05/2021 25  21 - 32 mmol/L Final    ANION GAP 04/05/2021 9  4 - 13 mmol/L Final    BUN 04/05/2021 11  5 - 25 mg/dL Final    Creatinine 04/05/2021 0 73  0 60 - 1 30 mg/dL Final    Standardized to IDMS reference method    Glucose 04/05/2021 120  65 - 140 mg/dL Final    If the patient is fasting, the ADA then defines impaired fasting glucose as > 100 mg/dL and diabetes as > or equal to 123 mg/dL  Specimen collection should occur prior to Sulfasalazine administration due to the potential for falsely depressed results  Specimen collection should occur prior to Sulfapyridine administration due to the potential for falsely elevated results   Calcium 04/05/2021 9 2  8 3 - 10 1 mg/dL Final    Corrected Calcium 04/05/2021 10 6* 8 3 - 10 1 mg/dL Final    AST 04/05/2021 30  5 - 45 U/L Final    Specimen collection should occur prior to Sulfasalazine administration due to the potential for falsely depressed results       ALT 04/05/2021 73  12 - 78 U/L Final    Specimen collection should occur prior to Sulfasalazine administration due to the potential for falsely depressed results   Alkaline Phosphatase 04/05/2021 104  46 - 116 U/L Final    Total Protein 04/05/2021 8 0  6 4 - 8 2 g/dL Final    Albumin 04/05/2021 2 3* 3 5 - 5 0 g/dL Final    Total Bilirubin 04/05/2021 0 44  0 20 - 1 00 mg/dL Final    Use of this assay is not recommended for patients undergoing treatment with eltrombopag due to the potential for falsely elevated results   eGFR 04/05/2021 97  ml/min/1 73sq m Final    Magnesium 04/05/2021 2 0  1 6 - 2 6 mg/dL Final    D-Dimer, Quant 04/05/2021 4 25* <0 50 ug/ml FEU Final    Reference and upper limits to exclude DVT and PE are the same  Do not use to exclude if clinical symptoms are present   Troponin I 04/05/2021 <0 02  <=0 04 ng/mL Final    3Autovalidation override  [employer] Chemistry analyzer 99% cutoff is > 0 04 ng/mL in network labs     o cTnI 99% cutoff is useful only when applied to patients in the clinical setting of myocardial ischemia   o cTnI 99% cutoff should be interpreted in the context of clinical history, ECG findings and possibly cardiac imaging to establish correct diagnosis  o cTnI 99% cutoff may be suggestive but clearly not indicative of a coronary event without the clinical setting of myocardial ischemia   NT-proBNP 04/05/2021 1,121* <125 pg/mL Final    LACTIC ACID 04/05/2021 1 0  0 5 - 2 0 mmol/L Final    Blood Culture 04/05/2021 No Growth After 5 Days  Final    Blood Culture 04/05/2021 No Growth After 5 Days     Final    SARS-CoV-2 04/05/2021 Negative  Negative Final    INFLUENZA A PCR 04/05/2021 Negative  Negative Final    INFLUENZA B PCR 04/05/2021 Negative  Negative Final    RSV PCR 04/05/2021 Negative  Negative Final    Ventricular Rate 04/05/2021 115  BPM Final    Atrial Rate 04/05/2021 115  BPM Final    CO Interval 04/05/2021 118  ms Final    QRSD Interval 04/05/2021 76  ms Final    QT Interval 04/05/2021 324  ms Final    QTC Interval 04/05/2021 448  ms Final    P Axis 04/05/2021 73  degrees Final    QRS Axis 04/05/2021 72  degrees Final    T Wave [place] 04/05/2021 62  degrees Final       Transitional Care Management Review:  Buck Cohen is a 77 y o  male here for TCM follow up  During the TCM phone call patient stated:    TCM Call (since 4/17/2021)     Date and time call was made  5/12/2021  3:14 PM    Hospital care reviewed  Records reviewed    Patient was hospitialized at  [facility]        Date of Admission  05/03/21    Date of discharge  05/11/21    Diagnosis  Acute respiratory failure with hypoxia    Disposition  Home    Were the patients medications reviewed and updated  Yes    Current Symptoms  Back pain - left side; Back pain - right side    Back pain, left side, onset  Ongoing    Back pain, right side, severity  Moderate    Back pain, right side, onset  Ongoing      TCM Call (since 4/17/2021)     Post hospital issues  Reduced activity    Should patient be enrolled in anticoag monitoring? No    Scheduled for follow up?   Yes    Patients specialists  Other (comment)    Other specialists names  Dr Chavez Gave    Other specialists contcat #  Hematology    Did you obtain your prescribed medications  Yes    Do you need help managing your prescriptions or medications  No    Is transportation to your appointment needed  No    I have advised the patient to call PCP with any new or worsening symptoms  [address]  Family    The type of support provided  Physical; Emotional    Do you have social support  Yes, as much as I need    Are you recieving any outpatient services  No    Are you recieving home care services  No    Are you using any community resources  No    Current waiver services  No    Have you fallen in the last 12 months  No    Interperter language line needed  No    Counseling  Patient    Counseling topics  Activities of daily living              Devin Todd MD

## 2021-05-21 ENCOUNTER — HOSPITAL ENCOUNTER (OUTPATIENT)
Dept: INFUSION CENTER | Facility: HOSPITAL | Age: 66
Discharge: HOME | End: 2021-05-21
Attending: INTERNAL MEDICINE
Payer: MEDICARE

## 2021-05-21 ENCOUNTER — OFFICE VISIT (OUTPATIENT)
Dept: HEMATOLOGY ONCOLOGY | Facility: CLINIC | Age: 66
End: 2021-05-21
Payer: MEDICARE

## 2021-05-21 VITALS
DIASTOLIC BLOOD PRESSURE: 65 MMHG | TEMPERATURE: 97.4 F | HEIGHT: 71 IN | WEIGHT: 137.13 LBS | RESPIRATION RATE: 18 BRPM | SYSTOLIC BLOOD PRESSURE: 126 MMHG | BODY MASS INDEX: 19.2 KG/M2 | OXYGEN SATURATION: 96 % | HEART RATE: 91 BPM

## 2021-05-21 VITALS
WEIGHT: 137 LBS | DIASTOLIC BLOOD PRESSURE: 65 MMHG | TEMPERATURE: 97.4 F | HEIGHT: 71 IN | SYSTOLIC BLOOD PRESSURE: 126 MMHG | BODY MASS INDEX: 19.18 KG/M2 | HEART RATE: 91 BPM

## 2021-05-21 DIAGNOSIS — C64.2 RENAL CELL CARCINOMA OF LEFT KIDNEY (HCC): Primary | ICD-10-CM

## 2021-05-21 DIAGNOSIS — C79.51 BONE METASTASES (HCC): Primary | ICD-10-CM

## 2021-05-21 DIAGNOSIS — C64.2 RENAL CELL CARCINOMA OF LEFT KIDNEY (HCC): ICD-10-CM

## 2021-05-21 PROCEDURE — 96417 CHEMO IV INFUS EACH ADDL SEQ: CPT

## 2021-05-21 PROCEDURE — 96413 CHEMO IV INFUSION 1 HR: CPT

## 2021-05-21 PROCEDURE — 99214 OFFICE O/P EST MOD 30 MIN: CPT | Performed by: NURSE PRACTITIONER

## 2021-05-21 RX ORDER — SODIUM CHLORIDE 9 MG/ML
20 INJECTION, SOLUTION INTRAVENOUS ONCE
Status: COMPLETED | OUTPATIENT
Start: 2021-05-21 | End: 2021-05-21

## 2021-05-21 RX ADMIN — SODIUM CHLORIDE 200 MG: 9 INJECTION, SOLUTION INTRAVENOUS at 12:08

## 2021-05-21 RX ADMIN — SODIUM CHLORIDE 65 MG: 9 INJECTION, SOLUTION INTRAVENOUS at 12:49

## 2021-05-21 RX ADMIN — SODIUM CHLORIDE 20 ML/HR: 0.9 INJECTION, SOLUTION INTRAVENOUS at 11:19

## 2021-05-21 NOTE — PLAN OF CARE
Problem: INFECTION - ADULT  Goal: Absence or prevention of progression during hospitalization  Description: INTERVENTIONS:  - Assess and monitor for signs and symptoms of infection  - Monitor lab/diagnostic results  - Monitor all insertion sites, i e  indwelling lines, tubes, and drains  - Monitor endotracheal if appropriate and nasal secretions for changes in amount and color  - [place] appropriate cooling/warming therapies per order  - Administer medications as ordered  - Instruct and encourage patient and family to use good hand hygiene technique  - Identify and instruct in appropriate isolation precautions for identified infection/condition  Outcome: Progressing     Problem: PAIN - ADULT  Goal: Verbalizes/displays adequate comfort level or baseline comfort level  Description: Interventions:  - Encourage patient to monitor pain and request assistance  - Assess pain using appropriate pain scale  - Administer analgesics based on type and severity of pain and evaluate response  - Implement non-pharmacological measures as appropriate and evaluate response  - Consider cultural and social influences on pain and pain management  - Notify physician/advanced practitioner if interventions unsuccessful or patient reports new pain  Outcome: Progressing     Problem: DISCHARGE PLANNING  Goal: Discharge to home or other facility with appropriate resources  Description: INTERVENTIONS:  - Identify barriers to discharge w/patient and caregiver  - Arrange for needed discharge resources and transportation as appropriate  - Identify discharge learning needs (meds, wound care, etc )  - Arrange for interpretive services to assist at discharge as needed  - Refer to Case Management Department for coordinating discharge planning if the patient needs post-hospital services based on physician/advanced practitioner order or complex needs related to functional status, cognitive ability, or social support system  Outcome: Progressing Problem: Knowledge Deficit  Goal: Patient/family/caregiver demonstrates understanding of disease process, treatment plan, medications, and discharge instructions  Description: Complete learning assessment and assess knowledge base    Interventions:  - Provide teaching at level of understanding  - Provide teaching via preferred learning methods  Outcome: Progressing

## 2021-05-21 NOTE — PROGRESS NOTES
[address] [facility] HEMATOLOGY ONCOLOGY [address] [facility]   [address] [facility]  [address] [place] [address]  [phone]  Progress Note  Paige Console, 1955, [id number]  5/21/2021    Assessment/Plan:  1  Bone metastases ([address] )  2  Renal cell carcinoma of left kidney [facility])    Patient is a 68-year-old male with a history of renal cell carcinoma of the left kidney with bone metastasis, currently on Opdivo and Yervoy  His most recent treatment was today  He has been tolerating this without difficulty  He was recently hospitalized secondary to HCAP with sepsis  COVID testing was checked and was negative  Patient had respiratory failure with hypoxia likely secondary to postobstructive pneumonia  Patient was requiring they both arm however was eventually weaned off down to 2 L nasal cannula which he is tolerating without difficulty  Patient states he is short of breath with exertion however is able to function without significant difficulty  We reviewed his most recent blood work, CBC continues to improve since hospitalization  His WBCs were elevated at 21 31 on 05/14/2021 however have improved to 16 16  His hemoglobin is 8 0 MCV 76 and his platelet count is 708, this is likely reactive to his infection  ANC was 14 31  Metabolic panel essentially stable however his calcium level was slightly low at 7 9 , alk phosphatase elevated 182 and albumin 1 6  patient does have a history of bone metastasis likely contributing to his elevated alkaline phosphatase  Patient was discharged from the hospital on a tapering dose of prednisone which he has since completed  His next treatment is scheduled for June 11, 2021  We will plan to see him just prior to that treatment  Patient verbalized understanding is agreement with plan  Goals and Barriers:    Current Goal:   Prolong Survival from Cancer  Barriers: None        Patient's Capacity to Self Care:  Patient is able to self care   -------------------------------------------------------------------------------------------------------    No chief complaint on file  History of present illness/Cancer History:   Oncology History   Renal cell carcinoma of left kidney ([facility] [address] )   2021 Initial Diagnosis    2021 patient presented with weight loss, dyspnea  2021 CT of the chest showed 3 4 cm left upper lobe mass, 3 x 2 cm lingular mass, right lower lobe nodules up to 1 5 cm  Pleural enhancement and moderate left effusion are noted  Mediastinal lymph nodes up to 5 2 cm  Bony destructive lesion in the left 9th rib  CT of the abdomen pelvis showed indeterminate lesions in the left kidney and liver  Retroperitoneal lymph nodes up to 9 3 cm  L4 right transverse process and right sacral destructive lesions noted  WBC is 13 0, hemoglobin 11 1, MCV 81, platelet count 294, neutrophil 79, bands 1, lymphocytes 5, monocytes 13  CMP showed albumin 2 0, total protein 7 3, otherwise normal   Liver biopsy showed hemangioma  2021 Biopsy    2021 biopsy of left periaortic node showed metastatic carcinoma consistent with renal primary  2021 -  Chemotherapy    ipilimumab (YERVOY) 65 mg in sodium chloride 0 9 % 50 mL IVPB, 1 mg/kg = 65 mg, Intravenous, Once, 2 of 4 cycles  Administration: 65 mg (2021), 65 mg (2021)  nivolumab (OPDIVO) 200 mg in sodium chloride 0 9 % 100 mL IVPB, 194 1 mg, Intravenous, Once, 2 of 10 cycles  Administration: 200 mg (2021), 200 mg (2021)          Cancer Staging  No matching staging information was found for the patient  ECO - Symptomatic but completely ambulatory    Interval history:   Clinically stable     Review of Systems   Constitutional: Positive for fatigue  Negative for activity change, appetite change, fever and unexpected weight change  Respiratory: Positive for shortness of breath (On exertion currently on 2 L nasal cannula)   Negative for cough  Cardiovascular: Negative for chest pain and leg swelling  Gastrointestinal: Negative for abdominal pain, constipation, diarrhea and nausea  Endocrine: Negative for cold intolerance and heat intolerance  Musculoskeletal: Negative for arthralgias and myalgias  Skin: Negative  Neurological: Negative for dizziness, weakness and headaches  Hematological: Negative for adenopathy  Does not bruise/bleed easily           Current Outpatient Medications:     benzonatate (TESSALON PERLES) 100 mg capsule, Take 1 capsule (100 mg total) by mouth 3 (three) times a day as needed for cough, Disp: 20 capsule, Rfl: 0    calcium carbonate (OYSTER SHELL,OSCAL) 500 mg, Take 1 tablet by mouth 2 (two) times a day with meals, Disp: 60 tablet, Rfl: 0    ergocalciferol (VITAMIN D2) 50,000 units, Take 1 capsule (50,000 Units total) by mouth once a week, Disp: 7 capsule, Rfl: 0    ferrous sulfate 324 (65 Fe) mg, Take 1 tablet (324 mg total) by mouth every other day, Disp: 15 tablet, Rfl: 0    fluticasone-salmeterol (ADVAIR, WIXELA) 250-50 mcg/dose inhaler, Inhale 1 puff 2 (two) times a day Rinse mouth after use , Disp: 1 Inhaler, Rfl: 3    furosemide (LASIX) 20 mg tablet, Take 1 tablet (20 mg total) by mouth 3 (three) times a week If needed for leg swelling, Disp: 20 tablet, Rfl: 5    nystatin (MYCOSTATIN) 500,000 units/5 mL suspension, Apply 5 mL (500,000 Units total) to the mouth or throat 4 (four) times a day for 7 days, Disp: 140 mL, Rfl: 1    oxyCODONE-acetaminophen (PERCOCET) 5-325 mg per tablet, Take 1 tablet by mouth every 4 (four) hours as needed for moderate painMax Daily Amount: 6 tablets, Disp: 42 tablet, Rfl: 0    pantoprazole (PROTONIX) 40 mg tablet, Take 1 tablet (40 mg total) by mouth daily, Disp: 90 tablet, Rfl: 1    predniSONE 20 mg tablet, Take 2 tablets (40 mg total) by mouth daily for 3 days, THEN 1 5 tablets (30 mg total) daily for 3 days, THEN 1 tablet (20 mg total) daily for 3 days, THEN 0 5 tablets (10 mg total) daily for 3 days  , Disp: 15 tablet, Rfl: 0  No current facility-administered medications for this visit  No Known Allergies    Advance Directive and Living Will:        Objective:   /65   Pulse 91   Temp (!) 97 4 °F (36 3 °C)   Ht 5' 10 98" (1 803 m)   Wt 62 1 kg (137 lb)   BMI 19 12 kg/m²   Wt Readings from Last 6 Encounters:   05/21/21 62 1 kg (137 lb)   05/21/21 62 2 kg (137 lb 2 oz)   05/18/21 64 kg (141 lb)   05/11/21 60 3 kg (132 lb 15 oz)   04/22/21 63 4 kg (139 lb 12 4 oz)   04/16/21 64 7 kg (142 lb 9 6 oz)       Physical Exam  Vitals signs reviewed  Constitutional:       Appearance: He is well-developed  Comments: Thin appearing   HENT:      Head: Normocephalic and atraumatic  Eyes:      Extraocular Movements: Extraocular movements intact  Conjunctiva/sclera: Conjunctivae normal       Pupils: Pupils are equal, round, and reactive to light  Neck:      Musculoskeletal: Normal range of motion  Cardiovascular:      Rate and Rhythm: Normal rate and regular rhythm  Pulses: Normal pulses  Heart sounds: Normal heart sounds  No murmur  Pulmonary:      Effort: Pulmonary effort is normal  No respiratory distress  Breath sounds: Normal breath sounds  Abdominal:      General: Bowel sounds are normal       Palpations: Abdomen is soft  Musculoskeletal: Normal range of motion  Lymphadenopathy:      Cervical: No cervical adenopathy  Skin:     General: Skin is warm and dry  Capillary Refill: Capillary refill takes less than 2 seconds  Neurological:      Mental Status: He is alert and oriented to person, place, and time     Psychiatric:         Behavior: Behavior normal          Pertinent Laboratory Results and Imaging Review:  Appointment on 05/19/2021   Component Date Value Ref Range Status    WBC 05/19/2021 16 16* 4 31 - 10 16 Thousand/uL Final    RBC 05/19/2021 3 64* 3 88 - 5 62 Million/uL Final    Hemoglobin 05/19/2021 8 0* 12 0 - 17 0 g/dL Final    Hematocrit 05/19/2021 27 7* 36 5 - 49 3 % Final    MCV 05/19/2021 76* 82 - 98 fL Final    MCH 05/19/2021 22 0* 26 8 - 34 3 pg Final    MCHC 05/19/2021 28 9* 31 4 - 37 4 g/dL Final    RDW 05/19/2021 16 5* 11 6 - 15 1 % Final    MPV 05/19/2021 9 3  8 9 - 12 7 fL Final    Platelets 22/98/3782 681* 149 - 390 Thousands/uL Final    nRBC 05/19/2021 0  /100 WBCs Final    Neutrophils Relative 05/19/2021 89* 43 - 75 % Final    Immat GRANS % 05/19/2021 1  0 - 2 % Final    Lymphocytes Relative 05/19/2021 2* 14 - 44 % Final    Monocytes Relative 05/19/2021 8  4 - 12 % Final    Eosinophils Relative 05/19/2021 0  0 - 6 % Final    Basophils Relative 05/19/2021 0  0 - 1 % Final    Neutrophils Absolute 05/19/2021 14 31* 1 85 - 7 62 Thousands/µL Final    Immature Grans Absolute 05/19/2021 0 14  0 00 - 0 20 Thousand/uL Final    Lymphocytes Absolute 05/19/2021 0 38* 0 60 - 4 47 Thousands/µL Final    Monocytes Absolute 05/19/2021 1 30* 0 17 - 1 22 Thousand/µL Final    Eosinophils Absolute 05/19/2021 0 01  0 00 - 0 61 Thousand/µL Final    Basophils Absolute 05/19/2021 0 02  0 00 - 0 10 Thousands/µL Final    Sodium 05/19/2021 135* 136 - 145 mmol/L Final    Potassium 05/19/2021 4 0  3 5 - 5 3 mmol/L Final    Chloride 05/19/2021 101  100 - 108 mmol/L Final    CO2 05/19/2021 28  21 - 32 mmol/L Final    ANION GAP 05/19/2021 6  4 - 13 mmol/L Final    BUN 05/19/2021 11  5 - 25 mg/dL Final    Creatinine 05/19/2021 0 52* 0 60 - 1 30 mg/dL Final    Standardized to IDMS reference method    Glucose 05/19/2021 123  65 - 140 mg/dL Final    If the patient is fasting, the ADA then defines impaired fasting glucose as > 100 mg/dL and diabetes as > or equal to 123 mg/dL  Specimen collection should occur prior to Sulfasalazine administration due to the potential for falsely depressed results   Specimen collection should occur prior to Sulfapyridine administration due to the potential for falsely elevated results   Calcium 05/19/2021 7 9* 8 3 - 10 1 mg/dL Final    Corrected Calcium 05/19/2021 9 8  8 3 - 10 1 mg/dL Final    AST 05/19/2021 29  5 - 45 U/L Final    Specimen collection should occur prior to Sulfasalazine administration due to the potential for falsely depressed results   ALT 05/19/2021 103* 12 - 78 U/L Final    Specimen collection should occur prior to Sulfasalazine administration due to the potential for falsely depressed results   Alkaline Phosphatase 05/19/2021 182* 46 - 116 U/L Final    Total Protein 05/19/2021 6 6  6 4 - 8 2 g/dL Final    Albumin 05/19/2021 1 6* 3 5 - 5 0 g/dL Final    Total Bilirubin 05/19/2021 0 24  0 20 - 1 00 mg/dL Final    Use of this assay is not recommended for patients undergoing treatment with eltrombopag due to the potential for falsely elevated results   eGFR 05/19/2021 111  ml/min/1 73sq m Final    TSH 3RD GENERATON 05/19/2021 0 961  0 358 - 3 740 uIU/mL Final       The following historical data was reviewed  No past medical history on file      Past Surgical History:   Procedure Laterality Date    IR BIOPSY LIVER MASS  4/7/2021    IR BIOPSY LYMPH NODE  4/13/2021    IR THORACENTESIS  4/6/2021    TENDON REPAIR Right     forearm       Social History     Socioeconomic History    Marital status: Single     Spouse name: Not on file    Number of children: Not on file    Years of education: Not on file    Highest education level: Not on file   Occupational History    Not on file   Social Needs    Financial resource strain: Not on file    Food insecurity     Worry: Not on file     Inability: Not on file    Transportation needs     Medical: Not on file     Non-medical: Not on file   Tobacco Use    Smoking status: Former Smoker    Smokeless tobacco: Never Used    Tobacco comment: quit feb 2021   Substance and Sexual Activity    Alcohol use: Never     Frequency: Never    Drug use: Never    Sexual activity: Not on file   Lifestyle    Physical activity     Days per week: Not on file     Minutes per session: Not on file    Stress: Not on file   Relationships    Social connections     Talks on phone: Not on file     Gets together: Not on file     Attends [employer] service: Not on file     Active member of club or organization: Not on file     Attends meetings of clubs or organizations: Not on file     Relationship status: Not on file    Intimate partner violence     Fear of current or ex partner: Not on file     Emotionally abused: Not on file     Physically abused: Not on file     Forced sexual activity: Not on file   Other Topics Concern    Not on file   Social History Narrative    Not on file       Family History   Problem Relation Age of Onset    Heart disease Father     Multiple sclerosis Sister     Heart attack Brother        Please note: This report has been generated by a voice recognition software system  Therefore there may be syntax, spelling, and/or grammatical errors  Please call if you have any questions

## 2021-06-01 DIAGNOSIS — J43.9 PULMONARY EMPHYSEMA, UNSPECIFIED EMPHYSEMA TYPE (HCC): ICD-10-CM

## 2021-06-01 DIAGNOSIS — R59.0 MEDIASTINAL LYMPHADENOPATHY: ICD-10-CM

## 2021-06-01 DIAGNOSIS — R91.8 LUNG MASS: ICD-10-CM

## 2021-06-01 DIAGNOSIS — C79.51 BONE METASTASES (HCC): ICD-10-CM

## 2021-06-01 RX ORDER — OXYCODONE HYDROCHLORIDE AND ACETAMINOPHEN 5; 325 MG/1; MG/1
1 TABLET ORAL EVERY 4 HOURS PRN
Qty: 42 TABLET | Refills: 0 | Status: SHIPPED | OUTPATIENT
Start: 2021-06-01 | End: 2021-06-21 | Stop reason: SDUPTHER

## 2021-06-03 ENCOUNTER — HOSPITAL ENCOUNTER (OUTPATIENT)
Dept: INFUSION CENTER | Facility: HOSPITAL | Age: 66
Discharge: HOME | End: 2021-06-03
Attending: INTERNAL MEDICINE

## 2021-06-07 RX ORDER — SODIUM CHLORIDE 9 MG/ML
20 INJECTION, SOLUTION INTRAVENOUS ONCE
Status: CANCELLED | OUTPATIENT
Start: 2021-06-09

## 2021-06-08 ENCOUNTER — OFFICE VISIT (OUTPATIENT)
Dept: PULMONOLOGY | Facility: CLINIC | Age: 66
End: 2021-06-08
Payer: MEDICARE

## 2021-06-08 ENCOUNTER — OFFICE VISIT (OUTPATIENT)
Dept: HEMATOLOGY ONCOLOGY | Facility: CLINIC | Age: 66
End: 2021-06-08
Payer: MEDICARE

## 2021-06-08 ENCOUNTER — APPOINTMENT (OUTPATIENT)
Dept: LAB | Facility: HOSPITAL | Age: 66
End: 2021-06-08
Attending: INTERNAL MEDICINE
Payer: MEDICARE

## 2021-06-08 ENCOUNTER — HOSPITAL ENCOUNTER (OUTPATIENT)
Dept: RADIOLOGY | Facility: HOSPITAL | Age: 66
Discharge: HOME | End: 2021-06-08
Payer: MEDICARE

## 2021-06-08 ENCOUNTER — TELEPHONE (OUTPATIENT)
Dept: HEMATOLOGY ONCOLOGY | Facility: CLINIC | Age: 66
End: 2021-06-08

## 2021-06-08 VITALS
WEIGHT: 134 LBS | DIASTOLIC BLOOD PRESSURE: 67 MMHG | HEIGHT: 70 IN | HEART RATE: 91 BPM | OXYGEN SATURATION: 96 % | BODY MASS INDEX: 19.18 KG/M2 | SYSTOLIC BLOOD PRESSURE: 106 MMHG

## 2021-06-08 VITALS
WEIGHT: 134 LBS | SYSTOLIC BLOOD PRESSURE: 106 MMHG | HEIGHT: 71 IN | HEART RATE: 11 BPM | TEMPERATURE: 98.2 F | BODY MASS INDEX: 18.76 KG/M2 | OXYGEN SATURATION: 89 % | DIASTOLIC BLOOD PRESSURE: 67 MMHG

## 2021-06-08 DIAGNOSIS — J90 PLEURAL EFFUSION: ICD-10-CM

## 2021-06-08 DIAGNOSIS — J18.9 HCAP (HEALTHCARE-ASSOCIATED PNEUMONIA): ICD-10-CM

## 2021-06-08 DIAGNOSIS — C64.2 RENAL CELL CARCINOMA OF LEFT KIDNEY (HCC): ICD-10-CM

## 2021-06-08 DIAGNOSIS — R59.0 MEDIASTINAL LYMPHADENOPATHY: ICD-10-CM

## 2021-06-08 DIAGNOSIS — J44.9 CHRONIC OBSTRUCTIVE PULMONARY DISEASE, UNSPECIFIED COPD TYPE (HCC): Primary | ICD-10-CM

## 2021-06-08 DIAGNOSIS — J96.11 CHRONIC RESPIRATORY FAILURE WITH HYPOXIA (HCC): ICD-10-CM

## 2021-06-08 DIAGNOSIS — J96.01 ACUTE RESPIRATORY FAILURE WITH HYPOXIA (HCC): ICD-10-CM

## 2021-06-08 DIAGNOSIS — J43.9 PULMONARY EMPHYSEMA, UNSPECIFIED EMPHYSEMA TYPE (HCC): Primary | ICD-10-CM

## 2021-06-08 DIAGNOSIS — C79.51 BONE METASTASES (HCC): Primary | ICD-10-CM

## 2021-06-08 LAB
ALBUMIN SERPL BCP-MCNC: 1.6 G/DL (ref 3.5–5)
ALP SERPL-CCNC: 201 U/L (ref 46–116)
ALT SERPL W P-5'-P-CCNC: 49 U/L (ref 12–78)
ANION GAP SERPL CALCULATED.3IONS-SCNC: 7 MMOL/L (ref 4–13)
AST SERPL W P-5'-P-CCNC: 38 U/L (ref 5–45)
BASOPHILS # BLD AUTO: 0.05 THOUSANDS/ΜL (ref 0–0.1)
BASOPHILS NFR BLD AUTO: 0 % (ref 0–1)
BILIRUB SERPL-MCNC: 0.34 MG/DL (ref 0.2–1)
BUN SERPL-MCNC: 12 MG/DL (ref 5–25)
CALCIUM ALBUM COR SERPL-MCNC: 10.6 MG/DL (ref 8.3–10.1)
CALCIUM SERPL-MCNC: 8.7 MG/DL (ref 8.3–10.1)
CHLORIDE SERPL-SCNC: 102 MMOL/L (ref 100–108)
CO2 SERPL-SCNC: 31 MMOL/L (ref 21–32)
CREAT SERPL-MCNC: 0.55 MG/DL (ref 0.6–1.3)
EOSINOPHIL # BLD AUTO: 0.03 THOUSAND/ΜL (ref 0–0.61)
EOSINOPHIL NFR BLD AUTO: 0 % (ref 0–6)
ERYTHROCYTE [DISTWIDTH] IN BLOOD BY AUTOMATED COUNT: 18.3 % (ref 11.6–15.1)
GFR SERPL CREATININE-BSD FRML MDRD: 109 ML/MIN/1.73SQ M
GLUCOSE SERPL-MCNC: 95 MG/DL (ref 65–140)
HCT VFR BLD AUTO: 28.4 % (ref 36.5–49.3)
HGB BLD-MCNC: 7.8 G/DL (ref 12–17)
IMM GRANULOCYTES # BLD AUTO: 0.18 THOUSAND/UL (ref 0–0.2)
IMM GRANULOCYTES NFR BLD AUTO: 1 % (ref 0–2)
LYMPHOCYTES # BLD AUTO: 0.57 THOUSANDS/ΜL (ref 0.6–4.47)
LYMPHOCYTES NFR BLD AUTO: 4 % (ref 14–44)
MCH RBC QN AUTO: 20.3 PG (ref 26.8–34.3)
MCHC RBC AUTO-ENTMCNC: 27.5 G/DL (ref 31.4–37.4)
MCV RBC AUTO: 74 FL (ref 82–98)
MONOCYTES # BLD AUTO: 1.97 THOUSAND/ΜL (ref 0.17–1.22)
MONOCYTES NFR BLD AUTO: 12 % (ref 4–12)
NEUTROPHILS # BLD AUTO: 13.59 THOUSANDS/ΜL (ref 1.85–7.62)
NEUTS SEG NFR BLD AUTO: 83 % (ref 43–75)
NRBC BLD AUTO-RTO: 0 /100 WBCS
PLATELET # BLD AUTO: 1167 THOUSANDS/UL (ref 149–390)
PMV BLD AUTO: 9.2 FL (ref 8.9–12.7)
POTASSIUM SERPL-SCNC: 3.7 MMOL/L (ref 3.5–5.3)
PROT SERPL-MCNC: 7.1 G/DL (ref 6.4–8.2)
RBC # BLD AUTO: 3.84 MILLION/UL (ref 3.88–5.62)
SODIUM SERPL-SCNC: 140 MMOL/L (ref 136–145)
TSH SERPL DL<=0.05 MIU/L-ACNC: 1.09 UIU/ML (ref 0.36–3.74)
WBC # BLD AUTO: 16.39 THOUSAND/UL (ref 4.31–10.16)

## 2021-06-08 PROCEDURE — 36415 COLL VENOUS BLD VENIPUNCTURE: CPT

## 2021-06-08 PROCEDURE — 84443 ASSAY THYROID STIM HORMONE: CPT

## 2021-06-08 PROCEDURE — 85025 COMPLETE CBC W/AUTO DIFF WBC: CPT

## 2021-06-08 PROCEDURE — 71046 X-RAY EXAM CHEST 2 VIEWS: CPT

## 2021-06-08 PROCEDURE — 80053 COMPREHEN METABOLIC PANEL: CPT

## 2021-06-08 PROCEDURE — 99214 OFFICE O/P EST MOD 30 MIN: CPT | Performed by: PHYSICIAN ASSISTANT

## 2021-06-08 PROCEDURE — 99214 OFFICE O/P EST MOD 30 MIN: CPT | Performed by: NURSE PRACTITIONER

## 2021-06-08 RX ORDER — ALBUTEROL SULFATE 2.5 MG/3ML
2.5 SOLUTION RESPIRATORY (INHALATION) EVERY 6 HOURS PRN
Qty: 360 ML | Refills: 2 | Status: SHIPPED | OUTPATIENT
Start: 2021-06-08

## 2021-06-08 RX ORDER — ALBUTEROL SULFATE 90 UG/1
2 AEROSOL, METERED RESPIRATORY (INHALATION) EVERY 6 HOURS PRN
Qty: 18 G | Refills: 1 | Status: SHIPPED | OUTPATIENT
Start: 2021-06-08

## 2021-06-08 RX ORDER — PREDNISONE 10 MG/1
40 TABLET ORAL DAILY
Qty: 20 TABLET | Refills: 0 | Status: SHIPPED | OUTPATIENT
Start: 2021-06-08

## 2021-06-08 NOTE — ASSESSMENT & PLAN NOTE
CXR as above  Of note, should left sided effusion be increasing would recommend IR thoracentesis depending on the size with plans to repeat cytology as there is high suspicion for malignant effusion  If effusions continue to be recurrent, an argument could be made to place indwelling catheter on left in the future

## 2021-06-08 NOTE — TELEPHONE ENCOUNTER
Call received from:   Irina Jacobs    Lab location:[facility]  Date of lab draw:6/8/21  Critical value:Platelets [phone]    Read back occurred:Yes  Tasked and IM:VENANCIO Riddle

## 2021-06-08 NOTE — PROGRESS NOTES
[address] [facility] HEMATOLOGY ONCOLOGY [address] [facility]   [address] [facility] [address] [phone]  Progress Note  Gretta Phalen, 1955, [id number]  6/8/2021    Assessment/Plan:  1  Renal cell carcinoma of left kidney (HCC)  2  Bone metastases ([facility] )  3  Mediastinal lymphadenopathy   Patient is a 59-year-old male with history of renal cell carcinoma of the left kidney with bone metastasis currently on Opdivo and Yervoy  He has completed 2 cycles and is due for his next cycle on 06/11/2021  He states he has been tolerating this without difficulty although does report swelling to his bilateral feet and ankles  His PCP ordered Lasix 20 mg g go 3 times a week for swelling  He also reports increased shortness of with exertion  He continues on oxygen after his hospitalization secondary to respiratory failure with hypoxia  Patient continue follow with pulmonary medicine and has an appointment this afternoon  Patient has not had updated blood work since 06/19/2021 therefore we will request blood work today just prior to his next treatment  Will call him and make arrangements as indicated  We will plan for a CT scan of the chest abdomen pelvis to re-evaluate status of disease  We will plan to see shortly after     - CT chest abdomen pelvis w contrast; Future    Goals and Barriers:    Current Goal:   Prolong Survival from Cancer  Barriers: None  Patient's Capacity to Self Care:  Patient Is able to self care   -------------------------------------------------------------------------------------------------------    No chief complaint on file  History of present illness/Cancer History:   Oncology History   Renal cell carcinoma of left kidney ([facility] [address] )   4/5/2021 Initial Diagnosis    April 2021 patient presented with weight loss, dyspnea  April 5, 2021 CT of the chest showed 3 4 cm left upper lobe mass, 3 x 2 cm lingular mass, right lower lobe nodules up to 1 5 cm   Pleural enhancement and moderate left effusion are noted  Mediastinal lymph nodes up to 5 2 cm  Bony destructive lesion in the left 9th rib  CT of the abdomen pelvis showed indeterminate lesions in the left kidney and liver  Retroperitoneal lymph nodes up to 9 3 cm  L4 right transverse process and right sacral destructive lesions noted  WBC is 13 0, hemoglobin 11 1, MCV 81, platelet count 885, neutrophil 79, bands 1, lymphocytes 5, monocytes 13  CMP showed albumin 2 0, total protein 7 3, otherwise normal   Liver biopsy showed hemangioma  2021 Biopsy    2021 biopsy of left periaortic node showed metastatic carcinoma consistent with renal primary  2021 -  Chemotherapy    ipilimumab (YERVOY) 65 mg in sodium chloride 0 9 % 50 mL IVPB, 1 mg/kg = 65 mg, Intravenous, Once, 2 of 4 cycles  Administration: 65 mg (2021), 65 mg (2021)  nivolumab (OPDIVO) 200 mg in sodium chloride 0 9 % 100 mL IVPB, 194 1 mg, Intravenous, Once, 2 of 10 cycles  Administration: 200 mg (2021), 200 mg (2021)        Cancer Staging  No matching staging information was found for the patient  ECO - Symptomatic but completely ambulatory    Interval history:  Clinically stable     Review of Systems   Constitutional: Positive for fatigue  Negative for activity change, appetite change, fever and unexpected weight change  Respiratory: Positive for shortness of breath  Negative for cough  Cardiovascular: Positive for leg swelling (Bilateral feet)  Negative for chest pain  Gastrointestinal: Negative for abdominal pain, constipation, diarrhea and nausea  Endocrine: Negative for cold intolerance and heat intolerance  Musculoskeletal: Positive for back pain  Negative for arthralgias and myalgias  Skin: Negative  Neurological: Negative for dizziness, weakness and headaches  Hematological: Negative for adenopathy  Does not bruise/bleed easily         Current Outpatient Medications:    albuterol (Ventolin HFA) 90 mcg/act inhaler, Inhale 2 puffs every 6 (six) hours as needed for wheezing, Disp: 18 g, Rfl: 1    benzonatate (TESSALON PERLES) 100 mg capsule, Take 1 capsule (100 mg total) by mouth 3 (three) times a day as needed for cough, Disp: 20 capsule, Rfl: 0    calcium carbonate (OYSTER SHELL,OSCAL) 500 mg, Take 1 tablet by mouth 2 (two) times a day with meals, Disp: 60 tablet, Rfl: 0    ergocalciferol (VITAMIN D2) 50,000 units, Take 1 capsule (50,000 Units total) by mouth once a week, Disp: 7 capsule, Rfl: 0    ferrous sulfate 324 (65 Fe) mg, Take 1 tablet (324 mg total) by mouth every other day, Disp: 15 tablet, Rfl: 0    fluticasone-salmeterol (ADVAIR, WIXELA) 250-50 mcg/dose inhaler, Inhale 1 puff 2 (two) times a day Rinse mouth after use , Disp: 1 each, Rfl: 5    furosemide (LASIX) 20 mg tablet, Take 1 tablet (20 mg total) by mouth 3 (three) times a week If needed for leg swelling, Disp: 20 tablet, Rfl: 5    oxyCODONE-acetaminophen (PERCOCET) 5-325 mg per tablet, Take 1 tablet by mouth every 4 (four) hours as needed for moderate painMax Daily Amount: 6 tablets, Disp: 42 tablet, Rfl: 0    pantoprazole (PROTONIX) 40 mg tablet, Take 1 tablet (40 mg total) by mouth daily, Disp: 90 tablet, Rfl: 1    No Known Allergies    Advance Directive and Living Will:        Objective:   /67   Pulse (!) 11   Temp 98 2 °F (36 8 °C)   Ht 5' 10 98" (1 803 m)   Wt 60 8 kg (134 lb)   SpO2 (!) 89%   BMI 18 70 kg/m²   Wt Readings from Last 6 Encounters:   06/08/21 60 8 kg (134 lb)   06/08/21 60 8 kg (134 lb)   05/21/21 62 2 kg (137 lb 2 oz)   05/21/21 62 1 kg (137 lb)   05/18/21 64 kg (141 lb)   05/11/21 60 3 kg (132 lb 15 oz)       Physical Exam  Vitals signs reviewed  Constitutional:       Appearance: Normal appearance  He is well-developed  HENT:      Head: Normocephalic and atraumatic  Eyes:      Extraocular Movements: Extraocular movements intact        Conjunctiva/sclera: Conjunctivae normal       Pupils: Pupils are equal, round, and reactive to light  Neck:      Musculoskeletal: Normal range of motion  Cardiovascular:      Rate and Rhythm: Normal rate and regular rhythm  Pulses: Normal pulses  Heart sounds: Normal heart sounds  Pulmonary:      Effort: Pulmonary effort is normal  Tachypnea present  No respiratory distress  Breath sounds: Normal breath sounds  Abdominal:      General: Bowel sounds are normal       Palpations: Abdomen is soft  Musculoskeletal: Normal range of motion  Right lower leg: Edema (Plus two pitting ankles) present  Left lower leg: Edema (Plus two pitting ankles) present  Lymphadenopathy:      Cervical: No cervical adenopathy  Skin:     General: Skin is warm and dry  Capillary Refill: Capillary refill takes less than 2 seconds  Neurological:      Mental Status: He is alert and oriented to person, place, and time     Psychiatric:         Behavior: Behavior normal        Pertinent Laboratory Results and Imaging Review:  Appointment on 05/19/2021   Component Date Value Ref Range Status    WBC 05/19/2021 16 16* 4 31 - 10 16 Thousand/uL Final    RBC 05/19/2021 3 64* 3 88 - 5 62 Million/uL Final    Hemoglobin 05/19/2021 8 0* 12 0 - 17 0 g/dL Final    Hematocrit 05/19/2021 27 7* 36 5 - 49 3 % Final    MCV 05/19/2021 76* 82 - 98 fL Final    MCH 05/19/2021 22 0* 26 8 - 34 3 pg Final    MCHC 05/19/2021 28 9* 31 4 - 37 4 g/dL Final    RDW 05/19/2021 16 5* 11 6 - 15 1 % Final    MPV 05/19/2021 9 3  8 9 - 12 7 fL Final    Platelets 51/75/1920 681* 149 - 390 Thousands/uL Final    nRBC 05/19/2021 0  /100 WBCs Final    Neutrophils Relative 05/19/2021 89* 43 - 75 % Final    Immat GRANS % 05/19/2021 1  0 - 2 % Final    Lymphocytes Relative 05/19/2021 2* 14 - 44 % Final    Monocytes Relative 05/19/2021 8  4 - 12 % Final    Eosinophils Relative 05/19/2021 0  0 - 6 % Final    Basophils Relative 05/19/2021 0  0 - 1 % Final    Neutrophils Absolute 05/19/2021 14 31* 1 85 - 7 62 Thousands/µL Final    Immature Grans Absolute 05/19/2021 0 14  0 00 - 0 20 Thousand/uL Final    Lymphocytes Absolute 05/19/2021 0 38* 0 60 - 4 47 Thousands/µL Final    Monocytes Absolute 05/19/2021 1 30* 0 17 - 1 22 Thousand/µL Final    Eosinophils Absolute 05/19/2021 0 01  0 00 - 0 61 Thousand/µL Final    Basophils Absolute 05/19/2021 0 02  0 00 - 0 10 Thousands/µL Final    Sodium 05/19/2021 135* 136 - 145 mmol/L Final    Potassium 05/19/2021 4 0  3 5 - 5 3 mmol/L Final    Chloride 05/19/2021 101  100 - 108 mmol/L Final    CO2 05/19/2021 28  21 - 32 mmol/L Final    ANION GAP 05/19/2021 6  4 - 13 mmol/L Final    BUN 05/19/2021 11  5 - 25 mg/dL Final    Creatinine 05/19/2021 0 52* 0 60 - 1 30 mg/dL Final    Standardized to IDMS reference method    Glucose 05/19/2021 123  65 - 140 mg/dL Final    If the patient is fasting, the ADA then defines impaired fasting glucose as > 100 mg/dL and diabetes as > or equal to 123 mg/dL  Specimen collection should occur prior to Sulfasalazine administration due to the potential for falsely depressed results  Specimen collection should occur prior to Sulfapyridine administration due to the potential for falsely elevated results   Calcium 05/19/2021 7 9* 8 3 - 10 1 mg/dL Final    Corrected Calcium 05/19/2021 9 8  8 3 - 10 1 mg/dL Final    AST 05/19/2021 29  5 - 45 U/L Final    Specimen collection should occur prior to Sulfasalazine administration due to the potential for falsely depressed results   ALT 05/19/2021 103* 12 - 78 U/L Final    Specimen collection should occur prior to Sulfasalazine administration due to the potential for falsely depressed results       Alkaline Phosphatase 05/19/2021 182* 46 - 116 U/L Final    Total Protein 05/19/2021 6 6  6 4 - 8 2 g/dL Final    Albumin 05/19/2021 1 6* 3 5 - 5 0 g/dL Final    Total Bilirubin 05/19/2021 0 24  0 20 - 1 00 mg/dL Final    Use of this assay is not recommended for patients undergoing treatment with eltrombopag due to the potential for falsely elevated results   eGFR 05/19/2021 111  ml/min/1 73sq m Final    TSH 3RD GENERATON 05/19/2021 0 961  0 358 - 3 740 uIU/mL Final   Appointment on 05/14/2021   Component Date Value Ref Range Status    Sodium 05/14/2021 141  136 - 145 mmol/L Final    Potassium 05/14/2021 4 2  3 5 - 5 3 mmol/L Final    Chloride 05/14/2021 104  100 - 108 mmol/L Final    CO2 05/14/2021 28  21 - 32 mmol/L Final    ANION GAP 05/14/2021 9  4 - 13 mmol/L Final    BUN 05/14/2021 16  5 - 25 mg/dL Final    Creatinine 05/14/2021 0 54* 0 60 - 1 30 mg/dL Final    Standardized to IDMS reference method    Glucose 05/14/2021 142* 65 - 140 mg/dL Final    If the patient is fasting, the ADA then defines impaired fasting glucose as > 100 mg/dL and diabetes as > or equal to 123 mg/dL  Specimen collection should occur prior to Sulfasalazine administration due to the potential for falsely depressed results  Specimen collection should occur prior to Sulfapyridine administration due to the potential for falsely elevated results   Calcium 05/14/2021 8 3  8 3 - 10 1 mg/dL Final    Corrected Calcium 05/14/2021 10 1  8 3 - 10 1 mg/dL Final    AST 05/14/2021 47* 5 - 45 U/L Final    Specimen collection should occur prior to Sulfasalazine administration due to the potential for falsely depressed results   ALT 05/14/2021 151* 12 - 78 U/L Final    Specimen collection should occur prior to Sulfasalazine administration due to the potential for falsely depressed results   Alkaline Phosphatase 05/14/2021 155* 46 - 116 U/L Final    Total Protein 05/14/2021 6 9  6 4 - 8 2 g/dL Final    Albumin 05/14/2021 1 8* 3 5 - 5 0 g/dL Final    Total Bilirubin 05/14/2021 0 31  0 20 - 1 00 mg/dL Final    Use of this assay is not recommended for patients undergoing treatment with eltrombopag due to the potential for falsely elevated results      eGFR 05/14/2021 109 ml/min/1 73sq m Final    WBC 05/14/2021 21 35* 4 31 - 10 16 Thousand/uL Final    RBC 05/14/2021 4 17  3 88 - 5 62 Million/uL Final    Hemoglobin 05/14/2021 9 3* 12 0 - 17 0 g/dL Final    Hematocrit 05/14/2021 32 1* 36 5 - 49 3 % Final    MCV 05/14/2021 77* 82 - 98 fL Final    MCH 05/14/2021 22 3* 26 8 - 34 3 pg Final    MCHC 05/14/2021 29 0* 31 4 - 37 4 g/dL Final    RDW 05/14/2021 15 9* 11 6 - 15 1 % Final    MPV 05/14/2021 9 5  8 9 - 12 7 fL Final    Platelets 83/53/5454 678* 149 - 390 Thousands/uL Final    nRBC 05/14/2021 0  /100 WBCs Final    This is an appended report  These results have been appended to a previously preliminary verified report   Segmented % 05/14/2021 91* 43 - 75 % Final    Bands % 05/14/2021 3  0 - 8 % Final    Lymphocytes % 05/14/2021 2* 14 - 44 % Final    Monocytes % 05/14/2021 4  4 - 12 % Final    Eosinophils, % 05/14/2021 0  0 - 6 % Final    Basophils % 05/14/2021 0  0 - 1 % Final    Absolute Neutrophils 05/14/2021 20 07* 1 85 - 7 62 Thousand/uL Final    Lymphocytes Absolute 05/14/2021 0 43* 0 60 - 4 47 Thousand/uL Final    Monocytes Absolute 05/14/2021 0 85  0 00 - 1 22 Thousand/uL Final    Eosinophils Absolute 05/14/2021 0 00  0 00 - 0 40 Thousand/uL Final    Basophils Absolute 05/14/2021 0 00  0 00 - 0 10 Thousand/uL Final    Total Counted 05/14/2021 100   Final    Anisocytosis 05/14/2021 Present   Final    Hypochromia 05/14/2021 Present   Final    Platelet Estimate 57/65/0111 Increased* Adequate Final   No results displayed because visit has over 200 results  The following historical data was reviewed      Past Medical History:   Diagnosis Date    Emphysema of lung [facility])        Past Surgical History:   Procedure Laterality Date    IR BIOPSY LIVER MASS  4/7/2021    IR BIOPSY LYMPH NODE  4/13/2021    IR THORACENTESIS  4/6/2021    TENDON REPAIR Right     forearm       Social History     Socioeconomic History    Marital status: Single Spouse name: None    Number of children: None    Years of education: None    Highest education level: None   Occupational History    None   Social Needs    Financial resource strain: None    Food insecurity     Worry: None     Inability: None    Transportation needs     Medical: None     Non-medical: None   Tobacco Use    Smoking status: Former Smoker    Smokeless tobacco: Never Used    Tobacco comment: quit feb 2021   Substance and Sexual Activity    Alcohol use: Never     Frequency: Never    Drug use: Never    Sexual activity: None   Lifestyle    Physical activity     Days per week: None     Minutes per session: None    Stress: None   Relationships    Social connections     Talks on phone: None     Gets together: None     Attends [employer] service: None     Active member of club or organization: None     Attends meetings of clubs or organizations: None     Relationship status: None    Intimate partner violence     Fear of current or ex partner: None     Emotionally abused: None     Physically abused: None     Forced sexual activity: None   Other Topics Concern    None   Social History Narrative    None       Family History   Problem Relation Age of Onset    Heart disease Father     Multiple sclerosis Sister     Heart attack Brother        Please note: This report has been generated by a voice recognition software system  Therefore there may be syntax, spelling, and/or grammatical errors  Please call if you have any questions

## 2021-06-08 NOTE — ASSESSMENT & PLAN NOTE
Continue supplemental oxygen at 2 L nasal cannula continuously  Keep oxygen saturations above 88%  Patient benefits from supplemental oxygen as it is necessary to treat his medical condition

## 2021-06-08 NOTE — ASSESSMENT & PLAN NOTE
Patient has suspected COPD of unknown severity with mild acute exacerbation at time  Recommend quick prednisone burst with 40 mg p o  daily x5 days  Recommend he continue Advair 250/50 mcg 1 puff twice daily  He was reminded to rinse his mouth out after each use  I added Spiriva Respimat 2 5 mcg 2 puffs daily  Reviewed side effects and proper inhaler technique in his presence  He demonstrated good technique in front of me  Continue albuterol HFA 2 puffs q 6 hours p r n     Added albuterol nebulized with nebulizer and supplies sent to his [employer]  Minutes acute exacerbation is resolved recommend pulmonary function test to assess degree of obstruction

## 2021-06-08 NOTE — PROGRESS NOTES
Pulmonary Follow Up Note   Herimnia Julien 77 y o  male MRN: [id number]  6/8/2021      Assessment:    Chronic obstructive pulmonary disease ([facility] )    Patient has suspected COPD of unknown severity with mild acute exacerbation at time  Recommend quick prednisone burst with 40 mg p o  daily x5 days  Recommend he continue Advair 250/50 mcg 1 puff twice daily  He was reminded to rinse his mouth out after each use  I added Spiriva Respimat 2 5 mcg 2 puffs daily  Reviewed side effects and proper inhaler technique in his presence  He demonstrated good technique in front of me  Continue albuterol HFA 2 puffs q 6 hours p r n     Added albuterol nebulized with nebulizer and supplies sent to his [employer] company  Minutes acute exacerbation is resolved recommend pulmonary function test to assess degree of obstruction  Chronic respiratory failure with hypoxia (HCC)    Continue supplemental oxygen at 2 L nasal cannula continuously  Keep oxygen saturations above 88%  Patient benefits from supplemental oxygen as it is necessary to treat his medical condition  HCAP (healthcare-associated pneumonia)    No need for additional antibiotics at this time  Will check chest x-ray now prior to scheduled CT per oncology to ensure no worsening infiltrates or effusion  Pleural effusion  CXR as above  Of note, should left sided effusion be increasing would recommend IR thoracentesis depending on the size with plans to repeat cytology as there is high suspicion for malignant effusion  If effusions continue to be recurrent, an argument could be made to place indwelling catheter on left in the future  Plan:    Diagnoses and all orders for this visit:    Chronic obstructive pulmonary disease, unspecified COPD type ([place] [address] )  -     Complete PFT with post bronchodilator; Future  -     predniSONE 10 mg tablet; Take 4 tablets (40 mg total) by mouth daily  -     tiotropium (SPIRIVA RESPIMAT) 2 5 MCG/ACT AERS inhaler;  Inhale 2 puffs daily  -     albuterol (2 5 mg/3 mL) 0 083 % nebulizer solution; Take 1 vial (2 5 mg total) by nebulization every 6 (six) hours as needed for wheezing or shortness of breath  -     Nebulizer Supplies  -     Nebulizer    HCAP (healthcare-associated pneumonia)  -     Ambulatory referral to Pulmonology    Acute respiratory failure with hypoxia ([facility] )  -     Ambulatory referral to Pulmonology    Pleural effusion  -     XR chest pa & lateral; Future    Chronic respiratory failure with hypoxia ([facility] )        Return in about 3 months (around 9/8/2021)  History of Present Illness   HPI:  Cachorro Garcia is a 77 y o  male who  Presents to the office today for hospital follow-up  He has past medical history positive for stage IV renal cell carcinoma diagnosed 04/13/2021 on chemotherapy  He was hospitalized the beginning of May for acute hypoxic respiratory failure secondary to sepsis caused by postobstructive pneumonia and COPD exacerbation  Of note patient also had bilateral pleural effusions left greater than right  He previously required left-sided thoracentesis 04/06/2021 that yielded exudative pattern with negative cytology and cultures  While hospitalized he completed a full 7 day course of cefepime as well as several doses of IV Solu-Medrol for being discharged home prednisone taper  He was sent home on 2 L nasal cannula continuously  Today in the office he reports continued dyspnea on exertion when he does strenuous activities  Denies shortness of breath at rest   He has cough that is occasionally productive of clear to yellow sputum  Denies chest tightness but does admit to wheezing "like I can't get air in sometimes "  Denies fevers, chills, headache or dizziness  He is using Advair 1 puff twice daily  He reports rinse his mouth out after each use  He is using his rescue inhaler 4 to 5 times a day  He states oxygen helps with his breathing      Review of Systems   All other systems reviewed and are negative        Historical Information   Past Medical History:   Diagnosis Date    Emphysema of lung [facility])      Past Surgical History:   Procedure Laterality Date    IR BIOPSY LIVER MASS  4/7/2021    IR BIOPSY LYMPH NODE  4/13/2021    IR THORACENTESIS  4/6/2021    TENDON REPAIR Right     forearm     Family History   Problem Relation Age of Onset    Heart disease Father     Multiple sclerosis Sister     Heart attack Brother        Social History     Tobacco Use   Smoking Status Former Smoker   Smokeless Tobacco Never Used   Tobacco Comment    quit feb 2021         Meds/Allergies     Current Outpatient Medications:     albuterol (Ventolin HFA) 90 mcg/act inhaler, Inhale 2 puffs every 6 (six) hours as needed for wheezing, Disp: 18 g, Rfl: 1    benzonatate (TESSALON PERLES) 100 mg capsule, Take 1 capsule (100 mg total) by mouth 3 (three) times a day as needed for cough, Disp: 20 capsule, Rfl: 0    calcium carbonate (OYSTER SHELL,OSCAL) 500 mg, Take 1 tablet by mouth 2 (two) times a day with meals, Disp: 60 tablet, Rfl: 0    ergocalciferol (VITAMIN D2) 50,000 units, Take 1 capsule (50,000 Units total) by mouth once a week, Disp: 7 capsule, Rfl: 0    ferrous sulfate 324 (65 Fe) mg, Take 1 tablet (324 mg total) by mouth every other day, Disp: 15 tablet, Rfl: 0    fluticasone-salmeterol (ADVAIR, WIXELA) 250-50 mcg/dose inhaler, Inhale 1 puff 2 (two) times a day Rinse mouth after use , Disp: 1 each, Rfl: 5    furosemide (LASIX) 20 mg tablet, Take 1 tablet (20 mg total) by mouth 3 (three) times a week If needed for leg swelling, Disp: 20 tablet, Rfl: 5    oxyCODONE-acetaminophen (PERCOCET) 5-325 mg per tablet, Take 1 tablet by mouth every 4 (four) hours as needed for moderate painMax Daily Amount: 6 tablets, Disp: 42 tablet, Rfl: 0    pantoprazole (PROTONIX) 40 mg tablet, Take 1 tablet (40 mg total) by mouth daily, Disp: 90 tablet, Rfl: 1    albuterol (2 5 mg/3 mL) 0 083 % nebulizer solution, Take 1 vial (2 5 mg total) by nebulization every 6 (six) hours as needed for wheezing or shortness of breath, Disp: 360 mL, Rfl: 2    predniSONE 10 mg tablet, Take 4 tablets (40 mg total) by mouth daily, Disp: 20 tablet, Rfl: 0    tiotropium (SPIRIVA RESPIMAT) 2 5 MCG/ACT AERS inhaler, Inhale 2 puffs daily, Disp: 4 g, Rfl: 2  No Known Allergies    Vitals: Blood pressure 106/67, pulse 91, height 5' 10" (1 778 m), weight 60 8 kg (134 lb), SpO2 96 %  Body mass index is 19 23 kg/m²  Oxygen Therapy  SpO2: 96 %  Oxygen Therapy: Supplemental oxygen  O2 Delivery Method: Nasal cannula  O2 Flow Rate (L/min): 3 L/min    Physical Exam  Physical Exam  Vitals signs reviewed  Constitutional:       General: He is not in acute distress  Appearance: Normal appearance  He is well-developed  HENT:      Head: Normocephalic and atraumatic  Right Ear: External ear normal       Left Ear: External ear normal       Nose: Nose normal       Mouth/Throat:      Mouth: Mucous membranes are moist       Pharynx: Oropharynx is clear  Eyes:      Extraocular Movements: Extraocular movements intact  Pupils: Pupils are equal, round, and reactive to light  Neck:      Musculoskeletal: Normal range of motion and neck supple  Cardiovascular:      Rate and Rhythm: Normal rate and regular rhythm  Pulses: Normal pulses  Heart sounds: Normal heart sounds  No murmur  Pulmonary:      Effort: Pulmonary effort is normal  No respiratory distress  Breath sounds: No stridor  Wheezing present  No rhonchi or rales  Abdominal:      Palpations: Abdomen is soft  Tenderness: There is no abdominal tenderness  Hernia: No hernia is present  Musculoskeletal: Normal range of motion  General: No tenderness or deformity  Right lower leg: Edema present  Left lower leg: Edema present  Skin:     General: Skin is warm and dry  Capillary Refill: Capillary refill takes less than 2 seconds     Neurological:      General: No focal deficit present  Mental Status: He is alert and oriented to person, place, and time  Mental status is at baseline  Psychiatric:         Behavior: Behavior normal          Thought Content: Thought content normal          Judgment: Judgment normal          Labs: I have personally reviewed pertinent lab results  , ABG: No results found for: PHART, OML8GOA, PO2ART, SMC2FGR, I9NTOUZF, BEART, SOURCE, BNP: No results found for: BNP, CBC:   Lab Results   Component Value Date    WBC 16 39 (H) 06/08/2021    HGB 7 8 (L) 06/08/2021    HCT 28 4 (L) 06/08/2021    MCV 74 (L) 06/08/2021    PLT 1,167 (HH) 06/08/2021    MCH 20 3 (L) 06/08/2021    MCHC 27 5 (L) 06/08/2021    RDW 18 3 (H) 06/08/2021    MPV 9 2 06/08/2021    NRBC 0 06/08/2021   , CMP:   Lab Results   Component Value Date    SODIUM 140 06/08/2021    K 3 7 06/08/2021     06/08/2021    CO2 31 06/08/2021    BUN 12 06/08/2021    CREATININE 0 55 (L) 06/08/2021    CALCIUM 8 7 06/08/2021    AST 38 06/08/2021    ALT 49 06/08/2021    ALKPHOS 201 (H) 06/08/2021    EGFR 109 06/08/2021   , PT/INR: No results found for: PT, INR, Troponin: No results found for: TROPONINI  Lab Results   Component Value Date    WBC 16 39 (H) 06/08/2021    HGB 7 8 (L) 06/08/2021    HCT 28 4 (L) 06/08/2021    MCV 74 (L) 06/08/2021    PLT 1,167 (HH) 06/08/2021     Lab Results   Component Value Date    CALCIUM 8 7 06/08/2021    K 3 7 06/08/2021    CO2 31 06/08/2021     06/08/2021    BUN 12 06/08/2021    CREATININE 0 55 (L) 06/08/2021     No results found for: IGE  Lab Results   Component Value Date    ALT 49 06/08/2021    AST 38 06/08/2021    ALKPHOS 201 (H) 06/08/2021       Imaging and other studies: I have personally reviewed pertinent reports  and I have personally reviewed pertinent films in PACS       CTA chest PE study 05/03/2021   No  Evidence of pulmonary embolism    Large dense opacity in left upper lobe and interval increase in patchy ground-glass opacity and right perihilar region    Pulmonary function testing:  None to review     Other Studies: I have personally reviewed pertinent reports        echocardiogram 04/06/2021   EF 70%  Grade 1 diastolic dysfunction  Right ventricular size and systolic function normal   Trace mitral regurgitation  Mild aortic stenosis  Trace tricuspid regurgitation  Estimated peak PA pressure 37 mm Hg  Trace pulmonic regurgitation

## 2021-06-08 NOTE — ASSESSMENT & PLAN NOTE
No need for additional antibiotics at this time  Will check chest x-ray now prior to scheduled CT per oncology to ensure no worsening infiltrates or effusion

## 2021-06-09 ENCOUNTER — TELEPHONE (OUTPATIENT)
Dept: HEMATOLOGY ONCOLOGY | Facility: CLINIC | Age: 66
End: 2021-06-09

## 2021-06-09 ENCOUNTER — TELEPHONE (OUTPATIENT)
Dept: PULMONOLOGY | Facility: CLINIC | Age: 66
End: 2021-06-09

## 2021-06-09 DIAGNOSIS — T50.905A DRUG-INDUCED PNEUMONITIS: Primary | ICD-10-CM

## 2021-06-09 DIAGNOSIS — R06.02 SHORTNESS OF BREATH: ICD-10-CM

## 2021-06-09 DIAGNOSIS — J96.11 CHRONIC RESPIRATORY FAILURE WITH HYPOXIA (HCC): ICD-10-CM

## 2021-06-09 DIAGNOSIS — J18.9 DRUG-INDUCED PNEUMONITIS: Primary | ICD-10-CM

## 2021-06-09 RX ORDER — PREDNISONE 10 MG/1
40 TABLET ORAL DAILY
Qty: 200 TABLET | Refills: 3 | Status: SHIPPED | OUTPATIENT
Start: 2021-06-09

## 2021-06-09 NOTE — TELEPHONE ENCOUNTER
Patient was sent for chest x-ray on 06/08/2021 per pulmonology to evaluate pleural effusions or worsening infiltrates  Given patient's history of respiratory failure and recent hospitalization we are concerned for pneumonitis secondary to immunotherapy, Opdivo  I Reviewed chest x-ray images with Dr Yancy Cole, images appear inflammatory suggesting patient has pneumonitis  At this time we will place his Opdivo and Yervoy treatment on hold  And manage with steroids  Froilan Parada PA-C  Prescribed prednisone 40 mg p o  daily x5 days  Given the appearance of the chest x-ray and suspicion for pneumonitis we will continue prednisone 40 mg p o  daily at least until we see him in the office  Patient is on 2 L oxygen nasal cannula I requested a pulse ox from the pharmacy  I called patient and reviewed the plan to continue his prednisone 40 mg p o  daily until we were able to re-evaluate him  I also asked him to record his oxygen levels throughout the day with and without activity  I asked him to call us in 1 week with those results  We will evaluate his symptoms and weaning schedule according to his oxygen levels  If he can tolerate decreasing his oxygen to 1 L we will likely wean his prednisone  We will plan to see him in 3 weeks as previously arranged  Patient verbalized understanding and is in agreement with our plan

## 2021-06-11 ENCOUNTER — HOSPITAL ENCOUNTER (OUTPATIENT)
Dept: INFUSION CENTER | Facility: HOSPITAL | Age: 66
Discharge: HOME | End: 2021-06-11
Attending: INTERNAL MEDICINE

## 2021-06-14 ENCOUNTER — TELEPHONE (OUTPATIENT)
Dept: HEMATOLOGY ONCOLOGY | Facility: CLINIC | Age: 66
End: 2021-06-14

## 2021-06-14 NOTE — TELEPHONE ENCOUNTER
Patient called to report Oxygen levels while taking Prednisone to [facility]     For any questions patient can be reached at [phone]     6/10/21   8 am- 90%   12 am- 92%  4 pm - 88 %  8 pm- 90    6/11/21  8am -91%  12pm -95%  4pm -94%  8pm- 92%    6/12/21  8am -89%  12pm- 96%  4pm- 93%  8pm- 92%    6/13/21  8am - 92%  12pm- 93%  4pm -92%  8pm -89%    6/14/21  8am- 91%  12pm- 94%  4pm   8pm

## 2021-06-16 ENCOUNTER — HOSPITAL ENCOUNTER (OUTPATIENT)
Dept: PULMONOLOGY | Facility: HOSPITAL | Age: 66
Discharge: HOME | End: 2021-06-16
Payer: MEDICARE

## 2021-06-16 DIAGNOSIS — J44.9 CHRONIC OBSTRUCTIVE PULMONARY DISEASE, UNSPECIFIED COPD TYPE (HCC): ICD-10-CM

## 2021-06-16 PROCEDURE — 94727 GAS DIL/WSHOT DETER LNG VOL: CPT | Performed by: INTERNAL MEDICINE

## 2021-06-16 PROCEDURE — 94760 N-INVAS EAR/PLS OXIMETRY 1: CPT

## 2021-06-16 PROCEDURE — 94729 DIFFUSING CAPACITY: CPT

## 2021-06-16 PROCEDURE — 94060 EVALUATION OF WHEEZING: CPT

## 2021-06-16 PROCEDURE — 94727 GAS DIL/WSHOT DETER LNG VOL: CPT

## 2021-06-16 PROCEDURE — 94060 EVALUATION OF WHEEZING: CPT | Performed by: INTERNAL MEDICINE

## 2021-06-16 PROCEDURE — 94729 DIFFUSING CAPACITY: CPT | Performed by: INTERNAL MEDICINE

## 2021-06-16 RX ORDER — ALBUTEROL SULFATE 2.5 MG/3ML
2.5 SOLUTION RESPIRATORY (INHALATION) ONCE AS NEEDED
Status: COMPLETED | OUTPATIENT
Start: 2021-06-16 | End: 2021-06-16

## 2021-06-16 RX ADMIN — ALBUTEROL SULFATE 2.5 MG: 2.5 SOLUTION RESPIRATORY (INHALATION) at 12:34

## 2021-06-21 ENCOUNTER — HOSPITAL ENCOUNTER (OUTPATIENT)
Dept: CT IMAGING | Facility: HOSPITAL | Age: 66
Discharge: HOME | End: 2021-06-21
Payer: MEDICARE

## 2021-06-21 ENCOUNTER — TELEPHONE (OUTPATIENT)
Dept: HEMATOLOGY ONCOLOGY | Facility: CLINIC | Age: 66
End: 2021-06-21

## 2021-06-21 DIAGNOSIS — C79.51 BONE METASTASES (HCC): ICD-10-CM

## 2021-06-21 DIAGNOSIS — R59.0 MEDIASTINAL LYMPHADENOPATHY: ICD-10-CM

## 2021-06-21 DIAGNOSIS — R91.8 LUNG MASS: ICD-10-CM

## 2021-06-21 PROCEDURE — 74177 CT ABD & PELVIS W/CONTRAST: CPT

## 2021-06-21 PROCEDURE — 71260 CT THORAX DX C+: CPT

## 2021-06-21 PROCEDURE — G1004 CDSM NDSC: HCPCS

## 2021-06-21 RX ORDER — OXYCODONE HYDROCHLORIDE AND ACETAMINOPHEN 5; 325 MG/1; MG/1
1 TABLET ORAL EVERY 4 HOURS PRN
Qty: 42 TABLET | Refills: 0 | Status: SHIPPED | OUTPATIENT
Start: 2021-06-21

## 2021-06-21 RX ADMIN — IOHEXOL 100 ML: 350 INJECTION, SOLUTION INTRAVENOUS at 12:41

## 2021-06-21 NOTE — TELEPHONE ENCOUNTER
Incoming call from patient stating wanted to let Simi/Dr [facility] know about his 1 liter oxygen recording for the past 4 days which are as followed:    06/16/21:  5:00 pm - 92%  10:00 pm - 93%    06/17/21:  8:00 am - 90%  12:00 pm - 92%  4:00 pm - 90%   8:00 pm - 90%    06/18/21:  8:00 am - 91%  12:00 pm - 90%  4:00 pm - 89%  8:00 pm - 92%    06/19/21:  8:00 am - 92%  12:00 pm - 92%  4:00 pm - 95%  8:00 pm - 92%    06/20/21:  8:00 am - 93%  12:00 pm - 95%  4:00 pm - 92%  8:00 pm - 96%

## 2021-06-21 NOTE — TELEPHONE ENCOUNTER
Dr Chelle Wilson is aware  He is ok with these readings, pt is aware that he needs to contact pulmonary if he gets SOB while on the 1 liter

## 2021-06-22 ENCOUNTER — TELEPHONE (OUTPATIENT)
Dept: HEMATOLOGY ONCOLOGY | Facility: CLINIC | Age: 66
End: 2021-06-22

## 2021-06-23 ENCOUNTER — TELEPHONE (OUTPATIENT)
Dept: PULMONOLOGY | Facility: CLINIC | Age: 66
End: 2021-06-23

## 2021-06-23 ENCOUNTER — OFFICE VISIT (OUTPATIENT)
Dept: INTERNAL MEDICINE CLINIC | Facility: CLINIC | Age: 66
End: 2021-06-23
Payer: MEDICARE

## 2021-06-23 VITALS
DIASTOLIC BLOOD PRESSURE: 68 MMHG | TEMPERATURE: 97.1 F | OXYGEN SATURATION: 97 % | BODY MASS INDEX: 18.34 KG/M2 | SYSTOLIC BLOOD PRESSURE: 116 MMHG | HEART RATE: 49 BPM | HEIGHT: 70 IN | WEIGHT: 128.1 LBS

## 2021-06-23 DIAGNOSIS — C64.2 RENAL CELL CARCINOMA OF LEFT KIDNEY (HCC): ICD-10-CM

## 2021-06-23 DIAGNOSIS — D75.839 THROMBOCYTOSIS: ICD-10-CM

## 2021-06-23 DIAGNOSIS — B37.0 THRUSH: ICD-10-CM

## 2021-06-23 DIAGNOSIS — R60.9 PERIPHERAL EDEMA: Primary | ICD-10-CM

## 2021-06-23 DIAGNOSIS — E43 SEVERE PROTEIN-CALORIE MALNUTRITION (HCC): ICD-10-CM

## 2021-06-23 DIAGNOSIS — J96.11 CHRONIC RESPIRATORY FAILURE WITH HYPOXIA (HCC): ICD-10-CM

## 2021-06-23 DIAGNOSIS — C79.51 BONE METASTASES (HCC): ICD-10-CM

## 2021-06-23 DIAGNOSIS — Z00.00 MEDICARE ANNUAL WELLNESS VISIT, SUBSEQUENT: ICD-10-CM

## 2021-06-23 PROBLEM — E83.51 HYPOCALCEMIA: Status: RESOLVED | Noted: 2021-05-05 | Resolved: 2021-06-23

## 2021-06-23 PROBLEM — A41.9 SEPSIS (HCC): Status: RESOLVED | Noted: 2021-05-03 | Resolved: 2021-06-23

## 2021-06-23 PROBLEM — R74.01 TRANSAMINITIS: Status: RESOLVED | Noted: 2021-05-03 | Resolved: 2021-06-23

## 2021-06-23 PROCEDURE — 99214 OFFICE O/P EST MOD 30 MIN: CPT | Performed by: FAMILY MEDICINE

## 2021-06-23 PROCEDURE — G0438 PPPS, INITIAL VISIT: HCPCS | Performed by: FAMILY MEDICINE

## 2021-06-23 RX ORDER — FUROSEMIDE 20 MG/1
20 TABLET ORAL 3 TIMES WEEKLY
Qty: 20 TABLET | Refills: 5 | Status: SHIPPED | OUTPATIENT
Start: 2021-06-23

## 2021-06-23 RX ORDER — DRONABINOL 5 MG/1
5 CAPSULE ORAL
Qty: 60 CAPSULE | Refills: 3 | Status: SHIPPED | OUTPATIENT
Start: 2021-06-23

## 2021-06-23 NOTE — PROGRESS NOTES
Assessment and Plan:     Problem List Items Addressed This Visit        Digestive    Thrush    Relevant Medications    nystatin (MYCOSTATIN) 500,000 units/5 mL suspension       Respiratory    Chronic respiratory failure with hypoxia (HCC)       Musculoskeletal and Integument    Bone metastases (HCC)    Relevant Medications    dronabinol (MARINOL) 5 MG capsule       Hematopoietic and Hemostatic    Thrombocytosis (HCC)       Genitourinary    Renal cell carcinoma of left kidney (HCC)    Relevant Medications    furosemide (LASIX) 20 mg tablet    dronabinol (MARINOL) 5 MG capsule       Other    Severe protein-calorie malnutrition (HCC)    Relevant Medications    dronabinol (MARINOL) 5 MG capsule    Peripheral edema - Primary    Relevant Medications    furosemide (LASIX) 20 mg tablet      Other Visit Diagnoses     Medicare annual wellness visit, subsequent            BMI Counseling: Body mass index is 18 38 kg/m²  The BMI is below normal  Patient advised to gain weight  Preventive health issues were discussed with patient, and age appropriate screening tests were ordered as noted in patient's After Visit Summary  Personalized health advice and appropriate referrals for health education or preventive services given if needed, as noted in patient's After Visit Summary       History of Present Illness:     Patient presents for Medicare Annual Wellness visit    Patient Care Team:  Thong Bell MD as PCP - General (Family Medicine)     Problem List:     Patient Active Problem List   Diagnosis    Shortness of breath    Anemia    Leucocytosis    Pleural effusion    Lung mass    Mediastinal lymphadenopathy    Tachycardia    Bone metastases ([facility] [facility] [address] )    Chronic [facility] pulmonary disease [facility] )    Renal cell carcinoma of left kidney ([facility] )    HCAP (healthcare-associated pneumonia)    Thrombocytosis (HCC)    Elevated d-dimer    Chronic respiratory failure with hypoxia (HCC)    Hypophosphatemia    Vitamin D deficiency    Premature atrial contractions    Severe protein-calorie malnutrition (HCC)    Ventricular ectopy    Diastolic dysfunction    Peripheral edema    Thrush      Past Medical and Surgical History:     Past Medical History:   Diagnosis Date    Emphy[facility] lung ([facility] )     Hypocalcemia 5/5/2021    Sepsis ([facility] ) 5/3/2021    Transaminitis 5/3/2021     Past Surgical History:   Procedure Laterality Date    IR BIOPSY LIVER MASS  4/7/2021    IR BIOPSY LYMPH NODE  4/13/2021    IR THORACENTESIS  4/6/2021    TENDON REPAIR Right     forearm      Family History:     Family History   Problem Relation Age of Onset    Heart disease Father     Multiple sclerosis Sister     Heart attack Brother       Social History:     Social History     Socioeconomic History    Marital status: Single     Spouse name: None    Number of children: None    Years of education: None    Highest education level: None   Occupational History    None   Tobacco Use    Smoking status: Former Smoker    Smokeless tobacco: Never Used    Tobacco comment: quit feb 2021   Vaping Use    Vaping Use: Never used   Substance and Sexual Activity    Alcohol use: Never    Drug use: Never    Sexual activity: None   Other Topics Concern    None   Social History Narrative    None     Social Determinants of Health     Financial Resource Strain:     Difficulty of Paying Living Expenses:    Food Insecurity:     Worried About Running Out of Food in the Last Year:     Ran Out of Food in the Last Year:    Transportation Needs:     Lack of Transportation (Medical):      Lack of Transportation (Non-Medical):    Physical Activity:     Days of Exercise per Week:     Minutes of Exercise per Session:    Stress:     Feeling of Stress :    Social Connections:     Frequency of Communication with Friends and Family:     Frequency of Social Gatherings with Friends and Family:     Attends Muslim Services:     Active Member of Clubs or Organizations:     Attends Club or Organization Meetings:     Marital Status:    Intimate Partner Violence:     Fear of Current or Ex-Partner:     Emotionally Abused:     Physically Abused:     Sexually Abused:       Medications and Allergies:     Current Outpatient Medications   Medication Sig Dispense Refill    albuterol (2 5 mg/3 mL) 0 083 % nebulizer solution Take 1 vial (2 5 mg total) by nebulization every 6 (six) hours as needed for wheezing or shortness of breath 360 mL 2    albuterol (Ventolin HFA) 90 mcg/act inhaler Inhale 2 puffs every 6 (six) hours as needed for wheezing 18 g 1    calcium carbonate (OYSTER SHELL,OSCAL) 500 mg Take 1 tablet by mouth 2 (two) times a day with meals 60 tablet 0    ergocalciferol (VITAMIN D2) 50,000 units Take 1 capsule (50,000 Units total) by mouth once a week 7 capsule 0    fluticasone-salmeterol (ADVAIR, WIXELA) 250-50 mcg/dose inhaler Inhale 1 puff 2 (two) times a day Rinse mouth after use   1 each 5    furosemide (LASIX) 20 mg tablet Take 1 tablet (20 mg total) by mouth 3 (three) times a week If needed for leg swelling 20 tablet 5    oxyCODONE-acetaminophen (PERCOCET) 5-325 mg per tablet Take 1 tablet by mouth every 4 (four) hours as needed for moderate painMax Daily Amount: 6 tablets 42 tablet 0    pantoprazole (PROTONIX) 40 mg tablet Take 1 tablet (40 mg total) by mouth daily 90 tablet 1    predniSONE 10 mg tablet Take 4 tablets (40 mg total) by mouth daily 20 tablet 0    predniSONE 10 mg tablet Take 4 tablets (40 mg total) by mouth daily 200 tablet 3    tiotropium (SPIRIVA RESPIMAT) 2 5 MCG/ACT AERS inhaler Inhale 2 puffs daily 4 g 2    benzonatate (TESSALON PERLES) 100 mg capsule Take 1 capsule (100 mg total) by mouth 3 (three) times a day as needed for cough (Patient not taking: Reported on 6/23/2021) 20 capsule 0    dronabinol (MARINOL) 5 MG capsule Take 1 capsule (5 mg total) by mouth 2 (two) times a day before meals 60 capsule 3    ferrous sulfate 324 (65 Fe) mg Take 1 tablet (324 mg total) by mouth every other day 15 tablet 0    nystatin (MYCOSTATIN) 500,000 units/5 mL suspension Apply 5 mL (500,000 Units total) to the mouth or throat 4 (four) times a day for 7 days 140 mL 3     No current facility-administered medications for this visit  No Known Allergies   Immunizations:     Immunization History   Administered Date(s) Administered    INFLUENZA 10/20/2003    SARS-CoV-2 / COVID-19 mRNA IM ([facility]) 03/06/2021, 04/02/2021      Health Maintenance:         Topic Date Due    Colorectal Cancer Screening  Never done    Hepatitis C Screening  Completed         Topic Date Due    Influenza Vaccine (Season Ended) 09/01/2021      Medicare Health Risk Assessment:     /68 (BP Location: Left arm)   Pulse (!) 49   Temp (!) 97 1 °F (36 2 °C) (Temporal)   Ht 5' 10" (1 778 m)   Wt 58 1 kg (128 lb 1 6 oz)   SpO2 97%   BMI 18 38 kg/m²      Antony is here for his Subsequent Wellness visit  Health Risk Assessment:   Patient rates overall health as fair  Patient feels that their physical health rating is much worse  Patient is satisfied with their life  Eyesight was rated as slightly worse  Hearing was rated as same  Patient feels that their emotional and mental health rating is same  Patients states they are never, rarely angry  Patient states they are often unusually tired/fatigued  Pain experienced in the last 7 days has been a lot  Patient's pain rating has been 8/10  Patient states that he has experienced weight loss or gain in last 6 months  Depression Screening:   PHQ-2 Score: 0      Fall Risk Screening: In the past year, patient has experienced: no history of falling in past year      Home Safety:  Patient has trouble with stairs inside or outside of their home  Patient has working smoke alarms and has working carbon monoxide detector  Home safety hazards include: none  Nutrition:   Current diet is Regular       Medications: Patient is not currently taking any over-the-counter supplements  Patient is able to manage medications  Activities of Daily Living (ADLs)/Instrumental Activities of Daily Living (IADLs):   Walk and transfer into and out of bed and chair?: Yes  Dress and groom yourself?: Yes    Bathe or shower yourself?: Yes    Feed yourself? Yes  Do your laundry/housekeeping?: Yes  Manage your money, pay your bills and track your expenses?: Yes  Make your own meals?: Yes    Do your own shopping?: No    Previous Hospitalizations:   Any hospitalizations or ED visits within the last 12 months?: Yes    How many hospitalizations have you had in the last year?: 1-2    Advance Care Planning:   Living will: Yes    Advanced directive: Yes    Five wishes given: Yes      Cognitive Screening:   Provider or family/friend/caregiver concerned regarding cognition?: No    PREVENTIVE SCREENINGS      Cardiovascular Screening:    General: Screening Not Indicated      Diabetes Screening:     General: Screening Current      Colorectal Cancer Screening:     General: Risks and Benefits Discussed    Due for: Cologuard      Prostate Cancer Screening:    General: Screening Not Indicated      Osteoporosis Screening:    General: Screening Not Indicated      Abdominal Aortic Aneurysm (AAA) Screening:    Risk factors include: age between 73-69 yo and tobacco use        General: Screening Not Indicated      Lung Cancer Screening:     General: Screening Not Indicated      Hepatitis C Screening:    General: Screening Current    Screening, Brief Intervention, and Referral to Treatment (SBIRT)    Screening  Typical number of drinks in a day: 0  Typical number of drinks in a week: 0  Interpretation: Low risk drinking behavior      Single Item Drug Screening:  How often have you used an illegal drug (including marijuana) or a prescription medication for non-medical reasons in the past year? never    Single Item Drug Screen Score: 0  Interpretation: Negative screen for possible drug use disorder    Brief Intervention  Alcohol & drug use screenings were reviewed  No concerns regarding substance use disorder identified       Review of Current Opioid Use    Opioid Risk Tool (ORT) Interpretation: Complete Opioid Risk Tool (ORT)      Chloe Webb MD

## 2021-06-23 NOTE — PATIENT INSTRUCTIONS
High Protein / High Calorie Diet   WHAT YOU NEED TO KNOW:   What is a high-protein and high-calorie diet? It is a meal plan with extra calories and protein  You may need this diet if you have certain health conditions that increase your body's need for protein and calories  Some of these health conditions include cancer, HIV, AIDS, wounds (such as pressure injuries and burns), and malnutrition  You may also need to follow this diet after a surgery or illness  The extra calories will help you gain weight and have more energy  The extra protein will help your body heal and get stronger  What are some guidelines for increasing protein and calories? Your dietitian will tell you how much protein and how many calories you need each day  · It may be easier to increase calories and protein if you eat 6 to 8 small meals throughout the day  Eat at regular times, and do not skip meals  · Always have snack foods available so you can eat when you feel hungry  · Include a variety of healthy foods from all of the food groups  What are some foods that are high in protein or calories? · Hot cereals (oatmeal or cream of wheat) with milk, added fat (such as butter or margarine), and sugar    · Granola and other cereals with dried fruit    · Croissants, buttermilk biscuits, muffins, or quick breads (banana bread or zucchini bread)    · Vegetables with added margarine, butter, cream cheese, and cheese    · Potatoes with butter, margarine, sour cream, or cheese    · Avocado    · Fruit canned in heavy syrup, dried fruit, or fruit nectar    · Meats, eggs, dried beans, and lentils    · Peanut butter and tofu    · Eggnog and milkshakes    · Whole milk and whole milk products (yogurt, ice cream, and cheese)    · Cream cheese and sour cream    · Casseroles with meat    · Soups made with cream or meat    How can I add protein to foods? · Add powdered milk to milk, cereals, scrambled eggs, soups, and casseroles      · Add cheese to sauces, soups, or vegetables  · Add eggs to tuna, salads, sauces, or casseroles  · Add nutrition supplements and breakfast drink mixes to milk or shakes  · Add nuts to foods or eat them as snacks  · Add meat (beef, chicken, and pork) to soups, casseroles, pasta dishes, or vegetables  · Add beans, peas, and other legumes to salads  · Eat cottage cheese or yogurt with fruit  How can I add calories to foods? You can add any of the following to foods to increase calories  · 1 tablespoon of butter or margarine (adds 100 calories)    · 1 tablespoon of mayonnaise (adds 100 calories)    · 1 tablespoon of heavy cream or whipping cream (adds 55 calories)    · 1 tablespoon of cream cheese (adds 50 calories)    · 1 tablespoon of brown sugar, maple syrup, or honey (adds 45 calories)    · 1 tablespoon of sour cream (adds 30 calories)    · 1 tablespoon of half-and-half cream or evaporated milk (adds 20 calories)    CARE AGREEMENT:   You have the right to help plan your care  Discuss treatment options with your healthcare provider to decide what care you want to receive  You always have the right to refuse treatment  The above information is an  only  It is not intended as medical advice for individual conditions or treatments  Talk to your doctor, nurse or pharmacist before following any medical regimen to see if it is safe and effective for you  © Copyright [address] Information is for End User's use only and may not be sold, redistributed or otherwise used for commercial purposes  All illustrations and images included in CareNotes® are the copyrighted property of [employer]  or [employer] Protein Diet   WHAT YOU NEED TO KNOW:   What is a high-protein diet? A high-protein diet is a meal plan that includes extra protein  Your body may need extra protein if you have certain health conditions, such as cancer, burns, or injuries   You may also need to follow this diet to get stronger after a surgery or illness  Extra protein helps to heal wounds and form new tissue in the body  Your dietitian will tell you how much protein and how many calories you need each day  What are some foods that are high in protein? The average amount of protein is listed below in grams (g)  To find the exact amount of protein in a food, read the food labels on packaged items  · Dairy:      ? 1 cup of any type of milk (8 g)    ? ½ cup of evaporated canned milk (9 g)    ? ¼ cup of nonfat dry milk (11 g)    ? 1 ounce of semi-hard or solid cheese (7 g)    ? ¼ cup of parmesan cheese (8 g)    ? ½ cup of cottage cheese (14 g)    ? ½ cup of pudding (4 g)    ? 1 cup of plain or fruit yogurt (8 g)    · Meats and meat substitutes:      ? 3 ounces of cooked freshwater fish (21 g)     ? 3 ounces of cooked shellfish (19 g)    ? ½ cup of canned tuna (14 g)    ? 3 ounces of cooked chicken, turkey, or other poultry (24 g)    ? 3 ounces of cooked beef, pork, lamb, or other red meat (21 g)    ? 1 large egg (6 g)    ? ¼ cup of fat free egg substitute (5 g)    ? ½ cup of tofu or tempeh (10 g)    ? 1 cup of cooked dried beans, such as young, kidney, or navy (15 g)    · Nuts and seeds:      ? 2 tablespoons of almonds, cashews, sunflower seeds, or walnuts (5 g)    ? 2 tablespoons of peanuts (7 g)    ? 2 tablespoons of peanut butter (8 g)    How can I add extra protein to foods? · Add powdered milk to milk, cereals, scrambled eggs, soups, and casseroles  · Add cheese to sauces, soups, or vegetables  · Add eggs to tuna, salads, sauces, or casseroles  · Add nutrition supplements and breakfast drink mixes to milk or shakes  · Add nuts to foods or eat them as snacks  · Add meat (beef, chicken, and pork) to soups, casseroles, pasta dishes, or vegetables  · Add beans, peas, and other legumes to salads  · Eat cottage cheese or yogurt with fruit      CARE AGREEMENT:   You have the right to help plan your care  Discuss treatment options with your healthcare provider to decide what care you want to receive  You always have the right to refuse treatment  The above information is an  only  It is not intended as medical advice for individual conditions or treatments  Talk to your doctor, nurse or pharmacist before following any medical regimen to see if it is safe and effective for you  © Copyright [address] Information is for End User's use only and may not be sold, redistributed or otherwise used for commercial purposes  All illustrations and images included in CareNotes® are the copyrighted property of [employer] M [employer] Inc  or [employer] [facility] Preventive Visit Patient Instructions  Thank you for completing your Welcome to Medicare Visit or Medicare Annual Wellness Visit today  Your next wellness visit will be due in one year (6/24/2022)  The screening/preventive services that you may require over the next 5-10 years are detailed below  Some tests may not apply to you based off risk factors and/or age  Screening tests ordered at today's visit but not completed yet may show as past due  Also, please note that scanned in results may not display below  Preventive Screenings:  Service Recommendations Previous Testing/Comments   Colorectal Cancer Screening  · Colonoscopy    · Fecal Occult Blood Test (FOBT)/Fecal Immunochemical Test (FIT)  · Fecal DNA/Cologuard Test  · Flexible Sigmoidoscopy Age: 54-65 years old   Colonoscopy: every 10 years (May be performed more frequently if at higher risk)  OR  FOBT/FIT: every 1 year  OR  Cologuard: every 3 years  OR  Sigmoidoscopy: every 5 years  Screening may be recommended earlier than age 48 if at higher risk for colorectal cancer  Also, an individualized decision between you and your healthcare provider will decide whether screening between the ages of 74-80 would be appropriate   Colonoscopy: Not on file  FOBT/FIT: Not on file  Cologuard: Not on file  Sigmoidoscopy: Not on file          Prostate Cancer Screening Individualized decision between patient and health care provider in men between ages of 53-78   Medicare will cover every 12 months beginning on the day after your 50th birthday PSA: No results in last 5 years           Hepatitis C Screening Once for adults born between 1945 and 1965  More frequently in patients at high risk for Hepatitis C Hep C Antibody: 05/05/2021    Screening Current   Diabetes Screening 1-2 times per year if you're at risk for diabetes or have pre-diabetes Fasting glucose: No results in last 5 years   A1C: No results in last 5 years    Screening Current   Cholesterol Screening Once every 5 years if you don't have a lipid disorder  May order more often based on risk factors  Lipid panel: Not on file           Other Preventive Screenings Covered by Medicare:  1  Abdominal Aortic Aneurysm (AAA) Screening: covered once if your at risk  You're considered to be at risk if you have a family history of AAA or a male between the age of 73-68 who smoking at least 100 cigarettes in your lifetime  2  Lung Cancer Screening: covers low dose CT scan once per year if you meet all of the following conditions: (1) Age 50-69; (2) No signs or symptoms of lung cancer; (3) Current smoker or have quit smoking within the last 15 years; (4) You have a tobacco smoking history of at least 30 pack years (packs per day x number of years you smoked); (5) You get a written order from a healthcare provider  3  Glaucoma Screening: covered annually if you're considered high risk: (1) You have diabetes OR (2) Family history of glaucoma OR (3)  aged 48 and older OR (3)  American aged 72 and older  3   Osteoporosis Screening: covered every 2 years if you meet one of the following conditions: (1) Have a vertebral abnormality; (2) On glucocorticoid therapy for more than 3 months; (3) Have primary hyperparathyroidism; (4) On osteoporosis medications and need to assess response to drug therapy  5  HIV Screening: covered annually if you're between the age of 12-76  Also covered annually if you are younger than 13 and older than 72 with risk factors for HIV infection  For pregnant patients, it is covered up to 3 times per pregnancy  Immunizations:  Immunization Recommendations   Influenza Vaccine Annual influenza vaccination during flu season is recommended for all persons aged >= 6 months who do not have contraindications   Pneumococcal Vaccine (Prevnar and Pneumovax)  * Prevnar = PCV13  * Pneumovax = PPSV23 Adults 25-60 years old: 1-3 doses may be recommended based on certain risk factors  Adults 72 years old: Prevnar (PCV13) vaccine recommended followed by Pneumovax (PPSV23) vaccine  If already received PPSV23 since turning 65, then PCV13 recommended at least one year after PPSV23 dose  Hepatitis B Vaccine 3 dose series if at intermediate or high risk (ex: diabetes, end stage renal disease, liver disease)   Tetanus (Td) Vaccine - COST NOT COVERED BY MEDICARE PART B Following completion of primary series, a booster dose should be given every 10 years to maintain immunity against tetanus  Td may also be given as tetanus wound prophylaxis  Tdap Vaccine - COST NOT COVERED BY MEDICARE PART B Recommended at least once for all adults  For pregnant patients, recommended with each pregnancy  Shingles Vaccine (Shingrix) - COST NOT COVERED BY MEDICARE PART B  2 shot series recommended in those aged 48 and above     Health Maintenance Due:      Topic Date Due    Colorectal Cancer Screening  Never done    Hepatitis C Screening  Completed     Immunizations Due:      Topic Date Due    Influenza Vaccine (Season Ended) 09/01/2021     Advance Directives   What are advance directives? Advance directives are legal documents that state your wishes and plans for medical care   These plans are made ahead of time in case you lose your ability to make decisions for yourself  Advance directives can apply to any medical decision, such as the treatments you want, and if you want to donate organs  What are the types of advance directives? There are many types of advance directives, and each state has rules about how to use them  You may choose a combination of any of the following:  · Living will: This is a written record of the treatment you want  You can also choose which treatments you do not want, which to limit, and which to stop at a certain time  This includes surgery, medicine, IV fluid, and tube feedings  · Durable power of  for healthcare [place] [facility]): This is a written record that states who you want to make healthcare choices for you when you are unable to make them for yourself  This person, called a proxy, is usually a family member or a friend  You may choose more than 1 proxy  · Do not resuscitate (DNR) order:  A DNR order is used in case your heart stops beating or you stop breathing  It is a request not to have certain forms of treatment, such as CPR  A DNR order may be included in other types of advance directives  · Medical directive: This covers the care that you want if you are in a coma, near death, or unable to make decisions for yourself  You can list the treatments you want for each condition  Treatment may include pain medicine, surgery, blood transfusions, dialysis, IV or tube feedings, and a ventilator (breathing machine)  · Values history: This document has questions about your views, beliefs, and how you feel and think about life  This information can help others choose the care that you would choose  Why are advance directives important? An advance directive helps you control your care  Although spoken wishes may be used, it is better to have your wishes written down  Spoken wishes can be misunderstood, or not followed  Treatments may be given even if you do not want them   An advance directive may make it easier for your family to make difficult choices about your care  Underweight  Underweight is defined as having a body mass index (BMI) of less than 18 5 kg/m2   Anorexia  is a loss of appetite, decreased food intake, or both  Your appetite naturally decreases as you get older  You also get full faster than you used to  This occurs because your body needs less energy  Other body changes can also lead to a decreased appetite  Even though some appetite loss is normal, you still need to get enough calories and nutrients to keep you healthy  You can start to lose too much weight if you do not eat as much food as your body needs  Unwanted weight loss can cause health problems, or worsen health problems you already have  You can also become dehydrated if you do not drink enough liquid  How to eat healthy and get enough nutrients:   · Choose healthy foods  Eat a variety of fruits, vegetables, whole grains, low-fat dairy foods, lean meats, and other protein foods  Limit foods high in fat, sugar, and salt  Limit or avoid alcohol as directed  Work with a dietitian to help you plan your meals if you need to follow a special diet  A dietitian can also teach you how to modify foods if you have trouble chewing or swallowing  · Snack on healthy foods between meals  if you only eat a small amount during meals  Snacks provide extra healthy nutrients and calories between meals  Examples include fruit, cheese, and whole grain crackers  · Drink liquids as directed  to avoid dehydration  Drink liquids between meals if they cause you to get full too quickly during meals  Ask how much liquid to drink each day and which liquids are best for you  · Use herbs, spices, and flavor enhancers to add flavor to foods  Avoid using herbs and spice blends that also contain sodium  Ask your healthcare provider or dietitian about flavor enhancers   Flavor enhancers with ham, natural [place], and roast beef flavors can also be sprinkled on food to add flavor  · Share meals with others as often as you can  Eating with others may help you to eat better during meal time  Ask family members, neighbors, or friends to join you for lunch  There are also senior centers where you can meet people, and share meals with them  · Ask family and friends for help  with shopping or preparing foods  Ask for a ride to the grocery store, if needed  Narcotic (Opioid) Safety    Use narcotics safely:  · Take prescribed narcotics exactly as directed  · Do not give narcotics to others or take narcotics that belong to someone else  · Do not mix narcotics without medicines or alcohol  · Do not drive or operate heavy machinery after you take the narcotic  · Monitor for side effects and notify your healthcare provider if you experienced side effects such as nausea, sleepiness, itching, or trouble thinking clearly  Manage constipation:    Constipation is the most common side effect of narcotic medicine  Constipation is when you have hard, dry bowel movements, or you go longer than usual between bowel movements  Tell your healthcare provider about all changes in your bowel movements while you are taking narcotics  He or she may recommend laxative medicine to help you have a bowel movement  He or she may also change the kind of narcotic you are taking, or change when you take it  The following are more ways you can prevent or relieve constipation:    · Drink liquids as directed  You may need to drink extra liquids to help soften and move your bowels  Ask how much liquid to drink each day and which liquids are best for you  · Eat high-fiber foods  This may help decrease constipation by adding bulk to your bowel movements  High-fiber foods include fruits, vegetables, whole-grain breads and cereals, and beans  Your healthcare provider or dietitian can help you create a high-fiber meal plan   Your provider may also recommend a fiber supplement if you cannot get enough fiber from food  · Exercise regularly  Regular physical activity can help stimulate your intestines  Walking is a good exercise to prevent or relieve constipation  Ask which exercises are best for you  · Schedule a time each day to have a bowel movement  This may help train your body to have regular bowel movements  Bend forward while you are on the toilet to help move the bowel movement out  Sit on the toilet for at least 10 minutes, even if you do not have a bowel movement  Store narcotics safely:   · Store narcotics where others cannot easily get them  Keep them in a locked cabinet or secure area  Do not  keep them in a purse or other bag you carry with you  A person may be looking for something else and find the narcotics  · Make sure narcotics are stored out of the reach of children  A child can easily overdose on narcotics  Narcotics may look like candy to a small child  The best way to dispose of narcotics: The laws vary by country and area  In the [place], the best way is to return the narcotics through a take-back program  This program is offered by the [employer] ([employer])  The following are options for using the program:  · Take the narcotics to a ASHLEY collection site  The site is often a law enforcement center  Call your local law enforcement center for scheduled take-back days in your area  You will be given information on where to go if the collection site is in a different location  · Take the narcotics to an approved pharmacy or hospital   A pharmacy or hospital may be set up as a collection site  You will need to ask if it is a ASHLEY collection site if you were not directed there  A pharmacy or doctor's office may not be able to take back narcotics unless it is a ASHLEY site  · Use a mail-back system  This means you are given containers to put the narcotics into  You will then mail them in the containers  · Use a take-back drop box    This is a place to leave the narcotics at any time  People and animals will not be able to get into the box  Your local law enforcement agency can tell you where to find a drop box in your area  Other ways to manage pain:   · Ask your healthcare provider about non-narcotic medicines to control pain  Nonprescription medicines include NSAIDs (such as ibuprofen) and acetaminophen  Prescription medicines include muscle relaxers, antidepressants, and steroids  · Pain may be managed without any medicines  Some ways to relieve pain include massage, aromatherapy, or meditation  Physical or occupational therapy may also help  For more information:   · Drug Enforcement Administration  [facility] [address]  Phone: 5- 743 - 734-5512  Web Address: [email or web address]    · [address]  and Drug Administration  [facility] , [address] [facility]  Phone: 1- 224 - 263-5869  Web Address: http://[employer]/     © Copyright [employer] 2018 Information is for End User's use only and may not be sold, redistributed or otherwise used for commercial purposes   All illustrations and images included in CareNotes® are the copyrighted property of [employer] D A M [employer] Inc  or [address] [place] [address] [place]

## 2021-06-23 NOTE — TELEPHONE ENCOUNTER
Called patient to review results of PFTs  I let him know they were consistent with a mixed obstructive and restrictive pattern  Obstruction likely due to underlying COPD and mild restriction likely due to pneumonitis currently being treated with prednisone per oncology team  He reports improvement since starting steroids and now only requires 1L NC and has less CROWELL though not back to baseline just yet  Recommend he continue supplemental O2 and titrate to maintain sats greater than 89%  Follow up with [facility] next week  He verbalized understanding and agrees to the plan

## 2021-06-24 NOTE — PROGRESS NOTES
Assessment/Plan:    No problem-specific Assessment & Plan notes found for this encounter  Diagnoses and all orders for this visit:    Peripheral edema  -     furosemide (LASIX) 20 mg tablet; Take 1 tablet (20 mg total) by mouth 3 (three) times a week If needed for leg swelling    Severe protein-calorie malnutrition (HCC)  -     dronabinol (MARINOL) 5 MG capsule; Take 1 capsule (5 mg total) by mouth 2 (two) times a day before meals    Renal cell carcinoma of left kidney (HCC)  -     dronabinol (MARINOL) 5 MG capsule; Take 1 capsule (5 mg total) by mouth 2 (two) times a day before meals    Bone metastases (HCC)  -     dronabinol (MARINOL) 5 MG capsule; Take 1 capsule (5 mg total) by mouth 2 (two) times a day before meals    Thrombocytosis (HCC)    Thrush  -     nystatin (MYCOSTATIN) 500,000 units/5 mL suspension; Apply 5 mL (500,000 Units total) to the mouth or throat 4 (four) times a day for 7 days    Chronic respiratory failure with hypoxia (HCC)         orders and recommendations as noted above     This is likely related to the underlying cancer as well as the recent chemotherapy  He is very concerned about the weight loss  Discussed options to help increase his appetite  Will have him start on Marinol to help increase his appetite and hopefully promote weight gain or at least maintenance of weight  Still with some peripheral edema at times  Likely related to the low albumin systemically  Frequent meals discussed continue follow-up with Hematology/Oncology  Continue with the prednisone as per their recommendations  May continue to use the nebulizer and the Tessalon as needed for cough  Continue with the oxygen  Recent laboratory testing reviewed with him  Albumin level is very significantly decreased  Increase protein in diet if tolerates  Prescription for the medication for thrush reordered and advised him to take this for at least a week    It is uncertain whether the thrush ever completely goes away between visits despite treatment, however  Advised him to watch for any persistent symptoms or recurrence  He does wish to continue fighting the cancer and is awaiting recommendations from Hematology Oncology  Continue with the Percocet on an as-needed basis for the pain related to bone metastases  Will have him follow up in about 6-8 weeks or sooner if needed  Subjective:      Patient ID: Toyin Alfonso is a 77 y o  male  He presents for follow-up  He had been started on the chemotherapy but this had to be stopped because of likely a pneumonitis  Chest x-ray with persistent abnormalities  Most recent CT scan from earlier this week is not yet read  He has been losing weight despite eating  He eats 3 meals a day and does often snack between  Trying to supplement as much protein as possible  Has not yet heard back from Hematology/Oncology regarding future treatment strategies for the metastatic renal cell carcinoma  He continues to have pain over the hip and back area  Likely consistent with his known bony metastases  Denies any nausea, vomiting, or diarrhea  Occasional difficulty with sleep  Does have some constipation symptoms on and off but is able to manage these  Denies any urinary symptoms  Denies any recent fevers or chills  Has had some persistent swelling into the legs  Is requesting a refill on his Lasix  Denies any chest pain or palpitations  Has chronic shortness of breath  Did follow-up with Pulmonary  PFTs were done which were essentially normal       The following portions of the patient's history were reviewed and updated as appropriate:   He  has a past medical history of Emphysema of lung ([address] ), Hypocalcemia (5/5/2021), Sepsis ([address] ) (5/3/2021), and Transaminitis (5/3/2021)    He   Patient Active Problem List    Diagnosis Date Noted    Peripheral edema 05/18/2021    Thrush 74/85/2429    Diastolic dysfunction 12/93/5267    Severe protein-calorie malnutrition ([place] [address] ) 05/06/2021    Ventricular ectopy 05/06/2021    Vitamin D deficiency 05/05/2021    Premature atrial contractions 05/05/2021    Hypophosphatemia 05/04/2021    HCAP (healthcare-associated pneumonia) 05/03/2021    Thrombocytosis ([place] [address] ) 05/03/2021    Elevated d-dimer 05/03/2021    Chronic respiratory failure with hypoxia ([place] [address] ) 05/03/2021    Renal cell carcinoma of left kidney ([place] [address] ) 04/16/2021    Bone metastases ([place] [address] ) 04/15/2021    Chronic obstructive pulmonary disease ([place] [address] ) 04/15/2021    Tachycardia 04/06/2021    Shortness of breath 04/05/2021    Anemia 04/05/2021    Leucocytosis 04/05/2021    Pleural effusion 04/05/2021    Lung mass 04/05/2021    Mediastinal lymphadenopathy 04/05/2021     He  has a past surgical history that includes Tendon repair (Right); IR thoracentesis (4/6/2021); IR biopsy liver mass (4/7/2021); and IR biopsy lymph node (4/13/2021)  His family history includes Heart attack in his brother; Heart disease in his father; Multiple sclerosis in his sister  He  reports that he has quit smoking  He has never used smokeless tobacco  He reports that he does not drink alcohol and does not use drugs  Current Outpatient Medications   Medication Sig Dispense Refill    albuterol (2 5 mg/3 mL) 0 083 % nebulizer solution Take 1 vial (2 5 mg total) by nebulization every 6 (six) hours as needed for wheezing or shortness of breath 360 mL 2    albuterol (Ventolin HFA) 90 mcg/act inhaler Inhale 2 puffs every 6 (six) hours as needed for wheezing 18 g 1    calcium carbonate (OYSTER SHELL,OSCAL) 500 mg Take 1 tablet by mouth 2 (two) times a day with meals 60 tablet 0    ergocalciferol (VITAMIN D2) 50,000 units Take 1 capsule (50,000 Units total) by mouth once a week 7 capsule 0    fluticasone-salmeterol (ADVAIR, WIXELA) 250-50 mcg/dose inhaler Inhale 1 puff 2 (two) times a day Rinse mouth after use   1 each 5    furosemide (LASIX) 20 mg tablet Take 1 tablet (20 mg total) by mouth 3 (three) times a week If needed for leg swelling 20 tablet 5    oxyCODONE-acetaminophen (PERCOCET) 5-325 mg per tablet Take 1 tablet by mouth every 4 (four) hours as needed for moderate painMax Daily Amount: 6 tablets 42 tablet 0    pantoprazole (PROTONIX) 40 mg tablet Take 1 tablet (40 mg total) by mouth daily 90 tablet 1    predniSONE 10 mg tablet Take 4 tablets (40 mg total) by mouth daily 20 tablet 0    predniSONE 10 mg tablet Take 4 tablets (40 mg total) by mouth daily 200 tablet 3    tiotropium (SPIRIVA RESPIMAT) 2 5 MCG/ACT AERS inhaler Inhale 2 puffs daily 4 g 2    benzonatate (TESSALON PERLES) 100 mg capsule Take 1 capsule (100 mg total) by mouth 3 (three) times a day as needed for cough (Patient not taking: Reported on 6/23/2021) 20 capsule 0    dronabinol (MARINOL) 5 MG capsule Take 1 capsule (5 mg total) by mouth 2 (two) times a day before meals 60 capsule 3    ferrous sulfate 324 (65 Fe) mg Take 1 tablet (324 mg total) by mouth every other day 15 tablet 0    nystatin (MYCOSTATIN) 500,000 units/5 mL suspension Apply 5 mL (500,000 Units total) to the mouth or throat 4 (four) times a day for 7 days 140 mL 3     No current facility-administered medications for this visit  Current Outpatient Medications on File Prior to Visit   Medication Sig    albuterol (2 5 mg/3 mL) 0 083 % nebulizer solution Take 1 vial (2 5 mg total) by nebulization every 6 (six) hours as needed for wheezing or shortness of breath    albuterol (Ventolin HFA) 90 mcg/act inhaler Inhale 2 puffs every 6 (six) hours as needed for wheezing    calcium carbonate (OYSTER SHELL,OSCAL) 500 mg Take 1 tablet by mouth 2 (two) times a day with meals    ergocalciferol (VITAMIN D2) 50,000 units Take 1 capsule (50,000 Units total) by mouth once a week    fluticasone-salmeterol (ADVAIR, WIXELA) 250-50 mcg/dose inhaler Inhale 1 puff 2 (two) times a day Rinse mouth after use      oxyCODONE-acetaminophen (PERCOCET) 5-325 mg per tablet Take 1 tablet by mouth every 4 (four) hours as needed for moderate painMax Daily Amount: 6 tablets    pantoprazole (PROTONIX) 40 mg tablet Take 1 tablet (40 mg total) by mouth daily    predniSONE 10 mg tablet Take 4 tablets (40 mg total) by mouth daily    predniSONE 10 mg tablet Take 4 tablets (40 mg total) by mouth daily    tiotropium (SPIRIVA RESPIMAT) 2 5 MCG/ACT AERS inhaler Inhale 2 puffs daily    benzonatate (TESSALON PERLES) 100 mg capsule Take 1 capsule (100 mg total) by mouth 3 (three) times a day as needed for cough (Patient not taking: Reported on 6/23/2021)    ferrous sulfate 324 (65 Fe) mg Take 1 tablet (324 mg total) by mouth every other day     No current facility-administered medications on file prior to visit  He has No Known Allergies       Review of Systems   Constitutional: Positive for activity change, appetite change and fatigue  Negative for chills and fever  HENT: Positive for mouth sores  Negative for hearing loss and trouble swallowing  Eyes: Negative for pain and visual disturbance  Respiratory: Positive for shortness of breath  Negative for cough and chest tightness  Cardiovascular: Negative for chest pain and palpitations  Gastrointestinal: Negative for abdominal pain, blood in stool, diarrhea, nausea and vomiting  Endocrine: Negative for polydipsia, polyphagia and polyuria  Genitourinary: Negative for dysuria  Musculoskeletal: Positive for arthralgias and back pain  Negative for gait problem  Skin: Negative for color change  Neurological: Negative for dizziness and headaches  Hematological: Does not bruise/bleed easily  Psychiatric/Behavioral: Negative for behavioral problems, decreased concentration and sleep disturbance  The patient is not nervous/anxious            Objective:      /68 (BP Location: Left arm)   Pulse (!) 49   Temp (!) 97 1 °F (36 2 °C) (Temporal)   Ht 5' 10" (1 778 m)   Wt 58 1 kg (128 lb 1 6 oz)   SpO2 97%   BMI 18 38 kg/m² Physical Exam  Vitals reviewed  Constitutional:       General: He is not in acute distress  Appearance: He is well-developed and well-groomed  He is cachectic  He is ill-appearing  HENT:      Head: Normocephalic and atraumatic  Comments:   Wasting of the temporal muscles; extensive white patches throughout the oral mucosa consistent with thrush     Nose: Nose normal    Eyes:      Conjunctiva/sclera: Conjunctivae normal       Pupils: Pupils are equal, round, and reactive to light  Cardiovascular:      Rate and Rhythm: Normal rate and regular rhythm  Heart sounds: Normal heart sounds  No murmur heard  No friction rub  No gallop  Pulmonary:      Breath sounds: Decreased breath sounds present  No wheezing or rales  Chest:      Chest wall: No tenderness  Abdominal:      General: There is no distension  Tenderness: There is no abdominal tenderness  There is no guarding or rebound  Musculoskeletal:      Comments:  Wasting of the interosseous muscles of the hands bilaterally   Lymphadenopathy:      Cervical: No cervical adenopathy  Skin:     General: Skin is warm and dry  Coloration: Skin is pale  Neurological:      Mental Status: He is alert and oriented to person, place, and time  Psychiatric:         Speech: Speech normal          Behavior: Behavior normal  Behavior is cooperative        Comments:  Frustrated and tired appearing

## 2021-06-28 ENCOUNTER — TELEPHONE (OUTPATIENT)
Dept: HEMATOLOGY ONCOLOGY | Facility: CLINIC | Age: 66
End: 2021-06-28

## 2021-06-28 NOTE — TELEPHONE ENCOUNTER
Will make Dr Dasha Bass aware  [facility]

## 2021-06-28 NOTE — TELEPHONE ENCOUNTER
Significant Findings     Call Received From [facility]   Radiology Department Location [place]   Study Ct chest abdomen pelvis    Date of Study 06-   Relevant Information

## 2021-06-29 ENCOUNTER — LAB (OUTPATIENT)
Dept: LAB | Facility: HOSPITAL | Age: 66
End: 2021-06-29
Attending: INTERNAL MEDICINE
Payer: MEDICARE

## 2021-06-29 ENCOUNTER — OFFICE VISIT (OUTPATIENT)
Dept: HEMATOLOGY ONCOLOGY | Facility: CLINIC | Age: 66
End: 2021-06-29
Payer: MEDICARE

## 2021-06-29 VITALS
HEART RATE: 105 BPM | HEIGHT: 70 IN | DIASTOLIC BLOOD PRESSURE: 75 MMHG | WEIGHT: 131 LBS | SYSTOLIC BLOOD PRESSURE: 125 MMHG | BODY MASS INDEX: 18.75 KG/M2 | TEMPERATURE: 98.5 F

## 2021-06-29 DIAGNOSIS — R59.0 MEDIASTINAL LYMPHADENOPATHY: ICD-10-CM

## 2021-06-29 DIAGNOSIS — C64.2 RENAL CELL CARCINOMA OF LEFT KIDNEY (HCC): Primary | ICD-10-CM

## 2021-06-29 DIAGNOSIS — D50.9 IRON DEFICIENCY ANEMIA, UNSPECIFIED IRON DEFICIENCY ANEMIA TYPE: ICD-10-CM

## 2021-06-29 DIAGNOSIS — D64.9 ANEMIA, UNSPECIFIED TYPE: ICD-10-CM

## 2021-06-29 DIAGNOSIS — C79.51 BONE METASTASES (HCC): Primary | ICD-10-CM

## 2021-06-29 DIAGNOSIS — C64.2 RENAL CELL CARCINOMA OF LEFT KIDNEY (HCC): ICD-10-CM

## 2021-06-29 DIAGNOSIS — C79.51 BONE METASTASES (HCC): ICD-10-CM

## 2021-06-29 LAB
ALBUMIN SERPL BCP-MCNC: 1.7 G/DL (ref 3.5–5)
ALP SERPL-CCNC: 275 U/L (ref 46–116)
ALT SERPL W P-5'-P-CCNC: 80 U/L (ref 12–78)
ANION GAP SERPL CALCULATED.3IONS-SCNC: 6 MMOL/L (ref 4–13)
AST SERPL W P-5'-P-CCNC: 28 U/L (ref 5–45)
BASOPHILS # BLD AUTO: 0.04 THOUSANDS/ΜL (ref 0–0.1)
BASOPHILS NFR BLD AUTO: 0 % (ref 0–1)
BILIRUB SERPL-MCNC: 0.25 MG/DL (ref 0.2–1)
BUN SERPL-MCNC: 20 MG/DL (ref 5–25)
CALCIUM ALBUM COR SERPL-MCNC: 10.9 MG/DL (ref 8.3–10.1)
CALCIUM SERPL-MCNC: 9.1 MG/DL (ref 8.3–10.1)
CHLORIDE SERPL-SCNC: 101 MMOL/L (ref 100–108)
CO2 SERPL-SCNC: 31 MMOL/L (ref 21–32)
CREAT SERPL-MCNC: 0.48 MG/DL (ref 0.6–1.3)
EOSINOPHIL # BLD AUTO: 0 THOUSAND/ΜL (ref 0–0.61)
EOSINOPHIL NFR BLD AUTO: 0 % (ref 0–6)
ERYTHROCYTE [DISTWIDTH] IN BLOOD BY AUTOMATED COUNT: 19.9 % (ref 11.6–15.1)
FERRITIN SERPL-MCNC: 5195 NG/ML (ref 8–388)
GFR SERPL CREATININE-BSD FRML MDRD: 115 ML/MIN/1.73SQ M
GLUCOSE SERPL-MCNC: 107 MG/DL (ref 65–140)
HCT VFR BLD AUTO: 25.8 % (ref 36.5–49.3)
HGB BLD-MCNC: 7.1 G/DL (ref 12–17)
IMM GRANULOCYTES # BLD AUTO: 0.2 THOUSAND/UL (ref 0–0.2)
IMM GRANULOCYTES NFR BLD AUTO: 1 % (ref 0–2)
IRON SATN MFR SERPL: 8 %
IRON SERPL-MCNC: 15 UG/DL (ref 65–175)
LYMPHOCYTES # BLD AUTO: 0.34 THOUSANDS/ΜL (ref 0.6–4.47)
LYMPHOCYTES NFR BLD AUTO: 2 % (ref 14–44)
MCH RBC QN AUTO: 19.6 PG (ref 26.8–34.3)
MCHC RBC AUTO-ENTMCNC: 27.5 G/DL (ref 31.4–37.4)
MCV RBC AUTO: 71 FL (ref 82–98)
MONOCYTES # BLD AUTO: 0.68 THOUSAND/ΜL (ref 0.17–1.22)
MONOCYTES NFR BLD AUTO: 4 % (ref 4–12)
NEUTROPHILS # BLD AUTO: 14.99 THOUSANDS/ΜL (ref 1.85–7.62)
NEUTS SEG NFR BLD AUTO: 93 % (ref 43–75)
NRBC BLD AUTO-RTO: 0 /100 WBCS
PLATELET # BLD AUTO: 749 THOUSANDS/UL (ref 149–390)
PMV BLD AUTO: 9.2 FL (ref 8.9–12.7)
POTASSIUM SERPL-SCNC: 3.8 MMOL/L (ref 3.5–5.3)
PROT SERPL-MCNC: 7.2 G/DL (ref 6.4–8.2)
RBC # BLD AUTO: 3.63 MILLION/UL (ref 3.88–5.62)
SODIUM SERPL-SCNC: 138 MMOL/L (ref 136–145)
TIBC SERPL-MCNC: 179 UG/DL (ref 250–450)
TSH SERPL DL<=0.05 MIU/L-ACNC: 0.72 UIU/ML (ref 0.36–3.74)
WBC # BLD AUTO: 16.25 THOUSAND/UL (ref 4.31–10.16)

## 2021-06-29 PROCEDURE — 82728 ASSAY OF FERRITIN: CPT

## 2021-06-29 PROCEDURE — 83540 ASSAY OF IRON: CPT

## 2021-06-29 PROCEDURE — 99214 OFFICE O/P EST MOD 30 MIN: CPT | Performed by: NURSE PRACTITIONER

## 2021-06-29 PROCEDURE — 85025 COMPLETE CBC W/AUTO DIFF WBC: CPT

## 2021-06-29 PROCEDURE — 80053 COMPREHEN METABOLIC PANEL: CPT

## 2021-06-29 PROCEDURE — 36415 COLL VENOUS BLD VENIPUNCTURE: CPT

## 2021-06-29 PROCEDURE — 83550 IRON BINDING TEST: CPT

## 2021-06-29 PROCEDURE — 84443 ASSAY THYROID STIM HORMONE: CPT

## 2021-06-29 NOTE — PROGRESS NOTES
[address] [facility] HEMATOLOGY ONCOLOGY [address] [facility]   [address] [facility] [place] [address] [phone]  Progress Note  Francis Barcenas, 1955, [id number]  6/29/2021    Assessment/Plan:  1  Renal cell carcinoma of left kidney (HCC)  2  Bone metastases ([address] )  3  Mediastinal lymphadenopathy  4  Anemia, unspecified type  5  Iron deficiency anemia, unspecified iron deficiency anemia type   Patient is a 70-year-old male with a history of metastatic renal cell carcinoma to the bones  He has been treated with Opdivo and Rolinda Petrin which is currently on hold since 06/08/2021,  secondary to probable pneumonitis  Patient had a chest x-ray on 06/08/2021 which appeared inflammatory suggesting pneumonitis  He was started on prednisone 40 mg p o  Daily and instructed to monitor his oxygen levels  He has been able to wean his oxygen requirement down to 1 L via nasal cannula  He states he has been able to take the oxygen off while at home for short periods of time  He continues to monitor is saturation while he is off the oxygen stating it does not go below 88%  He does have decreased oxygen levels with minimal exertion such as standing and washing the dishes  Once he puts the oxygen back on he is able to recover quickly  We have been managing him alongside pulmonology  I reviewed patient with Froilan Parada PA-C and discussed our plan to start the prednisone taper  At this time we will plan to taper his prednisone to 30 mg p o  Daily for 2 weeks  Per our discussion pulmonology would like to see him in 2 weeks before the next taper  We reviewed his CT scan from 06/21/2021 which reveals mixed response to treatment  There is decreasing mediastinal lymphadenopathy, C, left Reese lesion, left pleural nodularity and left renal mass  Stable to slight decreased pulmonary metastatic nodules and lymphangitic carcinomatosis    There is however increasing skeletal metastasis with associated pathologic fractures in the sacrum and left 7th and 9th ribs  There is also increasing abdominal pelvic carcinomatosis and possible new splenic metastasis versus infarct  Patient has only had 2 cycles of Opdivo and Yervoy with the last being on 05/21/21  We will plan to restart his treatment in 2-3 weeks pending prednisone taper and discussion with pulmonology  Patient is scheduled to have Xgeva in the near future, given his bone metastasis I think that is reasonable  Patient has not had updated blood work since 06/08/2021 which reveals white blood cell count 16 39, hemoglobin 7 8 with an MCV of 74 and platelets 9,320  Patient does have a history of iron deficiency, noted in May 21 with a saturation of 7% and ferritin 1,687  He was started on ferrous sulfate 324 mg p o  Daily  He has been tolerating this without difficulty  We will request the following labs and I will call him with the results  Otherwise I will plan to see him in 3 weeks for re-evaluation prior to starting his chemotherapy  Patient verbalized understanding and is in agreement with the plan  - CBC and differential; Future  - Comprehensive metabolic panel; Future  - Iron Panel (Includes Ferritin, Iron Sat%, Iron, and TIBC); Future    Goals and Barriers:    Current Goal:   Prolong Survival from Cancer  Barriers: None  Patient's Capacity to Self Care:  Patient is able to self care   -------------------------------------------------------------------------------------------------------    No chief complaint on file  History of present illness/Cancer History:   Oncology History   Renal cell carcinoma of left kidney ([facility] [address] )   4/5/2021 Initial Diagnosis    April 2021 patient presented with weight loss, dyspnea  April 5, 2021 CT of the chest showed 3 4 cm left upper lobe mass, 3 x 2 cm lingular mass, right lower lobe nodules up to 1 5 cm  Pleural enhancement and moderate left effusion are noted  Mediastinal lymph nodes up to 5 2 cm    Bony destructive lesion in the left 9th rib  CT of the abdomen pelvis showed indeterminate lesions in the left kidney and liver  Retroperitoneal lymph nodes up to 9 3 cm  L4 right transverse process and right sacral destructive lesions noted  WBC is 13 0, hemoglobin 11 1, MCV 81, platelet count 896, neutrophil 79, bands 1, lymphocytes 5, monocytes 13  CMP showed albumin 2 0, total protein 7 3, otherwise normal   Liver biopsy showed hemangioma  2021 Biopsy    2021 biopsy of left periaortic node showed metastatic carcinoma consistent with renal primary  2021 -  Chemotherapy    ipilimumab (YERVOY) 65 mg in sodium chloride 0 9 % 50 mL IVPB, 1 mg/kg = 65 mg, Intravenous, Once, 2 of 4 cycles  Administration: 65 mg (2021), 65 mg (2021)  nivolumab (OPDIVO) 200 mg in sodium chloride 0 9 % 100 mL IVPB, 194 1 mg, Intravenous, Once, 2 of 10 cycles  Administration: 200 mg (2021), 200 mg (2021)          Cancer Staging  No matching staging information was found for the patient  ECO - Symptomatic but completely ambulatory    Interval history:  Clinically stable     Review of Systems   Constitutional: Positive for fatigue  Negative for activity change, appetite change, fever and unexpected weight change  Respiratory: Positive for shortness of breath (Mild on exertion)  Negative for cough  Cardiovascular: Negative for chest pain and leg swelling  Gastrointestinal: Negative for abdominal pain, constipation, diarrhea and nausea  Endocrine: Negative for cold intolerance and heat intolerance  Musculoskeletal: Negative for arthralgias and myalgias  Skin: Negative  Neurological: Negative for dizziness, weakness and headaches  Hematological: Negative for adenopathy  Does not bruise/bleed easily           Current Outpatient Medications:     albuterol (2 5 mg/3 mL) 0 083 % nebulizer solution, Take 1 vial (2 5 mg total) by nebulization every 6 (six) hours as needed for wheezing or shortness of breath, Disp: 360 mL, Rfl: 2    albuterol (Ventolin HFA) 90 mcg/act inhaler, Inhale 2 puffs every 6 (six) hours as needed for wheezing, Disp: 18 g, Rfl: 1    benzonatate (TESSALON PERLES) 100 mg capsule, Take 1 capsule (100 mg total) by mouth 3 (three) times a day as needed for cough (Patient not taking: Reported on 6/23/2021), Disp: 20 capsule, Rfl: 0    calcium carbonate (OYSTER SHELL,OSCAL) 500 mg, Take 1 tablet by mouth 2 (two) times a day with meals, Disp: 60 tablet, Rfl: 0    dronabinol (MARINOL) 5 MG capsule, Take 1 capsule (5 mg total) by mouth 2 (two) times a day before meals, Disp: 60 capsule, Rfl: 3    ergocalciferol (VITAMIN D2) 50,000 units, Take 1 capsule (50,000 Units total) by mouth once a week, Disp: 7 capsule, Rfl: 0    ferrous sulfate 324 (65 Fe) mg, Take 1 tablet (324 mg total) by mouth every other day, Disp: 15 tablet, Rfl: 0    fluticasone-salmeterol (ADVAIR, WIXELA) 250-50 mcg/dose inhaler, Inhale 1 puff 2 (two) times a day Rinse mouth after use , Disp: 1 each, Rfl: 5    furosemide (LASIX) 20 mg tablet, Take 1 tablet (20 mg total) by mouth 3 (three) times a week If needed for leg swelling, Disp: 20 tablet, Rfl: 5    nystatin (MYCOSTATIN) 500,000 units/5 mL suspension, Apply 5 mL (500,000 Units total) to the mouth or throat 4 (four) times a day for 7 days, Disp: 140 mL, Rfl: 3    oxyCODONE-acetaminophen (PERCOCET) 5-325 mg per tablet, Take 1 tablet by mouth every 4 (four) hours as needed for moderate painMax Daily Amount: 6 tablets, Disp: 42 tablet, Rfl: 0    pantoprazole (PROTONIX) 40 mg tablet, Take 1 tablet (40 mg total) by mouth daily, Disp: 90 tablet, Rfl: 1    predniSONE 10 mg tablet, Take 4 tablets (40 mg total) by mouth daily, Disp: 20 tablet, Rfl: 0    predniSONE 10 mg tablet, Take 4 tablets (40 mg total) by mouth daily, Disp: 200 tablet, Rfl: 3    tiotropium (SPIRIVA RESPIMAT) 2 5 MCG/ACT AERS inhaler, Inhale 2 puffs daily, Disp: 4 g, Rfl: 2    No Known Allergies    Advance Directive and Living Will:        Objective:   /75   Pulse 105   Temp 98 5 °F (36 9 °C)   Ht 5' 10" (1 778 m)   Wt 59 4 kg (131 lb)   BMI 18 80 kg/m²   Wt Readings from Last 6 Encounters:   06/29/21 59 4 kg (131 lb)   06/23/21 58 1 kg (128 lb 1 6 oz)   06/08/21 60 8 kg (134 lb)   06/08/21 60 8 kg (134 lb)   05/21/21 62 2 kg (137 lb 2 oz)   05/21/21 62 1 kg (137 lb)       Physical Exam  Vitals reviewed  Constitutional:       Appearance: Normal appearance  He is well-developed  HENT:      Head: Normocephalic and atraumatic  Eyes:      Extraocular Movements: Extraocular movements intact  Pupils: Pupils are equal, round, and reactive to light  Cardiovascular:      Rate and Rhythm: Normal rate and regular rhythm  Pulses: Normal pulses  Heart sounds: Normal heart sounds  Pulmonary:      Effort: Pulmonary effort is normal  No respiratory distress  Breath sounds: Normal breath sounds  Abdominal:      General: Bowel sounds are normal       Palpations: Abdomen is soft  Musculoskeletal:         General: Normal range of motion  Cervical back: Normal range of motion  Lymphadenopathy:      Cervical: No cervical adenopathy  Skin:     General: Skin is warm and dry  Capillary Refill: Capillary refill takes less than 2 seconds  Neurological:      Mental Status: He is alert and oriented to person, place, and time  Psychiatric:         Behavior: Behavior normal          Pertinent Laboratory Results and Imaging Review:  No visits with results within 1 Week(s) from this visit     Latest known visit with results is:   Appointment on 06/08/2021   Component Date Value Ref Range Status    WBC 06/08/2021 16 39* 4 31 - 10 16 Thousand/uL Final    RBC 06/08/2021 3 84* 3 88 - 5 62 Million/uL Final    Hemoglobin 06/08/2021 7 8* 12 0 - 17 0 g/dL Final    Hematocrit 06/08/2021 28 4* 36 5 - 49 3 % Final    MCV 06/08/2021 74* 82 - 98 fL Final    [facility] 06/08/2021 20 3* 26 8 - 34 3 pg Final    MCHC 06/08/2021 27 5* 31 4 - 37 4 g/dL Final    RDW 06/08/2021 18 3* 11 6 - 15 1 % Final    MPV 06/08/2021 9 2  8 9 - 12 7 fL Final    Platelets 34/86/2809 1,167* 149 - 390 Thousands/uL Final    This result has [facility] Bell Lombardo on 06 08 2021 at [id number], and has been read back   nRBC 06/08/2021 0  /100 WBCs Final    Neutrophils Relative 06/08/2021 83* 43 - 75 % Final    Immat GRANS % 06/08/2021 1  0 - 2 % Final    Lymphocytes Relative 06/08/2021 4* 14 - 44 % Final    Monocytes Relative 06/08/2021 12  4 - 12 % Final    Eosinophils Relative 06/08/2021 0  0 - 6 % Final    Basophils Relative 06/08/2021 0  0 - 1 % Final    Neutrophils Absolute 06/08/2021 13 59* 1 85 - 7 62 Thousands/µL Final    Immature Grans Absolute 06/08/2021 0 18  0 00 - 0 20 Thousand/uL Final    Lymphocytes Absolute 06/08/2021 0 57* 0 60 - 4 47 Thousands/µL Final    Monocytes Absolute 06/08/2021 1 97* 0 17 - 1 22 Thousand/µL Final    Eosinophils Absolute 06/08/2021 0 03  0 00 - 0 61 Thousand/µL Final    Basophils Absolute 06/08/2021 0 05  0 00 - 0 10 Thousands/µL Final    Sodium 06/08/2021 140  136 - 145 mmol/L Final    Potassium 06/08/2021 3 7  3 5 - 5 3 mmol/L Final    Chloride 06/08/2021 102  100 - 108 mmol/L Final    CO2 06/08/2021 31  21 - 32 mmol/L Final    ANION GAP 06/08/2021 7  4 - 13 mmol/L Final    BUN 06/08/2021 12  5 - 25 mg/dL Final    Creatinine 06/08/2021 0 55* 0 60 - 1 30 mg/dL Final    Standardized to IDMS reference method    Glucose 06/08/2021 95  65 - 140 mg/dL Final    If the patient is fasting, the ADA then defines impaired fasting glucose as > 100 mg/dL and diabetes as > or equal to 123 mg/dL  Specimen collection should occur prior to Sulfasalazine administration due to the potential for falsely depressed results  Specimen collection should occur prior to Sulfapyridine administration due to the potential for falsely elevated results      Calcium 06/08/2021 8 7  8 3 - 10 1 mg/dL Final    Corrected Calcium 06/08/2021 10 6* 8 3 - 10 1 mg/dL Final    AST 06/08/2021 38  5 - 45 U/L Final    Specimen collection should occur prior to Sulfasalazine administration due to the potential for falsely depressed results   ALT 06/08/2021 49  12 - 78 U/L Final    Specimen collection should occur prior to Sulfasalazine administration due to the potential for falsely depressed results   Alkaline Phosphatase 06/08/2021 201* 46 - 116 U/L Final    Total Protein 06/08/2021 7 1  6 4 - 8 2 g/dL Final    Albumin 06/08/2021 1 6* 3 5 - 5 0 g/dL Final    Total Bilirubin 06/08/2021 0 34  0 20 - 1 00 mg/dL Final    Use of this assay is not recommended for patients undergoing treatment with eltrombopag due to the potential for falsely elevated results   eGFR 06/08/2021 109  ml/min/1 73sq m Final    TSH 3RD GENERATON 06/08/2021 1 094  0 358 - 3 740 uIU/mL Final         The following historical data was reviewed      Past Medical History:   Diagnosis Date    Emphysema of lung ([address] )     Hypocalcemia 5/5/2021    Sepsis ([address] ) 5/3/2021    Transaminitis 5/3/2021       Past Surgical History:   Procedure Laterality Date    IR BIOPSY LIVER MASS  4/7/2021    IR BIOPSY LYMPH NODE  4/13/2021    IR THORACENTESIS  4/6/2021    TENDON REPAIR Right     forearm       Social History     Socioeconomic History    Marital status: Single     Spouse name: Not on file    Number of children: Not on file    Years of education: Not on file    Highest education level: Not on file   Occupational History    Not on file   Tobacco Use    Smoking status: Former Smoker    Smokeless tobacco: Never Used    Tobacco comment: quit feb 2021   Vaping Use    Vaping Use: Never used   Substance and Sexual Activity    Alcohol use: Never    Drug use: Never    Sexual activity: Not on file   Other Topics Concern    Not on file   Social History Narrative    Not on file     Social Determinants of Health     Financial Resource Strain:     Difficulty of Paying Living Expenses:    Food Insecurity:     Worried About [address] in the Last Year:     [address] in the Last Year:    Transportation Needs:     Lack of Transportation (Medical):  Lack of Transportation (Non-Medical):    Physical Activity:     Days of Exercise per Week:     Minutes of Exercise per Session:    Stress:     Feeling of Stress :    Social Connections:     Frequency of Communication with Friends and Family:     Frequency of Social Gatherings with Friends and Family:     Attends Zoroastrianism Services:     Active Member of Clubs or Organizations:     Attends Club or Organization Meetings:     Marital Status:    Intimate Partner Violence:     Fear of Current or Ex-Partner:     Emotionally Abused:     Physically Abused:     Sexually Abused:        Family History   Problem Relation Age of Onset    Heart disease Father     Multiple sclerosis Sister     Heart attack Brother        Please note: This report has been generated by a voice recognition software system  Therefore there may be syntax, spelling, and/or grammatical errors  Please call if you have any questions

## 2021-06-29 NOTE — PROGRESS NOTES
[phone] Call from Jc Cedeno to hold treatment 07/02/2021 and ok to give xgeva at next appointment on 07/30/2201

## 2021-06-29 NOTE — PATIENT INSTRUCTIONS
Start to taper prednisone to 30 mg by mouth daily for two weeks, then decrease to 20 mg daily by mouth for one week, then to 10 mg daily by mouth for one week  Continue to monitor your oxygen levels  Try to wean oxygen as able

## 2021-07-02 ENCOUNTER — TELEPHONE (OUTPATIENT)
Dept: HEMATOLOGY ONCOLOGY | Facility: CLINIC | Age: 66
End: 2021-07-02

## 2021-07-02 ENCOUNTER — HOSPITAL ENCOUNTER (OUTPATIENT)
Dept: INFUSION CENTER | Facility: HOSPITAL | Age: 66
Discharge: HOME | End: 2021-07-02
Attending: INTERNAL MEDICINE

## 2021-07-02 DIAGNOSIS — D64.9 ANEMIA, UNSPECIFIED TYPE: Primary | ICD-10-CM

## 2021-07-02 NOTE — TELEPHONE ENCOUNTER
Called and discussed pts Hgb 7 1  Pt is asymptomatic and states hes "feeling pretty good " Advised him to call the office if he becomes symptomatic at any point and reviewed s/s of low Hgb  Also instructed him to get repeat blood work done next week  He verbalized understanding

## 2021-07-06 ENCOUNTER — APPOINTMENT (EMERGENCY)
Dept: CT IMAGING | Facility: HOSPITAL | Age: 66
End: 2021-07-06
Payer: MEDICARE

## 2021-07-06 ENCOUNTER — APPOINTMENT (EMERGENCY)
Dept: RADIOLOGY | Facility: HOSPITAL | Age: 66
End: 2021-07-06
Payer: MEDICARE

## 2021-07-06 ENCOUNTER — APPOINTMENT (INPATIENT)
Dept: RADIOLOGY | Facility: HOSPITAL | Age: 66
DRG: 542 | End: 2021-07-06
Payer: MEDICARE

## 2021-07-06 ENCOUNTER — TELEPHONE (OUTPATIENT)
Dept: HEMATOLOGY ONCOLOGY | Facility: CLINIC | Age: 66
End: 2021-07-06

## 2021-07-06 ENCOUNTER — HOSPITAL ENCOUNTER (INPATIENT)
Facility: HOSPITAL | Age: 66
LOS: 6 days | DRG: 542 | End: 2021-07-12
Attending: INTERNAL MEDICINE | Admitting: INTERNAL MEDICINE
Payer: MEDICARE

## 2021-07-06 ENCOUNTER — TELEPHONE (OUTPATIENT)
Dept: INTERNAL MEDICINE CLINIC | Facility: CLINIC | Age: 66
End: 2021-07-06

## 2021-07-06 ENCOUNTER — HOSPITAL ENCOUNTER (EMERGENCY)
Facility: HOSPITAL | Age: 66
End: 2021-07-06
Attending: EMERGENCY MEDICINE
Payer: MEDICARE

## 2021-07-06 VITALS
RESPIRATION RATE: 20 BRPM | BODY MASS INDEX: 18.75 KG/M2 | WEIGHT: 131 LBS | TEMPERATURE: 97.6 F | OXYGEN SATURATION: 93 % | DIASTOLIC BLOOD PRESSURE: 87 MMHG | HEIGHT: 70 IN | HEART RATE: 98 BPM | SYSTOLIC BLOOD PRESSURE: 164 MMHG

## 2021-07-06 DIAGNOSIS — C79.51 SECONDARY MALIGNANT NEOPLASM OF BONE AND BONE MARROW (HCC): ICD-10-CM

## 2021-07-06 DIAGNOSIS — E43 SEVERE PROTEIN-CALORIE MALNUTRITION (HCC): ICD-10-CM

## 2021-07-06 DIAGNOSIS — C64.2 RENAL CELL CARCINOMA OF LEFT KIDNEY (HCC): ICD-10-CM

## 2021-07-06 DIAGNOSIS — C79.51 BONY METASTASIS (HCC): ICD-10-CM

## 2021-07-06 DIAGNOSIS — J96.21 ACUTE ON CHRONIC RESPIRATORY FAILURE WITH HYPOXIA (HCC): ICD-10-CM

## 2021-07-06 DIAGNOSIS — C79.52 SECONDARY MALIGNANT NEOPLASM OF BONE AND BONE MARROW (HCC): ICD-10-CM

## 2021-07-06 DIAGNOSIS — C79.51 BONE METASTASES (HCC): Primary | ICD-10-CM

## 2021-07-06 DIAGNOSIS — R29.898 RIGHT LEG WEAKNESS: Primary | ICD-10-CM

## 2021-07-06 DIAGNOSIS — C64.9 RENAL CELL CARCINOMA (HCC): ICD-10-CM

## 2021-07-06 DIAGNOSIS — R26.2 AMBULATORY DYSFUNCTION: ICD-10-CM

## 2021-07-06 PROBLEM — E83.52 HYPERCALCEMIA: Status: ACTIVE | Noted: 2021-07-06

## 2021-07-06 PROBLEM — T17.908A ASPIRATION INTO AIRWAY: Status: ACTIVE | Noted: 2021-07-06

## 2021-07-06 LAB
ALBUMIN SERPL BCP-MCNC: 1.6 G/DL (ref 3.5–5)
ALP SERPL-CCNC: 249 U/L (ref 46–116)
ALT SERPL W P-5'-P-CCNC: 37 U/L (ref 12–78)
ANION GAP SERPL CALCULATED.3IONS-SCNC: 9 MMOL/L (ref 4–13)
APTT PPP: 39 SECONDS (ref 23–37)
AST SERPL W P-5'-P-CCNC: 21 U/L (ref 5–45)
BASOPHILS # BLD AUTO: 0.02 THOUSANDS/ΜL (ref 0–0.1)
BASOPHILS NFR BLD AUTO: 0 % (ref 0–1)
BILIRUB SERPL-MCNC: 0.36 MG/DL (ref 0.2–1)
BILIRUB UR QL STRIP: NEGATIVE
BUN SERPL-MCNC: 11 MG/DL (ref 5–25)
CALCIUM ALBUM COR SERPL-MCNC: 10.5 MG/DL (ref 8.3–10.1)
CALCIUM SERPL-MCNC: 8.6 MG/DL (ref 8.3–10.1)
CHLORIDE SERPL-SCNC: 101 MMOL/L (ref 100–108)
CLARITY UR: NORMAL
CO2 SERPL-SCNC: 32 MMOL/L (ref 21–32)
COLOR UR: YELLOW
CREAT SERPL-MCNC: 0.44 MG/DL (ref 0.6–1.3)
EOSINOPHIL # BLD AUTO: 0.01 THOUSAND/ΜL (ref 0–0.61)
EOSINOPHIL NFR BLD AUTO: 0 % (ref 0–6)
ERYTHROCYTE [DISTWIDTH] IN BLOOD BY AUTOMATED COUNT: 20.7 % (ref 11.6–15.1)
GFR SERPL CREATININE-BSD FRML MDRD: 119 ML/MIN/1.73SQ M
GLUCOSE SERPL-MCNC: 87 MG/DL (ref 65–140)
GLUCOSE UR STRIP-MCNC: NEGATIVE MG/DL
HCT VFR BLD AUTO: 27 % (ref 36.5–49.3)
HGB BLD-MCNC: 7.2 G/DL (ref 12–17)
HGB UR QL STRIP.AUTO: NEGATIVE
IMM GRANULOCYTES # BLD AUTO: 0.21 THOUSAND/UL (ref 0–0.2)
IMM GRANULOCYTES NFR BLD AUTO: 2 % (ref 0–2)
INR PPP: 1.36 (ref 0.84–1.19)
KETONES UR STRIP-MCNC: NEGATIVE MG/DL
LACTATE SERPL-SCNC: 1.9 MMOL/L (ref 0.5–2)
LEUKOCYTE ESTERASE UR QL STRIP: NEGATIVE
LYMPHOCYTES # BLD AUTO: 0.41 THOUSANDS/ΜL (ref 0.6–4.47)
LYMPHOCYTES NFR BLD AUTO: 3 % (ref 14–44)
MCH RBC QN AUTO: 19.1 PG (ref 26.8–34.3)
MCHC RBC AUTO-ENTMCNC: 26.7 G/DL (ref 31.4–37.4)
MCV RBC AUTO: 72 FL (ref 82–98)
MONOCYTES # BLD AUTO: 0.95 THOUSAND/ΜL (ref 0.17–1.22)
MONOCYTES NFR BLD AUTO: 7 % (ref 4–12)
NEUTROPHILS # BLD AUTO: 12.69 THOUSANDS/ΜL (ref 1.85–7.62)
NEUTS SEG NFR BLD AUTO: 88 % (ref 43–75)
NITRITE UR QL STRIP: NEGATIVE
NRBC BLD AUTO-RTO: 0 /100 WBCS
NT-PROBNP SERPL-MCNC: 2896 PG/ML
PH UR STRIP.AUTO: 8 [PH]
PLATELET # BLD AUTO: 724 THOUSANDS/UL (ref 149–390)
PMV BLD AUTO: 9.3 FL (ref 8.9–12.7)
POTASSIUM SERPL-SCNC: 3 MMOL/L (ref 3.5–5.3)
PROCALCITONIN SERPL-MCNC: 0.32 NG/ML
PROT SERPL-MCNC: 6.7 G/DL (ref 6.4–8.2)
PROT UR STRIP-MCNC: NEGATIVE MG/DL
PROTHROMBIN TIME: 16.5 SECONDS (ref 11.6–14.5)
RBC # BLD AUTO: 3.77 MILLION/UL (ref 3.88–5.62)
SARS-COV-2 RNA RESP QL NAA+PROBE: NEGATIVE
SODIUM SERPL-SCNC: 142 MMOL/L (ref 136–145)
SP GR UR STRIP.AUTO: 1.01 (ref 1–1.03)
UROBILINOGEN UR QL STRIP.AUTO: 1 E.U./DL
WBC # BLD AUTO: 14.29 THOUSAND/UL (ref 4.31–10.16)

## 2021-07-06 PROCEDURE — 96376 TX/PRO/DX INJ SAME DRUG ADON: CPT

## 2021-07-06 PROCEDURE — 80053 COMPREHEN METABOLIC PANEL: CPT | Performed by: EMERGENCY MEDICINE

## 2021-07-06 PROCEDURE — 85025 COMPLETE CBC W/AUTO DIFF WBC: CPT | Performed by: EMERGENCY MEDICINE

## 2021-07-06 PROCEDURE — G1004 CDSM NDSC: HCPCS

## 2021-07-06 PROCEDURE — 96374 THER/PROPH/DIAG INJ IV PUSH: CPT

## 2021-07-06 PROCEDURE — U0005 INFEC AGEN DETEC AMPLI PROBE: HCPCS | Performed by: EMERGENCY MEDICINE

## 2021-07-06 PROCEDURE — 96360 HYDRATION IV INFUSION INIT: CPT

## 2021-07-06 PROCEDURE — 71045 X-RAY EXAM CHEST 1 VIEW: CPT

## 2021-07-06 PROCEDURE — 72131 CT LUMBAR SPINE W/O DYE: CPT

## 2021-07-06 PROCEDURE — 93005 ELECTROCARDIOGRAM TRACING: CPT

## 2021-07-06 PROCEDURE — 99285 EMERGENCY DEPT VISIT HI MDM: CPT

## 2021-07-06 PROCEDURE — 36415 COLL VENOUS BLD VENIPUNCTURE: CPT | Performed by: EMERGENCY MEDICINE

## 2021-07-06 PROCEDURE — 87040 BLOOD CULTURE FOR BACTERIA: CPT | Performed by: EMERGENCY MEDICINE

## 2021-07-06 PROCEDURE — 99223 1ST HOSP IP/OBS HIGH 75: CPT | Performed by: INTERNAL MEDICINE

## 2021-07-06 PROCEDURE — 84145 PROCALCITONIN (PCT): CPT | Performed by: EMERGENCY MEDICINE

## 2021-07-06 PROCEDURE — 94760 N-INVAS EAR/PLS OXIMETRY 1: CPT

## 2021-07-06 PROCEDURE — 85610 PROTHROMBIN TIME: CPT | Performed by: EMERGENCY MEDICINE

## 2021-07-06 PROCEDURE — U0003 INFECTIOUS AGENT DETECTION BY NUCLEIC ACID (DNA OR RNA); SEVERE ACUTE RESPIRATORY SYNDROME CORONAVIRUS 2 (SARS-COV-2) (CORONAVIRUS DISEASE [COVID-19]), AMPLIFIED PROBE TECHNIQUE, MAKING USE OF HIGH THROUGHPUT TECHNOLOGIES AS DESCRIBED BY CMS-2020-01-R: HCPCS | Performed by: EMERGENCY MEDICINE

## 2021-07-06 PROCEDURE — 81003 URINALYSIS AUTO W/O SCOPE: CPT | Performed by: EMERGENCY MEDICINE

## 2021-07-06 PROCEDURE — 85730 THROMBOPLASTIN TIME PARTIAL: CPT | Performed by: EMERGENCY MEDICINE

## 2021-07-06 PROCEDURE — 83605 ASSAY OF LACTIC ACID: CPT | Performed by: EMERGENCY MEDICINE

## 2021-07-06 PROCEDURE — 94664 DEMO&/EVAL PT USE INHALER: CPT

## 2021-07-06 PROCEDURE — 70450 CT HEAD/BRAIN W/O DYE: CPT

## 2021-07-06 PROCEDURE — 94668 MNPJ CHEST WALL SBSQ: CPT

## 2021-07-06 PROCEDURE — 94640 AIRWAY INHALATION TREATMENT: CPT

## 2021-07-06 PROCEDURE — 99285 EMERGENCY DEPT VISIT HI MDM: CPT | Performed by: EMERGENCY MEDICINE

## 2021-07-06 PROCEDURE — 83880 ASSAY OF NATRIURETIC PEPTIDE: CPT | Performed by: EMERGENCY MEDICINE

## 2021-07-06 RX ORDER — CALCIUM CARBONATE 200(500)MG
1000 TABLET,CHEWABLE ORAL DAILY PRN
Status: DISCONTINUED | OUTPATIENT
Start: 2021-07-06 | End: 2021-07-09

## 2021-07-06 RX ORDER — ACETAMINOPHEN 325 MG/1
650 TABLET ORAL EVERY 6 HOURS PRN
Status: DISCONTINUED | OUTPATIENT
Start: 2021-07-06 | End: 2021-07-06

## 2021-07-06 RX ORDER — SENNOSIDES 8.6 MG
2 TABLET ORAL DAILY
Status: DISCONTINUED | OUTPATIENT
Start: 2021-07-07 | End: 2021-07-12 | Stop reason: HOSPADM

## 2021-07-06 RX ORDER — POTASSIUM CHLORIDE 20 MEQ/1
40 TABLET, EXTENDED RELEASE ORAL ONCE
Status: DISCONTINUED | OUTPATIENT
Start: 2021-07-06 | End: 2021-07-12 | Stop reason: HOSPADM

## 2021-07-06 RX ORDER — FUROSEMIDE 20 MG/1
20 TABLET ORAL 3 TIMES WEEKLY
Status: DISCONTINUED | OUTPATIENT
Start: 2021-07-07 | End: 2021-07-07

## 2021-07-06 RX ORDER — ALBUTEROL SULFATE 2.5 MG/3ML
2.5 SOLUTION RESPIRATORY (INHALATION) EVERY 4 HOURS PRN
Status: CANCELLED | OUTPATIENT
Start: 2021-07-06

## 2021-07-06 RX ORDER — DEXAMETHASONE SODIUM PHOSPHATE 4 MG/ML
4 INJECTION, SOLUTION INTRA-ARTICULAR; INTRALESIONAL; INTRAMUSCULAR; INTRAVENOUS; SOFT TISSUE EVERY 6 HOURS SCHEDULED
Status: DISCONTINUED | OUTPATIENT
Start: 2021-07-07 | End: 2021-07-09

## 2021-07-06 RX ORDER — HYDROMORPHONE HCL/PF 1 MG/ML
1 SYRINGE (ML) INJECTION ONCE
Status: COMPLETED | OUTPATIENT
Start: 2021-07-06 | End: 2021-07-06

## 2021-07-06 RX ORDER — OXYCODONE HYDROCHLORIDE 5 MG/1
5 TABLET ORAL EVERY 4 HOURS PRN
Status: DISCONTINUED | OUTPATIENT
Start: 2021-07-06 | End: 2021-07-11

## 2021-07-06 RX ORDER — DRONABINOL 2.5 MG/1
5 CAPSULE ORAL
Status: DISCONTINUED | OUTPATIENT
Start: 2021-07-07 | End: 2021-07-09

## 2021-07-06 RX ORDER — ACETAMINOPHEN 325 MG/1
975 TABLET ORAL EVERY 8 HOURS SCHEDULED
Status: DISCONTINUED | OUTPATIENT
Start: 2021-07-06 | End: 2021-07-09

## 2021-07-06 RX ORDER — SODIUM CHLORIDE FOR INHALATION 0.9 %
3 VIAL, NEBULIZER (ML) INHALATION
Status: DISCONTINUED | OUTPATIENT
Start: 2021-07-07 | End: 2021-07-12 | Stop reason: HOSPADM

## 2021-07-06 RX ORDER — HEPARIN SODIUM 5000 [USP'U]/ML
5000 INJECTION, SOLUTION INTRAVENOUS; SUBCUTANEOUS EVERY 8 HOURS SCHEDULED
Status: DISCONTINUED | OUTPATIENT
Start: 2021-07-06 | End: 2021-07-07

## 2021-07-06 RX ORDER — ONDANSETRON 2 MG/ML
4 INJECTION INTRAMUSCULAR; INTRAVENOUS EVERY 6 HOURS PRN
Status: DISCONTINUED | OUTPATIENT
Start: 2021-07-06 | End: 2021-07-12 | Stop reason: HOSPADM

## 2021-07-06 RX ORDER — FLUTICASONE FUROATE AND VILANTEROL 200; 25 UG/1; UG/1
1 POWDER RESPIRATORY (INHALATION) DAILY
Status: DISCONTINUED | OUTPATIENT
Start: 2021-07-07 | End: 2021-07-11

## 2021-07-06 RX ORDER — ALBUTEROL SULFATE 2.5 MG/3ML
2.5 SOLUTION RESPIRATORY (INHALATION) EVERY 6 HOURS PRN
Status: DISCONTINUED | OUTPATIENT
Start: 2021-07-06 | End: 2021-07-12 | Stop reason: HOSPADM

## 2021-07-06 RX ORDER — OXYCODONE HYDROCHLORIDE AND ACETAMINOPHEN 5; 325 MG/1; MG/1
1 TABLET ORAL EVERY 4 HOURS PRN
Status: DISCONTINUED | OUTPATIENT
Start: 2021-07-06 | End: 2021-07-06

## 2021-07-06 RX ORDER — OXYCODONE HYDROCHLORIDE 10 MG/1
10 TABLET ORAL EVERY 4 HOURS PRN
Status: DISCONTINUED | OUTPATIENT
Start: 2021-07-06 | End: 2021-07-11

## 2021-07-06 RX ORDER — DOCUSATE SODIUM 100 MG/1
100 CAPSULE, LIQUID FILLED ORAL 2 TIMES DAILY
Status: DISCONTINUED | OUTPATIENT
Start: 2021-07-07 | End: 2021-07-09

## 2021-07-06 RX ORDER — HYDROMORPHONE HCL/PF 1 MG/ML
1 SYRINGE (ML) INJECTION EVERY 2 HOUR PRN
Status: DISCONTINUED | OUTPATIENT
Start: 2021-07-06 | End: 2021-07-06 | Stop reason: HOSPADM

## 2021-07-06 RX ORDER — PANTOPRAZOLE SODIUM 40 MG/1
40 TABLET, DELAYED RELEASE ORAL DAILY
Status: DISCONTINUED | OUTPATIENT
Start: 2021-07-07 | End: 2021-07-11

## 2021-07-06 RX ORDER — LEVALBUTEROL 1.25 MG/.5ML
1.25 SOLUTION, CONCENTRATE RESPIRATORY (INHALATION)
Status: DISCONTINUED | OUTPATIENT
Start: 2021-07-07 | End: 2021-07-12 | Stop reason: HOSPADM

## 2021-07-06 RX ADMIN — HYDROMORPHONE HYDROCHLORIDE 1 MG: 1 INJECTION, SOLUTION INTRAMUSCULAR; INTRAVENOUS; SUBCUTANEOUS at 18:04

## 2021-07-06 RX ADMIN — HYDROMORPHONE HYDROCHLORIDE 1 MG: 1 INJECTION, SOLUTION INTRAMUSCULAR; INTRAVENOUS; SUBCUTANEOUS at 20:33

## 2021-07-06 RX ADMIN — ALBUTEROL SULFATE 2.5 MG: 2.5 SOLUTION RESPIRATORY (INHALATION) at 23:11

## 2021-07-06 RX ADMIN — SODIUM CHLORIDE 1000 ML: 0.9 INJECTION, SOLUTION INTRAVENOUS at 09:52

## 2021-07-06 RX ADMIN — HYDROMORPHONE HYDROCHLORIDE 1 MG: 1 INJECTION, SOLUTION INTRAMUSCULAR; INTRAVENOUS; SUBCUTANEOUS at 10:20

## 2021-07-06 RX ADMIN — HYDROMORPHONE HYDROCHLORIDE 1 MG: 1 INJECTION, SOLUTION INTRAMUSCULAR; INTRAVENOUS; SUBCUTANEOUS at 14:44

## 2021-07-06 NOTE — ED PROVIDER NOTES
History  Chief Complaint   Patient presents with    Weakness - Generalized     leg weakness more right unable to ambulate since yesterday     Patient is a 58-year-old male with history of renal cell carcinoma and metastatic disease to the thoracolumbar spine as well as multiple ribs  Patient states his right leg has been increasingly weaker and today states had difficulty ambulating using his right leg secondary to weakness and pain  No bowel or bladder incontinence or retention  No perineal anesthesia  Patient's oncologist is Dr Pritesh Rivers          Prior to Admission Medications   Prescriptions Last Dose Informant Patient Reported? Taking? albuterol (2 5 mg/3 mL) 0 083 % nebulizer solution   No No   Sig: Take 1 vial (2 5 mg total) by nebulization every 6 (six) hours as needed for wheezing or shortness of breath   albuterol (Ventolin HFA) 90 mcg/act inhaler   No No   Sig: Inhale 2 puffs every 6 (six) hours as needed for wheezing   benzonatate (TESSALON PERLES) 100 mg capsule  Self No No   Sig: Take 1 capsule (100 mg total) by mouth 3 (three) times a day as needed for cough   Patient not taking: Reported on 6/23/2021   calcium carbonate (OYSTER SHELL,OSCAL) 500 mg   No No   Sig: Take 1 tablet by mouth 2 (two) times a day with meals   dronabinol (MARINOL) 5 MG capsule   No No   Sig: Take 1 capsule (5 mg total) by mouth 2 (two) times a day before meals   ergocalciferol (VITAMIN D2) 50,000 units   No No   Sig: Take 1 capsule (50,000 Units total) by mouth once a week   ferrous sulfate 324 (65 Fe) mg  Self No No   Sig: Take 1 tablet (324 mg total) by mouth every other day   fluticasone-salmeterol (ADVAIR, WIXELA) 250-50 mcg/dose inhaler   No No   Sig: Inhale 1 puff 2 (two) times a day Rinse mouth after use     furosemide (LASIX) 20 mg tablet   No No   Sig: Take 1 tablet (20 mg total) by mouth 3 (three) times a week If needed for leg swelling   oxyCODONE-acetaminophen (PERCOCET) 5-325 mg per tablet   No No   Sig: Take 1 tablet by mouth every 4 (four) hours as needed for moderate painMax Daily Amount: 6 tablets   pantoprazole (PROTONIX) 40 mg tablet   No No   Sig: Take 1 tablet (40 mg total) by mouth daily   predniSONE 10 mg tablet   No No   Sig: Take 4 tablets (40 mg total) by mouth daily   predniSONE 10 mg tablet   No No   Sig: Take 4 tablets (40 mg total) by mouth daily   tiotropium (SPIRIVA RESPIMAT) 2 5 MCG/ACT AERS inhaler   No No   Sig: Inhale 2 puffs daily      Facility-Administered Medications: None       Past Medical History:   Diagnosis Date    [facility] lung [facility] [address] )     Hypocalcemia 5/5/2021    Sepsis ([facility] [address] ) 5/3/2021    Transaminitis 5/3/2021       Past Surgical History:   Procedure Laterality Date    IR BIOPSY LIVER MASS  4/7/2021    IR BIOPSY LYMPH NODE  4/13/2021    IR THORACENTESIS  4/6/2021    TENDON REPAIR Right     forearm       Family History   Problem Relation Age of Onset    Heart disease Father     Multiple sclerosis Sister     Heart attack Brother      I have reviewed and agree with the history as documented  E-Cigarette/Vaping    E-Cigarette Use Never User      E-Cigarette/Vaping Substances    Nicotine No     THC No     CBD No     Flavoring No     Other No     Unknown No      Social History     Tobacco Use    Smoking status: Former Smoker    Smokeless tobacco: Never Used    Tobacco comment: quit feb 2021   Vaping Use    Vaping Use: Never used   Substance Use Topics    Alcohol use: Never    Drug use: Never       Review of Systems   Constitutional: Positive for activity change and fatigue  Negative for appetite change, chills and fever  HENT: Negative for ear pain, hearing loss, rhinorrhea, sneezing, sore throat and trouble swallowing  Eyes: Negative for pain and visual disturbance  Respiratory: Positive for shortness of breath  Negative for cough, choking, chest tightness, wheezing and stridor  Cardiovascular: Negative for chest pain, palpitations and leg swelling  Gastrointestinal: Negative for abdominal pain, constipation, diarrhea, nausea and vomiting  Genitourinary: Negative for difficulty urinating, dysuria, frequency, hematuria and testicular pain  Musculoskeletal: Positive for back pain and gait problem  Negative for arthralgias and neck pain  Skin: Negative for color change and rash  Allergic/Immunologic: Negative for immunocompromised state  Neurological: Positive for weakness  Negative for dizziness, seizures, syncope, speech difficulty, light-headedness, numbness and headaches  Psychiatric/Behavioral: Negative for behavioral problems, confusion and sleep disturbance  All other systems reviewed and are negative  Physical Exam  Physical Exam  Vitals and nursing note reviewed  Constitutional:       General: He is not in acute distress  Appearance: He is well-developed  He is ill-appearing  He is not diaphoretic  Comments: cachectic   HENT:      Head: Normocephalic and atraumatic  Nose: Nose normal       Mouth/Throat:      Mouth: Mucous membranes are moist       Pharynx: Oropharynx is clear  Eyes:      General: No scleral icterus  Extraocular Movements: Extraocular movements intact  Conjunctiva/sclera: Conjunctivae normal    Cardiovascular:      Rate and Rhythm: Normal rate and regular rhythm  Pulses: Normal pulses  Heart sounds: Normal heart sounds  No murmur heard  Pulmonary:      Effort: Pulmonary effort is normal  No respiratory distress  Breath sounds: Normal breath sounds  No wheezing or rales  Chest:      Chest wall: No tenderness  Abdominal:      General: Bowel sounds are normal  There is no distension  Palpations: Abdomen is soft  There is no mass  Tenderness: There is no abdominal tenderness  There is no right CVA tenderness, left CVA tenderness, guarding or rebound  Musculoskeletal:         General: No swelling, tenderness, deformity or signs of injury  Normal range of motion  Cervical back: Normal range of motion and neck supple  Right lower leg: No edema  Left lower leg: No edema  Lymphadenopathy:      Cervical: No cervical adenopathy  Skin:     General: Skin is warm and dry  Capillary Refill: Capillary refill takes less than 2 seconds  Coloration: Skin is not jaundiced or pale  Findings: No erythema or rash  Neurological:      Mental Status: He is alert and oriented to person, place, and time  Motor: Weakness present  No abnormal muscle tone  Comments: Normal sensation bilateral lower extremities  Weakness with 1+ strength of the right lower extremity, inability to hold against resistance   Psychiatric:         Mood and Affect: Mood normal          Behavior: Behavior normal          Thought Content:  Thought content normal          Vital Signs  ED Triage Vitals   Temperature Pulse Respirations Blood Pressure SpO2   07/06/21 0925 07/06/21 0925 07/06/21 0925 07/06/21 0925 07/06/21 0925   98 6 °F (37 °C) (!) 110 20 132/79 93 %      Temp Source Heart Rate Source Patient Position - Orthostatic VS BP Location FiO2 (%)   07/06/21 0925 07/06/21 0930 07/06/21 0930 07/06/21 0930 --   Temporal Monitor Sitting Left arm       Pain Score       07/06/21 0930       Worst Possible Pain           Vitals:    07/06/21 1300 07/06/21 1315 07/06/21 1330 07/06/21 1400   BP: 154/69 150/72 151/76 154/89   Pulse: 103 105 (!) 110 (!) 108   Patient Position - Orthostatic VS: Sitting Sitting Sitting Sitting         Visual Acuity  Visual Acuity      Most Recent Value   L Pupil Size (mm)  2   R Pupil Size (mm)  2          ED Medications  Medications   sodium chloride 0 9 % bolus 1,000 mL (0 mL Intravenous Stopped 7/6/21 1113)   HYDROmorphone (DILAUDID) injection 1 mg (1 mg Intravenous Given 7/6/21 1020)       Diagnostic Studies  Results Reviewed     Procedure Component Value Units Date/Time    UA w Reflex to Microscopic w Reflex to Culture [[id number]] Collected: 07/06/21 1114    Lab Status: Final result Specimen: Urine, Clean Catch Updated: 07/06/21 1120     Color, UA Yellow     Clarity, UA Cloudy     Specific Gravity, UA 1 015     pH, UA 8 0     Leukocytes, UA Negative     Nitrite, UA Negative     Protein, UA Negative mg/dl      Glucose, UA Negative mg/dl      Ketones, UA Negative mg/dl      Urobilinogen, UA 1 0 E U /dl      Bilirubin, UA Negative     Blood, UA Negative    Novel Coronavirus [facility] [[id number]]  (Normal) Collected: 07/06/21 0942    Lab Status: Final result Specimen: Nares from Nasopharyngeal Swab Updated: 07/06/21 1042     SARS-CoV-2 Negative    Narrative: The specimen collection materials, transport medium, and/or testing methodology utilized in the production of these test results have been proven to be reliable in a limited validation with an abbreviated program under the Emergency Utilization Authorization provided by the FDA  Testing reported as "Presumptive positive" will be confirmed with secondary testing to ensure result accuracy  Clinical caution and judgement should be used with the interpretation of these results with consideration of the clinical impression and other laboratory testing  Testing reported as "Positive" or "Negative" has been proven to be accurate according to standard laboratory validation requirements  All testing is performed with control materials showing appropriate reactivity at standard intervals        NT-BNP PRO [[id number]]  (Abnormal) Collected: 07/06/21 0946    Lab Status: Final result Specimen: Blood from Arm, Right Updated: 07/06/21 1027     NT-proBNP 2,896 pg/mL     Protime-INR [[id number]]  (Abnormal) Collected: 07/06/21 0942    Lab Status: Final result Specimen: Blood from Arm, Left Updated: 07/06/21 1017     Protime 16 5 seconds      INR 1 36    APTT [[id number]]  (Abnormal) Collected: 07/06/21 0942    Lab Status: Final result Specimen: Blood from Arm, Left Updated: 07/06/21 1017     PTT 39 seconds Lactic acid [[id number]]  (Normal) Collected: 07/06/21 0942    Lab Status: Final result Specimen: Blood from Arm, Left Updated: 07/06/21 1010     LACTIC ACID 1 9 mmol/L     Narrative:      Result may be elevated if tourniquet was used during collection      Comprehensive metabolic panel [[id number]]  (Abnormal) Collected: 07/06/21 0942    Lab Status: Final result Specimen: Blood from Arm, Left Updated: 07/06/21 1007     Sodium 142 mmol/L      Potassium 3 0 mmol/L      Chloride 101 mmol/L      CO2 32 mmol/L      ANION GAP 9 mmol/L      BUN 11 mg/dL      Creatinine 0 44 mg/dL      Glucose 87 mg/dL      Calcium 8 6 mg/dL      Corrected Calcium 10 5 mg/dL      AST 21 U/L      ALT 37 U/L      Alkaline Phosphatase 249 U/L      Total Protein 6 7 g/dL      Albumin 1 6 g/dL      Total Bilirubin 0 36 mg/dL      eGFR 119 ml/min/1 73sq m     Narrative:      National Kidney Disease Foundation guidelines for Chronic Kidney Disease (CKD):     Stage 1 with normal or high GFR (GFR > 90 mL/min/1 73 square meters)    Stage 2 Mild CKD (GFR = 60-89 mL/min/1 73 square meters)    Stage 3A Moderate CKD (GFR = 45-59 mL/min/1 73 square meters)    Stage 3B Moderate CKD (GFR = 30-44 mL/min/1 73 square meters)    Stage 4 Severe CKD (GFR = 15-29 mL/min/1 73 square meters)    Stage 5 End Stage CKD (GFR <15 mL/min/1 73 square meters)  Note: GFR calculation is accurate only with a steady state creatinine    CBC and differential [[id number]]  (Abnormal) Collected: 07/06/21 0942    Lab Status: Final result Specimen: Blood from Arm, Left Updated: 07/06/21 0954     WBC 14 29 Thousand/uL      RBC 3 77 Million/uL      Hemoglobin 7 2 g/dL      Hematocrit 27 0 %      MCV 72 fL      MCH 19 1 pg      MCHC 26 7 g/dL      RDW 20 7 %      MPV 9 3 fL      Platelets 156 Thousands/uL      nRBC 0 /100 WBCs      Neutrophils Relative 88 %      Immat GRANS % 2 %      Lymphocytes Relative 3 %      Monocytes Relative 7 %      Eosinophils Relative 0 %      Basophils Relative 0 %      Neutrophils Absolute 12 69 Thousands/µL      Immature Grans Absolute 0 21 Thousand/uL      Lymphocytes Absolute 0 41 Thousands/µL      Monocytes Absolute 0 95 Thousand/µL      Eosinophils Absolute 0 01 Thousand/µL      Basophils Absolute 0 02 Thousands/µL     Procalcitonin with AM Reflex [[id number]] Collected: 07/06/21 0942    Lab Status: In process Specimen: Blood from Arm, Left Updated: 07/06/21 0951    Blood culture #2 [[id number]] Collected: 07/06/21 0942    Lab Status: In process Specimen: Blood from Arm, Left Updated: 07/06/21 0951    Blood culture #1 [[id number]] Collected: 07/06/21 0942    Lab Status: In process Specimen: Blood from Arm, Right Updated: 07/06/21 0951                 CT spine lumbar without contrast   Final Result by Francisco Law MD (07/06 1148)      Again noted is a fracture of the S1 vertebra with the depression of the superior endplate ,  this is likely pathological fracture  No central canal narrowing  A left subarticular zone and foraminal protrusion at L4-5 with mild abutment/impingement of the traversing left L5 nerve      Disc bulge at L5-S1 with no focal disc herniation seen      Bony metastatic disease in the L2 vertebra, L4 right transverse process       A nondisplaced fracture of the left transverse process of the L5 vertebra, new      There is incompletely visualized patchy opacity right lower lung this may be a new infective consolidation or drug-induced infiltrates  This can be evaluated with a CT or x-ray chest        Bilateral moderate size effusion as seen on the previous study      The study was marked in [facility] for immediate notification  Workstation performed: [id number]         XR chest 1 view portable   Final Result by Rebel Mercedes MD (07/06 1332)      Multifocal patchy airspace disease and reticulonodular opacification of the lung fields, worse on the right upper and midlung fields since the prior examination    Prior CT suggestive of multiple metastatic pulmonary nodules and lymphangitic spread of    disease  Workstation performed: [id number]         CT head without contrast   Final Result by Musa Dent MD (07/06 1046)      No acute intracranial abnormality  Workstation performed: [id number]                    Procedures  ECG 12 Lead Documentation Only    Date/Time: 7/6/2021 10:16 AM  Performed by: Colin Ann DO  Authorized by: Colin Ann DO     Indications / Diagnosis:  Sob, weakness  ECG reviewed by me, the ED Provider: yes    Patient location:  ED  Rate:     ECG rate:  108    ECG rate assessment: tachycardic    Rhythm:     Rhythm: sinus rhythm    Ectopy:     Ectopy: PVCs    QRS:     QRS axis:  Normal    QRS intervals:  Normal  Conduction:     Conduction: normal    ST segments:     ST segments:  Normal  T waves:     T waves: non-specific               ED Course          spoke with patient's oncologist, Dr Sarthak Gutierrez  Request patient be transferred to [place] for a medical admission and evaluation by Radiation Oncology for symptomatic treatment of metastatic bony metastasis  SBIRT 22yo+      Most Recent Value   SBIRT (24 yo +)   In order to provide better care to our patients, we are screening all of our patients for alcohol and drug use  Would it be okay to ask you these screening questions? Yes Filed at: 07/06/2021 4892   Initial Alcohol Screen: US AUDIT-C    1  How often do you have a drink containing alcohol?  0 Filed at: 07/06/2021 0939   2  How many drinks containing alcohol do you have on a typical day you are drinking? 0 Filed at: 07/06/2021 0939   3a  Male UNDER 65: How often do you have five or more drinks on one occasion? 0 Filed at: 07/06/2021 0939   Audit-C Score  0 Filed at: 07/06/2021 9390   VALERIY: How many times in the past year have you    Used an illegal drug or used a prescription medication for non-medical reasons?   Never Filed at: 07/06/2021 7045 MDM    Disposition  Final diagnoses:   Right leg weakness   Bony metastasis ([facility] [facility] [address] )   Ambulatory dysfunction   Renal cell carcinoma ([facility] [facility] [address] )     Time reflects when diagnosis was documented in both MDM as applicable and the Disposition within this note     Time User Action Codes Description Comment    7/6/2021  2:24 PM Albertine Gum T Add [R29 898] Right leg weakness     7/6/2021  2:24 PM Jun, Annabelle Haas T Add [C79 51] Bony metastasis ([facility] [facility] [address] )     7/6/2021  2:24 PM Albertine Gum T Add [R26 2] Ambulatory dysfunction     7/6/2021  2:24 PM Albertine Gum T Add [C64 9] Renal cell carcinoma [facility])       ED Disposition     ED Disposition Condition Date/Time Comment    Transfer to Another Facility-In Network  Tue Jul 6, 2021  2:24 PM Donal Lassiter should be transferred out to [address] [place] [address]  Follow-up Information    None         Patient's Medications   Discharge Prescriptions    No medications on file     No discharge procedures on file      PDMP Review       Value Time User    PDMP Reviewed  Yes 6/23/2021  1:53 PM Terry Pacheco MD          ED Provider  Electronically Signed by           Everardo Pool DO  07/06/21 3254

## 2021-07-06 NOTE — TELEPHONE ENCOUNTER
Transferring Call to Another Office   Who is Calling Dr Thierry Snow to speak with Dr Shoemaker Memory   Transferred to Dr China Milligan  transferred

## 2021-07-06 NOTE — ED NOTES
Call placed to [facility] for update on transport, stated at this time that are waiting for a transport time        Roel White RN  07/06/21 5782

## 2021-07-06 NOTE — ED NOTES
Patient resting comfortably offering no complaints at this time        Rosa Conleyr, RN  07/06/21 6919

## 2021-07-06 NOTE — TELEPHONE ENCOUNTER
Patient called and complained of right leg weakness and not being able to walk since getting his CT last week  Per Monika Court patient should go to ER, may need advanced imaging  Patient in agreement and thought that's where he should go

## 2021-07-06 NOTE — ED NOTES
Patient resting comfortably, offering no complaints at this time        Mayo Montana RN  07/06/21 6372

## 2021-07-07 ENCOUNTER — DOCUMENTATION (OUTPATIENT)
Dept: NEUROSURGERY | Facility: CLINIC | Age: 66
End: 2021-07-07

## 2021-07-07 ENCOUNTER — APPOINTMENT (INPATIENT)
Dept: RADIOLOGY | Facility: HOSPITAL | Age: 66
DRG: 542 | End: 2021-07-07
Attending: RADIOLOGY
Payer: MEDICARE

## 2021-07-07 ENCOUNTER — APPOINTMENT (INPATIENT)
Dept: RADIOLOGY | Facility: HOSPITAL | Age: 66
DRG: 542 | End: 2021-07-07
Payer: MEDICARE

## 2021-07-07 LAB
ABO GROUP BLD: NORMAL
ABO GROUP BLD: NORMAL
ANION GAP SERPL CALCULATED.3IONS-SCNC: 4 MMOL/L (ref 4–13)
ATRIAL RATE: 108 BPM
BLD GP AB SCN SERPL QL: NEGATIVE
BUN SERPL-MCNC: 12 MG/DL (ref 5–25)
CA-I BLD-SCNC: 1.14 MMOL/L (ref 1.12–1.32)
CALCIUM SERPL-MCNC: 8.8 MG/DL (ref 8.3–10.1)
CHLORIDE SERPL-SCNC: 105 MMOL/L (ref 100–108)
CO2 SERPL-SCNC: 31 MMOL/L (ref 21–32)
CREAT SERPL-MCNC: 0.24 MG/DL (ref 0.6–1.3)
ERYTHROCYTE [DISTWIDTH] IN BLOOD BY AUTOMATED COUNT: 20.7 % (ref 11.6–15.1)
GFR SERPL CREATININE-BSD FRML MDRD: 153 ML/MIN/1.73SQ M
GLUCOSE SERPL-MCNC: 79 MG/DL (ref 65–140)
HCT VFR BLD AUTO: 26.3 % (ref 36.5–49.3)
HCT VFR BLD AUTO: 30.7 % (ref 36.5–49.3)
HGB BLD-MCNC: 7 G/DL (ref 12–17)
HGB BLD-MCNC: 8.5 G/DL (ref 12–17)
MCH RBC QN AUTO: 19.2 PG (ref 26.8–34.3)
MCHC RBC AUTO-ENTMCNC: 26.6 G/DL (ref 31.4–37.4)
MCV RBC AUTO: 72 FL (ref 82–98)
P AXIS: 67 DEGREES
PLATELET # BLD AUTO: 675 THOUSANDS/UL (ref 149–390)
PMV BLD AUTO: 9.7 FL (ref 8.9–12.7)
POTASSIUM SERPL-SCNC: 3.4 MMOL/L (ref 3.5–5.3)
PR INTERVAL: 112 MS
PROCALCITONIN SERPL-MCNC: 0.73 NG/ML
PROCALCITONIN SERPL-MCNC: 0.98 NG/ML
PTH-INTACT SERPL-MCNC: 19.9 PG/ML (ref 18.4–80.1)
QRS AXIS: 73 DEGREES
QRSD INTERVAL: 72 MS
QT INTERVAL: 332 MS
QTC INTERVAL: 444 MS
RBC # BLD AUTO: 3.64 MILLION/UL (ref 3.88–5.62)
RH BLD: POSITIVE
RH BLD: POSITIVE
SODIUM SERPL-SCNC: 140 MMOL/L (ref 136–145)
SPECIMEN EXPIRATION DATE: NORMAL
T WAVE AXIS: 70 DEGREES
VENTRICULAR RATE: 108 BPM
WBC # BLD AUTO: 15.02 THOUSAND/UL (ref 4.31–10.16)

## 2021-07-07 PROCEDURE — 94002 VENT MGMT INPAT INIT DAY: CPT

## 2021-07-07 PROCEDURE — 94668 MNPJ CHEST WALL SBSQ: CPT

## 2021-07-07 PROCEDURE — 82330 ASSAY OF CALCIUM: CPT | Performed by: STUDENT IN AN ORGANIZED HEALTH CARE EDUCATION/TRAINING PROGRAM

## 2021-07-07 PROCEDURE — 77290 THER RAD SIMULAJ FIELD CPLX: CPT | Performed by: RADIOLOGY

## 2021-07-07 PROCEDURE — 94760 N-INVAS EAR/PLS OXIMETRY 1: CPT

## 2021-07-07 PROCEDURE — 94640 AIRWAY INHALATION TREATMENT: CPT

## 2021-07-07 PROCEDURE — 93010 ELECTROCARDIOGRAM REPORT: CPT | Performed by: INTERNAL MEDICINE

## 2021-07-07 PROCEDURE — 99232 SBSQ HOSP IP/OBS MODERATE 35: CPT | Performed by: INTERNAL MEDICINE

## 2021-07-07 PROCEDURE — 77334 RADIATION TREATMENT AID(S): CPT | Performed by: RADIOLOGY

## 2021-07-07 PROCEDURE — 86900 BLOOD TYPING SEROLOGIC ABO: CPT | Performed by: STUDENT IN AN ORGANIZED HEALTH CARE EDUCATION/TRAINING PROGRAM

## 2021-07-07 PROCEDURE — 83970 ASSAY OF PARATHORMONE: CPT | Performed by: STUDENT IN AN ORGANIZED HEALTH CARE EDUCATION/TRAINING PROGRAM

## 2021-07-07 PROCEDURE — 86850 RBC ANTIBODY SCREEN: CPT | Performed by: STUDENT IN AN ORGANIZED HEALTH CARE EDUCATION/TRAINING PROGRAM

## 2021-07-07 PROCEDURE — 77412 RADIATION TX DELIVERY LVL 3: CPT | Performed by: RADIOLOGY

## 2021-07-07 PROCEDURE — G1004 CDSM NDSC: HCPCS

## 2021-07-07 PROCEDURE — 99223 1ST HOSP IP/OBS HIGH 75: CPT | Performed by: NEUROLOGICAL SURGERY

## 2021-07-07 PROCEDURE — 77295 3-D RADIOTHERAPY PLAN: CPT | Performed by: RADIOLOGY

## 2021-07-07 PROCEDURE — 72148 MRI LUMBAR SPINE W/O DYE: CPT

## 2021-07-07 PROCEDURE — 92610 EVALUATE SWALLOWING FUNCTION: CPT

## 2021-07-07 PROCEDURE — 77300 RADIATION THERAPY DOSE PLAN: CPT | Performed by: RADIOLOGY

## 2021-07-07 PROCEDURE — 72128 CT CHEST SPINE W/O DYE: CPT

## 2021-07-07 PROCEDURE — 72146 MRI CHEST SPINE W/O DYE: CPT

## 2021-07-07 PROCEDURE — 77370 RADIATION PHYSICS CONSULT: CPT | Performed by: RADIOLOGY

## 2021-07-07 PROCEDURE — 80048 BASIC METABOLIC PNL TOTAL CA: CPT | Performed by: STUDENT IN AN ORGANIZED HEALTH CARE EDUCATION/TRAINING PROGRAM

## 2021-07-07 PROCEDURE — G6002 STEREOSCOPIC X-RAY GUIDANCE: HCPCS | Performed by: RADIOLOGY

## 2021-07-07 PROCEDURE — 82306 VITAMIN D 25 HYDROXY: CPT | Performed by: STUDENT IN AN ORGANIZED HEALTH CARE EDUCATION/TRAINING PROGRAM

## 2021-07-07 PROCEDURE — 77387 GUIDANCE FOR RADJ TX DLVR: CPT | Performed by: RADIOLOGY

## 2021-07-07 PROCEDURE — 85014 HEMATOCRIT: CPT | Performed by: STUDENT IN AN ORGANIZED HEALTH CARE EDUCATION/TRAINING PROGRAM

## 2021-07-07 PROCEDURE — 99222 1ST HOSP IP/OBS MODERATE 55: CPT | Performed by: NURSE PRACTITIONER

## 2021-07-07 PROCEDURE — 86920 COMPATIBILITY TEST SPIN: CPT

## 2021-07-07 PROCEDURE — 85027 COMPLETE CBC AUTOMATED: CPT | Performed by: STUDENT IN AN ORGANIZED HEALTH CARE EDUCATION/TRAINING PROGRAM

## 2021-07-07 PROCEDURE — 87040 BLOOD CULTURE FOR BACTERIA: CPT | Performed by: STUDENT IN AN ORGANIZED HEALTH CARE EDUCATION/TRAINING PROGRAM

## 2021-07-07 PROCEDURE — 86901 BLOOD TYPING SEROLOGIC RH(D): CPT | Performed by: STUDENT IN AN ORGANIZED HEALTH CARE EDUCATION/TRAINING PROGRAM

## 2021-07-07 PROCEDURE — 84145 PROCALCITONIN (PCT): CPT | Performed by: STUDENT IN AN ORGANIZED HEALTH CARE EDUCATION/TRAINING PROGRAM

## 2021-07-07 PROCEDURE — P9040 RBC LEUKOREDUCED IRRADIATED: HCPCS

## 2021-07-07 PROCEDURE — 85018 HEMOGLOBIN: CPT | Performed by: STUDENT IN AN ORGANIZED HEALTH CARE EDUCATION/TRAINING PROGRAM

## 2021-07-07 PROCEDURE — 77263 THER RADIOLOGY TX PLNG CPLX: CPT | Performed by: RADIOLOGY

## 2021-07-07 RX ORDER — LORAZEPAM 2 MG/ML
0.5 INJECTION INTRAMUSCULAR ONCE
Status: COMPLETED | OUTPATIENT
Start: 2021-07-07 | End: 2021-07-07

## 2021-07-07 RX ORDER — HEPARIN SODIUM 5000 [USP'U]/ML
5000 INJECTION, SOLUTION INTRAVENOUS; SUBCUTANEOUS EVERY 8 HOURS SCHEDULED
Status: DISCONTINUED | OUTPATIENT
Start: 2021-07-07 | End: 2021-07-11

## 2021-07-07 RX ORDER — SODIUM CHLORIDE 9 MG/ML
75 INJECTION, SOLUTION INTRAVENOUS CONTINUOUS
Status: DISCONTINUED | OUTPATIENT
Start: 2021-07-07 | End: 2021-07-07

## 2021-07-07 RX ORDER — POTASSIUM CHLORIDE 14.9 MG/ML
20 INJECTION INTRAVENOUS ONCE
Status: COMPLETED | OUTPATIENT
Start: 2021-07-07 | End: 2021-07-07

## 2021-07-07 RX ORDER — HYDROMORPHONE HCL/PF 1 MG/ML
0.5 SYRINGE (ML) INJECTION ONCE
Status: COMPLETED | OUTPATIENT
Start: 2021-07-07 | End: 2021-07-07

## 2021-07-07 RX ORDER — HYDROMORPHONE HCL/PF 1 MG/ML
0.5 SYRINGE (ML) INJECTION EVERY 4 HOURS PRN
Status: DISCONTINUED | OUTPATIENT
Start: 2021-07-07 | End: 2021-07-11

## 2021-07-07 RX ADMIN — HEPARIN SODIUM 5000 UNITS: 5000 INJECTION INTRAVENOUS; SUBCUTANEOUS at 16:25

## 2021-07-07 RX ADMIN — DEXAMETHASONE SODIUM PHOSPHATE 4 MG: 4 INJECTION INTRA-ARTICULAR; INTRALESIONAL; INTRAMUSCULAR; INTRAVENOUS; SOFT TISSUE at 05:19

## 2021-07-07 RX ADMIN — LEVALBUTEROL HYDROCHLORIDE 1.25 MG: 1.25 SOLUTION, CONCENTRATE RESPIRATORY (INHALATION) at 13:30

## 2021-07-07 RX ADMIN — ISODIUM CHLORIDE 3 ML: 0.03 SOLUTION RESPIRATORY (INHALATION) at 13:30

## 2021-07-07 RX ADMIN — DEXAMETHASONE SODIUM PHOSPHATE 4 MG: 4 INJECTION INTRA-ARTICULAR; INTRALESIONAL; INTRAMUSCULAR; INTRAVENOUS; SOFT TISSUE at 17:41

## 2021-07-07 RX ADMIN — HYDROMORPHONE HYDROCHLORIDE 0.5 MG: 1 INJECTION, SOLUTION INTRAMUSCULAR; INTRAVENOUS; SUBCUTANEOUS at 21:45

## 2021-07-07 RX ADMIN — DEXAMETHASONE SODIUM PHOSPHATE 4 MG: 4 INJECTION INTRA-ARTICULAR; INTRALESIONAL; INTRAMUSCULAR; INTRAVENOUS; SOFT TISSUE at 13:20

## 2021-07-07 RX ADMIN — LORAZEPAM 0.5 MG: 2 INJECTION INTRAMUSCULAR; INTRAVENOUS at 02:54

## 2021-07-07 RX ADMIN — AZITHROMYCIN MONOHYDRATE 500 MG: 500 INJECTION, POWDER, LYOPHILIZED, FOR SOLUTION INTRAVENOUS at 05:59

## 2021-07-07 RX ADMIN — CEFTRIAXONE SODIUM 1000 MG: 10 INJECTION, POWDER, FOR SOLUTION INTRAVENOUS at 05:09

## 2021-07-07 RX ADMIN — HEPARIN SODIUM 5000 UNITS: 5000 INJECTION INTRAVENOUS; SUBCUTANEOUS at 21:47

## 2021-07-07 RX ADMIN — HYDROMORPHONE HYDROCHLORIDE 0.5 MG: 1 INJECTION, SOLUTION INTRAMUSCULAR; INTRAVENOUS; SUBCUTANEOUS at 13:25

## 2021-07-07 RX ADMIN — TIOTROPIUM BROMIDE 18 MCG: 18 CAPSULE ORAL; RESPIRATORY (INHALATION) at 08:15

## 2021-07-07 RX ADMIN — LEVALBUTEROL HYDROCHLORIDE 1.25 MG: 1.25 SOLUTION, CONCENTRATE RESPIRATORY (INHALATION) at 07:14

## 2021-07-07 RX ADMIN — POTASSIUM CHLORIDE 20 MEQ: 14.9 INJECTION, SOLUTION INTRAVENOUS at 03:20

## 2021-07-07 RX ADMIN — POTASSIUM CHLORIDE 20 MEQ: 14.9 INJECTION, SOLUTION INTRAVENOUS at 07:16

## 2021-07-07 RX ADMIN — DEXAMETHASONE SODIUM PHOSPHATE 4 MG: 4 INJECTION INTRA-ARTICULAR; INTRALESIONAL; INTRAMUSCULAR; INTRAVENOUS; SOFT TISSUE at 00:57

## 2021-07-07 RX ADMIN — LEVALBUTEROL HYDROCHLORIDE 1.25 MG: 1.25 SOLUTION, CONCENTRATE RESPIRATORY (INHALATION) at 20:51

## 2021-07-07 RX ADMIN — ACETAMINOPHEN 975 MG: 325 TABLET, FILM COATED ORAL at 21:47

## 2021-07-07 RX ADMIN — FLUTICASONE FUROATE AND VILANTEROL TRIFENATATE 1 PUFF: 200; 25 POWDER RESPIRATORY (INHALATION) at 08:15

## 2021-07-07 RX ADMIN — HYDROMORPHONE HYDROCHLORIDE 0.5 MG: 1 INJECTION, SOLUTION INTRAMUSCULAR; INTRAVENOUS; SUBCUTANEOUS at 16:24

## 2021-07-07 RX ADMIN — HYDROMORPHONE HYDROCHLORIDE 0.5 MG: 1 INJECTION, SOLUTION INTRAMUSCULAR; INTRAVENOUS; SUBCUTANEOUS at 10:24

## 2021-07-07 RX ADMIN — ISODIUM CHLORIDE 3 ML: 0.03 SOLUTION RESPIRATORY (INHALATION) at 07:14

## 2021-07-07 RX ADMIN — OXYCODONE HYDROCHLORIDE 10 MG: 10 TABLET ORAL at 20:24

## 2021-07-07 RX ADMIN — ISODIUM CHLORIDE 3 ML: 0.03 SOLUTION RESPIRATORY (INHALATION) at 20:51

## 2021-07-07 NOTE — ASSESSMENT & PLAN NOTE
Sudden onset BLE paraplegia and subjective numbness yesterday with inability to ambulate  · Recently diagnosed with stage IV RCC, s/p chemotherapy  Known metastasis to liver, lung, and bone  · Also with known T9 pathologic fracture with retropulsion and kyphosis  · SIN score 14  · Exam: 2/5 LLE except 4-/5 left PF, 1/5 RLE, bilateral clonus, no JPS bilaterally  SILT bilaterally  Imaging:  · CT CAP w/contrast, 6/21/21: Severe compression deformity, sclerosis, and retropulsion at the T9 vertebral body has progressed compared to May  Nondisplaced fracture of the T8 spinous process is unchanged in alignment  Plan:  · STAT MRI thoracic and lumbar spine w/wo contrast  · STAT CT thoracic spine w/o contrast  · Frequent neuro checks  · Radiation oncology following, appreciate recommendations  · PT/OT evaluation  · Keep NPO at this time  · No additional labs needed at this time   · Patient is currently getting PRBC 2/2 hgb 7 0  · Medical management per primary team  · Pain control per primary team  · Recommend palliative care consult  · DVT ppx: SCDs, SQH d/c this morning for possible surgical intervention    Neurosurgery will continue to follow  Will review imaging immediately upon completion  At this time will likely recommend surgical intervention today if medical stable given pneumonia and increased work of breathing  Appreciate recommendations from primary team  Will decided today whether surgery vs emergent radiation is indicated  Please call with questions or concerns

## 2021-07-07 NOTE — ASSESSMENT & PLAN NOTE
· Likely due to malignancy, iron deficiency  · Iron studies last week with severely elevated ferritin but low saturations  · PRN transfusion  · Check type and screen

## 2021-07-07 NOTE — PROGRESS NOTES
Pastoral Care Progress Note    2021  Patient: Donal Lassiter : 1955  Admission Date & Time: 20213  MRN: [id number] CSN: [id number]                     Chaplaincy Interventions Utilized:   Empowerment: Provided chaplaincy education    Exploration: Explored spiritual needs & resources  Relationship Building: Listened empathically and Hospitality  Ritual: Provided prayer      21 1000   Clinical Encounter Type   Visited With Patient   Routine Visit Introduction     Spiritual Coping Strategies Utilized:   Spiritual practices     Bridgett Callejas for introduction to pastoral care and to explain about our [place]  He requested prayers for the world and for himself  Prayed with him

## 2021-07-07 NOTE — CONSULTS
Consultation - Palliative and Supportive Care   Marilyn Morrell 77 y o  male [id number]    Patient Active Problem List   Diagnosis    Shortness of breath    Anemia    Leucocytosis    Pleural effusion    Lung mass    Mediastinal lymphadenopathy    Tachycardia    Bone metastases (HCC)    Chronic obstructive pulmonary disease (HCC)    Renal cell carcinoma of left kidney (HCC)    HCAP (healthcare-associated pneumonia)    Thrombocytosis (HCC)    Elevated d-dimer    Acute on chronic respiratory failure with hypoxia (HCC)    Hypophosphatemia    Vitamin D deficiency    Premature atrial contractions    Severe protein-calorie malnutrition (HCC)    Ventricular ectopy    Diastolic dysfunction    Peripheral edema    Thrush    Hypercalcemia    Aspiration into airway     Active issues specifically addressed today include:   · Palliative care encounter  · Goals of care discussion  · Severe protein-calorie malnutrition     Plan:  1  Symptom management   Pain  - Oxycodone 5 mg -10 mg Q 4 hour prn mod/severe pain  - hydromorphone 0 5 mg Q 4hour sprn breakthrough pain    2  Goals  · Limits set at DNAR/DNI, code status changed  · Per patient, his brother Carla Segundo is his designated medical decision maker  States there is completed living will requested copy  -     Code Status: DNAR/DNI  - Level 3   Decisional apparatus:  Patient is competent on my exam today  If competence is lost, patient's substitute decision maker would default to patient brother Hermes Lane Directive / Living Will / POLST:  Copy requested     I have reviewed the patient's controlled substance dispensing history in the Prescription Drug Monitoring Program in compliance with the [facility] regulations before prescribing any controlled substances  We appreciate the invitation to be involved in this patient's care  We will continue to follow     Please do not hesitate to reach our on call provider through our clinic answering service at [phone] should you have acute symptom control concerns  IDENTIFICATION:  Inpatient consult to Palliative Care  Consult performed by: MARILOU Phillips  Consult ordered by: Byron Turner MD        Physician Requesting Consult: Antoni Luna MD  Reason for Consult / Principal Problem: goals of care/symptom management  Hx and PE limited by: no limitations    HISTORY OF PRESENT ILLNESS:       Batsheva Nurse is a 77 y o  male who presented to [facility] [address] with back pain and RLE weakness  The patient was transferred from [facility] on 7/6 for evaluation by radiation oncology  The patient was diagnosed with renal cell carcinoma in April 2021  at the time of diagnosis he had stage IV disease with osseous metastasis and lung metastasis  He  follows with Dr Reginaldo Bolton  After two cycles,  Immunotherapy was held in setting of pneumonitis  Introduced palliative care to the patient and the role in his care  When asked about his disease the patient describes recently losing weight and not being able to maintain the level of activity that he was able to do previously  He now uses a walker and has limited mobility  He is able to shower independently but is unable to go to the store  He states he has been losing weight  It is noted in chart review he has lost 15 pounds in the past three months  He states his appetite is good and he has been using protein shakes to supplement  His family and friends have been assisting in meal prep  When asked again about his understanding of his cancer, the patient talks about his activities of daily living and the changes that the disease process has brought  With direct questioning about understanding of his treatment plan, the patient is unclear about the extent of his disease and goals of treatment  The patient expresses that he thought the intent of treatment was curative  Offered support and information  Introduced [employer]  The patient states he has a completed living will with advanced directives  He is clear that in the event of cardiac arrest or respiratory failure, he would want to be allowed to die without aggressive measures such as CPR or intubation  Code status changed  Provided support, palliative care will continue to follow closely  Review of Systems   Constitutional: Positive for malaise/fatigue and weight loss  Negative for decreased appetite  Respiratory: Positive for shortness of breath  Musculoskeletal: Positive for back pain and muscle weakness  Neurological: Positive for weakness  All other systems reviewed and are negative  Past Medical History:   Diagnosis Date    Emphysema [facility] [address] ([facility] [address] )     Hypocalcemia 5/5/2021    Sepsis ([facility] [address] ) 5/3/2021    Transaminitis 5/3/2021     Past Surgical History:   Procedure Laterality Date    IR BIOPSY LIVER MASS  4/7/2021    IR BIOPSY LYMPH NODE  4/13/2021    IR THORACENTESIS  4/6/2021    TENDON REPAIR Right     forearm     Social History     Socioeconomic History    Marital status: Single     Spouse name: Not on file    Number of children: Not on file    Years of education: Not on file    Highest education level: Not on file   Occupational History    Not on file   Tobacco Use    Smoking status: Former Smoker    Smokeless tobacco: Never Used    Tobacco comment: quit feb 2021   Vaping Use    Vaping Use: Never used   Substance and Sexual Activity    Alcohol use: Never    Drug use: Never    Sexual activity: Not on file   Other Topics Concern    Not on file   Social History Narrative    Not on file     Social Determinants of Health     Financial Resource Strain:     Difficulty of Paying Living Expenses:    Food Insecurity:     Worried About Running Out of Food in the Last Year:     [address] in the Last Year:    Transportation Needs:     Lack of Transportation (Medical):      Lack of Transportation (Non-Medical): Physical Activity:     Days of Exercise per Week:     Minutes of Exercise per Session:    Stress:     Feeling of Stress :    Social Connections:     Frequency of Communication with Friends and Family:     Frequency of Social Gatherings with Friends and Family:     Attends [employer] Services:     Active Member of Clubs or Organizations:     Attends Club or Organization Meetings:     Marital Status:    Intimate Partner Violence:     Fear of Current or Ex-Partner:     Emotionally Abused:     Physically Abused:     Sexually Abused:      Family History   Problem Relation Age of Onset    Heart disease Father     Multiple sclerosis Sister     Heart attack Brother        MEDICATIONS / ALLERGIES:    current meds:   Current Facility-Administered Medications   Medication Dose Route Frequency    acetaminophen (TYLENOL) tablet 975 mg  975 mg Oral Q8H [address]    albuterol inhalation solution 2 5 mg  2 5 mg Nebulization Q6H PRN    cefTRIAXone (ROCEPHIN) 1,000 mg in dextrose 5 % 50 mL IVPB  1,000 mg Intravenous Q24H    And    azithromycin (ZITHROMAX) 500 mg in sodium chloride 0 9 % 250 mL IVPB  500 mg Intravenous Q24H    calcium carbonate (TUMS) chewable tablet 1,000 mg  1,000 mg Oral Daily PRN    dexamethasone (DECADRON) injection 4 mg  4 mg Intravenous Q6H [address]    docusate sodium (COLACE) capsule 100 mg  100 mg Oral BID    dronabinol (MARINOL) capsule 5 mg  5 mg Oral BID AC    fluticasone-vilanterol (BREO ELLIPTA) 200-25 MCG/INH inhaler 1 puff  1 puff Inhalation Daily    HYDROmorphone (DILAUDID) injection 0 5 mg  0 5 mg Intravenous Q4H PRN    levalbuterol (XOPENEX) inhalation solution 1 25 mg  1 25 mg Nebulization TID    ondansetron (ZOFRAN) injection 4 mg  4 mg Intravenous Q6H PRN    oxyCODONE (ROXICODONE) immediate release tablet 10 mg  10 mg Oral Q4H PRN    oxyCODONE (ROXICODONE) IR tablet 5 mg  5 mg Oral Q4H PRN    pantoprazole (PROTONIX) EC tablet 40 mg  40 mg Oral Daily    potassium chloride (K-DUR,KLOR-CON) CR tablet 40 mEq  40 mEq Oral Once    senna (SENOKOT) tablet 17 2 mg  2 tablet Oral Daily    sodium chloride 0 9 % inhalation solution 3 mL  3 mL Nebulization TID    tiotropium (SPIRIVA) capsule for inhaler 18 mcg  18 mcg Inhalation Daily       No Known Allergies    OBJECTIVE:    Physical Exam  Physical Exam  Vitals and nursing note reviewed  Constitutional:       General: He is awake  Appearance: He is underweight  He is ill-appearing  Interventions: Nasal cannula in place  HENT:      Head: Normocephalic and atraumatic  Pulmonary:      Breath sounds: Decreased air movement present  Skin:     General: Skin is warm and dry  Neurological:      General: No focal deficit present  Mental Status: He is alert and oriented to person, place, and time  GCS: GCS eye subscore is 4  GCS verbal subscore is 5  GCS motor subscore is 6  Psychiatric:         Attention and Perception: Attention normal          Mood and Affect: Mood normal          Speech: Speech normal          Behavior: Behavior is cooperative  Cognition and Memory: Cognition normal          Lab Results:   CBC:   Lab Results   Component Value Date    WBC 15 02 (H) 07/07/2021    HGB 8 5 (L) 07/07/2021    HCT 30 7 (L) 07/07/2021    MCV 72 (L) 07/07/2021     (H) 07/07/2021    MCH 19 2 (L) 07/07/2021    MCHC 26 6 (L) 07/07/2021    RDW 20 7 (H) 07/07/2021    MPV 9 7 07/07/2021   , CMP:   Lab Results   Component Value Date    SODIUM 140 07/07/2021    K 3 4 (L) 07/07/2021     07/07/2021    CO2 31 07/07/2021    BUN 12 07/07/2021    CREATININE 0 24 (L) 07/07/2021    CALCIUM 8 8 07/07/2021    EGFR 153 07/07/2021   , PT/PTT:No results found for: PT, PTT  *    Counseling / Coordination of Care    Total floor / unit time spent today 35+  minutes  Greater than 50% of total time was spent with the patient and / or family counseling and / or coordination of care   A description of the counseling / coordination of care: goals of care, support, review of chart, supportive listening offered information

## 2021-07-07 NOTE — ASSESSMENT & PLAN NOTE
· Transferred from [facility] ED due to worsening weakness of RLE and back pain    · Suspect pathologic fractures leading to radiculopathy  · Start IV decadron 4mg q6h  · Consult radiation oncology for possible radiation treatment

## 2021-07-07 NOTE — ASSESSMENT & PLAN NOTE
Likely secondary to worsening pneumonia on imaging  Patient on 15L [address] [facility] overnight  Currently SpO2 93% on RA    Continue abx per primary team - on rocephin and zithromax  BC pending  WBC 15 02  Procalcitonin 0 73

## 2021-07-07 NOTE — PROGRESS NOTES
Patient was presented at the Neuro Oncology Case Review this morning  Dr Estrella Lyman requested for case to be presented  Dr Jose Calvillo recommended MRI and possible surgery asap  Neurosurgery inpatient team was made aware by Dr Aureliano Montero  Neurosurgery inpatient team saw the patient today  The final treatment plan will be left at the discretion of the patient and the treating physician  DISCLAIMERS:  TO THE TREATING PHYSICIAN:  This conference is a meeting of clinicians from various specialty areas who evaluate and discuss patients for whom a multidisciplinary treatment approach is being considered  Please note that the above opinion was a consensus of the conference attendees and is intended only to assist in quality care of the discussed patient  The responsibility for follow up on the input given during the conference, along with any final decisions regarding plan of care, is that of the patient and the patient's provider  Accordingly, appointments have only been recommended based on this information; and have NOT been scheduled unless otherwise noted  TO THE PATIENT:  This summary is a brief record of major aspects of your cancer treatment  You may choose to can share a your copy with any of your doctors or nurses  However, this is not a detailed or comprehensive record of your care

## 2021-07-07 NOTE — ASSESSMENT & PLAN NOTE
Malnutrition Findings:           BMI Findings: There is no height or weight on file to calculate BMI     Continue marinol for appetite stimulation

## 2021-07-07 NOTE — MALNUTRITION/BMI
This medical record reflects one or more clinical indicators suggestive of malnutrition  Malnutrition Findings:   Adult Malnutrition type: Chronic illness (related to increased demands from catabolic illness resulting in unplanned wt loss)  Adult Degree of Malnutrition: Other severe protein calorie malnutrition  Malnutrition Characteristics: Fat loss, Muscle loss (moderate/severe loss of subcutaneous fat at orbitals and cheek region; moderate/severe muscle mass loss at clavicles/ temporals; treated with PO diet and PO nutrition supplements)         See Nutrition note dated 7/7/21 for additional details  Completed nutrition assessment is viewable in the nutrition documentation

## 2021-07-07 NOTE — H&P
[address] [facility]  H&P- Lucia Fabry 1955, 77 y o  male MRN: [id number]  Unit/Bed#: [facility] 933-01 Encounter: [id number]  Primary Care Provider: Josseline Rodgers MD   Date and time admitted to hospital: 7/6/2021 10:23 PM    * Bone metastases [facility])  Assessment & Plan  · Transferred from [facility] ED due to worsening weakness of RLE and back pain  · Suspect pathologic fractures leading to radiculopathy  · Start IV decadron 4mg q6h  · Consult radiation oncology for possible radiation treatment    Aspiration into airway  Assessment & Plan  · Pt aspirated thin liquids after arrival to the hospital   · NPO for now pending swallow evaluation    · Supplemental oxygen  · Suctioning PRN  · PRN albuterol  · IV decadron as above    Hypercalcemia  Assessment & Plan  · Likely due to bone metastasis  · Stop oral calcium and supplementation  · Check ionized calcium, PTH, vitamin D panel  · Avoid IVF due to volume overload as evidenced by pleural effusions and ascites on CT scan for now  · May need calcitonin, pamidronate    Acute on chronic respiratory failure with hypoxia (HCC)  Assessment & Plan  · Due to pleural effusions, aspiration, possible chemo induced pneumonitis  · Continue albuterol, advair, PRN oxygen  · May need thoracentesis for pleural effusions  · Hold PO prednisone, on IV decadron for now    Thrombocytosis (HCC)  Assessment & Plan  · Likely reactive from malingnacy  · Monitor CBC    Pleural effusion  Assessment & Plan  · Possibly malignancy vs due to hypoalbuminemia  · Consider consultation with IR for thorcentesis     Anemia  Assessment & Plan  · Likely due to malignancy, iron deficiency  · Iron studies last week with severely elevated ferritin but low saturations  · PRN transfusion  · Check type and screen    Renal cell carcinoma of left kidney [facility])  Assessment & Plan  · Follows with Dr [facility] as an outpatient  · Last chemo treatment was early june    Leucocytosis  Assessment & Plan  · Likely due to steroids  · No evidence of active infection at this time  · monitor CBC, temps    Chronic obstructive pulmonary disease (HCC)  Assessment & Plan  · PRN albuterol  · Continue advair and spiriva    Severe protein-calorie malnutrition (HCC)  Assessment & Plan  Malnutrition Findings:           BMI Findings: There is no height or weight on file to calculate BMI  Continue marinol for appetite stimulation      VTE Pharmacologic Prophylaxis:   High Risk (Score >/= 5) - Pharmacological DVT Prophylaxis Ordered: heparin  Sequential Compression Devices Ordered  Code Status: Level 1 - Full Code   Discussion with family: Updated  (significant other) via phone  Anticipated Length of Stay: Patient will be admitted on an inpatient basis with an anticipated length of stay of greater than 2 midnights secondary to leg weakness  Total Time for Visit, including Counseling / Coordination of Care: 45 minutes Greater than 50% of this total time spent on direct patient counseling and coordination of care  Chief Complaint: weakness    History of Present Illness:  Krzysztof Rutledge is a 77 y o  male with a PMH of renal cancer who presents with back pain and weakness  He presented to the ED with back pain and left leg weakness  He was evaluated and found to have bone metastases and referred to [facility] for radiation therapy  He arrived and had an aspiration event and is now short of breath and coughing  Review of Systems:  Review of Systems   All other systems reviewed and are negative        Past Medical and Surgical History:   Past Medical History:   Diagnosis Date    Emphysema of lung ([address] )     Hypocalcemia 5/5/2021    Sepsis ([address] ) 5/3/2021    Transaminitis 5/3/2021       Past Surgical History:   Procedure Laterality Date    IR BIOPSY LIVER MASS  4/7/2021    IR BIOPSY LYMPH NODE  4/13/2021    IR THORACENTESIS  4/6/2021    TENDON REPAIR Right     forearm       Meds/Allergies:  Prior to Admission medications    Medication Sig Start Date End Date Taking? Authorizing Provider   albuterol (2 5 mg/3 mL) 0 083 % nebulizer solution Take 1 vial (2 5 mg total) by nebulization every 6 (six) hours as needed for wheezing or shortness of breath 6/8/21   Froilan Parada PA-C   albuterol (Ventolin HFA) 90 mcg/act inhaler Inhale 2 puffs every 6 (six) hours as needed for wheezing 6/8/21   Diane Hernandez MD   benzonatate (TESSALON PERLES) 100 mg capsule Take 1 capsule (100 mg total) by mouth 3 (three) times a day as needed for cough  Patient not taking: Reported on 6/23/2021 4/7/21   Matty Patterson DO   calcium carbonate (OYSTER SHELL,OSCAL) 500 mg Take 1 tablet by mouth 2 (two) times a day with meals 5/11/21   Millie Harrison MD   dronabinol (MARINOL) 5 MG capsule Take 1 capsule (5 mg total) by mouth 2 (two) times a day before meals 6/23/21   Diane Hernandez MD   ergocalciferol (VITAMIN D2) 50,000 units Take 1 capsule (50,000 Units total) by mouth once a week 5/12/21   Millie Harrison MD   ferrous sulfate 324 (65 Fe) mg Take 1 tablet (324 mg total) by mouth every other day 4/7/21 6/8/21  Matty Patterson DO   fluticasone-salmeterol (ADVAIR, [place]) 250-50 mcg/dose inhaler Inhale 1 puff 2 (two) times a day Rinse mouth after use   6/1/21   Diane Hernandez MD   furosemide (LASIX) 20 mg tablet Take 1 tablet (20 mg total) by mouth 3 (three) times a week If needed for leg swelling 6/23/21   Diane Hernandez MD   oxyCODONE-acetaminophen (PERCOCET) 5-325 mg per tablet Take 1 tablet by mouth every 4 (four) hours as needed for moderate painMax Daily Amount: 6 tablets 6/21/21   Diane Hernandez MD   pantoprazole (PROTONIX) 40 mg tablet Take 1 tablet (40 mg total) by mouth daily 5/18/21   Diane Hernandez MD   predniSONE 10 mg tablet Take 4 tablets (40 mg total) by mouth daily 6/8/21   Amelie Parada PA-C   predniSONE 10 mg tablet Take 4 tablets (40 mg total) by mouth daily 6/9/21   Akhil Herrera, CRNP   tiotropium (SPIRIVA RESPIMAT) 2 5 MCG/ACT AERS inhaler Inhale 2 puffs daily 6/8/21   Froilan Parada PA-C     I have reviewed home medications using recent Epic encounter  Allergies: No Known Allergies    Social History:  Marital Status: Single   Occupation: other  Patient Pre-hospital Living Situation: Home  Patient Pre-hospital Level of Mobility: walks  Patient Pre-hospital Diet Restrictions: none  Substance Use History:   Social History     Substance and Sexual Activity   Alcohol Use Never     Social History     Tobacco Use   Smoking Status Former Smoker   Smokeless Tobacco Never Used   Tobacco Comment    quit feb 2021     Social History     Substance and Sexual Activity   Drug Use Never       Family History:  Family History   Problem Relation Age of Onset    Heart disease Father     Multiple sclerosis Sister     Heart attack Brother        Physical Exam:     Vitals:   Blood Pressure: 161/96 (07/06/21 2228)  Pulse: (!) 108 (07/06/21 2228)  Temperature: 98 2 °F (36 8 °C) (07/06/21 2228)  Respirations: 21 (07/06/21 2228)  SpO2: (!) 86 % (07/06/21 2314)    Physical Exam  Constitutional:       Appearance: He is ill-appearing  Comments: cachectic   HENT:      Head: Normocephalic and atraumatic  Nose: Nose normal       Mouth/Throat:      Mouth: Mucous membranes are moist       Pharynx: Oropharynx is clear  Eyes:      Extraocular Movements: Extraocular movements intact  Pupils: Pupils are equal, round, and reactive to light  Cardiovascular:      Rate and Rhythm: Tachycardia present  Pulmonary:      Effort: Pulmonary effort is normal       Breath sounds: Wheezing present  Abdominal:      General: Abdomen is flat  There is distension  Palpations: Abdomen is soft  Tenderness: There is no abdominal tenderness  There is no guarding  Musculoskeletal:      Right lower leg: No edema  Left lower leg: No edema     Neurological:      Mental Status: He is alert and oriented to person, place, and time  Comments: Right foot drop   Psychiatric:         Mood and Affect: Mood normal          Behavior: Behavior normal         Additional Data:     Lab Results:  Results from last 7 days   Lab Units 07/06/21  0942   WBC Thousand/uL 14 29*   HEMOGLOBIN g/dL 7 2*   HEMATOCRIT % 27 0*   PLATELETS Thousands/uL 724*   NEUTROS PCT % 88*   LYMPHS PCT % 3*   MONOS PCT % 7   EOS PCT % 0     Results from last 7 days   Lab Units 07/06/21  0942   SODIUM mmol/L 142   POTASSIUM mmol/L 3 0*   CHLORIDE mmol/L 101   CO2 mmol/L 32   BUN mg/dL 11   CREATININE mg/dL 0 44*   ANION GAP mmol/L 9   CALCIUM mg/dL 8 6   ALBUMIN g/dL 1 6*   TOTAL BILIRUBIN mg/dL 0 36   ALK PHOS U/L 249*   ALT U/L 37   AST U/L 21   GLUCOSE RANDOM mg/dL 87     Results from last 7 days   Lab Units 07/06/21  0942   INR  1 36*             Results from last 7 days   Lab Units 07/06/21  0942   LACTIC ACID mmol/L 1 9   PROCALCITONIN ng/ml 0 32*       Imaging: Reviewed radiology reports from this admission including: MRI spine  XR chest portable    (Results Pending)       EKG and Other Studies Reviewed on Admission:   · EKG: NSR    ** Please Note: This note has been constructed using a voice recognition system   **

## 2021-07-07 NOTE — RESPIRATORY THERAPY NOTE
RT Protocol Note  Akil Nieves 77 y o  male MRN: [id number]  [facility] Encounter: [id number]    Assessment    Principal Problem:    Bone metastases ([place] [address] )  Active Problems:    Anemia    Leucocytosis    Pleural effusion    Chronic obstructive pulmonary disease ([facility] )    Renal cell carcinoma of left kidney (HCC)    Thrombocytosis (HCC)    Acute on chronic respiratory failure with hypoxia (HCC)    Severe protein-calorie malnutrition (HCC)    Hypercalcemia    Aspiration into airway      Home Pulmonary Medications:  YES  Home Devices/Therapy: Home O2    Past Medical History:   Diagnosis Date    Emphysema of lung ([place] [address] )     Hypocalcemia 5/5/2021    Sepsis ([place] [address] ) 5/3/2021    Transaminitis 5/3/2021     Social History     Socioeconomic History    Marital status: Single     Spouse name: Not on file    Number of children: Not on file    Years of education: Not on file    Highest education level: Not on file   Occupational History    Not on file   Tobacco Use    Smoking status: Former Smoker    Smokeless tobacco: Never Used    Tobacco comment: quit feb 2021   Vaping Use    Vaping Use: Never used   Substance and Sexual Activity    Alcohol use: Never    Drug use: Never    Sexual activity: Not on file   Other Topics Concern    Not on file   Social History Narrative    Not on file     Social Determinants of Health     Financial Resource Strain:     Difficulty of Paying Living Expenses:    Food Insecurity:     Worried About Running Out of Food in the Last Year:     [address] in the Last Year:    Transportation Needs:     Lack of Transportation (Medical):      Lack of Transportation (Non-Medical):    Physical Activity:     Days of Exercise per Week:     Minutes of Exercise per Session:    Stress:     Feeling of Stress :    Social Connections:     Frequency of Communication with Friends and Family:     Frequency of Social Gatherings with Friends and Family:     Attends Buddhism Services:  Active Member of Clubs or Organizations:     Attends Club or Organization Meetings:     Marital Status:    Intimate Partner Violence:     Fear of Current or Ex-Partner:     Emotionally Abused:     Physically Abused:     Sexually Abused:        Subjective         Objective    Physical Exam:   Assessment Type: During-treatment  General Appearance: Alert, Awake  Respiratory Pattern: Symmetrical, Spontaneous  Chest Assessment: Chest expansion symmetrical  Bilateral Breath Sounds: Rhonchi (possible aspiration)    Vitals:  Blood pressure 161/96, pulse (!) 108, temperature 98 2 °F (36 8 °C), resp  rate 21, SpO2 (!) 86 %  Imaging and other studies: I have personally reviewed pertinent reports  Plan    Respiratory Plan: Home Bronchodilator Patient pathway  Airway Clearance Plan: Flutter, Incentive Spirometer     Resp Comments: (P) Called to room  80% on 6 l/m possible aspiration  RN's were suctioning  Started prn albuterol, flutter IS  Assessment of respiratory and Airway protocol  Pt takes respiratory tx at home and is on 1 l/m and ususally  spo2 90-92 on that liter flow    Will start Respiratory tx TID and prn   flutter TID IS

## 2021-07-07 NOTE — CONSULTS
Consultation - Radiation Oncology   Antony Colbert 77 y o  male MRN: [id number]  Unit/Bed#: [facility] 933-01 Encounter: [id number]        History of Present Illness   Physician Requesting Consult: Milla Santacruz MD  Reason for Consult / Principal Problem: Lower extremity weakness  Hx and PE limited by: None  HPI: Donal Lassiter is a 77y o  year old male who presents with Recently diagnosed widely metastatic renal cell carcinoma  The patient had been diagnosed initially approximately 3 months ago  He received a few cycles of immunotherapy but this was discontinued due to suspected pneumonitis  He was currently on prednisone as an outpatient and the dose had been in the process of being tapered down when he presented to the emergency room at my nurse with worsening shortness of breath as well as back pain and worsening right lower extremity weakness  A recent CT performed as an outpatient on 6/21/21 revealed moderate bilateral pleural infusions, increased from prior,  But decreased compression of the central airways by lymphadenopathy with decreasing postobstructive consolidation  Extensive osseous metastases were noted,  most concerning being a severe compression deformity at T9 with retropulsion of the vertebral body which have progressed compared to prior imaging  A CT of the lumbar spine was performed upon arrival to the emergency room again revealing extensive osseous disease but no central canal narrowing identified  The patient  Was transferred from my nurse to [facility] for consideration of palliative spine radiation  He is currently on 4 mg of dexamethasone IV q 6  He reports mid back pain radiating up and down the spine    He reports significant progression overnight of his lower extremity weakness, with essentially flaccid paralysis of the right lower extremity and now significant weakness of the left lower extremity to the point where he is unable to move the left leg under his own power  He denies any recent change in bowel or bladder habits  He denies any numbness of the lower extremities  Inpatient consult to Radiation Oncology  Consult performed by: Jose Roberto Pascal MD  Consult ordered by: Dillon Feldman MD          Review of Systems   Constitutional: Positive for fatigue and unexpected weight change  HENT: Negative  Eyes: Negative  Respiratory: Positive for shortness of breath  Cardiovascular: Negative  Gastrointestinal: Negative  Endocrine: Negative  Genitourinary: Negative  Musculoskeletal: Positive for back pain  Skin: Negative  Allergic/Immunologic: Negative  Neurological: Positive for weakness  Hematological: Negative  Psychiatric/Behavioral: Negative             Historical Information   Previous Oncology History: As per above HPI  Past Medical History:   Diagnosis Date    Emphysema [facility] lung ([facility] )     Hypocalcemia 5/5/2021    Sepsis ([facility] ) 5/3/2021    Transaminitis 5/3/2021     Past Surgical History:   Procedure Laterality Date    IR BIOPSY LIVER MASS  4/7/2021    IR BIOPSY LYMPH NODE  4/13/2021    IR THORACENTESIS  4/6/2021    TENDON REPAIR Right     forearm       Family History   Problem Relation Age of Onset    Heart disease Father     Multiple sclerosis Sister     Heart attack Brother      Social History   Social History     Substance and Sexual Activity   Alcohol Use Never     Social History     Substance and Sexual Activity   Drug Use Never     Social History     Tobacco Use   Smoking Status Former Smoker   Smokeless Tobacco Never Used   Tobacco Comment    quit feb 2021       Meds/Allergies   current meds:   Current Facility-Administered Medications   Medication Dose Route Frequency    acetaminophen (TYLENOL) tablet 975 mg  975 mg Oral Q8H [address]    albuterol inhalation solution 2 5 mg  2 5 mg Nebulization Q6H PRN    cefTRIAXone (ROCEPHIN) 1,000 mg in dextrose 5 % 50 mL IVPB  1,000 mg Intravenous Q24H    And    azithromycin (ZITHROMAX) 500 mg in sodium chloride 0 9 % 250 mL IVPB  500 mg Intravenous Q24H    calcium carbonate (TUMS) chewable tablet 1,000 mg  1,000 mg Oral Daily PRN    dexamethasone (DECADRON) injection 4 mg  4 mg Intravenous Q6H [address]    docusate sodium (COLACE) capsule 100 mg  100 mg Oral BID    dronabinol (MARINOL) capsule 5 mg  5 mg Oral BID AC    fluticasone-vilanterol (BREO ELLIPTA) 200-25 MCG/INH inhaler 1 puff  1 puff Inhalation Daily    furosemide (LASIX) tablet 20 mg  20 mg Oral Once per day on Mon Wed Fri    heparin (porcine) subcutaneous injection 5,000 Units  5,000 Units Subcutaneous Q8H [address]    levalbuterol (XOPENEX) inhalation solution 1 25 mg  1 25 mg Nebulization TID    ondansetron (ZOFRAN) injection 4 mg  4 mg Intravenous Q6H PRN    oxyCODONE (ROXICODONE) immediate release tablet 10 mg  10 mg Oral Q4H PRN    oxyCODONE (ROXICODONE) IR tablet 5 mg  5 mg Oral Q4H PRN    pantoprazole (PROTONIX) EC tablet 40 mg  40 mg Oral Daily    potassium chloride (K-DUR,KLOR-CON) CR tablet 40 mEq  40 mEq Oral Once    potassium chloride 20 mEq IVPB (premix)  20 mEq Intravenous Once    senna (SENOKOT) tablet 17 2 mg  2 tablet Oral Daily    sodium chloride 0 9 % inhalation solution 3 mL  3 mL Nebulization TID    tiotropium (SPIRIVA) capsule for inhaler 18 mcg  18 mcg Inhalation Daily       No Known Allergies    Objective     Intake/Output Summary (Last 24 hours) at 7/7/2021 0743  Last data filed at 7/7/2021 0450  Gross per 24 hour   Intake --   Output 300 ml   Net -300 ml     Invasive Devices     Peripheral Intravenous Line            Peripheral IV 07/07/21 Dorsal (posterior); Right Forearm <1 day    Peripheral IV 07/07/21 Left;Ventral (anterior) Forearm <1 day              Physical Exam   The patient is seen today at the bedside, on BiPAP secondary to worsening shortness of breath  He is quite cachectic appearing  Abdomen is soft nontender  No swelling of the lower extremities    Answers all questions appropriately  Strength of the upper extremities grossly within normal limits  He is unable to move the right lower extremity whatsoever  He is able to wiggle his left toes but otherwise unable to move the left lower extremity  Distal sensation grossly intact  Lab Results: I have personally reviewed pertinent reports  Imaging Studies: I have personally reviewed pertinent films in PACS  EKG, Pathology, and Other Studies: I have personally reviewed pertinent reports  Assessment/Plan     Assessment and Plan:  Mr Mani Pelayo is a 29-year-old male with recently diagnosed widely metastatic renal carcinoma  He had been on immunotherapy for 2 cycles before this was recently discontinued secondary to presumed pneumonitis  He had been on prednisone as an outpatient and this was in the process of being tapered as his breathing seemed to be improving  However, he now presents with worsening shortness of breath as well as fairly rapid progressive weakness of the bilateral lower extremities, initially the right and now the left as well  Based on his prior CTs, the area of greatest concern would be a pathologic fracture at T9 with significant retropulsion into the canal   There are no other obvious sources of cord compression on the CTs  We have recommended a stat MRI of the thoracic and lumbar spine without contrast   We have also recommended consultation with neurosurgery, although his current respiratory issues may preclude surgical intervention  In addition to the MRI, we will also move forward with CT planning for emergent radiation should surgery not be an option  He should remain on dexamethasone 4 mg IV q 6  Treatment will be delivered over the course of 10 days, 3000 cGy  We would also recommend palliative care consultation during this hospitalization      Code Status: Level 1 - Full Code

## 2021-07-07 NOTE — ASSESSMENT & PLAN NOTE
· Multifactorial  · Due to pleural effusions, aspiration, malignancy  · Continue albuterol, advair, PRN oxygen  · IV decadron

## 2021-07-07 NOTE — SPEECH THERAPY NOTE
Speech-Language Pathology Bedside Swallow Evaluation      Patient Name: Brielle Sultana    SZHGM'D Date: 7/7/2021     Problem List  Principal Problem:    Bone metastases ([facility] )  Active Problems:    Anemia    Leucocytosis    Pleural effusion    Chronic obstructive pulmonary disease ([address] )    Renal cell carcinoma of left kidney (HCC)    Thrombocytosis (HCC)    Acute on chronic respiratory failure with hypoxia (HCC)    Severe protein-calorie malnutrition (HCC)    Hypercalcemia    Aspiration into airway      Past Medical History  Past Medical History:   Diagnosis Date    Emphysema of lung ([facility] )     Hypocalcemia 5/5/2021    Sepsis ([facility] ) 5/3/2021    Transaminitis 5/3/2021       Past Surgical History  Past Surgical History:   Procedure Laterality Date    IR BIOPSY LIVER MASS  4/7/2021    IR BIOPSY LYMPH NODE  4/13/2021    IR THORACENTESIS  4/6/2021    TENDON REPAIR Right     forearm       Summary   Pt presented with functional appearing oral and pharyngeal stage swallowing skills with materials administered today  However, RN reports significant coughing episode last evening when drinking thin liquids  The patient also reports weight loss x2 months and is fatigued  He is assessed with puree and soft solids, with all liquids and tolerates well with no s/s aspiration  Risk/s for Aspiration: moderate due to lethargy and weakness      Recommended Diet: mechanically altered/level 2 diet and nectar thick liquids   Recommended Form of Meds: whole with puree   Aspiration precautions and swallowing strategies: upright posture, only feed when fully alert and small bites/sips  Other Recommendations: Continue frequent oral care; may consider VBS    Current Medical Status  Brielle Sultana is a 77 y o  male with a PMH of renal cancer who presents with back pain and weakness  He presented to the ED with back pain and left leg weakness  He was evaluated and found to have bone metastases and referred to [facility] for radiation therapy  He arrived and had an aspiration event and is now short of breath and coughing  Current Precautions:  Fall  Aspiration    Allergies:  No known food allergies  Past medical history:  Please see H&P for details    Special Studies:  Chest xray 7/6/2021: Findings suspicious for worsening pneumonia, most pronounced involving the right upper lobe of the lung  This is superimposed on a background of metastatic disease  The small left pleural effusion appears unchanged  Swallow Information   Current Risks for Dysphagia & Aspiration: lethargy and overall deconditioning   Current Symptoms/Concerns: none observed, but suspected aspiration event last evening   Current Diet: NPO   Baseline Diet: regular diet and thin liquids    Baseline Assessment   Behavior/Cognition: alert  Speech/Language Status: able to participate in conversation and able to follow commands  Patient Positioning: upright in bed  Pain Status/Interventions/Response to Interventions: No report of or nonverbal indications of pain  Swallow Mechanism Exam  Oral care is provided with oral swabs and suction kit  Formal OME not complete, but appears adequate  Patient has natural dentition and oral cavity is clear and moist        Consistencies Assessed and Performance   Consistencies Administered: thin liquids, nectar thick, honey thick, puree and soft solids  Materials administered included applesauce and clementine cracker with honey, nectar thick and thin liquids     Oral Stage: WFL  Mastication was adequate with the materials administered today  Bolus formation and transfer were functional with no significant oral residue noted  No overt s/s reduced oral control  Pharyngeal Stage: WFL  Swallow Mechanics:  Swallowing initiation appeared prompt  Laryngeal rise was palpated and judged to be within functional limits  No coughing, throat clearing, change in vocal quality or respiratory status noted today       Esophageal Concerns: none reported    Summary and Recommendations (see above)    Results Reviewed with: patient, RN and MD     Treatment Recommended: dysphagia therapy      Frequency of treatment: 2-3x week, as able     Patient Stated Goal: none     Dysphagia LTG  -Patient will demonstrate safe and effective oral intake (without overt s/s significant oral/pharyngeal dysphagia including s/s penetration or aspiration) for the highest appropriate diet level       Short Term Goals:  -Pt will tolerate Dysphagia 2/mechanical soft diet and nectar thick with no significant s/s oral or pharyngeal dysphagia across 1-3 diagnostic session/s     -Patient will tolerate trials of upgraded food and/or liquid texture with no significant s/s of oral or pharyngeal dysphagia including aspiration across 1-3 diagnostic sessions     -Patient will comply with a Video/Modified Barium Swallow study for more complete assessment of swallowing anatomy/physiology/aspiration risk and to assess efficacy of treatment techniques so as to best guide treatment plan (as warranted)     Speech Therapy Prognosis   Prognosis: fair    Prognosis Considerations: medical status, progressive disease process, medically fragile status and respiratory status

## 2021-07-07 NOTE — ASSESSMENT & PLAN NOTE
Dx 3 months ago as widely metastatic RCC via LN biopsy  Immunotherapy d/c 2/2 pneumonitis (4) excellent

## 2021-07-07 NOTE — OCCUPATIONAL THERAPY NOTE
Occupational Therapy Cancellation note        Patient Name: Georgi PEÑA Date: 7/7/2021 07/07/21 0901   OT Last Visit   OT Visit Date 07/07/21   Note Type   Note type Evaluation   Cancel Reasons Medical status   OT consult received, chart reviewed  Pt admitted with bone metastases, currently with low hgb (7) and receiving a  blood transfusion, neurosurgery possible surgical intervention  Will continue attempts as medically appropriate   James ELLIS, OTR/L

## 2021-07-07 NOTE — PLAN OF CARE
Problem: PAIN - ADULT  Goal: Verbalizes/displays adequate comfort level or baseline comfort level  Description: Interventions:  - Encourage patient to monitor pain and request assistance  - Assess pain using appropriate pain scale  - Administer analgesics based on type and severity of pain and evaluate response  - Implement non-pharmacological measures as appropriate and evaluate response  - Consider cultural and social influences on pain and pain management  - Notify physician/advanced practitioner if interventions unsuccessful or patient reports new pain  Outcome: Progressing     Problem: INFECTION - ADULT  Goal: Absence or prevention of progression during hospitalization  Description: INTERVENTIONS:  - Assess and monitor for signs and symptoms of infection  - Monitor lab/diagnostic results  - Monitor all insertion sites, i e  indwelling lines, tubes, and drains  - Monitor endotracheal if appropriate and nasal secretions for changes in amount and color  - [place] appropriate cooling/warming therapies per order  - Administer medications as ordered  - Instruct and encourage patient and family to use good hand hygiene technique  - Identify and instruct in appropriate isolation precautions for identified infection/condition  Outcome: Progressing  Goal: Absence of fever/infection during neutropenic period  Description: INTERVENTIONS:  - Monitor WBC    Outcome: Progressing

## 2021-07-07 NOTE — ASSESSMENT & PLAN NOTE
· Pt aspirated thin liquids after arrival to the hospital   · NPO for now pending swallow evaluation    · Supplemental oxygen  · Suctioning PRN  · PRN albuterol  · IV decadron as above

## 2021-07-07 NOTE — ASSESSMENT & PLAN NOTE
Paraparesis bony metastasis  MRI lumbar and thoracic spines noted - noted to have cord edema below T8-T9, extensive metastatic disease  Continue IV dexamethasone  Discussed with neurosurgery - risks of operative intervention outweigh benefits, surgery deferred  Discussed with radiation oncology patient for palliative radiation treatments  Continue opioid analgesia  Palliative care following

## 2021-07-07 NOTE — ASSESSMENT & PLAN NOTE
Malnutrition Findings:           BMI Findings: There is no height or weight on file to calculate BMI       Medical management per primary team  Would benefit from nutrition consult

## 2021-07-07 NOTE — PLAN OF CARE
Problem: PAIN - ADULT  Goal: Verbalizes/displays adequate comfort level or baseline comfort level  Description: Interventions:  - Encourage patient to monitor pain and request assistance  - Assess pain using appropriate pain scale  - Administer analgesics based on type and severity of pain and evaluate response  - Implement non-pharmacological measures as appropriate and evaluate response  - Consider cultural and social influences on pain and pain management  - Notify physician/advanced practitioner if interventions unsuccessful or patient reports new pain  Outcome: Progressing     Problem: INFECTION - ADULT  Goal: Absence or prevention of progression during hospitalization  Description: INTERVENTIONS:  - Assess and monitor for signs and symptoms of infection  - Monitor lab/diagnostic results  - Monitor all insertion sites, i e  indwelling lines, tubes, and drains  - Monitor endotracheal if appropriate and nasal secretions for changes in amount and color  - [place] appropriate cooling/warming therapies per order  - Administer medications as ordered  - Instruct and encourage patient and family to use good hand hygiene technique  - Identify and instruct in appropriate isolation precautions for identified infection/condition  Outcome: Progressing  Goal: Absence of fever/infection during neutropenic period  Description: INTERVENTIONS:  - Monitor WBC    Outcome: Progressing     Problem: SAFETY ADULT  Goal: Patient will remain free of falls  Description: INTERVENTIONS:  - Educate patient/family on patient safety including physical limitations  - Instruct patient to call for assistance with activity   - Consult OT/PT to assist with strengthening/mobility   - Keep Call bell within reach  - Keep bed low and locked with side rails adjusted as appropriate  - Keep care items and personal belongings within reach  - Initiate and maintain comfort rounds  - Make Fall Risk Sign visible to staff  - Offer Toileting every 2 Hours, in advance of need  - Initiate/Maintain alarm  - Obtain necessary fall risk management equipment:   - Apply yellow socks and bracelet for high fall risk patients  - Consider moving patient to room near nurses station  Outcome: Progressing  Goal: Maintain or return to baseline ADL function  Description: INTERVENTIONS:  -  Assess patient's ability to carry out ADLs; assess patient's baseline for ADL function and identify physical deficits which impact ability to perform ADLs (bathing, care of mouth/teeth, toileting, grooming, dressing, etc )  - Assess/evaluate cause of self-care deficits   - Assess range of motion  - Assess patient's mobility; develop plan if impaired  - Assess patient's need for assistive devices and provide as appropriate  - Encourage maximum independence but intervene and supervise when necessary  - Involve family in performance of ADLs  - Assess for home care needs following discharge   - Consider OT consult to assist with ADL evaluation and planning for discharge  - Provide patient education as appropriate  Outcome: Progressing  Goal: Maintains/Returns to pre admission functional level  Description: INTERVENTIONS:  - Perform BMAT or MOVE assessment daily    - Set and communicate daily mobility goal to care team and patient/family/caregiver  - Collaborate with rehabilitation services on mobility goals if consulted  - Perform Range of Motion 3 times a day  - Reposition patient every 2 hours    - Dangle patient 3 times a day  - Stand patient 3 times a day  - Ambulate patient 3 times a day  - Out of bed to chair 3 times a day   - Out of bed for meals 3 times a day  - Out of bed for toileting  - Record patient progress and toleration of activity level   Outcome: Progressing     Problem: DISCHARGE PLANNING  Goal: Discharge to home or other facility with appropriate resources  Description: INTERVENTIONS:  - Identify barriers to discharge w/patient and caregiver  - Arrange for needed discharge resources and transportation as appropriate  - Identify discharge learning needs (meds, wound care, etc )  - Arrange for interpretive services to assist at discharge as needed  - Refer to Case Management Department for coordinating discharge planning if the patient needs post-hospital services based on physician/advanced practitioner order or complex needs related to functional status, cognitive ability, or social support system  Outcome: Progressing     Problem: Knowledge Deficit  Goal: Patient/family/caregiver demonstrates understanding of disease process, treatment plan, medications, and discharge instructions  Description: Complete learning assessment and assess knowledge base    Interventions:  - Provide teaching at level of understanding  - Provide teaching via preferred learning methods  Outcome: Progressing     Problem: Potential for Falls  Goal: Patient will remain free of falls  Description: INTERVENTIONS:  - Educate patient/family on patient safety including physical limitations  - Instruct patient to call for assistance with activity   - Consult OT/PT to assist with strengthening/mobility   - Keep Call bell within reach  - Keep bed low and locked with side rails adjusted as appropriate  - Keep care items and personal belongings within reach  - Initiate and maintain comfort rounds  - Make Fall Risk Sign visible to staff  - Offer Toileting every 2 Hours, in advance of need  - Initiate/Maintain alarm  - Obtain necessary fall risk management equipment:   - Apply yellow socks and bracelet for high fall risk patients  - Consider moving patient to room near nurses station  Outcome: Progressing     Problem: Prexisting or High Potential for Compromised Skin Integrity  Goal: Skin integrity is maintained or improved  Description: INTERVENTIONS:  - Identify patients at risk for skin breakdown  - Assess and monitor skin integrity  - Assess and monitor nutrition and hydration status  - Monitor labs   - Assess for incontinence   - Turn and reposition patient  - Assist with mobility/ambulation  - Relieve pressure over bony prominences  - Avoid friction and shearing  - Provide appropriate hygiene as needed including keeping skin clean and dry  - Evaluate need for skin moisturizer/barrier cream  - Collaborate with interdisciplinary team   - Patient/family teaching  - Consider wound care consult   Outcome: Progressing     Problem: MOBILITY - ADULT  Goal: Maintain or return to baseline ADL function  Description: INTERVENTIONS:  -  Assess patient's ability to carry out ADLs; assess patient's baseline for ADL function and identify physical deficits which impact ability to perform ADLs (bathing, care of mouth/teeth, toileting, grooming, dressing, etc )  - Assess/evaluate cause of self-care deficits   - Assess range of motion  - Assess patient's mobility; develop plan if impaired  - Assess patient's need for assistive devices and provide as appropriate  - Encourage maximum independence but intervene and supervise when necessary  - Involve family in performance of ADLs  - Assess for home care needs following discharge   - Consider OT consult to assist with ADL evaluation and planning for discharge  - Provide patient education as appropriate  Outcome: Progressing  Goal: Maintains/Returns to pre admission functional level  Description: INTERVENTIONS:  - Perform BMAT or MOVE assessment daily    - Set and communicate daily mobility goal to care team and patient/family/caregiver  - Collaborate with rehabilitation services on mobility goals if consulted  - Perform Range of Motion 3 times a day  - Reposition patient every 2 hours    - Dangle patient 3 times a day  - Stand patient 3 times a day  - Ambulate patient 3 times a day  - Out of bed to chair 3 times a day   - Out of bed for meals 3 times a day  - Out of bed for toileting  - Record patient progress and toleration of activity level   Outcome: Progressing     Problem: Nutrition/Hydration-ADULT  Goal: Nutrient/Hydration intake appropriate for improving, restoring or maintaining nutritional needs  Description: Monitor and assess patient's nutrition/hydration status for malnutrition  Collaborate with interdisciplinary team and initiate plan and interventions as ordered  Monitor patient's weight and dietary intake as ordered or per policy  Utilize nutrition screening tool and intervene as necessary  Determine patient's food preferences and provide high-protein, high-caloric foods as appropriate       INTERVENTIONS:  - Monitor oral intake, urinary output, labs, and treatment plans  - Assess nutrition and hydration status and recommend course of action  - Evaluate amount of meals eaten  - Assist patient with eating if necessary   - Allow adequate time for meals  - Recommend/ encourage appropriate diets, oral nutritional supplements, and vitamin/mineral supplements  - Order, calculate, and assess calorie counts as needed  - Recommend, monitor, and adjust tube feedings and TPN/PPN based on assessed needs  - Assess need for intravenous fluids  - Provide specific nutrition/hydration education as appropriate  - Include patient/family/caregiver in decisions related to nutrition  Outcome: Progressing

## 2021-07-07 NOTE — TREATMENT PLAN
Imaging reviewed in detail with neurosurgical attendings  Given the extent of his systemic disease, pneumonia/pneumonitis, O2 requirements and increased work of breathing at rest, there is a high likelihood that he will not be able to be extubated post operatively  This means likely trach/peg/nursing home placement post operatively  Surgical intervention does not guarantee any improvement in his current neurological status  Based on the [address], he would be unable to tolerate surgery from a systemic standpoint at this time  Therefore we recommend proceeding with radiation therapy for the best possible outcome for this patient  He will need to be in a TLSO brace when OOB and HOB > 45 degrees to prevent further kyphosis  Neurosurgery will sign off at this time  He can follow up on an as needed basis

## 2021-07-07 NOTE — ASSESSMENT & PLAN NOTE
· Due to pleural effusions, aspiration, possible chemo induced pneumonitis  · Continue albuterol, advair, PRN oxygen  · May need thoracentesis for pleural effusions  · Hold PO prednisone, on IV decadron for now

## 2021-07-07 NOTE — QUICK NOTE
Notified by RN that pt experiencing worsening SOB, increased work of breathing, tachycardia and diaphoresis  RT was called to bedside, recommending BiPAP, orders placed  Pt reevaluated after complaints of anxiety wearing the BiPAP, discussed with pt the importance of wearing the BiPAP and 0 5mg of Ativan ordered  At that time CXR official read showing worsening pneumonia more pronounced in RUL  Procal, blood cultures and morning labs were drawn at this time  Pt started on ceftriaxone and azithromycin

## 2021-07-07 NOTE — PROGRESS NOTES
[address] [facility]  Progress Note - Musa Cordova 1955, 77 y o  male MRN: [id number]  Unit/Bed#: [facility] 933-01 Encounter: [id number]  Primary Care Provider: Charli Singer MD   Date and time admitted to hospital: 7/6/2021 10:23 PM    Acute on chronic respiratory failure with hypoxia [facility])  Assessment & Plan  · Multifactorial  · Due to pleural effusions, aspiration, malignancy  · Continue albuterol, advair, PRN oxygen  · IV decadron     * Bone metastases ([facility] [address] )  Assessment & Plan  Paraparesis bony metastasis  MRI lumbar and thoracic spines noted - noted to have cord edema below T8-T9, extensive metastatic disease  Continue IV dexamethasone  Discussed with neurosurgery - risks of operative intervention outweigh benefits, surgery deferred  Discussed with radiation oncology patient for palliative radiation treatments  Continue opioid analgesia  Palliative care following    Renal cell carcinoma of left kidney ([facility] [address] )  Assessment & Plan  · Follows with Dr Belkis Jaime as an outpatient  · Supportive care    Hypercalcemia  Assessment & Plan  · Likely due to bone metastasis  · IV dexamethasone  · Monitor closely    Severe protein-calorie malnutrition ([facility] [address] )  Assessment & Plan  Encourage adequate calorie and protein intake  Nutrition therapy following    Thrombocytosis ([facility])  Assessment & Plan  · Likely reactive   · Monitor    Chronic obstructive pulmonary disease (HCC)  Assessment & Plan  · PRN albuterol  · Continue advair and spiriva    Pleural effusion  Assessment & Plan  · Possibly malignancy vs due to hypoalbuminemia  · Monitor    Leucocytosis  Assessment & Plan  · Likely reactive  · Monitor counts    Anemia  Assessment & Plan  · Multifactorial  · Monitor hemoglobin            VTE Pharmacologic Prophylaxis: VTE Score: 7 High Risk (Score >/= 5) - Pharmacological DVT Prophylaxis Ordered: heparin  Sequential Compression Devices Ordered      Patient Centered Rounds: I performed bedside rounds with nursing staff today  Discussions with Specialists or Other Care Team Provider:  Neurosurgery, Radiation Oncology, palliative Care    Education and Discussions with Family / Patient: Updated  (brother and brother - Lisa Diaz, brother Brigid Hinton ) via phone  Time Spent for Care: 30 minutes  More than 50% of total time spent on counseling and coordination of care as described above  Current Length of Stay: 1 day(s)  Current Patient Status: Inpatient   Certification Statement: The patient will continue to require additional inpatient hospital stay due to as outlined  Discharge Plan: metastatic renal cancer, paraplegia    Code Status: Level 3 - DNAR and DNI    Subjective:     Reports feeling tired and fatigued  Short of breath  History chart labs medications reviewed    Objective:     Vitals:   Temp (24hrs), Av 7 °F (36 5 °C), Min:97 4 °F (36 3 °C), Max:98 2 °F (36 8 °C)    Temp:  [97 4 °F (36 3 °C)-98 2 °F (36 8 °C)] 98 1 °F (36 7 °C)  HR:  [] 101  Resp:  [14-23] 20  BP: (121-164)/(67-96) 121/73  SpO2:  [86 %-99 %] 97 %  There is no height or weight on file to calculate BMI  Input and Output Summary (last 24 hours):      Intake/Output Summary (Last 24 hours) at 2021 1520  Last data filed at 2021 1003  Gross per 24 hour   Intake 450 ml   Output 450 ml   Net 0 ml       Physical Exam:   Physical Exam     Comfortably in bed   Dyspneic at rest but able to complete sentences with some effort  Features of protein calorie malnutrition noted  Neck supple  Lungs diminished breath sounds bilateral  Heart sounds S1-S2 noted  Tachycardia noted  Abdomen soft nontender  Awake obeys simple commands  Paraplegia noted  Pulses noted  No rash      Additional Data:     Labs:  Results from last 7 days   Lab Units 21  1358 21  0400 21  0942   WBC Thousand/uL  --  15 02* 14 29*   HEMOGLOBIN g/dL 8 5* 7 0* 7 2*   HEMATOCRIT % 30 7* 26 3* 27 0*   PLATELETS Thousands/uL  --  675* 724*   NEUTROS PCT %  --   --  88*   LYMPHS PCT %  --   --  3*   MONOS PCT %  --   --  7   EOS PCT %  --   --  0     Results from last 7 days   Lab Units 07/07/21  0400 07/06/21  0942   SODIUM mmol/L 140 142   POTASSIUM mmol/L 3 4* 3 0*   CHLORIDE mmol/L 105 101   CO2 mmol/L 31 32   BUN mg/dL 12 11   CREATININE mg/dL 0 24* 0 44*   ANION GAP mmol/L 4 9   CALCIUM mg/dL 8 8 8 6   ALBUMIN g/dL  --  1 6*   TOTAL BILIRUBIN mg/dL  --  0 36   ALK PHOS U/L  --  249*   ALT U/L  --  37   AST U/L  --  21   GLUCOSE RANDOM mg/dL 79 87     Results from last 7 days   Lab Units 07/06/21  0942   INR  1 36*             Results from last 7 days   Lab Units 07/07/21  0630 07/07/21  0400 07/06/21  0942   LACTIC ACID mmol/L  --   --  1 9   PROCALCITONIN ng/ml 0 73* 0 98* 0 32*       Lines/Drains:  Invasive Devices     Peripheral Intravenous Line            Peripheral IV 07/07/21 Dorsal (posterior); Right Forearm <1 day    Peripheral IV 07/07/21 Left;Ventral (anterior) Forearm <1 day                      Imaging: Reviewed radiology reports from this admission including: chest xray and MRI spine    Recent Cultures (last 7 days):   Results from last 7 days   Lab Units 07/07/21  0359 07/06/21  0942   BLOOD CULTURE  Received in Microbiology Lab  Culture in Progress  Received in Microbiology Lab  Culture in Progress  Received in Microbiology Lab  Culture in Progress  Received in Microbiology Lab  Culture in Progress         Last 24 Hours Medication List:   Current Facility-Administered Medications   Medication Dose Route Frequency Provider Last Rate    acetaminophen  975 mg Oral [facility] Milla Santacruz MD      albuterol  2 5 mg Nebulization Q6H PRN Milla Santacruz MD      cefTRIAXone  1,000 mg Intravenous Q24H Renee Metz PA-C 1,000 mg (07/07/21 4668)    And    azithromycin  500 mg Intravenous Q24H Renee Metz PA-C 500 mg (07/07/21 8396)    calcium carbonate  1,000 mg Oral Daily PRN Milla Santacruz MD      dexamethasone  4 mg Intravenous Q6H [facility] Tiffanie Garza MD      docusate sodium  100 mg Oral BID Tiffanie Garza MD      dronabinol  5 mg Oral BID AC Tiffanie Garza MD      fluticasone-vilanterol  1 puff Inhalation Daily Tiffanie Garza MD      HYDROmorphone  0 5 mg Intravenous Q4H PRN Toño Coffman MD      levalbuterol  1 25 mg Nebulization TID Tiffanie Garza MD      ondansetron  4 mg Intravenous Q6H PRN Tiffanie Garza MD      oxyCODONE  10 mg Oral Q4H PRN Tiffanie Garza MD      oxyCODONE  5 mg Oral Q4H PRN Tiffanie Garza MD      pantoprazole  40 mg Oral Daily Tiffanie Garza MD      potassium chloride  40 mEq Oral Once Tiffanie Garza MD      senna  2 tablet Oral Daily Tiffanie Garza MD      sodium chloride  3 mL Nebulization TID Tiffanie Garza MD      tiotropium  18 mcg Inhalation Daily Tiffanie Garza MD          Today, Patient Was Seen By: Toño Coffman MD    **Please Note: This note may have been constructed using a voice recognition system  **

## 2021-07-07 NOTE — PHYSICAL THERAPY NOTE
Physical Therapy Cancellation Note      PT CONSULT RECEIVED  CHART REVIEWED  PATIENT IS CURRENTLY RECEIVING BLOOD TRANSFUSION FOR [facility]  NEUROSURGERY IS FOLLOWING FOR POSSIBLE SURGICAL INTERVENTION (THORACIC)  PT WILL CONTINUE TO FOLLOW ON CASELOAD AND PERFORM INITIAL EVALUATION AS MEDICALLY APPROPRIATE      CHEPE MAYES PT, DPT

## 2021-07-07 NOTE — CONSULTS
[address] 1955, 77 y o  male MRN: [id number]  Unit/Bed#: [facility] 933-01 Encounter: [id number]  Primary Care Provider: Melvina Ibrahim MD   Date and time admitted to hospital: 7/6/2021 10:23 PM    Inpatient consult to Neurosurgery  Consult performed by: Cece Harvey PA-C  Consult ordered by: Saratha Scheuermann, MD      Consult completed 7/7/21 at 8:30am    * Bone metastases [facility])  Assessment & Plan  Sudden onset BLE paraplegia and subjective numbness yesterday with inability to ambulate  · Recently diagnosed with stage IV RCC, s/p chemotherapy  Known metastasis to liver, lung, and bone  · Also with known T9 pathologic fracture with retropulsion and kyphosis  · SIN score 14  · Exam: 2/5 LLE except 4-/5 left PF, 1/5 RLE, bilateral clonus, no JPS bilaterally  SILT bilaterally  Imaging:  · CT CAP w/contrast, 6/21/21: Severe compression deformity, sclerosis, and retropulsion at the T9 vertebral body has progressed compared to May  Nondisplaced fracture of the T8 spinous process is unchanged in alignment  Plan:  · STAT MRI thoracic and lumbar spine w/wo contrast  · STAT CT thoracic spine w/o contrast  · Frequent neuro checks  · Radiation oncology following, appreciate recommendations  · PT/OT evaluation  · Keep NPO at this time  · No additional labs needed at this time   · Patient is currently getting PRBC 2/2 hgb 7 0  · Medical management per primary team  · Pain control per primary team  · Recommend palliative care consult  · DVT ppx: SCDs, SQH d/c this morning for possible surgical intervention    Neurosurgery will continue to follow  Will review imaging immediately upon completion  At this time will likely recommend surgical intervention today if medical stable given pneumonia and increased work of breathing  Appreciate recommendations from primary team  Will decided today whether surgery vs emergent radiation is indicated   Please call with questions or concerns  Severe protein-calorie malnutrition ([facility] [address] )  Assessment & Plan  Malnutrition Findings:           BMI Findings: There is no height or weight on file to calculate BMI  Medical management per primary team  Would benefit from nutrition consult    Acute on chronic respiratory failure with hypoxia [facility])  Assessment & Plan  Likely secondary to worsening pneumonia on imaging  Patient on 15L [address] overnight  Currently SpO2 93% on RA    Continue abx per primary team - on rocephin and zithromax  BC pending  WBC 15 02  Procalcitonin 0 73      Renal cell carcinoma of left kidney (HCC)  Assessment & Plan  Dx 3 months ago as widely metastatic RCC via LN biopsy  Immunotherapy d/c 2/2 pneumonitis    Anemia  Assessment & Plan  hgb 7 0 this morning  S/p 1u PRBC  Repeat H&H pending    History of Present Illness     HPI: Flor Valverde is a 77y o  year old male with PMH including COPD who presents with complaint of sudden BLE weakness and paraplegia over the past 2 days resulting in inability to walk  He is also complaining of worsening mid back pain and pain around his ribcage  He was diagnosed with renal cell carcinoma of the left kidney via LN biopsy in April 2021; he presented with SOB  Imaging unfortunately showed widely metastatic disease with metastasis in his liver, lung, and bones  He was found to have a T9 pathologic fracture with retropulsion and kyphosis but he was not symptomatic from this at the time  He started immunotherapy but this was discontinued 2/2 pneumonitis  He has lost 20-30 pounds  2 days ago he noticed RLE weakness which progressed rapidly and into his LLE  He was also complaining of subjective numbness in his legs, although sensation is intact on exam  He has significant paraplegia in his BLE  New imaging is pending, but the most likely cause is progression of his T9 fracture  SIN score 14 at T9   At this time we recommend surgery if he is cleared from a pulmonary standpoint  Review of Systems   Constitutional: Positive for fatigue and unexpected weight change  Negative for chills and fever  HENT: Negative for ear pain and sore throat  Eyes: Negative for pain and visual disturbance  Respiratory: Positive for shortness of breath  Negative for cough  Cardiovascular: Negative for chest pain and palpitations  Gastrointestinal: Negative for abdominal pain and vomiting  Genitourinary: Negative for dysuria and hematuria  Musculoskeletal: Positive for back pain and gait problem  Negative for arthralgias  Skin: Negative for color change and rash  Neurological: Positive for weakness and numbness  Negative for seizures and syncope  All other systems reviewed and are negative        Historical Information   Past Medical History:   Diagnosis Date    Emphysema of lung ([facility] [address] )     Hypocalcemia 5/5/2021    Sepsis ([facility] [address] ) 5/3/2021    Transaminitis 5/3/2021     Past Surgical History:   Procedure Laterality Date    IR BIOPSY LIVER MASS  4/7/2021    IR BIOPSY LYMPH NODE  4/13/2021    IR THORACENTESIS  4/6/2021    TENDON REPAIR Right     forearm     Social History     Substance and Sexual Activity   Alcohol Use Never     Social History     Substance and Sexual Activity   Drug Use Never     Social History     Tobacco Use   Smoking Status Former Smoker   Smokeless Tobacco Never Used   Tobacco Comment    quit feb 2021     Family History   Problem Relation Age of Onset    Heart disease Father     Multiple sclerosis Sister     Heart attack Brother        Meds/Allergies   all current active meds have been reviewed, current meds:   Current Facility-Administered Medications   Medication Dose Route Frequency    acetaminophen (TYLENOL) tablet 975 mg  975 mg Oral Q8H [address]    albuterol inhalation solution 2 5 mg  2 5 mg Nebulization Q6H PRN    cefTRIAXone (ROCEPHIN) 1,000 mg in dextrose 5 % 50 mL IVPB  1,000 mg Intravenous Q24H    And    azithromycin (ZITHROMAX) 500 mg in sodium chloride 0 9 % 250 mL IVPB  500 mg Intravenous Q24H    calcium carbonate (TUMS) chewable tablet 1,000 mg  1,000 mg Oral Daily PRN    dexamethasone (DECADRON) injection 4 mg  4 mg Intravenous Q6H [address]    docusate sodium (COLACE) capsule 100 mg  100 mg Oral BID    dronabinol (MARINOL) capsule 5 mg  5 mg Oral BID AC    fluticasone-vilanterol (BREO ELLIPTA) 200-25 MCG/INH inhaler 1 puff  1 puff Inhalation Daily    levalbuterol (XOPENEX) inhalation solution 1 25 mg  1 25 mg Nebulization TID    ondansetron (ZOFRAN) injection 4 mg  4 mg Intravenous Q6H PRN    oxyCODONE (ROXICODONE) immediate release tablet 10 mg  10 mg Oral Q4H PRN    oxyCODONE (ROXICODONE) IR tablet 5 mg  5 mg Oral Q4H PRN    pantoprazole (PROTONIX) EC tablet 40 mg  40 mg Oral Daily    potassium chloride (K-DUR,KLOR-CON) CR tablet 40 mEq  40 mEq Oral Once    senna (SENOKOT) tablet 17 2 mg  2 tablet Oral Daily    sodium chloride 0 9 % inhalation solution 3 mL  3 mL Nebulization TID    tiotropium (SPIRIVA) capsule for inhaler 18 mcg  18 mcg Inhalation Daily    and PTA meds:   Prior to Admission Medications   Prescriptions Last Dose Informant Patient Reported? Taking?    albuterol (2 5 mg/3 mL) 0 083 % nebulizer solution   No No   Sig: Take 1 vial (2 5 mg total) by nebulization every 6 (six) hours as needed for wheezing or shortness of breath   albuterol (Ventolin HFA) 90 mcg/act inhaler   No No   Sig: Inhale 2 puffs every 6 (six) hours as needed for wheezing   benzonatate (TESSALON PERLES) 100 mg capsule  Self No No   Sig: Take 1 capsule (100 mg total) by mouth 3 (three) times a day as needed for cough   Patient not taking: Reported on 6/23/2021   calcium carbonate (OYSTER SHELL,OSCAL) 500 mg   No No   Sig: Take 1 tablet by mouth 2 (two) times a day with meals   dronabinol (MARINOL) 5 MG capsule   No No   Sig: Take 1 capsule (5 mg total) by mouth 2 (two) times a day before meals   ergocalciferol (VITAMIN D2) 50,000 units   No No Sig: Take 1 capsule (50,000 Units total) by mouth once a week   ferrous sulfate 324 (65 Fe) mg  Self No No   Sig: Take 1 tablet (324 mg total) by mouth every other day   fluticasone-salmeterol (ADVAIR, WIXELA) 250-50 mcg/dose inhaler   No No   Sig: Inhale 1 puff 2 (two) times a day Rinse mouth after use  furosemide (LASIX) 20 mg tablet   No No   Sig: Take 1 tablet (20 mg total) by mouth 3 (three) times a week If needed for leg swelling   oxyCODONE-acetaminophen (PERCOCET) 5-325 mg per tablet   No No   Sig: Take 1 tablet by mouth every 4 (four) hours as needed for moderate painMax Daily Amount: 6 tablets   pantoprazole (PROTONIX) 40 mg tablet   No No   Sig: Take 1 tablet (40 mg total) by mouth daily   predniSONE 10 mg tablet   No No   Sig: Take 4 tablets (40 mg total) by mouth daily   predniSONE 10 mg tablet   No No   Sig: Take 4 tablets (40 mg total) by mouth daily   tiotropium (SPIRIVA RESPIMAT) 2 5 MCG/ACT AERS inhaler   No No   Sig: Inhale 2 puffs daily      Facility-Administered Medications: None     No Known Allergies    Objective   I/O       07/05 0701 - 07/06 0700 07/06 0701 - 07/07 0700 07/07 0701 - 07/08 0700    I V  (mL/kg)   0 (0)    IV Piggyback  100     Total Intake(mL/kg)  100 0 (0)    Urine (mL/kg/hr)  300 150 (1 1)    Total Output  300 150    Net  -200 -150                 Physical Exam  Vitals and nursing note reviewed  Constitutional:       Appearance: Normal appearance  He is well-developed  He is ill-appearing  HENT:      Head: Normocephalic and atraumatic  Eyes:      Extraocular Movements: Extraocular movements intact  Pupils: Pupils are equal, round, and reactive to light  Cardiovascular:      Rate and Rhythm: Normal rate  Pulmonary:      Effort: Pulmonary effort is normal  No respiratory distress  Abdominal:      Palpations: Abdomen is soft  Musculoskeletal:         General: Normal range of motion  Cervical back: Normal range of motion     Skin:     General: Skin is warm and dry  Neurological:      Mental Status: He is alert and oriented to person, place, and time  Deep Tendon Reflexes:      Reflex Scores:       Patellar reflexes are 2+ on the right side and 2+ on the left side  Achilles reflexes are 2+ on the right side and 2+ on the left side  Psychiatric:         Mood and Affect: Mood normal          Speech: Speech normal          Behavior: Behavior normal          Thought Content: Thought content normal          Judgment: Judgment normal        Neurologic Exam     Mental Status   Oriented to person, place, and time  Follows 2 step commands  Attention: normal  Concentration: normal    Speech: speech is normal   Level of consciousness: alert  Knowledge: good  Able to repeat  Normal comprehension  Cranial Nerves   Cranial nerves II through XII intact  CN III, IV, VI   Pupils are equal, round, and reactive to light  Motor Exam   Muscle bulk: normal  Overall muscle tone: normal  5/5 BUE  2/5 LLE except 4-/5 left PF  1/5 entire RLE     Sensory Exam   Light touch normal    JPS not intact bilateral lower extremities     Gait, Coordination, and Reflexes     Tremor   Resting tremor: absent    Reflexes   Right patellar: 2+  Left patellar: 2+  Right achilles: 2+  Left achilles: 2+  Right ankle clonus: present  Left ankle clonus: present        Vitals:Blood pressure 126/77, pulse (!) 109, temperature 97 6 °F (36 4 °C), resp  rate 20, SpO2 93 %  ,There is no height or weight on file to calculate BMI       Lab Results:   Results from last 7 days   Lab Units 07/07/21  0400 07/06/21  0942   WBC Thousand/uL 15 02* 14 29*   HEMOGLOBIN g/dL 7 0* 7 2*   HEMATOCRIT % 26 3* 27 0*   PLATELETS Thousands/uL 675* 724*   NEUTROS PCT %  --  88*   MONOS PCT %  --  7     Results from last 7 days   Lab Units 07/07/21  0400 07/06/21  0942   POTASSIUM mmol/L 3 4* 3 0*   CHLORIDE mmol/L 105 101   CO2 mmol/L 31 32   BUN mg/dL 12 11   CREATININE mg/dL 0 24* 0 44*   CALCIUM mg/dL 8 8 8 6   ALK PHOS U/L  --  249*   ALT U/L  --  37   AST U/L  --  21             Results from last 7 days   Lab Units 07/06/21  0942   INR  1 36*   PTT seconds 39*     No results found for: TROPONINT  ABG:  Lab Results   Component Value Date    PHART 7 410 05/04/2021    GZM6UGQ 38 2 05/04/2021    PO2ART 68 4 (L) 05/04/2021    SBQ2ZLI 23 7 05/04/2021    BEART -0 8 05/04/2021    SOURCE Radial, Right 05/04/2021       Imaging Studies: I have personally reviewed pertinent reports  and I have personally reviewed pertinent films in PACS     XR chest portable    Result Date: 7/7/2021  Narrative: CHEST INDICATION:   cough, shortness of breath, aspiration suspected  History of metastatic renal cell carcinoma with lung metastases, lymphangitic carcinomatosis, bone metastases, abdominopelvic carcinomatosis  COMPARISON:  Plain film portable chest radiograph dated July 6, 2021 at 10:11 AM   Report of CT dated June 21, 2021  EXAM PERFORMED/VIEWS:  XR CHEST PORTABLE  AP semierect Images: 2 FINDINGS: The cardiomediastinal silhouette appears unchanged  Numerous pulmonary nodules and areas of patchy opacity are redemonstrated bilaterally throughout the lungs  Opacities in the right upper lobe are worse and appear more dense compared with the earlier plain film from the morning of July 6, 2021  An area  of opacity in the left upper lobe also appears slightly more dense  This is concerning for developing bilateral upper lobe pneumonia, right worse than left  Small left pleural effusion unchanged  There is osseous metastatic disease; this was much better demonstrated on the recent CT  Please refer to the CT report of June 21, 2021  Impression: Findings suspicious for worsening pneumonia, most pronounced involving the right upper lobe of the lung  This is superimposed on a background of metastatic disease  The small left pleural effusion appears unchanged  The study was marked in [facility] for immediate notification   Workstation performed: [id number]     XR chest 1 view portable    Result Date: 7/6/2021  Narrative: CHEST INDICATION:   weakness  COMPARISON:  6/8/2021 EXAM PERFORMED/VIEWS:  XR CHEST PORTABLE FINDINGS: Cardiomediastinal silhouette appears unremarkable  There is multifocal patchy airspace disease, which appears worsened in the right mid and upper lung fields  There is no pneumothorax  There is a small left pleural effusion  Lytic left-sided rib lesion should be correlated with prior CT  Impression: Multifocal patchy airspace disease and reticulonodular opacification of the lung fields, worse on the right upper and midlung fields since the prior examination  Prior CT suggestive of multiple metastatic pulmonary nodules and lymphangitic spread of disease  Workstation performed: [id number]     XR chest pa & lateral    Result Date: 6/12/2021  Narrative: CHEST INDICATION:   J90: Pleural effusion, not elsewhere classified  Metastatic renal cell carcinoma  COMPARISON:  Chest radiograph from 5/8/2021 and chest CT from 5/3/2021  EXAM PERFORMED/VIEWS:  XR CHEST PA & LATERAL  DUAL ENERGY SUBTRACTION  FINDINGS: Cardiomediastinal silhouette normal  Persistent extensive bilateral consolidation, greatest in the left upper lung, with septal thickening  Persistent small left effusion  No pneumothorax  Severe degenerative disease in the spine  Compression deformity of T9, unchanged, with metastatic disease to the left 9th rib and old left rib fractures  Impression: Persistent small left pleural effusion  Persistent extensive bilateral consolidation, greatest in the left upper lung, question metastatic disease with probable lymphangitic spread of tumor  Redemonstration of compression deformity of T9 and left 9th rib metastasis  Workstation performed: [id number]     CT head without contrast    Result Date: 7/6/2021  Narrative: CT BRAIN - WITHOUT CONTRAST INDICATION:   right leg weakness  COMPARISON:  None   TECHNIQUE:  CT examination of the brain was performed  In addition to axial images, sagittal and coronal 2D reformatted images were created and submitted for interpretation  Radiation dose length product (DLP) for this visit:  875 69 mGy-cm   This examination, like all CT scans performed in the [facility], was performed utilizing techniques to minimize radiation dose exposure, including the use of iterative  reconstruction and automated exposure control  IMAGE QUALITY:  Diagnostic  FINDINGS: PARENCHYMA: Decreased attenuation is noted in periventricular and subcortical white matter demonstrating an appearance that is statistically most likely to represent mild microangiopathic change  No CT signs of acute infarction  No intracranial mass, mass effect or midline shift  No acute parenchymal hemorrhage  VENTRICLES AND EXTRA-AXIAL SPACES:  Normal for the patient's age  VISUALIZED ORBITS AND PARANASAL SINUSES:  Unremarkable  CALVARIUM AND EXTRACRANIAL SOFT TISSUES:  Normal      Impression: No acute intracranial abnormality  Workstation performed: [id number]     CT spine lumbar without contrast    Result Date: 7/6/2021  Narrative: CT LUMBAR SPINE INDICATION:   Back pain or radiculopathy, cancer or infection suspected renal cell ca with mets, new right leg weakness  COMPARISON: None  TECHNIQUE:  Contiguous axial images through the lumbar spine were obtained  Sagittal and coronal reconstructions were performed  Radiation dose length product (DLP) for this visit:  650 85 mGy-cm   This examination, like all CT scans performed in the [facility], was performed utilizing techniques to minimize radiation dose exposure, including the use of iterative  reconstruction and automated exposure control  IMAGE QUALITY:  Diagnostic  FINDINGS: ALIGNMENT:  There are 5 lumbar type vertebral bodies  Normal alignment of the lumbar spine  No spondylolisthesis or spondylolysis   Permeation with the patchy sclerotic density in the transverse process of the L4 vertebra due to metastatic disease A nondisplaced fracture noted within the left transverse process of the L5 vertebra Bony metastatic disease seen in the L2 vertebra VERTEBRAL BODIES:  There is a permeative density within the S1 vertebra with associated the fracture  There is depression of superior endplate  DEGENERATIVE CHANGES: Lower Thoracic spine:  Normal lower thoracic disc spaces ] L1-2:  Normal disc height  No herniation  Normal facet joints  No canal or foraminal stenosis  L2-3:  Demonstrates degenerative disc disease  There is no significant central canal narrowing  There is minimal retrolisthesis of L2 over L3 with disc bulge  There is mild bilateral foraminal narrowing L3-4:  Demonstrates no significant central canal narrowing oR foraminal narrowing  There is a mild disc bulge L4-5: Demonstrates a disc bulge  There is a left foraminal and subarticular zone protrusion with mild left foraminal narrowing  There is mild right foraminal narrowing L5-S1: Again noted is a pathological fracture of the S1 vertebra with the patchy sclerotic permeative density  There is no retropulsion of posterior vertebral margin  There is mild disc bulge with no significant central canal narrowing  There is mild bilateral foraminal narrowing  PARASPINAL SOFT TISSUES:  Ascites seen Nonobstructing renal calculi seen  Para-aortic lymph nodes seen within raquel mass measuring 5 3 x 6 3 cm Moderate-sized bilateral effusion, incompletely evaluated Patchy opacity seen right lower lung, incompletely evaluated, and new from the previous study Patchy sclerotic density in the posterior aspect of the right iliac bone due to bony metastatic disease, stable    Impression: Again noted is a fracture of the S1 vertebra with the depression of the superior endplate ,  this is likely pathological fracture  No central canal narrowing    A left subarticular zone and foraminal protrusion at L4-5 with mild abutment/impingement of the traversing left L5 nerve Disc bulge at L5-S1 with no focal disc herniation seen Bony metastatic disease in the L2 vertebra, L4 right transverse process A nondisplaced fracture of the left transverse process of the L5 vertebra, new There is incompletely visualized patchy opacity right lower lung this may be a new infective consolidation or drug-induced infiltrates  This can be evaluated with a CT or x-ray chest   Bilateral moderate size effusion as seen on the previous study The study was marked in [facility] for immediate notification  Workstation performed: [id number]     CT chest abdomen pelvis w contrast    Result Date: 6/28/2021  Narrative: CT CHEST, ABDOMEN AND PELVIS WITH IV CONTRAST INDICATION:   Renal cell carcinoma  COMPARISON:  Chest CTs dated 5/3/2021 and 4/5/2021  CT of the abdomen/pelvis dated 4/5/2021  TECHNIQUE: CT examination of the chest, abdomen and pelvis was performed  Axial, sagittal, and coronal 2D reformatted images were created from the source data and submitted for interpretation  Radiation dose length product (DLP) for this visit:  596 13 mGy-cm   This examination, like all CT scans performed in the [facility], was performed utilizing techniques to minimize radiation dose exposure, including the use of iterative  reconstruction and automated exposure control  IV Contrast:  100 mL of iohexol (OMNIPAQUE) Enteric Contrast: Enteric contrast was administered  FINDINGS: CHEST LUNGS:  Multiple angular nodules scattered throughout both lungs measuring up to 3 cm x 1 9 cm on the left (lower lobe--3/38) and 1 5 cm x 1 4 cm on the right (lower lobe--3/55)  These have, in general decreased in size from most recent prior  Decreased compression of the left central airways by lymphadenopathy with resultant decreasing postobstructive consolidation in the left upper lobe    Likewise, nodular to consolidative densities around the central right airways have decreased  Diffuse straight interlobular septal thickening in both upper lobes and the left lower lobe 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  PLEURA:  Moderate bilateral pleural effusions, increased from prior  Hyperenhancing soft tissue rind around the left effusion appears decreased from prior  HEART/GREAT VESSELS:  Unremarkable for patient's age  MEDIASTINUM AND MARGARITA:  Conglomerate raquel mass in the right hilum measures 3 2 cm x 2 3 cm (2/24) and has decreased in size from most recent prior (previously 4 3 cm x 3 1 cm)  Heterogeneous subcarinal raquel conglomeration measuring 2 8 cm x 1 9 cm (previously 5 cm x 3 7 cm  Significant decrease in infiltrative soft tissue mass in the left hilum  CHEST WALL AND LOWER NECK:   Left thyroid lobe nodule has decreased in size now measuring 8 mm versus 11 mm in April  No axillary lymphadenopathy  ABDOMEN LIVER/BILIARY TREE:  3 3 cm x 2 3 cm metastatic heterogeneously enhancing lesion in posterior right hepatic lobe, grossly unchanged since April when accounting for differences in phase of contrast   No new hepatic masses demonstrated  GALLBLADDER:  Rounded foci of thin lamellar hyperdensity the gallbladder body could represent gallstones  No wall thickening or pericholecystic inflammation  SPLEEN:  Bandlike hypoenhancement at the anterior spleen infarct  New 14 mm peripheral hypoenhancing focus at the posterior tip of the spleen (2/60) could represent infarct or metastasis  PANCREAS:  Unremarkable  ADRENAL GLANDS:  Unremarkable  KIDNEYS/URETERS:  Lower pole predominant nephrolithiasis without ureteral stones on either side  No hydronephrosis  Heterogeneous enhancement at the left lower pole appears significantly decreased in overall territory when compared to April and shows volume loss with concave cortical margin rather than masslike convex margins seen on prior  STOMACH AND BOWEL:  Extensive colonic diverticulosis    Gas, stool, and contrast extends the rectum without dilated or inflamed loops of bowel  Stomach is unremarkable  APPENDIX:  Not well demonstrated  No evidence for acute appendicitis  ABDOMINOPELVIC CAVITY:  Moderate fluid in the pelvis, increased since April  Ventral enhancing carcinomatosis in the anterior lower abdomen and pelvis (2/102) has increased in extent from April  Necrotic retroperitoneal (left periaortic) lymphadenopathy measures 5 8 cm x 3 4 cm versus 6 6 cm x 4 4 cm on prior  VESSELS:  Unremarkable for patient's age  PELVIS REPRODUCTIVE ORGANS:  Unremarkable for patient's age  URINARY BLADDER:  Layering hyperdensity in the posterior bladder may represent small stones or liquid calcium  Phase of contrast is too early to represent urographic contrast is no fistulous connection to the colon to suggest enteric contrast  ABDOMINAL WALL/INGUINAL REGIONS:  Unremarkable  OSSEOUS STRUCTURES: Soft tissue component of left 9th rib metastasis appears decreased from prior now with estimated size of 3 6 cm x 1 cm versus 4 6 cm x 2 0 cm in May  There is also increasing sclerosis consistent with ongoing bony remodeling  There has been a similar change and soft tissue component and mineralization at the right L4 transverse process metastasis  New nondisplaced fracture of the right 9th rib with immature callus  Associated mottled mineralization raises suspicion for underlying metastatic lesion, not previously demonstrated (3/98)  Buckling deformity at the anterior right 9th rib is chronic and unchanged  New right posterior 7th rib metastasis with associated mottled mineralization also showing nondisplaced pathologic fracture (3/49)  New punctate sclerotic foci in the sternum (3/52 and 3/58) concerning for new metastasis  Fracture of the anterior-superior corner of the sacrum with 5 mm of depression  Underlying mineralization shows mixed lytic and sclerotic mottling with greatest demineralization in the right anterior sacral alum (2/90)    This is, again, a pathologic fracture  Severe compression deformity, sclerosis, and retropulsion at the T9 vertebral body has progressed compared to May  Nondisplaced fracture of the T8 spinous process is unchanged in alignment  Increasing size of lytic lesion in the L2 vertebral body now measuring 19 mm versus 13 mm in April (602/81)  It should be noted that the sclerotic nature of some of the bony metastases (e g , sternum, sacrum) is atypical for renal cell carcinoma  Impression: 1  New rib metastases at the right 7th and 9th ribs with associated nondisplaced pathologic fractures  2   Pathologic fracture of the T9 vertebral body has progressed compared to prior with increased retropulsion  Correlate for evidence of increasing myelopathy  3   Increasing sacral metastases with new pathologic fracture at the anterior-superior endplate  Mild superior endplate depression  4   Metastatic lesions at the left 9th rib and right L4 transverse process, on the other hand, show some involution evidence for early remodeling 5  Significant reduction in mediastinal and hilar lymphadenopathy  Associated decrease in compression of the left lung airways with resultant decrease in left upper lobe postobstructive atelectasis and pneumonia  6   Metastatic lung nodules show a more modest decrease in size  Persistent interlobular septal thickening in the lungs 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  7   Decreasing left pleural nodularity, though with increasing metastatic pleural effusions, bilaterally  8   Significant decrease in left lower pole renal mass  9   Unchanged hepatic metastasis  10   Band of hypoenhancement spleen in the anterior spleen is clearly an infarct while the more rounded hyperenhancing focus posteriorly could represent metastatic lesion or infarct  11   Worsening peritoneal carcinomatosis in the ventral-lower abdomen with increasing moderate fluid in the pelvis, possibly developing pseudomyxoma   12   Decreasing necrotic retroperitoneal lymphadenopathy  13   Decreasing left thyroid nodule, presumably metastasis  SUMMARY:  Overall, there appears to be a mixed response to treatment  Decreasing mediastinal lymphadenopathy, retroperitoneal lymphadenopathy, left thyroid lesion, left pleural nodularity, and left renal mass  Stable to slightly decreased pulmonary metastatic nodules and lymphangitic carcinomatosis  Increasing skeletal metastases with associated pathologic fractures as well as increasing abdominopelvic carcinomatosis and possible new splenic metastasis versus infarct  The study was marked in EPIC for significant notification  Workstation performed: [id number]     Complete PFT with post bronchodilator    Result Date: 2021  Narrative: Pulmonary Function Test Report Jayden Garcia 77 y o  male MRN: [id number] Date of Testin21 Date of Interpretation: 21 Requesting Physician: Ramesh Fairchild Reason for Testing: COPD Reference set for interpretation: NHANES III Procedure: The patient was taken to pulmonary function testing laboratory  The patient demonstrated good effort and cooperation  The results of this test meet ATS standards for acceptability and repeatability  Data set appears appropriate for interpretation  Post bronchodilator testing performed after the administration of 2 5mg albuterol in 3cc normal saline administered via nebulizer per bronchodilator protocol   Results: FEV1/FVC Ratio: 69 % Forced Vital Capacity: 1 97 L 44 % predicted FEV1: 1 37 L 45 % predicted Lung volumes by body plethysmography: Total Lung Capacity 66 % predicted Residual volume 93 % predicted DLCO corrected for patients hemoglobin level: 60 % Interpretation: · Severe obstructive airflow defect · No significant response to the administration to bronchodilator per ATS Standards · · Mild restrictive lung disease as indicated by decreased TLC · Mild reduction in diffusion capacity · Flow-volume loop suggests obstruction BRADLY Garcia  [facility]      EKG, Pathology, and Other Studies: I have personally reviewed pertinent reports  VTE Prophylaxis: Sequential compression device (Venodyne)  and Heparin    Code Status: Level 1 - Full Code  Advance Directive and Living Will:      Power of :    POLST:      Counseling / Coordination of Care  I spent 30 minutes with the patient

## 2021-07-07 NOTE — ASSESSMENT & PLAN NOTE
· Likely due to bone metastasis  · Stop oral calcium and supplementation  · Check ionized calcium, PTH, vitamin D panel  · Avoid IVF due to volume overload as evidenced by pleural effusions and ascites on CT scan for now  · May need calcitonin, pamidronate

## 2021-07-08 LAB
ABO GROUP BLD BPU: NORMAL
BPU ID: NORMAL
CROSSMATCH: NORMAL
UNIT DISPENSE STATUS: NORMAL
UNIT PRODUCT CODE: NORMAL
UNIT RH: NORMAL

## 2021-07-08 PROCEDURE — 77412 RADIATION TX DELIVERY LVL 3: CPT | Performed by: RADIOLOGY

## 2021-07-08 PROCEDURE — 94668 MNPJ CHEST WALL SBSQ: CPT

## 2021-07-08 PROCEDURE — 97167 OT EVAL HIGH COMPLEX 60 MIN: CPT

## 2021-07-08 PROCEDURE — 94760 N-INVAS EAR/PLS OXIMETRY 1: CPT

## 2021-07-08 PROCEDURE — 99232 SBSQ HOSP IP/OBS MODERATE 35: CPT | Performed by: INTERNAL MEDICINE

## 2021-07-08 PROCEDURE — 77331 SPECIAL RADIATION DOSIMETRY: CPT | Performed by: RADIOLOGY

## 2021-07-08 PROCEDURE — 77387 GUIDANCE FOR RADJ TX DLVR: CPT | Performed by: RADIOLOGY

## 2021-07-08 PROCEDURE — 94664 DEMO&/EVAL PT USE INHALER: CPT

## 2021-07-08 PROCEDURE — 94640 AIRWAY INHALATION TREATMENT: CPT | Performed by: SOCIAL WORKER

## 2021-07-08 PROCEDURE — G6002 STEREOSCOPIC X-RAY GUIDANCE: HCPCS | Performed by: RADIOLOGY

## 2021-07-08 PROCEDURE — 94668 MNPJ CHEST WALL SBSQ: CPT | Performed by: SOCIAL WORKER

## 2021-07-08 PROCEDURE — 97163 PT EVAL HIGH COMPLEX 45 MIN: CPT

## 2021-07-08 PROCEDURE — 92526 ORAL FUNCTION THERAPY: CPT

## 2021-07-08 PROCEDURE — 94760 N-INVAS EAR/PLS OXIMETRY 1: CPT | Performed by: SOCIAL WORKER

## 2021-07-08 PROCEDURE — 94640 AIRWAY INHALATION TREATMENT: CPT

## 2021-07-08 PROCEDURE — 99232 SBSQ HOSP IP/OBS MODERATE 35: CPT | Performed by: NURSE PRACTITIONER

## 2021-07-08 RX ADMIN — OXYCODONE HYDROCHLORIDE 10 MG: 10 TABLET ORAL at 16:27

## 2021-07-08 RX ADMIN — SENNOSIDES 17.2 MG: 8.6 TABLET, FILM COATED ORAL at 08:40

## 2021-07-08 RX ADMIN — CEFTRIAXONE SODIUM 1000 MG: 10 INJECTION, POWDER, FOR SOLUTION INTRAVENOUS at 06:00

## 2021-07-08 RX ADMIN — HEPARIN SODIUM 5000 UNITS: 5000 INJECTION INTRAVENOUS; SUBCUTANEOUS at 13:56

## 2021-07-08 RX ADMIN — TIOTROPIUM BROMIDE 18 MCG: 18 CAPSULE ORAL; RESPIRATORY (INHALATION) at 08:39

## 2021-07-08 RX ADMIN — HEPARIN SODIUM 5000 UNITS: 5000 INJECTION INTRAVENOUS; SUBCUTANEOUS at 22:46

## 2021-07-08 RX ADMIN — DRONABINOL 5 MG: 2.5 CAPSULE ORAL at 06:17

## 2021-07-08 RX ADMIN — ACETAMINOPHEN 975 MG: 325 TABLET, FILM COATED ORAL at 06:17

## 2021-07-08 RX ADMIN — ACETAMINOPHEN 975 MG: 325 TABLET, FILM COATED ORAL at 22:46

## 2021-07-08 RX ADMIN — DEXAMETHASONE SODIUM PHOSPHATE 4 MG: 4 INJECTION INTRA-ARTICULAR; INTRALESIONAL; INTRAMUSCULAR; INTRAVENOUS; SOFT TISSUE at 01:00

## 2021-07-08 RX ADMIN — DEXAMETHASONE SODIUM PHOSPHATE 4 MG: 4 INJECTION INTRA-ARTICULAR; INTRALESIONAL; INTRAMUSCULAR; INTRAVENOUS; SOFT TISSUE at 12:25

## 2021-07-08 RX ADMIN — DOCUSATE SODIUM 100 MG: 100 CAPSULE ORAL at 16:27

## 2021-07-08 RX ADMIN — ISODIUM CHLORIDE 3 ML: 0.03 SOLUTION RESPIRATORY (INHALATION) at 07:48

## 2021-07-08 RX ADMIN — DEXAMETHASONE SODIUM PHOSPHATE 4 MG: 4 INJECTION INTRA-ARTICULAR; INTRALESIONAL; INTRAMUSCULAR; INTRAVENOUS; SOFT TISSUE at 16:27

## 2021-07-08 RX ADMIN — DEXAMETHASONE SODIUM PHOSPHATE 4 MG: 4 INJECTION INTRA-ARTICULAR; INTRALESIONAL; INTRAMUSCULAR; INTRAVENOUS; SOFT TISSUE at 06:16

## 2021-07-08 RX ADMIN — ACETAMINOPHEN 975 MG: 325 TABLET, FILM COATED ORAL at 13:57

## 2021-07-08 RX ADMIN — DRONABINOL 5 MG: 2.5 CAPSULE ORAL at 16:27

## 2021-07-08 RX ADMIN — FLUTICASONE FUROATE AND VILANTEROL TRIFENATATE 1 PUFF: 200; 25 POWDER RESPIRATORY (INHALATION) at 08:39

## 2021-07-08 RX ADMIN — AZITHROMYCIN MONOHYDRATE 500 MG: 500 INJECTION, POWDER, LYOPHILIZED, FOR SOLUTION INTRAVENOUS at 07:00

## 2021-07-08 RX ADMIN — PANTOPRAZOLE SODIUM 40 MG: 40 TABLET, DELAYED RELEASE ORAL at 06:16

## 2021-07-08 RX ADMIN — HEPARIN SODIUM 5000 UNITS: 5000 INJECTION INTRAVENOUS; SUBCUTANEOUS at 06:17

## 2021-07-08 RX ADMIN — LEVALBUTEROL HYDROCHLORIDE 1.25 MG: 1.25 SOLUTION, CONCENTRATE RESPIRATORY (INHALATION) at 07:48

## 2021-07-08 RX ADMIN — LEVALBUTEROL HYDROCHLORIDE 1.25 MG: 1.25 SOLUTION, CONCENTRATE RESPIRATORY (INHALATION) at 20:04

## 2021-07-08 RX ADMIN — ISODIUM CHLORIDE 3 ML: 0.03 SOLUTION RESPIRATORY (INHALATION) at 20:04

## 2021-07-08 RX ADMIN — DOCUSATE SODIUM 100 MG: 100 CAPSULE ORAL at 08:40

## 2021-07-08 RX ADMIN — HYDROMORPHONE HYDROCHLORIDE 0.5 MG: 1 INJECTION, SOLUTION INTRAMUSCULAR; INTRAVENOUS; SUBCUTANEOUS at 10:44

## 2021-07-08 RX ADMIN — OXYCODONE HYDROCHLORIDE 10 MG: 10 TABLET ORAL at 22:45

## 2021-07-08 NOTE — CASE MANAGEMENT
CM s/w pt's SO and gathered the following information:    HOME: Pt lives in a 3 SH with 2 HIRAM    LIVES W/: Alone    : Soila Eller (Brother/POA) [phone]    MEDICAL POA: Soila Eller (Brother/POA) [phone]    PCP: Melvina Ibrahim MD    PH[facility]ACY: [facility]    INDEPENDENCE: Ind at baseline w/o a device    TRANSPORTATION Appts: Drives self    DME: [facility]    HHC: None reported     I/P REHAB:  None reported    MENTAL HEALTH:  None reported    D&A:  None reported    TRANSPORTATION AT D/C: Family Transport    Patient/caregiver received discharge checklist   Content reviewed  Patient/caregiver encouraged to participate in discharge plan of care prior to discharge home  CM reviewed d/c planning process including the following: identifying help at home, patient preference for d/c planning needs, Discharge Lounge, [facility] Meds to Bed program, availability of treatment team to discuss questions or concerns patient and/or family may have regarding understanding medications and recognizing signs and symptoms once discharged  CM also encouraged patient to follow up with all recommended appointments after discharge  Patient advised of importance for patient and family to participate in managing patients medical well being

## 2021-07-08 NOTE — PROGRESS NOTES
[address]  Progress Note - Erik Biswas 1955, 77 y o  male MRN: [id number]  Unit/Bed#: [facility] 933-01 Encounter: [id number]  Primary Care Provider: Elpidio Iniguez MD   Date and time admitted to hospital: 7/6/2021 10:23 PM    Acute on chronic respiratory failure with hypoxia (HCC)  Assessment & Plan  · Multifactorial  · Due to pleural effusions, aspiration, malignancy  · Continue albuterol, advair, PRN oxygen  · IV decadron     * Bone metastases ([facility] [address] )  Assessment & Plan  Paraparesis bony metastasis  MRI lumbar and thoracic spines noted - noted to have cord edema below T8-T9, extensive metastatic disease  Continue IV dexamethasone  Discussed with neurosurgery - risks of operative intervention outweigh benefits, surgery deferred  Receiving palliative radiation with radiotherapy  Continue opioid analgesia  Palliative care following    Renal cell carcinoma of left kidney ([facility] [address] )  Assessment & Plan  · Follows with Dr Sarthak Gutierrez as an outpatient  · Supportive care    Aspiration into airway  Assessment & Plan  · Pt aspirated thin liquids after arrival to the hospital  · Aspiration precautions  · Monitor closely    Hypercalcemia  Assessment & Plan  · Likely due to bone metastasis  · IV dexamethasone  · Monitor closely    Severe protein-calorie malnutrition (HCC)  Assessment & Plan  Encourage adequate calorie and protein intake  Nutrition therapy following    Thrombocytosis (HCC)  Assessment & Plan  · Likely reactive   · Monitor    Chronic obstructive pulmonary disease (HCC)  Assessment & Plan  · PRN albuterol  · Continue advair and spiriva    Pleural effusion  Assessment & Plan  · Possibly malignancy vs due to hypoalbuminemia  · Monitor    Leucocytosis  Assessment & Plan  · Likely reactive  · Monitor counts    Anemia  Assessment & Plan  · Multifactorial  · Monitor hemoglobin          VTE Pharmacologic Prophylaxis: VTE Score: 7 High Risk (Score >/= 5) - Pharmacological DVT Prophylaxis Ordered: heparin  Sequential Compression Devices Ordered  Patient Centered Rounds: I performed bedside rounds with nursing staff today  Discussions with Specialists or Other Care Team Provider:     Education and Discussions with Family / Patient: Patient declined call to   Time Spent for Care: 30 minutes  More than 50% of total time spent on counseling and coordination of care as described above  Current Length of Stay: 2 day(s)  Current Patient Status: Inpatient   Certification Statement: The patient will continue to require additional inpatient hospital stay due to As outlined  Discharge Plan: Awaiting clinical and symptomatic improvement    Code Status: Level 3 - DNAR and DNI    Subjective:     Reports feeling better today  Continues to have generalized weakness and fatigue  Appetite fair  Overall prognosis explained to the patient he verbalized understanding    Objective:     Vitals:   Temp (24hrs), Av 5 °F (36 4 °C), Min:97 2 °F (36 2 °C), Max:98 4 °F (36 9 °C)    Temp:  [97 2 °F (36 2 °C)-98 4 °F (36 9 °C)] 97 4 °F (36 3 °C)  HR:  [] 87  Resp:  [16-22] 16  BP: (108-148)/(59-86) 129/68  SpO2:  [93 %-98 %] 98 %  Body mass index is 18 79 kg/m²  Input and Output Summary (last 24 hours):      Intake/Output Summary (Last 24 hours) at 2021 1351  Last data filed at 2021 1044  Gross per 24 hour   Intake 680 ml   Output 1200 ml   Net -520 ml       Physical Exam:   Physical Exam     Comfortably in bed  Dyspneic at rest but able to complete sentences  Features of protein calorie malnutrition noted  Neck supple  Lungs diminished breath sounds bilaterally  Heart sounds S1 and S2 noted  Abdomen soft  Awake obeys simple commands  Paraplegia noted  No rash      Additional Data:     Labs:  Results from last 7 days   Lab Units 21  1358 21  0400 21  0942   WBC Thousand/uL  --  15 02* 14 29*   HEMOGLOBIN g/dL 8 5* 7 0* 7 2*   HEMATOCRIT % 30 7* 26 3* 27 0*   PLATELETS Thousands/uL  --  675* 724*   NEUTROS PCT %  --   --  88*   LYMPHS PCT %  --   --  3*   MONOS PCT %  --   --  7   EOS PCT %  --   --  0     Results from last 7 days   Lab Units 07/07/21  0400 07/06/21  0942   SODIUM mmol/L 140 142   POTASSIUM mmol/L 3 4* 3 0*   CHLORIDE mmol/L 105 101   CO2 mmol/L 31 32   BUN mg/dL 12 11   CREATININE mg/dL 0 24* 0 44*   ANION GAP mmol/L 4 9   CALCIUM mg/dL 8 8 8 6   ALBUMIN g/dL  --  1 6*   TOTAL BILIRUBIN mg/dL  --  0 36   ALK PHOS U/L  --  249*   ALT U/L  --  37   AST U/L  --  21   GLUCOSE RANDOM mg/dL 79 87     Results from last 7 days   Lab Units 07/06/21  0942   INR  1 36*             Results from last 7 days   Lab Units 07/07/21  0630 07/07/21  0400 07/06/21  0942   LACTIC ACID mmol/L  --   --  1 9   PROCALCITONIN ng/ml 0 73* 0 98* 0 32*       Lines/Drains:  Invasive Devices     Peripheral Intravenous Line            Peripheral IV 07/07/21 Dorsal (posterior); Right Forearm 1 day    Peripheral IV 07/07/21 Left;Ventral (anterior) Forearm 1 day                  Imaging: Reviewed radiology reports from this admission including: MRI spine    Recent Cultures (last 7 days):   Results from last 7 days   Lab Units 07/07/21  0359 07/06/21  0942   BLOOD CULTURE  No Growth at 24 hrs  No Growth at 24 hrs  No Growth at 24 hrs  No Growth at 24 hrs         Last 24 Hours Medication List:   Current Facility-Administered Medications   Medication Dose Route Frequency Provider Last Rate    acetaminophen  975 mg Oral [facility] Adriane Miller MD      albuterol  2 5 mg Nebulization Q6H PRN Adriane Miller MD      cefTRIAXone  1,000 mg Intravenous Q24H Félix Goel PA-C 1,000 mg (07/08/21 0600)    And    azithromycin  500 mg Intravenous Q24H Hetal Shrestha PA-C 500 mg (07/08/21 0700)    calcium carbonate  1,000 mg Oral Daily PRN Adriane Miller MD      dexamethasone  4 mg Intravenous Q6H Harlan Gutierrez MD      docusate sodium  100 mg Oral BID Adrinae Miller MD      dronabinol  5 mg Oral BID AC India Felix MD      fluticasone-vilanterol  1 puff Inhalation Daily India Felix MD      heparin (porcine)  5,000 Units Subcutaneous [facility] Mayra Urias MD      HYDROmorphone  0 5 mg Intravenous Q4H PRN Mayra Urias MD      levalbuterol  1 25 mg Nebulization TID India Felix MD      ondansetron  4 mg Intravenous Q6H PRN India Felix MD      oxyCODONE  10 mg Oral Q4H PRN India Felix MD      oxyCODONE  5 mg Oral Q4H PRN India Felix MD      pantoprazole  40 mg Oral Daily India Felix MD      potassium chloride  40 mEq Oral Once India Felix MD      senna  2 tablet Oral Daily India Felix MD      sodium chloride  3 mL Nebulization TID India Felix MD      tiotropium  18 mcg Inhalation Daily India Felix MD          Today, Patient Was Seen By: Mayra Urias MD    **Please Note: This note may have been constructed using a voice recognition system  **

## 2021-07-08 NOTE — PLAN OF CARE
Problem: PAIN - ADULT  Goal: Verbalizes/displays adequate comfort level or baseline comfort level  Description: Interventions:  - Encourage patient to monitor pain and request assistance  - Assess pain using appropriate pain scale  - Administer analgesics based on type and severity of pain and evaluate response  - Implement non-pharmacological measures as appropriate and evaluate response  - Consider cultural and social influences on pain and pain management  - Notify physician/advanced practitioner if interventions unsuccessful or patient reports new pain  Outcome: Progressing     Problem: INFECTION - ADULT  Goal: Absence or prevention of progression during hospitalization  Description: INTERVENTIONS:  - Assess and monitor for signs and symptoms of infection  - Monitor lab/diagnostic results  - Monitor all insertion sites, i e  indwelling lines, tubes, and drains  - Monitor endotracheal if appropriate and nasal secretions for changes in amount and color  - [place] appropriate cooling/warming therapies per order  - Administer medications as ordered  - Instruct and encourage patient and family to use good hand hygiene technique  - Identify and instruct in appropriate isolation precautions for identified infection/condition  Outcome: Progressing  Goal: Absence of fever/infection during neutropenic period  Description: INTERVENTIONS:  - Monitor WBC    Outcome: Progressing     Problem: SAFETY ADULT  Goal: Patient will remain free of falls  Description: INTERVENTIONS:  - Educate patient/family on patient safety including physical limitations  - Instruct patient to call for assistance with activity   - Consult OT/PT to assist with strengthening/mobility   - Keep Call bell within reach  - Keep bed low and locked with side rails adjusted as appropriate  - Keep care items and personal belongings within reach  - Initiate and maintain comfort rounds  - Make Fall Risk Sign visible to staff  - Offer Toileting every 2 Hours, in advance of need  - Initiate/Maintain alarm  - Obtain necessary fall risk management equipment:   - Apply yellow socks and bracelet for high fall risk patients  - Consider moving patient to room near nurses station  Outcome: Progressing  Goal: Maintain or return to baseline ADL function  Description: INTERVENTIONS:  -  Assess patient's ability to carry out ADLs; assess patient's baseline for ADL function and identify physical deficits which impact ability to perform ADLs (bathing, care of mouth/teeth, toileting, grooming, dressing, etc )  - Assess/evaluate cause of self-care deficits   - Assess range of motion  - Assess patient's mobility; develop plan if impaired  - Assess patient's need for assistive devices and provide as appropriate  - Encourage maximum independence but intervene and supervise when necessary  - Involve family in performance of ADLs  - Assess for home care needs following discharge   - Consider OT consult to assist with ADL evaluation and planning for discharge  - Provide patient education as appropriate  Outcome: Progressing  Goal: Maintains/Returns to pre admission functional level  Description: INTERVENTIONS:  - Perform BMAT or MOVE assessment daily    - Set and communicate daily mobility goal to care team and patient/family/caregiver  - Collaborate with rehabilitation services on mobility goals if consulted  - Perform Range of Motion 3 times a day  - Reposition patient every 2 hours    - Dangle patient 3 times a day  - Stand patient 3 times a day  - Ambulate patient 3 times a day  - Out of bed to chair 3 times a day   - Out of bed for meals 3 times a day  - Out of bed for toileting  - Record patient progress and toleration of activity level   Outcome: Progressing     Problem: DISCHARGE PLANNING  Goal: Discharge to home or other facility with appropriate resources  Description: INTERVENTIONS:  - Identify barriers to discharge w/patient and caregiver  - Arrange for needed discharge resources and transportation as appropriate  - Identify discharge learning needs (meds, wound care, etc )  - Arrange for interpretive services to assist at discharge as needed  - Refer to Case Management Department for coordinating discharge planning if the patient needs post-hospital services based on physician/advanced practitioner order or complex needs related to functional status, cognitive ability, or social support system  Outcome: Progressing     Problem: Knowledge Deficit  Goal: Patient/family/caregiver demonstrates understanding of disease process, treatment plan, medications, and discharge instructions  Description: Complete learning assessment and assess knowledge base    Interventions:  - Provide teaching at level of understanding  - Provide teaching via preferred learning methods  Outcome: Progressing     Problem: Potential for Falls  Goal: Patient will remain free of falls  Description: INTERVENTIONS:  - Educate patient/family on patient safety including physical limitations  - Instruct patient to call for assistance with activity   - Consult OT/PT to assist with strengthening/mobility   - Keep Call bell within reach  - Keep bed low and locked with side rails adjusted as appropriate  - Keep care items and personal belongings within reach  - Initiate and maintain comfort rounds  - Make Fall Risk Sign visible to staff  - Offer Toileting every 2 Hours, in advance of need  - Initiate/Maintain alarm  - Obtain necessary fall risk management equipment:   - Apply yellow socks and bracelet for high fall risk patients  - Consider moving patient to room near nurses station  Outcome: Progressing     Problem: Prexisting or High Potential for Compromised Skin Integrity  Goal: Skin integrity is maintained or improved  Description: INTERVENTIONS:  - Identify patients at risk for skin breakdown  - Assess and monitor skin integrity  - Assess and monitor nutrition and hydration status  - Monitor labs   - Assess for incontinence   - Turn and reposition patient  - Assist with mobility/ambulation  - Relieve pressure over bony prominences  - Avoid friction and shearing  - Provide appropriate hygiene as needed including keeping skin clean and dry  - Evaluate need for skin moisturizer/barrier cream  - Collaborate with interdisciplinary team   - Patient/family teaching  - Consider wound care consult   Outcome: Progressing     Problem: MOBILITY - ADULT  Goal: Maintain or return to baseline ADL function  Description: INTERVENTIONS:  -  Assess patient's ability to carry out ADLs; assess patient's baseline for ADL function and identify physical deficits which impact ability to perform ADLs (bathing, care of mouth/teeth, toileting, grooming, dressing, etc )  - Assess/evaluate cause of self-care deficits   - Assess range of motion  - Assess patient's mobility; develop plan if impaired  - Assess patient's need for assistive devices and provide as appropriate  - Encourage maximum independence but intervene and supervise when necessary  - Involve family in performance of ADLs  - Assess for home care needs following discharge   - Consider OT consult to assist with ADL evaluation and planning for discharge  - Provide patient education as appropriate  Outcome: Progressing  Goal: Maintains/Returns to pre admission functional level  Description: INTERVENTIONS:  - Perform BMAT or MOVE assessment daily    - Set and communicate daily mobility goal to care team and patient/family/caregiver  - Collaborate with rehabilitation services on mobility goals if consulted  - Perform Range of Motion 3 times a day  - Reposition patient every 2 hours    - Dangle patient 3 times a day  - Stand patient 3 times a day  - Ambulate patient 3 times a day  - Out of bed to chair 3 times a day   - Out of bed for meals 3 times a day  - Out of bed for toileting  - Record patient progress and toleration of activity level   Outcome: Progressing     Problem: Nutrition/Hydration-ADULT  Goal: Nutrient/Hydration intake appropriate for improving, restoring or maintaining nutritional needs  Description: Monitor and assess patient's nutrition/hydration status for malnutrition  Collaborate with interdisciplinary team and initiate plan and interventions as ordered  Monitor patient's weight and dietary intake as ordered or per policy  Utilize nutrition screening tool and intervene as necessary  Determine patient's food preferences and provide high-protein, high-caloric foods as appropriate       INTERVENTIONS:  - Monitor oral intake, urinary output, labs, and treatment plans  - Assess nutrition and hydration status and recommend course of action  - Evaluate amount of meals eaten  - Assist patient with eating if necessary   - Allow adequate time for meals  - Recommend/ encourage appropriate diets, oral nutritional supplements, and vitamin/mineral supplements  - Order, calculate, and assess calorie counts as needed  - Recommend, monitor, and adjust tube feedings and TPN/PPN based on assessed needs  - Assess need for intravenous fluids  - Provide specific nutrition/hydration education as appropriate  - Include patient/family/caregiver in decisions related to nutrition  Outcome: Progressing

## 2021-07-08 NOTE — SPEECH THERAPY NOTE
Speech Language/Pathology    Speech/Language Pathology Progress Note    Patient Name: Musa Cordova  MBYBF'Z Date: 7/8/2021     Problem List  Principal Problem:    Bone metastases ([address] )  Active Problems:    Anemia    Leucocytosis    Pleural effusion    Chronic obstructive pulmonary disease (HCC)    Renal cell carcinoma of left kidney (HCC)    Thrombocytosis (HCC)    Acute on chronic respiratory failure with hypoxia (HCC)    Severe protein-calorie malnutrition (HCC)    Hypercalcemia    Aspiration into airway       Past Medical History  Past Medical History:   Diagnosis Date    Emphysema of lung ([address] )     Hypocalcemia 5/5/2021    Sepsis ([address] ) 5/3/2021    Transaminitis 5/3/2021        Past Surgical History  Past Surgical History:   Procedure Laterality Date    IR BIOPSY LIVER MASS  4/7/2021    IR BIOPSY LYMPH NODE  4/13/2021    IR THORACENTESIS  4/6/2021    TENDON REPAIR Right     forearm         Subjective:  "It's good  I got bad news about things" Patient awake and alert  Just returned from radiation tx  Objective: The patient is seen for dysphagia therapy  He is assessed with peanut butter and jelly sandwich  The patient feeds himself  Bite strength is adequate  Mastication is timely and efficient with no oral residue  He is observed with slight cough x1 with sandwich and states "I mean its peanut butter  It's sticky"  The patient intermittently takes small sips of thin liquids via straw and tolerates well with no overt s/s aspiration  Patient educated on risk of aspiration, especially when more lethargic  Appears safe for diet upgrade  Assessment:  The patient tolerated upgraded trials well  Plan/Recommendations:  Recommend diet upgrade to level 3 with thin liquids  ST will continue to further assess

## 2021-07-08 NOTE — PLAN OF CARE
Problem: PHYSICAL THERAPY ADULT  Goal: Performs mobility at highest level of function for planned discharge setting  See evaluation for individualized goals  Description: Treatment/Interventions: Endurance training, Patient/family training, Bed mobility, Spoke to nursing, Spoke to case management, OT  Equipment Recommended: Wheelchair       See flowsheet documentation for full assessment, interventions and recommendations  7/8/2021 1601 by Terry Teresa PT  Note: Prognosis: Fair  Problem List: Decreased strength, Decreased range of motion, Decreased endurance, Impaired balance, Decreased mobility, Decreased safety awareness, Orthopedic restrictions  Assessment: Pt seen for high complexity PT evaluation  Pt with active PT eval/treat orders  Pt is a 77 y o  male [facility] with bone metastases  Pt's active problems include: anemia, leucocytosis, pleural effusion, COPD, renal cell carcinoma of L kidney, thrombocytopenia, acute on chonic respiratory failure, severe protein calorie malnutrition, and hypercalcemia  Pt  has a past medical history of Emphysema of [address] ([facility] [address] ), Hypocalcemia (5/5/2021), Sepsis ([facility] [address] ) (5/3/2021), and Transaminitis (5/3/2021)  Pt resides alone in [facility] with 2 [facility] and was independent prior to hospital admission  Pt has limitations in strength, balance, endurance, and overall functional mobility  Pt requires assist of 1-2 for all mobility performed this date  TLSO donned prior to mobility  Pt performed R/L rolling bed mobility tasks with mod Ax1  Pt performed sit<>sup and sup<>sit bed mobility tasks with mod Ax2  Pt sat EOB fluctuating from mod- min to maintain upright sitting posterior and correct posterior lean  Pt impulsive throughout therapy session- cues for sdafety and to maintain spinal precautions required  OOB mobility deferred 2* decreased sitting balance- will continue to assess and evaluate as appropriate and able   Pt was left supine in bed with bed alarm on at the end of PT session with all needs in reach  Pt would benefit from continued PT services while in hospital to address remaining limitations  PT to continue to follow pt and recommends post-acute rehab services after d/c  The patient's AM-PAC Basic Mobility Inpatient Short Form Low Function Raw Score 15 , Standardized Score is 23 9  A standardized score less 42 9 suggests the patient may benefit from discharge to post-acute rehab services  Please also refer to the recommendation of the Physical Therapist for safe discharge planning  Barriers to Discharge: Inaccessible home environment, Decreased caregiver support        PT Discharge Recommendation: Post acute rehabilitation services     PT - OK to Discharge: Yes (to rehab once medically cleared )    See flowsheet documentation for full assessment

## 2021-07-08 NOTE — PLAN OF CARE
Problem: OCCUPATIONAL THERAPY ADULT  Goal: Performs self-care activities at highest level of function for planned discharge setting  See evaluation for individualized goals  Description: Treatment Interventions: ADL retraining, Functional transfer training, Endurance training, Patient/family training, Equipment evaluation/education, Compensatory technique education, Energy conservation          See flowsheet documentation for full assessment, interventions and recommendations  7/8/2021 1403 by Wang Frias OT  Note: Limitation: Decreased ADL status, Decreased endurance, Decreased self-care trans, Decreased high-level ADLs  Prognosis: Fair  Assessment: Pt is a 77 y o  male who was admitted to [facility] on 7/6/2021 with Bone metastases ([address] )  Pt with orders for a TLSO brace  His comorbidities include acute on chronic respiratory failure, renal cell carcinoma of kidney, hypercalcemia, COPD, pleural effusion, and anemia  At baseline pt was I w/ ADLs and mobility  Pt lives alone in a multi level home  Currently pt requires mod-max A for overall ADLS and mod Ax2 for bed mobility and supine<>sit transfer  Pt tolerated sitting EOB for 5 minutes, but unsafe to perform standing transfer at this time 2* LE weakness  Pt currently presents with impairments in the following categories -steps to enter environment, limited home support, difficulty performing ADLS and difficulty performing IADLS  activity tolerance, endurance, standing balance/tolerance and sitting balance/tolerance  These impairments, as well as pt's fatigue, pain, spinal precautions, decreased caregiver support and risk for falls  limit pt's ability to safely engage in all baseline areas of occupation, including grooming, bathing, dressing, toileting and functional mobility/transfers  The patient's raw score on the AM-PAC Daily Activity inpatient short form is 13, standardized score is 32 03, less than 39 4   Patients at this level are likely to benefit from discharge to post-acute rehabilitation services  Please refer to the recommendation of the Occupational Therapist for safe discharge planning  From OT standpoint, recommend inpatient rehab upon D/C  OT will continue to follow to address the below stated goals  OT Discharge Recommendation: Post acute rehabilitation services       7/8/2021 1403 by Sydnee Marie OT  Note: Limitation: Decreased ADL status, Decreased endurance, Decreased self-care trans, Decreased high-level ADLs  Prognosis: Fair  Assessment: Pt is a 77 y o  male who was admitted to [facility] on 7/6/2021 with Bone metastases ([address] )  Pt with orders for a TLSO brace  His comorbidities include acute on chronic respiratory failure, renal cell carcinoma of kidney, hypercalcemia, COPD, pleural effusion, and anemia  At baseline pt was I w/ ADLs and mobility  Pt lives alone in a multi level home  Currently pt requires mod-max A for overall ADLS and mod Ax2 for bed mobility and supine<>sit transfer  Pt tolerated sitting EOB for 5 minutes, but unsafe to perform standing transfer at this time 2* LE weakness  Pt currently presents with impairments in the following categories -steps to enter environment, limited home support, difficulty performing ADLS and difficulty performing IADLS  activity tolerance, endurance, standing balance/tolerance and sitting balance/tolerance  These impairments, as well as pt's fatigue, pain, spinal precautions, decreased caregiver support and risk for falls  limit pt's ability to safely engage in all baseline areas of occupation, including grooming, bathing, dressing, toileting and functional mobility/transfers  The patient's raw score on the AM-PAC Daily Activity inpatient short form is 13, standardized score is 32 03, less than 39 4  Patients at this level are likely to benefit from discharge to post-acute rehabilitation services   Please refer to the recommendation of the Occupational Therapist for safe discharge planning  From OT standpoint, recommend inpatient rehab upon D/C  OT will continue to follow to address the below stated goals        OT Discharge Recommendation: Post acute rehabilitation services

## 2021-07-08 NOTE — ASSESSMENT & PLAN NOTE
Paraparesis bony metastasis  MRI lumbar and thoracic spines noted - noted to have cord edema below T8-T9, extensive metastatic disease  Continue IV dexamethasone  Discussed with neurosurgery - risks of operative intervention outweigh benefits, surgery deferred  Receiving palliative radiation with radiotherapy  Continue opioid analgesia  Palliative care following

## 2021-07-08 NOTE — PROGRESS NOTES
Progress note - Palliative and Supportive Care   George Armas 77 y o  male [id number]    Patient Active Problem List   Diagnosis    Shortness of breath    Anemia    Leucocytosis    Pleural effusion    Lung mass    Mediastinal lymphadenopathy    Tachycardia    Bone metastases (HCC)    Chronic obstructive pulmonary disease (HCC)    Renal cell carcinoma of left kidney (HCC)    HCAP (healthcare-associated pneumonia)    Thrombocytosis (HCC)    Elevated d-dimer    Acute on chronic respiratory failure with hypoxia (HCC)    Hypophosphatemia    Vitamin D deficiency    Premature atrial contractions    Severe protein-calorie malnutrition (HCC)    Ventricular ectopy    Diastolic dysfunction    Peripheral edema    Thrush    Hypercalcemia    Aspiration into airway     Active issues specifically addressed today include:   Stage iv RCC  Cancer related pain  Goals of care  Symptom management     Plan:  1  Symptom management   Encourage use of PO medications    Pain  -  Oxycodone 5mg -10 mg Q 4 hours prn mod/severe pain  -  Hydromorphone 0 5 Q 4 hour prn breakthrough pain     2  Goals  · Disease focused cares with limits of DNAR/DNI  · Goals of care discussions ongoing       Code Status: DNAR/DNI  - Level 3   Decisional apparatus:  Patient is competent on my exam today  If competence is lost, patient's substitute decision maker would default to brother hodan  by [place]  Advance Directive / Living Will / POLST:  None on file, copy requested     Interval history:       Patient continues with radiation to spina mass with cord compression  He complains of pain 7-8/10 in his back  Per nursing the patient offers minimal complaints when asked about pain medication however rates his pain consistently a 7-8/10  Encouraged use of PO pain medication        MEDICATIONS / ALLERGIES:     current meds:   Current Facility-Administered Medications   Medication Dose Route Frequency    acetaminophen (TYLENOL) tablet is alert and oriented to person, place, and time  Psychiatric:         Attention and Perception: Attention normal          Mood and Affect: Mood normal          Behavior: Behavior is cooperative  Cognition and Memory: Cognition normal          Lab Results: CBC: No results found for: WBC, HGB, HCT, MCV, PLT, ADJUSTEDWBC, MCH, MCHC, RDW, MPV, NRBC, CMP: No results found for: SODIUM, K, CL, CO2, ANIONGAP, BUN, CREATININE, GLUCOSE, CALCIUM, AST, ALT, ALKPHOS, PROT, BILITOT, EGFR, PT/PTT:No results found for: PT, PTT      Counseling / Coordination of Care    Total floor / unit time spent today 20+  minutes  Greater than 50% of total time was spent with the patient and / or family counseling and / or coordination of care  A description of the counseling / coordination of care: review of symptoms, collaboartion with nursing team, supportive listening, offered information

## 2021-07-08 NOTE — ASSESSMENT & PLAN NOTE
· Pt aspirated thin liquids after arrival to the hospital  · Aspiration precautions  · Monitor closely

## 2021-07-08 NOTE — PHYSICAL THERAPY NOTE
Physical Therapy Evaluation     Patient's Name: Tien Morel    Admitting Diagnosis  Renal cell carcinoma ([facility] [address] ) [C64 9]    Problem List  Patient Active Problem List   Diagnosis    Shortness of breath    Anemia    Leucocytosis    Pleural effusion    Lung mass    Mediastinal lymphadenopathy    Tachycardia    Bone metastases (HCC)    Chronic obstructive pulmonary disease (HCC)    Renal cell carcinoma of left kidney (HCC)    HCAP (healthcare-associated pneumonia)    Thrombocytosis (HCC)    Elevated d-dimer    Acute on chronic respiratory failure with hypoxia (HCC)    Hypophosphatemia    Vitamin D deficiency    Premature atrial contractions    Severe protein-calorie malnutrition (HCC)    Ventricular ectopy    Diastolic dysfunction    Peripheral edema    Thrush    Hypercalcemia    Aspiration into airway       Past Medical History  Past Medical History:   Diagnosis Date    Emphysema of lung ([facility] [address] )     Hypocalcemia 5/5/2021    Sepsis ([facility] [address] ) 5/3/2021    Transaminitis 5/3/2021       Past Surgical History  Past Surgical History:   Procedure Laterality Date    IR BIOPSY LIVER MASS  4/7/2021    IR BIOPSY LYMPH NODE  4/13/2021    IR THORACENTESIS  4/6/2021    TENDON REPAIR Right     forearm        07/08/21 1043   PT Last Visit   PT Visit Date 07/08/21   Note Type   Note type Evaluation   Pain Assessment   Pain Assessment Tool Pain Assessment not indicated - pt denies pain   Pain Score No Pain   Home Living   Type of [address] [place] [address] Two level;Bed/bath upstairs  (2 HIRAM)   Bathroom Shower/Tub Walk-in shower   Bathroom Toilet Raised   Bathroom Equipment   (denies )   Home Equipment   (denies )   Prior Function   Level of [place] Independent with ADLs and functional mobility   Lives With Alone   Receives Help From Family  (brothers are local )   ADL Assistance Independent   IADLs Independent   Falls in the last 6 months 0  (per pt report )   Restrictions/Precautions   Braces or Orthoses TLSO  (when OOB or >45*)   Other Precautions Chair Alarm; Bed Alarm;Spinal precautions;Multiple lines;O2;Impulsive   General   Family/Caregiver Present No   Cognition   Arousal/Participation Alert   Attention Within functional limits   Orientation Level Oriented X4   Memory Decreased recall of precautions   Following Commands Follows one step commands without difficulty   Comments Pt pleasant and cooperative  Pt mildly impulsive while performing all mobility tasks- required increased cuing for safety throughout therapy session    RLE Assessment   RLE Assessment   (grossly 2-/5 )   LLE Assessment   LLE Assessment   (grossly 2-/5 )   Bed Mobility   Rolling R 3  Moderate assistance   Additional items Assist x 1;Bedrails; Increased time required;Verbal cues   Rolling L 3  Moderate assistance   Additional items Assist x 1;Bedrails; Increased time required   Supine to Sit 3  Moderate assistance   Additional items Assist x 2; Increased time required;HOB elevated; Bedrails;Verbal cues;LE management   Sit to Supine 3  Moderate assistance   Additional items Assist x 2;HOB elevated; Bedrails; Increased time required;LE management   Additional Comments Pt supine in bed upon arrival  Pt required min- mod Ax1 to correct posterior lean and maintain upright sitting posture  Pt returned to supine in bed with bed alarm on and all needs within reach    Transfers   Sit to Stand Unable to assess   Additional Comments STS not appropriate this date 2* poor sitting tolerance   Will continue to assess and trial as appropriate and able    Ambulation/Elevation   Gait pattern Not appropriate   Balance   Static Sitting Poor   Dynamic Sitting Poor -   Activity Tolerance   Activity Tolerance Patient limited by fatigue;Patient tolerated treatment well   Medical Staff Made Aware OT; co-eval performed this date 2* increased medical complexity and multiple co-morbidities    Nurse Made Aware RN cleared pt to participate in therapy session Assessment   Prognosis Fair   Problem List Decreased strength;Decreased range of motion;Decreased endurance; Impaired balance;Decreased mobility; Decreased safety awareness;Orthopedic restrictions   Assessment Pt seen for high complexity PT evaluation  Pt with active PT eval/treat orders  Pt is a 77 y o  male who was [facility] on 7/6/2021 with bone metastases  Pt's active problems include: anemia, leucocytosis, pleural effusion, COPD, renal cell carcinoma of L kidney, thrombocytopenia, acute on chonic respiratory failure, severe protein calorie malnutrition, and hypercalcemia  Pt  has a past medical history of Emphysema of lung ([facility] [address] ), Hypocalcemia (5/5/2021), Sepsis ([facility] [address] ) (5/3/2021), and Transaminitis (5/3/2021)  Pt resides alone in 2  with 2 [facility] and was independent prior to hospital admission  Pt has limitations in strength, balance, endurance, and overall functional mobility  Pt requires assist of 1-2 for all mobility performed this date  TLSO donned prior to mobility  Pt performed R/L rolling bed mobility tasks with mod Ax1  Pt performed sit<>sup and sup<>sit bed mobility tasks with mod Ax2  Pt sat EOB fluctuating from mod- min to maintain upright sitting posterior and correct posterior lean  Pt impulsive throughout therapy session- cues for safety and to maintain spinal precautions required  OOB mobility deferred 2* decreased sitting balance- will continue to assess and evaluate as appropriate and able  Pt was left supine in bed with bed alarm on at the end of PT session with all needs in reach  Pt would benefit from continued PT services while in hospital to address remaining limitations  PT to continue to follow pt and recommends post-acute rehab services after d/c  The patient's AM-PAC Basic Mobility Inpatient Short Form Low Function Raw Score 15 , Standardized Score is 23 9  A standardized score less 42 9 suggests the patient may benefit from discharge to post-acute rehab services  Please also refer to the recommendation of the Physical Therapist for safe discharge planning  Barriers to Discharge Inaccessible home environment;Decreased caregiver support   Goals   Patient Goals to get stronger    STG Expiration Date 07/22/21   Short Term Goal #1 STG 1  Pt will be able to perform bed mobility with mod I in order to improve overall functional mobility and assist in safe d/c  STG 2  Pt will improve sitting/standing static/dynamic balance 1 grade in order to improve functional mobility and assist in safe d/c  STG 3  Pt will improve LE strength by one grade in order to improve functional mobility and assist in safe d/c  *functional transfers not assessed this date will continue to follow and assess as appropriate and able      PT Treatment Day 0   Plan   Treatment/Interventions Endurance training;Patient/family training;Bed mobility;Spoke to nursing;Spoke to case management;OT   PT Frequency Other (Comment)  (3-5x/wk )   Recommendation   PT Discharge Recommendation Post acute rehabilitation services   Equipment Recommended Wheelchair   PT - OK to Discharge Yes  (to rehab once medically cleared )   AM-PAC Basic Mobility Inpatient   Turning in Bed Without Bedrails 2   Lying on Back to Sitting on Edge of Flat Bed 1   Moving Bed to Chair 1   Standing Up From Chair 1   Walk in Room 1   Climb 3-5 Stairs 1   Basic Mobility Inpatient Raw Score 7   Turning Head Towards Sound 4   Follow Simple Instructions 4   Low Function Basic Mobility Raw Score 15   Low Function Basic Mobility Standardized Score 23 9           Divya Wagner, PT, DPT

## 2021-07-08 NOTE — OCCUPATIONAL THERAPY NOTE
Occupational Therapy Evaluation     Patient Name: Erik Biswas  DUVBT'D Date: 7/8/2021  Problem List  Principal Problem:    Bone metastases ([facility] )  Active Problems:    Anemia    Leucocytosis    Pleural effusion    Chronic obstructive pulmonary disease (HCC)    Renal cell carcinoma of left kidney (HCC)    Thrombocytosis (HCC)    Acute on chronic respiratory failure with hypoxia (HCC)    Severe protein-calorie malnutrition (HCC)    Hypercalcemia    Aspiration into airway    Past Medical History  Past Medical History:   Diagnosis Date    Emphysema of lung ([facility] )     Hypocalcemia 5/5/2021    [facility] 5/3/2021    Transaminitis 5/3/2021     Past Surgical History  Past Surgical History:   Procedure Laterality Date    IR BIOPSY LIVER MASS  4/7/2021    IR BIOPSY LYMPH NODE  4/13/2021    IR THORACENTESIS  4/6/2021    TENDON REPAIR Right     forearm         07/08/21 1040   Note Type   Note type Evaluation   Restrictions/Precautions   Braces or Orthoses TLSO   Other Precautions Fall Risk;Multiple lines;O2   Home Living   Type of Home House   Home Layout Multi-level   Prior Function   Level of [place] Independent with ADLs and functional mobility   Lives With Alone   ADL Assistance Independent   IADLs Independent   Lifestyle   Autonomy Pt was I w/o a device PTA      Reciprocal Relationships Lives alone   Service to Others not working   Psychosocial   Psychosocial (WDL) WDL   ADL   Where Assessed Edge of bed   Eating Assistance 5  Supervision/Setup   Eating Deficit Setup   Grooming Assistance 4  Minimal Assistance   UB Bathing Assistance 4  Minimal Assistance   LB Bathing Assistance 2  Maximal Assistance   UB Dressing Assistance 4  Minimal Assistance   LB Dressing Assistance 2  Maximal [address]  3  Moderate Assistance   Functional Assistance 2  Maximal Assistance   Bed Mobility   Rolling R 3  Moderate assistance   Additional items Assist x 1;Bedrails   Rolling L 3  Moderate assistance Additional items Assist x 1;Bedrails   Supine to Sit 3  Moderate assistance   Additional items Assist x 1   Sit to Supine 3  Moderate assistance   Additional items Assist x 2   Additional Comments Pt tolerated sitting EOB for about 5 minutes w/ min-mod A for support while seated   Transfers   Sit to Stand Unable to assess   Additional Comments Sit<>stand not appropriate at this time 2* decreased B/L LE weakness  Balance   Static Sitting Poor   Dynamic Sitting Poor -   Activity Tolerance   Activity Tolerance Patient tolerated treatment well   Medical Staff Made Aware Yes   Nurse Made Aware Yes   RUE Assessment   RUE Assessment WFL   LUE Assessment   LUE Assessment WFL   Hand Function   Gross Motor Coordination Functional   Fine Motor Coordination Functional   Cognition   Overall Cognitive Status WFL   Arousal/Participation Alert; Responsive; Cooperative   Attention Within functional limits   Orientation Level Oriented X4   Memory Decreased recall of precautions   Following Commands Follows one step commands without difficulty   Comments Pt pleasant and cooperative  He was mildly impulsive  Assessment   Limitation Decreased ADL status; Decreased endurance;Decreased self-care trans;Decreased high-level ADLs   Prognosis Fair   Assessment Pt is a 77 y o  male who was admitted to [facility] on 7/6/2021 with Bone metastases ([facility] [address] )  Pt with orders for a TLSO brace  His comorbidities include acute on chronic respiratory failure, renal cell carcinoma of kidney, hypercalcemia, COPD, pleural effusion, and anemia  At baseline pt was I w/ ADLs and mobility  Pt lives alone in a multi level home  Currently pt requires mod-max A for overall ADLS and mod Ax2 for bed mobility and supine<>sit transfer  Pt tolerated sitting EOB for 5 minutes, but unsafe to perform standing transfer at this time 2* LE weakness   Pt currently presents with impairments in the following categories -steps to enter environment, limited home support, difficulty performing ADLS and difficulty performing IADLS  activity tolerance, endurance, standing balance/tolerance and sitting balance/tolerance  These impairments, as well as pt's fatigue, pain, spinal precautions, decreased caregiver support and risk for falls  limit pt's ability to safely engage in all baseline areas of occupation, including grooming, bathing, dressing, toileting and functional mobility/transfers  The patient's raw score on the AM-PAC Daily Activity inpatient short form is 13, standardized score is 32 03, less than 39 4  Patients at this level are likely to benefit from discharge to post-acute rehabilitation services  Please refer to the recommendation of the Occupational Therapist for safe discharge planning  From OT standpoint, recommend inpatient rehab upon D/C  OT will continue to follow to address the below stated goals  Goals   Patient Goals to be able to stand   LTG Time Frame 10-14   Long Term Goal see below goals    Plan   Treatment Interventions ADL retraining;Functional transfer training; Endurance training;Patient/family training;Equipment evaluation/education; Compensatory technique education; Energy conservation   Goal Expiration Date 07/22/21   OT Frequency 3-5x/wk   Recommendation   OT Discharge Recommendation Post acute rehabilitation services   [facility] Daily Activity Inpatient   Lower Body Dressing 1   Bathing 1   Toileting 2   Upper Body Dressing 2   Grooming 3   Eating 4   Daily Activity Raw Score 13   Daily Activity Standardized Score (Calc for Raw Score >=11) 32 03     LTG 10-14 days  Pt will perform all ADL's w/ SBA while seated EOB or in chair with G balance and DME/AE as needed      Pt will participate in further assessment of func mobility/transfers w/ mod Ax2 as a prerequisite for participation in ADLs (sit pivot vs slide board)       Pt will improve bed mobility to SBA to increase independence w/ EOB activities and ADLs       Pt will perform toileting at a min Ax1 level for f/u/thoroughness w/ use of bedpan      Pt will improve activity tolerance to G for 30 min treatment session      Pt will tolerate sitting EOB w/ SBA for 8-10 minutes for participation in purposeful activity           Jose Costa OTR/L

## 2021-07-08 NOTE — RESTORATIVE TECHNICIAN NOTE
Restorative Technician Note      Patient Name: Rianna Peter     Restorative Tech Visit Date: 07/08/21  Note Type: Bracing, Initial consult  Patient Position Upon Consult: Supine  Brace Applied: Asp[employer] Horizon 456 Back Pack TLSO  Additional Brace Ordered: No  Patient Position When Brace Applied: Supine (long sitting in the bed)  Education Provided: Yes  Patient Position at End of Consult: Supine; Other (comment) (long sitting in the bed)  Nurse Communication: Nurse aware of consult, application of brace        Horizon backpack TLSO adjusted for pt's size  He states " I will give it a try"  Instructed to let the nurse know if it is causing him pain and if so they can help him take it off  Instructed pt to take it off when laying supine in the bed  To wear it when long sitting or the [facility] greater than 45*     Call the [facility], ext [phone], with any bracing questions and/or adjustments       [employer],

## 2021-07-09 LAB
ANION GAP SERPL CALCULATED.3IONS-SCNC: 3 MMOL/L (ref 4–13)
ANISOCYTOSIS BLD QL SMEAR: PRESENT
BASOPHILS # BLD MANUAL: 0 THOUSAND/UL (ref 0–0.1)
BASOPHILS NFR MAR MANUAL: 0 % (ref 0–1)
BUN SERPL-MCNC: 16 MG/DL (ref 5–25)
CALCIUM SERPL-MCNC: 8.8 MG/DL (ref 8.3–10.1)
CHLORIDE SERPL-SCNC: 103 MMOL/L (ref 100–108)
CO2 SERPL-SCNC: 34 MMOL/L (ref 21–32)
CREAT SERPL-MCNC: 0.23 MG/DL (ref 0.6–1.3)
EOSINOPHIL # BLD MANUAL: 0 THOUSAND/UL (ref 0–0.4)
EOSINOPHIL NFR BLD MANUAL: 0 % (ref 0–6)
ERYTHROCYTE [DISTWIDTH] IN BLOOD BY AUTOMATED COUNT: 20.4 % (ref 11.6–15.1)
GFR SERPL CREATININE-BSD FRML MDRD: 155 ML/MIN/1.73SQ M
GLUCOSE SERPL-MCNC: 160 MG/DL (ref 65–140)
HCT VFR BLD AUTO: 29.6 % (ref 36.5–49.3)
HGB BLD-MCNC: 8 G/DL (ref 12–17)
HYPERCHROMIA BLD QL SMEAR: PRESENT
LYMPHOCYTES # BLD AUTO: 0.16 THOUSAND/UL (ref 0.6–4.47)
LYMPHOCYTES # BLD AUTO: 1 % (ref 14–44)
MCH RBC QN AUTO: 20.3 PG (ref 26.8–34.3)
MCHC RBC AUTO-ENTMCNC: 27 G/DL (ref 31.4–37.4)
MCV RBC AUTO: 75 FL (ref 82–98)
MICROCYTES BLD QL AUTO: PRESENT
MONOCYTES # BLD AUTO: 0.64 THOUSAND/UL (ref 0–1.22)
MONOCYTES NFR BLD: 4 % (ref 4–12)
NEUTROPHILS # BLD MANUAL: 15.23 THOUSAND/UL (ref 1.85–7.62)
NEUTS BAND NFR BLD MANUAL: 1 % (ref 0–8)
NEUTS SEG NFR BLD AUTO: 94 % (ref 43–75)
NRBC BLD AUTO-RTO: 0 /100 WBCS
PLATELET # BLD AUTO: 601 THOUSANDS/UL (ref 149–390)
PLATELET BLD QL SMEAR: ABNORMAL
PMV BLD AUTO: 10 FL (ref 8.9–12.7)
POIKILOCYTOSIS BLD QL SMEAR: PRESENT
POLYCHROMASIA BLD QL SMEAR: PRESENT
POTASSIUM SERPL-SCNC: 3.9 MMOL/L (ref 3.5–5.3)
PROCALCITONIN SERPL-MCNC: 0.54 NG/ML
RBC # BLD AUTO: 3.94 MILLION/UL (ref 3.88–5.62)
RBC MORPH BLD: PRESENT
SODIUM SERPL-SCNC: 140 MMOL/L (ref 136–145)
TARGETS BLD QL SMEAR: PRESENT
WBC # BLD AUTO: 16.03 THOUSAND/UL (ref 4.31–10.16)

## 2021-07-09 PROCEDURE — 94664 DEMO&/EVAL PT USE INHALER: CPT

## 2021-07-09 PROCEDURE — 80048 BASIC METABOLIC PNL TOTAL CA: CPT | Performed by: INTERNAL MEDICINE

## 2021-07-09 PROCEDURE — 99232 SBSQ HOSP IP/OBS MODERATE 35: CPT | Performed by: INTERNAL MEDICINE

## 2021-07-09 PROCEDURE — 94760 N-INVAS EAR/PLS OXIMETRY 1: CPT

## 2021-07-09 PROCEDURE — 94668 MNPJ CHEST WALL SBSQ: CPT

## 2021-07-09 PROCEDURE — 99232 SBSQ HOSP IP/OBS MODERATE 35: CPT | Performed by: FAMILY MEDICINE

## 2021-07-09 PROCEDURE — 77387 GUIDANCE FOR RADJ TX DLVR: CPT | Performed by: RADIOLOGY

## 2021-07-09 PROCEDURE — 84145 PROCALCITONIN (PCT): CPT | Performed by: INTERNAL MEDICINE

## 2021-07-09 PROCEDURE — 94640 AIRWAY INHALATION TREATMENT: CPT

## 2021-07-09 PROCEDURE — 85027 COMPLETE CBC AUTOMATED: CPT | Performed by: INTERNAL MEDICINE

## 2021-07-09 PROCEDURE — 85007 BL SMEAR W/DIFF WBC COUNT: CPT | Performed by: INTERNAL MEDICINE

## 2021-07-09 PROCEDURE — G6002 STEREOSCOPIC X-RAY GUIDANCE: HCPCS | Performed by: RADIOLOGY

## 2021-07-09 PROCEDURE — 77412 RADIATION TX DELIVERY LVL 3: CPT | Performed by: RADIOLOGY

## 2021-07-09 PROCEDURE — DP0C1ZZ BEAM RADIATION OF OTHER BONE USING PHOTONS 1 - 10 MEV: ICD-10-PCS | Performed by: RADIOLOGY

## 2021-07-09 PROCEDURE — 97535 SELF CARE MNGMENT TRAINING: CPT

## 2021-07-09 RX ORDER — DEXAMETHASONE SODIUM PHOSPHATE 4 MG/ML
4 INJECTION, SOLUTION INTRA-ARTICULAR; INTRALESIONAL; INTRAMUSCULAR; INTRAVENOUS; SOFT TISSUE
Status: DISCONTINUED | OUTPATIENT
Start: 2021-07-10 | End: 2021-07-12 | Stop reason: HOSPADM

## 2021-07-09 RX ORDER — DRONABINOL 2.5 MG/1
5 CAPSULE ORAL
Status: DISCONTINUED | OUTPATIENT
Start: 2021-07-10 | End: 2021-07-11

## 2021-07-09 RX ORDER — AZITHROMYCIN 250 MG/1
500 TABLET, FILM COATED ORAL EVERY 24 HOURS
Status: DISCONTINUED | OUTPATIENT
Start: 2021-07-10 | End: 2021-07-10

## 2021-07-09 RX ORDER — ACETAMINOPHEN 325 MG/1
650 TABLET ORAL
Status: DISCONTINUED | OUTPATIENT
Start: 2021-07-09 | End: 2021-07-12 | Stop reason: HOSPADM

## 2021-07-09 RX ADMIN — TIOTROPIUM BROMIDE 18 MCG: 18 CAPSULE ORAL; RESPIRATORY (INHALATION) at 10:20

## 2021-07-09 RX ADMIN — DEXAMETHASONE SODIUM PHOSPHATE 4 MG: 4 INJECTION INTRA-ARTICULAR; INTRALESIONAL; INTRAMUSCULAR; INTRAVENOUS; SOFT TISSUE at 00:07

## 2021-07-09 RX ADMIN — ACETAMINOPHEN 975 MG: 325 TABLET, FILM COATED ORAL at 05:45

## 2021-07-09 RX ADMIN — ACETAMINOPHEN 975 MG: 325 TABLET, FILM COATED ORAL at 14:54

## 2021-07-09 RX ADMIN — LEVALBUTEROL HYDROCHLORIDE 1.25 MG: 1.25 SOLUTION, CONCENTRATE RESPIRATORY (INHALATION) at 14:03

## 2021-07-09 RX ADMIN — ISODIUM CHLORIDE 3 ML: 0.03 SOLUTION RESPIRATORY (INHALATION) at 20:35

## 2021-07-09 RX ADMIN — HYDROMORPHONE HYDROCHLORIDE 0.5 MG: 1 INJECTION, SOLUTION INTRAMUSCULAR; INTRAVENOUS; SUBCUTANEOUS at 18:01

## 2021-07-09 RX ADMIN — DOCUSATE SODIUM 100 MG: 100 CAPSULE ORAL at 10:19

## 2021-07-09 RX ADMIN — ISODIUM CHLORIDE 3 ML: 0.03 SOLUTION RESPIRATORY (INHALATION) at 14:03

## 2021-07-09 RX ADMIN — SENNOSIDES 17.2 MG: 8.6 TABLET, FILM COATED ORAL at 10:20

## 2021-07-09 RX ADMIN — DEXAMETHASONE SODIUM PHOSPHATE 4 MG: 4 INJECTION INTRA-ARTICULAR; INTRALESIONAL; INTRAMUSCULAR; INTRAVENOUS; SOFT TISSUE at 05:45

## 2021-07-09 RX ADMIN — DRONABINOL 5 MG: 2.5 CAPSULE ORAL at 07:38

## 2021-07-09 RX ADMIN — CEFTRIAXONE SODIUM 1000 MG: 10 INJECTION, POWDER, FOR SOLUTION INTRAVENOUS at 04:37

## 2021-07-09 RX ADMIN — HEPARIN SODIUM 5000 UNITS: 5000 INJECTION INTRAVENOUS; SUBCUTANEOUS at 14:54

## 2021-07-09 RX ADMIN — LEVALBUTEROL HYDROCHLORIDE 1.25 MG: 1.25 SOLUTION, CONCENTRATE RESPIRATORY (INHALATION) at 07:37

## 2021-07-09 RX ADMIN — DRONABINOL 5 MG: 2.5 CAPSULE ORAL at 15:00

## 2021-07-09 RX ADMIN — ISODIUM CHLORIDE 3 ML: 0.03 SOLUTION RESPIRATORY (INHALATION) at 07:37

## 2021-07-09 RX ADMIN — HYDROMORPHONE HYDROCHLORIDE 0.5 MG: 1 INJECTION, SOLUTION INTRAMUSCULAR; INTRAVENOUS; SUBCUTANEOUS at 10:59

## 2021-07-09 RX ADMIN — OXYCODONE HYDROCHLORIDE 10 MG: 10 TABLET ORAL at 21:34

## 2021-07-09 RX ADMIN — OXYCODONE HYDROCHLORIDE 10 MG: 10 TABLET ORAL at 04:40

## 2021-07-09 RX ADMIN — HEPARIN SODIUM 5000 UNITS: 5000 INJECTION INTRAVENOUS; SUBCUTANEOUS at 21:34

## 2021-07-09 RX ADMIN — FLUTICASONE FUROATE AND VILANTEROL TRIFENATATE 1 PUFF: 200; 25 POWDER RESPIRATORY (INHALATION) at 10:20

## 2021-07-09 RX ADMIN — PANTOPRAZOLE SODIUM 40 MG: 40 TABLET, DELAYED RELEASE ORAL at 05:45

## 2021-07-09 RX ADMIN — AZITHROMYCIN MONOHYDRATE 500 MG: 500 INJECTION, POWDER, LYOPHILIZED, FOR SOLUTION INTRAVENOUS at 05:44

## 2021-07-09 RX ADMIN — HEPARIN SODIUM 5000 UNITS: 5000 INJECTION INTRAVENOUS; SUBCUTANEOUS at 05:45

## 2021-07-09 RX ADMIN — OXYCODONE HYDROCHLORIDE 10 MG: 10 TABLET ORAL at 14:59

## 2021-07-09 RX ADMIN — LEVALBUTEROL HYDROCHLORIDE 1.25 MG: 1.25 SOLUTION, CONCENTRATE RESPIRATORY (INHALATION) at 20:35

## 2021-07-09 RX ADMIN — DEXAMETHASONE SODIUM PHOSPHATE 4 MG: 4 INJECTION INTRA-ARTICULAR; INTRALESIONAL; INTRAMUSCULAR; INTRAVENOUS; SOFT TISSUE at 12:41

## 2021-07-09 RX ADMIN — ACETAMINOPHEN 650 MG: 325 TABLET, FILM COATED ORAL at 21:33

## 2021-07-09 NOTE — CASE MANAGEMENT
Per pt's and his family they would like pt to transition to [facility] rehab or fellowship danay KRUSE will send referrals in [facility]

## 2021-07-09 NOTE — OCCUPATIONAL THERAPY NOTE
Occupational Therapy Treatment Note     07/09/21 1036   OT Last Visit   OT Visit Date 07/09/21   Restrictions/Precautions   Braces or Orthoses TLSO  (when OOB or >45*)   Other Precautions Fall Risk;Spinal precautions   Lifestyle   Autonomy Pt was I w/o a device PTA  Reciprocal Relationships Lives alone   Service to Others not working   Pain Assessment   Pain Assessment Tool 0-10   Pain Score No Pain   ADL   Where Assessed Edge of bed   Grooming Assistance 4  Minimal Assistance   Grooming Deficit Steadying; Increased time to complete;Setup   UB Dressing Assistance 3  Moderate Assistance   UB Dressing Deficit Thread RUE; Thread LUE;Pull around back   Bed Mobility   Supine to Sit 3  Moderate assistance   Additional items Assist x 2   Sit to Supine 2  Maximal assistance   Additional items Assist x 3   Cognition   Overall Cognitive Status [facility]   Arousal/Participation Alert; Responsive; Cooperative   Attention Within functional limits   Orientation Level Oriented X4   Memory Decreased recall of precautions   Following Commands Follows one step commands without difficulty   Assessment   Assessment Pt participated in occupational therapy with focus on activity tolerance,  bed mob, unsupported sitting balance and tolerance for pt engagement in functional self-care task/oral care  Pt cleared by RN/Jayna for pt participation in occupational therapy  Pt received HOB raised/supine pt sitting upright and agreeable to therapy following pt Identifiers confirmed  Pt pleasant and cooperative with all therapy requests  He required assist x 2 for bed mob and assist required to maintain unsupported sitting balance  Pt required assist to don/doff TLSO backpack brace 2* pt decreased overall strength, coordination and balance  Pt able to maintain unsupported balance at time throughout session  Pt very motivated to participate   Pt will require post acute rehab services to continue to address pt deficits with decreased strength, coordination and balance which currently impair pt ADL and functional mob  Pt bed alarm active post session all needs within reach      Plan   Treatment Interventions ADL retraining   Goal Expiration Date 07/22/21   OT Treatment Day 1   OT Frequency 3-5x/wk   Recommendation   OT Discharge Recommendation Post acute rehabilitation services   AM-PAC Daily Activity Inpatient   Lower Body Dressing 1   Bathing 1   Toileting 2   Upper Body Dressing 2   Grooming 3   Eating 4   Daily Activity Raw Score 13   Daily Activity Standardized Score (Calc for Raw Score >=11) 32 03       Albertus Silence  TOUSSAINT/L

## 2021-07-09 NOTE — PROGRESS NOTES
The azithromycin has been converted to Oral per [facility] IV-to-PO Auto-Conversion Protocol for Adults as approved by the Pharmacy and Therapeutics Committee  The patient met all eligible criteria:  3 Age = 25years old   2) Received at least one dose of the IV form   3) Receiving at least one other scheduled oral/enteral medication   4) Tolerating an oral/enteral diet     and did not have any exclusions:   1) Critical care patient   2) Active GI bleed (IF assessing H2RAs or PPIs)   3) Continuous tube feeding (IF assessing cipro, doxycycline, levofloxacin, minocycline, rifampin, or voriconazole)   4) Receiving PO vancomycin (IF assessing metronidazole)   5) Persistent nausea and/or vomiting   6) Ileus or gastrointestinal obstruction   7) Coty/nasogastric tube set for continuous suction   8) Specific order not to automatically convert to PO (in the order's comments or if discussed in the most recent Infectious Disease or primary team's progress notes)       Mariana Husain, PharmD, [address]man Clinical Pharmacist  [phone]

## 2021-07-09 NOTE — ASSESSMENT & PLAN NOTE
Internal Medicine PHYSICIAN Discharge Summary    Patient ID:  Philly Cruz  [id number]  51 year old  1940  Admit date  3/11/2019  Discharge date  3/16/2019   Admitting Physician  Rachel Douglas MD  Discharge Physician:  Kelsey Jasmine MD  Admission Diagnoses  Acute on chronic congestive heart failure, unspecified heart failure type (CMS/[facility]) [I50.9]  discharge diagnoses  -Acute on chronic combined systolic and diastolic congestive heart failure   -Ischemic cardiomyopathy  S/p BiV-ICD (ICD upgraded on 3/5/19) with last LVEF 40% on 3/7/19   -Hypertrophic cardiomyopathy with history of alcohol ablation  -CAD s/p stent placement in 2016   -Hypertension   -Hyperlipidemia  -Paroxysmal A. fib s/p AVJ ablation on Xarelto   -History of nonsustained ventricular tachycardia   -Tricuspid regurgitation   -Chronic kidney disease stage III   ADMISSION CONDITION  poor  DISCHARGE CONDITION  stable   [address] Macdonald is a 66year old female with history of hypertension, hyperlipidemia, paroxysmal A. fib on Xarelto, ischemic cardiomyopathy, hypertrophic cardiomyopathy s/p alcohol septal ablation, CKD stage III and chronic systolic and diastolic heart failure admitted for acute on chronic systolic and diastolic heart failure after she presented with a chief complaint of worsening shortness of breath, orthopnea and PND 3 days after she was discharged from the hospital and her dose of Lasix was reduced due to hypotension. He denies chest pain, dizziness or leg swelling on presentation. . She complained decreased urine output which subsequently improved. Patient was on Lasix drip which was subsequently changed to torsemide 20 mg a day on day of discharge. Creatinine slightly higher but stable at time of discharge. Cardiology okay for her to be discharged by resuming metoprolol and holding ARB. Patient to continue aspirin and atorvastatin.  Neurology also recommended metolazone 2.5 mg p.r.n. 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 Prescription for metolazone not given and patient to follow with her cardiology as an out patient with BMP and a weeks time. Patient's intake/output and weight was monitored while in the hospital. CRP = 5 and ESR = 40 with no pericardial effusion on echo. Discussed with cardiology for discharge. Patient noted to have 15 beats of V. tach while inpatient and potassium supplemented p.r.n. Patient continued taking Xarelto for paroxysmal A. Fib. Patient reports improvement in shortness of breath and denies leg swelling, chest pain, cough, fever, sweating orthopnea or PND at time of discharge. Patient's other chronic medical conditions stable and continued PTA meds. Patient discharged in a stable condition after all her questions and concerns addressed. Patient to follow with cardiology as an outpatient. Review Of System: 10 Point review of system is done, Pertinent positives are listed all other systems were reviewed and are negative. Consults:   IP Consult Orders (From admission, onward)    Start     Ordered    03/12/19 1117  Inpatient consult to Electrophysiology  ONE TIME     Provider:  Albino Clayton MD    03/12/19 1116    03/11/19 1735  Inpatient consult to Cardiology  ONE TIME     Provider:  Srinivas Davalos MD    03/11/19 1734        Diagnostic Studies: as below    Result Date: 3/11/2019  EXAM: XR CHEST AP OR PA DATE OF EXAM: 3/11/2019 CLINICAL HISTORY: shortness of breath  IMPRESSION: 1. There are small bilateral pleural effusions and mild pulmonary vascular congestion.      Discharge Exam:  Vitals:    03/15/19 2238   BP: 127/76   Pulse: 82   Resp: 16   Temp: 97.9 Â°F (36.6 Â°C)      Constitutional: No acute distress, non-toxic appearance  HENT: Atraumatic, normocephalic  Respiratory: No respiratory distress, normal breath sounds  Cardiovascular: Normal rate, normal rhythm, no murmurs, no gallops, no rubs  GI: Soft, nondistended, normal bowel sounds, nontender  Skin: Well hydrated, no rash  Extremities: No clubbing, cyanosis, nor edema  Neurology: A&Ox3. No gross motor or sensory deficits  Psychiatric: Speech and behavior appropriate    Disposition: Home    Patient Instructions: Activity: activity as tolerated    Diet: cardiac diet    Wound Care: none needed    Follow-up with Dr Stone Barragan MD in 1 weeks after discharge. Consults: Follow up with cardiology, and EP as directed by the physician. If the appointment was not made then call for scheduling the appointment, phone numbers are provided, and clear directions in layman's terms are given to the patient. Same instructions also printed out in the AVS.     Face to face encounter was conducted on day of discharge; the above findings and plan of care discussed at length with the patient. All questions answered to patient's satisfaction. Patient demonstrated understanding and is in agreement with plan. Total time spent discharging the patient was >30 minutes of which greater than 50% was spent in counseling and coordinating care. The discharge instructions were also discussed with the staff nurse who will reiterate the plan to patient and go over changes in medications as discussed.     LACE(10 or more):High            4 LACE Length of Stay    3 LACE Acute Admission    5 LACE Charlson Comorbidity Index Evalution    3 LACE Visits to ED Previous 6 Months          Signed:    Mehdi Olsen MD   3/16/2019 7:39 AM  March 16, 2019   [facility] Hospitalist  Pager:  [phone]

## 2021-07-09 NOTE — PROGRESS NOTES
[address] 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  · IV dexamethasone  · Continue supplemental oxygen to keep O2 sats more than 88-90%    * Bone metastases (HCC)  Assessment & Plan  Paraparesis bony metastasis  MRI lumbar and thoracic spines noted - noted to have cord edema below T8-T9, extensive metastatic disease  Continue IV dexamethasone  Discussed with neurosurgery - risks of operative intervention outweigh benefits, surgery deferred  Receiving palliative radiation with radiotherapy  Continue opioid analgesia  Palliative care following    Renal cell carcinoma of left kidney ([facility] [address] )  Assessment & Plan  · Follows with Dr Miguel Angel Nugent as an outpatient  · Supportive care    Aspiration into airway  Assessment & Plan  · Pt aspirated thin liquids after arrival to the hospital  · Aspiration precautions  · Monitor closely    Hypercalcemia  Assessment & Plan  · Likely due to bone metastasis  · IV dexamethasone  · Monitor closely    Severe protein-calorie malnutrition (HCC)  Assessment & Plan  Encourage adequate calorie and protein intake  Nutrition therapy following    Thrombocytosis (HCC)  Assessment & Plan  · Likely reactive   · Monitor    Chronic obstructive pulmonary disease (HCC)  Assessment & Plan  · PRN albuterol  · Continue advair and spiriva    Pleural effusion  Assessment & Plan  · Possibly malignancy vs due to hypoalbuminemia  · Monitor    Leucocytosis  Assessment & Plan  · Likely reactive  · Monitor counts    Anemia  Assessment & Plan  · Multifactorial  · Monitor hemoglobin          VTE Pharmacologic Prophylaxis: VTE Score: 7 High Risk (Score >/= 5) - Pharmacological DVT Prophylaxis Ordered: heparin  Sequential Compression Devices Ordered  Patient Centered Rounds: I performed bedside rounds with nursing staff today  Discussions with Specialists or Other Care Team Provider:  Palliative care    Education and Discussions with Family / Patient: Updated  (brother) at bedside  His brother and sister-in-law are at bedside discussed in detail  Time Spent for Care: 30 minutes  More than 50% of total time spent on counseling and coordination of care as described above  Current Length of Stay: 3 day(s)  Current Patient Status: Inpatient   Certification Statement: The patient will continue to require additional inpatient hospital stay due to As outlined  Discharge Plan: Renal cancer with extensive metastasis paraplegia hypoxic respiratory failure, goals of care discussion underway palliative care following    Code Status: Level 3 - DNAR and DNI    Subjective:     Reports feeling tired and fatigued      Objective:     Vitals:   Temp (24hrs), Av 5 °F (36 4 °C), Min:97 2 °F (36 2 °C), Max:97 8 °F (36 6 °C)    Temp:  [97 2 °F (36 2 °C)-97 8 °F (36 6 °C)] 97 8 °F (36 6 °C)  HR:  [] 117  Resp:  [15-18] 17  BP: (138-139)/(77-96) 138/77  SpO2:  [91 %-99 %] 92 %  Body mass index is 18 79 kg/m²  Input and Output Summary (last 24 hours):      Intake/Output Summary (Last 24 hours) at 2021 1602  Last data filed at 2021 1454  Gross per 24 hour   Intake 1190 ml   Output 1205 ml   Net -15 ml       Physical Exam:   Physical Exam     Sitting up in bed  Dyspneic at rest but able to complete sentences  Features of protein calorie malnutrition noted  Neck supple  Lungs diminished breath sounds bilateral  Heart sounds S1-S2 noted  Abdomen soft  Awake obeys simple commands  Paraplegia noted  Pulses noted  No rash    Additional Data:     Labs:  Results from last 7 days   Lab Units 21  0521 21  0942   WBC Thousand/uL 16 03* 14 29* HEMOGLOBIN g/dL 8 0* 7 2*   HEMATOCRIT % 29 6* 27 0*   PLATELETS Thousands/uL 601* 724*   BANDS PCT % 1  --    NEUTROS PCT %  --  88*   LYMPHS PCT %  --  3*   LYMPHO PCT % 1*  --    MONOS PCT %  --  7   MONO PCT % 4  --    EOS PCT % 0 0     Results from last 7 days   Lab Units 07/09/21  0521 07/06/21  0942   SODIUM mmol/L 140 142   POTASSIUM mmol/L 3 9 3 0*   CHLORIDE mmol/L 103 101   CO2 mmol/L 34* 32   BUN mg/dL 16 11   CREATININE mg/dL 0 23* 0 44*   ANION GAP mmol/L 3* 9   CALCIUM mg/dL 8 8 8 6   ALBUMIN g/dL  --  1 6*   TOTAL BILIRUBIN mg/dL  --  0 36   ALK PHOS U/L  --  249*   ALT U/L  --  37   AST U/L  --  21   GLUCOSE RANDOM mg/dL 160* 87     Results from last 7 days   Lab Units 07/06/21  0942   INR  1 36*             Results from last 7 days   Lab Units 07/09/21  0521 07/07/21  0630 07/07/21  0400 07/06/21  0942   LACTIC ACID mmol/L  --   --   --  1 9   PROCALCITONIN ng/ml 0 54* 0 73* 0 98* 0 32*       Lines/Drains:  Invasive Devices     Peripheral Intravenous Line            Peripheral IV 07/07/21 Dorsal (posterior); Right Forearm 2 days    Peripheral IV 07/07/21 Left;Ventral (anterior) Forearm 2 days                      Imaging: No pertinent imaging reviewed  Recent Cultures (last 7 days):   Results from last 7 days   Lab Units 07/07/21  0359 07/06/21  0942   BLOOD CULTURE  No Growth at 48 hrs  No Growth at 48 hrs  No Growth at 48 hrs  No Growth at 48 hrs         Last 24 Hours Medication List:   Current Facility-Administered Medications   Medication Dose Route Frequency Provider Last Rate    acetaminophen  975 mg Oral [facility] Philip Irwin MD      albuterol  2 5 mg Nebulization Q6H PRN MD Tomas Smith Cuff ON 7/10/2021] azithromycin  500 mg Oral Q24H Sherrell Kitchen MD      calcium carbonate  1,000 mg Oral Daily PRN Philip Irwin MD      cefTRIAXone  1,000 mg Intravenous Q24H Chuyita Yung PA-C Stopped (07/09/21 0520)    dexamethasone  4 mg Intravenous Q6H [facility] Adrián Angelyn Dancer, MD      docusate sodium  100 mg Oral BID Venancio Subramanian MD      dronabinol  5 mg Oral BID AC Venancio Subramanian MD      fluticasone-vilanterol  1 puff Inhalation Daily Venancio Subramanian MD      heparin (porcine)  5,000 Units Subcutaneous [facility] Emely Collazo MD      HYDROmorphone  0 5 mg Intravenous Q4H PRN Emely Collazo MD      levalbuterol  1 25 mg Nebulization TID Venancio Subramanian MD      ondansetron  4 mg Intravenous Q6H PRN Venancio Subramanian MD      oxyCODONE  10 mg Oral Q4H PRN Venancio Subramanian MD      oxyCODONE  5 mg Oral Q4H PRN Venancio Subramanian MD      pantoprazole  40 mg Oral Daily Venancio Subramanian MD      potassium chloride  40 mEq Oral Once Venancio Subramanian MD      senna  2 tablet Oral Daily Venancio Subramanian MD      sodium chloride  3 mL Nebulization TID Venancio Subramanian MD      tiotropium  18 mcg Inhalation Daily Venancio Subramanian MD          Today, Patient Was Seen By: Emely Collazo MD    **Please Note: This note may have been constructed using a voice recognition system  **

## 2021-07-09 NOTE — PLAN OF CARE
Problem: OCCUPATIONAL THERAPY ADULT  Goal: Performs self-care activities at highest level of function for planned discharge setting  See evaluation for individualized goals  Description: Treatment Interventions: ADL retraining, Functional transfer training, Endurance training, Patient/family training, Equipment evaluation/education, Compensatory technique education, Energy conservation          See flowsheet documentation for full assessment, interventions and recommendations  Outcome: Progressing  Note: Limitation: Decreased ADL status, Decreased endurance, Decreased self-care trans, Decreased high-level ADLs  Prognosis: Fair  Assessment: Pt participated in occupational therapy with focus on activity tolerance,  bed mob, unsupported sitting balance and tolerance for pt engagement in functional self-care task/oral care  Pt cleared by RN/Jayna for pt participation in occupational therapy  Pt received HOB raised/supine pt sitting upright and agreeable to therapy following pt Identifiers confirmed  Pt pleasant and cooperative with all therapy requests  He required assist x 2 for bed mob and assist required to maintain unsupported sitting balance  Pt required assist to don/doff TLSO backpack brace 2* pt decreased overall strength, coordination and balance  Pt able to maintain unsupported balance at time throughout session  Pt very motivated to participate  Pt will require post acute rehab services to continue to address pt deficits with decreased strength, coordination and balance which currently impair pt ADL and functional mob  Pt bed alarm active post session all needs within reach        OT Discharge Recommendation: Post acute rehabilitation services

## 2021-07-10 LAB — PROCALCITONIN SERPL-MCNC: 0.45 NG/ML

## 2021-07-10 PROCEDURE — 84145 PROCALCITONIN (PCT): CPT | Performed by: INTERNAL MEDICINE

## 2021-07-10 PROCEDURE — 94640 AIRWAY INHALATION TREATMENT: CPT

## 2021-07-10 PROCEDURE — 94668 MNPJ CHEST WALL SBSQ: CPT

## 2021-07-10 PROCEDURE — 94664 DEMO&/EVAL PT USE INHALER: CPT

## 2021-07-10 PROCEDURE — 99232 SBSQ HOSP IP/OBS MODERATE 35: CPT | Performed by: INTERNAL MEDICINE

## 2021-07-10 PROCEDURE — 94760 N-INVAS EAR/PLS OXIMETRY 1: CPT

## 2021-07-10 RX ORDER — AZITHROMYCIN 250 MG/1
500 TABLET, FILM COATED ORAL EVERY 24 HOURS
Status: DISCONTINUED | OUTPATIENT
Start: 2021-07-11 | End: 2021-07-11

## 2021-07-10 RX ADMIN — HEPARIN SODIUM 5000 UNITS: 5000 INJECTION INTRAVENOUS; SUBCUTANEOUS at 22:00

## 2021-07-10 RX ADMIN — HEPARIN SODIUM 5000 UNITS: 5000 INJECTION INTRAVENOUS; SUBCUTANEOUS at 13:12

## 2021-07-10 RX ADMIN — ISODIUM CHLORIDE 3 ML: 0.03 SOLUTION RESPIRATORY (INHALATION) at 14:10

## 2021-07-10 RX ADMIN — HEPARIN SODIUM 5000 UNITS: 5000 INJECTION INTRAVENOUS; SUBCUTANEOUS at 06:01

## 2021-07-10 RX ADMIN — LEVALBUTEROL HYDROCHLORIDE 1.25 MG: 1.25 SOLUTION, CONCENTRATE RESPIRATORY (INHALATION) at 18:12

## 2021-07-10 RX ADMIN — SENNOSIDES 17.2 MG: 8.6 TABLET, FILM COATED ORAL at 09:25

## 2021-07-10 RX ADMIN — ACETAMINOPHEN 650 MG: 325 TABLET, FILM COATED ORAL at 09:20

## 2021-07-10 RX ADMIN — SODIUM CHLORIDE 250 ML: 0.9 INJECTION, SOLUTION INTRAVENOUS at 21:29

## 2021-07-10 RX ADMIN — LEVALBUTEROL HYDROCHLORIDE 1.25 MG: 1.25 SOLUTION, CONCENTRATE RESPIRATORY (INHALATION) at 14:10

## 2021-07-10 RX ADMIN — DRONABINOL 5 MG: 2.5 CAPSULE ORAL at 16:01

## 2021-07-10 RX ADMIN — OXYCODONE HYDROCHLORIDE 10 MG: 10 TABLET ORAL at 12:41

## 2021-07-10 RX ADMIN — LEVALBUTEROL HYDROCHLORIDE 1.25 MG: 1.25 SOLUTION, CONCENTRATE RESPIRATORY (INHALATION) at 07:46

## 2021-07-10 RX ADMIN — ISODIUM CHLORIDE 3 ML: 0.03 SOLUTION RESPIRATORY (INHALATION) at 07:46

## 2021-07-10 RX ADMIN — FLUTICASONE FUROATE AND VILANTEROL TRIFENATATE 1 PUFF: 200; 25 POWDER RESPIRATORY (INHALATION) at 09:28

## 2021-07-10 RX ADMIN — OXYCODONE HYDROCHLORIDE 10 MG: 10 TABLET ORAL at 06:01

## 2021-07-10 RX ADMIN — PANTOPRAZOLE SODIUM 40 MG: 40 TABLET, DELAYED RELEASE ORAL at 06:00

## 2021-07-10 RX ADMIN — ACETAMINOPHEN 650 MG: 325 TABLET, FILM COATED ORAL at 12:38

## 2021-07-10 RX ADMIN — ACETAMINOPHEN 650 MG: 325 TABLET, FILM COATED ORAL at 16:01

## 2021-07-10 RX ADMIN — OXYCODONE HYDROCHLORIDE 10 MG: 10 TABLET ORAL at 02:30

## 2021-07-10 RX ADMIN — TIOTROPIUM BROMIDE 18 MCG: 18 CAPSULE ORAL; RESPIRATORY (INHALATION) at 09:28

## 2021-07-10 RX ADMIN — DEXAMETHASONE SODIUM PHOSPHATE 4 MG: 4 INJECTION INTRA-ARTICULAR; INTRALESIONAL; INTRAMUSCULAR; INTRAVENOUS; SOFT TISSUE at 09:19

## 2021-07-10 RX ADMIN — CEFTRIAXONE SODIUM 1000 MG: 10 INJECTION, POWDER, FOR SOLUTION INTRAVENOUS at 06:00

## 2021-07-10 RX ADMIN — ACETAMINOPHEN 650 MG: 325 TABLET, FILM COATED ORAL at 21:28

## 2021-07-10 RX ADMIN — ISODIUM CHLORIDE 3 ML: 0.03 SOLUTION RESPIRATORY (INHALATION) at 18:12

## 2021-07-10 RX ADMIN — DEXAMETHASONE SODIUM PHOSPHATE 4 MG: 4 INJECTION INTRA-ARTICULAR; INTRALESIONAL; INTRAMUSCULAR; INTRAVENOUS; SOFT TISSUE at 16:02

## 2021-07-10 RX ADMIN — DEXAMETHASONE SODIUM PHOSPHATE 4 MG: 4 INJECTION INTRA-ARTICULAR; INTRALESIONAL; INTRAMUSCULAR; INTRAVENOUS; SOFT TISSUE at 12:38

## 2021-07-10 RX ADMIN — AZITHROMYCIN MONOHYDRATE 500 MG: 250 TABLET ORAL at 06:00

## 2021-07-10 RX ADMIN — OXYCODONE HYDROCHLORIDE 10 MG: 10 TABLET ORAL at 21:28

## 2021-07-10 RX ADMIN — HYDROMORPHONE HYDROCHLORIDE 0.5 MG: 1 INJECTION, SOLUTION INTRAMUSCULAR; INTRAVENOUS; SUBCUTANEOUS at 16:01

## 2021-07-10 NOTE — ASSESSMENT & PLAN NOTE
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

## 2021-07-10 NOTE — PROGRESS NOTES
Progress note - Palliative and Supportive Care   Tien Morel 77 y o  male [id number]    Patient Active Problem List   Diagnosis    Shortness of breath    Anemia    Leucocytosis    Pleural effusion    Lung mass    Mediastinal lymphadenopathy    Tachycardia    Bone metastases (HCC)    Chronic obstructive pulmonary disease (HCC)    Renal cell carcinoma of left kidney (HCC)    HCAP (healthcare-associated pneumonia)    Thrombocytosis (HCC)    Elevated d-dimer    Acute on chronic respiratory failure with hypoxia (HCC)    Hypophosphatemia    Vitamin D deficiency    Premature atrial contractions    Severe protein-calorie malnutrition (HCC)    Ventricular ectopy    Diastolic dysfunction    Peripheral edema    Thrush    Hypercalcemia    Aspiration into airway     Active issues specifically addressed today include: cachexia; delirium; goals; frailty; overal medical decompensation    Plan:  1  Symptom management - NSurg no longer following; palliative to adjust steroids   - reduced steroids to TID from QID  This may reduce overnight insomnia and daytime delirium    - pt has access to opioids for pain; will not scheudle these while he is delirious    2  Goals - full cares, DNR/DNI 3 limit   - Pt's frailty limits options: chemo is not offered d/t poor ECOG status  - Surgery is not offered d/t poor performance/nutritional status   - XRT may continue so long as pt tolerates this therapy  - Defer use of ABx to SLIM  - Pt endorses a non-competent desire to me today to take it 'one day at a time' with goal of improving strenght and function  Code Status: DNR/DNI - Level 3   Decisional apparatus:  Patient is not competent on my exam today  If competence is lost, patient's substitute decision maker would default to brother Jg Vela by [place] Act 169     Advance Directive / Living Will / POLST:  None on file    Vikram Coleman MD  Palliative and Supportive Care  Clinic/Answering Service: [phone]  You can find me on [employer]! Interval history:       Since last visit, we are alerted by Dr Chan Dupree that pt's resp drive as incrased and WOB is elevated  He is overall in very poor shape, with a dramatically reduced BMI, and mets throughout both lungs and mutliple bony sites  At bedside this PM, he does not appear terribly insightful nor oriented, but can articulate that he has a severe illness for which we are using some therapies  However, he appears misinformed or confused as to if he has been getting chemo or XRT or both  We attempted to correct his misunderstanding, and he was only marginally able to accept new information        Could not reach family by phone today    Willy Torres / Bao Linker:     all current active meds have been reviewed and current meds:   Current Facility-Administered Medications   Medication Dose Route Frequency    acetaminophen (TYLENOL) tablet 650 mg  650 mg Oral 4x Daily (with meals and at bedtime)    albuterol inhalation solution 2 5 mg  2 5 mg Nebulization Q6H PRN    [START ON 7/10/2021] azithromycin (ZITHROMAX) tablet 500 mg  500 mg Oral Q24H    cefTRIAXone (ROCEPHIN) 1,000 mg in dextrose 5 % 50 mL IVPB  1,000 mg Intravenous Q24H    [START ON 7/10/2021] dexamethasone (DECADRON) injection 4 mg  4 mg Intravenous TID With Meals    [START ON 7/10/2021] dronabinol (MARINOL) capsule 5 mg  5 mg Oral Daily With Dinner    fluticasone-vilanterol (BREO ELLIPTA) 200-25 MCG/INH inhaler 1 puff  1 puff Inhalation Daily    heparin (porcine) subcutaneous injection 5,000 Units  5,000 Units Subcutaneous Q8H [address]    HYDROmorphone (DILAUDID) injection 0 5 mg  0 5 mg Intravenous Q4H PRN    levalbuterol (XOPENEX) inhalation solution 1 25 mg  1 25 mg Nebulization TID    ondansetron (ZOFRAN) injection 4 mg  4 mg Intravenous Q6H PRN    oxyCODONE (ROXICODONE) immediate release tablet 10 mg  10 mg Oral Q4H PRN    oxyCODONE (ROXICODONE) IR tablet 5 mg  5 mg Oral Q4H PRN    pantoprazole (PROTONIX) EC tablet 40 mg  40 mg Oral Daily    potassium chloride (K-DUR,KLOR-CON) CR tablet 40 mEq  40 mEq Oral Once    senna (SENOKOT) tablet 17 2 mg  2 tablet Oral Daily    sodium chloride 0 9 % inhalation solution 3 mL  3 mL Nebulization TID    tiotropium (SPIRIVA) capsule for inhaler 18 mcg  18 mcg Inhalation Daily       No Known Allergies    OBJECTIVE:    Physical Exam  Physical Exam  Constitutional:       General: He is not in acute distress  Appearance: He is ill-appearing, toxic-appearing and diaphoretic  Comments: Frail   HENT:      Head: Normocephalic and atraumatic  Right Ear: External ear normal       Left Ear: External ear normal    Eyes:      General:         Right eye: No discharge  Left eye: No discharge  Conjunctiva/sclera: Conjunctivae normal       Pupils: Pupils are equal, round, and reactive to light  Neck:      Trachea: No tracheal deviation  Cardiovascular:      Rate and Rhythm: Regular rhythm  Tachycardia present  Pulmonary:      Effort: Pulmonary effort is normal  No respiratory distress  Breath sounds: No stridor  Abdominal:      Palpations: Abdomen is soft  Comments: scaphoid   Musculoskeletal:      Comments: dramtic wasting throughout   Skin:     General: Skin is warm  Coloration: Skin is pale  Findings: No erythema or rash  Neurological:      General: No focal deficit present  Mental Status: He is alert  He is disoriented  Cranial Nerves: No cranial nerve deficit  Psychiatric:      Comments: Cannot participate well in exam   Judgment and insight poor  Lab Results:   I have personally reviewed pertinent labs  , CBC:   Lab Results   Component Value Date    WBC 16 03 (H) 07/09/2021    HGB 8 0 (L) 07/09/2021    HCT 29 6 (L) 07/09/2021    MCV 75 (L) 07/09/2021     (H) 07/09/2021    MCH 20 3 (L) 07/09/2021    MCHC 27 0 (L) 07/09/2021    RDW 20 4 (H) 07/09/2021    MPV 10 0 07/09/2021    NRBC 0 07/09/2021   , CMP:   Lab Results   Component Value Date    SODIUM 140 07/09/2021    K 3 9 07/09/2021     07/09/2021    CO2 34 (H) 07/09/2021    BUN 16 07/09/2021    CREATININE 0 23 (L) 07/09/2021    CALCIUM 8 8 07/09/2021    EGFR 155 07/09/2021   , BMP:  Lab Results   Component Value Date    SODIUM 140 07/09/2021    K 3 9 07/09/2021     07/09/2021    CO2 34 (H) 07/09/2021    BUN 16 07/09/2021    CREATININE 0 23 (L) 07/09/2021    GLUC 160 (H) 07/09/2021    CALCIUM 8 8 07/09/2021    AGAP 3 (L) 07/09/2021    EGFR 155 07/09/2021   , PT/PTT:No results found for: PT, PTT  Imaging Studies: none new; reviewed reports   EKG, Pathology, and Other Studies: none new    Counseling / Coordination of Care    Total floor / unit time spent today 25+ minutes  Greater than 50% of total time was spent with the patient and / or family counseling and / or coordination of care  A description of the counseling / coordination of care: chart review; symptom pursuit; supportive listening; anticipatory guidance       This note was not shared with the patient due to reasonable likelihood of causing patient harm

## 2021-07-10 NOTE — PROGRESS NOTES
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 [address] )  Assessment & Plan  · Follows with Dr Isela Carlton as an outpatient  · Supportive care    Aspiration into airway  Assessment & Plan  · Pt aspirated thin liquids after arrival to the hospital  · Aspiration precautions  · Monitor closely    Hypercalcemia  Assessment & Plan  · Likely due to bone metastasis  · IV dexamethasone  · Monitor closely    Severe protein-calorie malnutrition (HCC)  Assessment & Plan  Encourage adequate calorie and protein intake  Nutrition therapy following    Thrombocytosis (HCC)  Assessment & Plan  · Likely reactive   · Monitor    Chronic obstructive pulmonary disease (HCC)  Assessment & Plan  · PRN albuterol  · Continue advair and spiriva    Pleural effusion  Assessment & Plan  · Possibly malignancy vs due to hypoalbuminemia  · Monitor    Leucocytosis  Assessment & Plan  · Likely reactive  · Monitor counts    Anemia  Assessment & Plan  · Multifactorial  · Monitor hemoglobin            VTE Pharmacologic Prophylaxis: VTE Score: 7 High Risk (Score >/= 5) - Pharmacological DVT Prophylaxis Ordered: heparin  Sequential Compression Devices Ordered  Patient Centered Rounds: I performed bedside rounds with nursing staff today  Discussions with Specialists or Other Care Team Provider:     Education and Discussions with Family / Patient: Attempted to update  (Brother Juliana Marquis) via phone  Left voicemail  Time Spent for Care: 30 minutes  More than 50% of total time spent on counseling and coordination of care as described above  Current Length of Stay: 4 day(s)  Current Patient Status: Inpatient   Certification Statement: The patient will continue to require additional inpatient hospital stay due to as outlined  Discharge Plan: awaitinig clinical symptomatic improvement, ongoing goals of care discussion with palliative Care, pending placement case management following    Code Status: Level 3 - DNAR and DNI    Subjective:     Reports feeling tired  Dyspneic at rest      Objective:     Vitals:   Temp (24hrs), Av 7 °F (36 5 °C), Min:97 2 °F (36 2 °C), Max:98 1 °F (36 7 °C)    Temp:  [97 2 °F (36 2 °C)-98 1 °F (36 7 °C)] 98 1 °F (36 7 °C)  HR:  [117-138] 135  Resp:  [17-22] 22  BP: (135-156)/(77-99) 156/99  SpO2:  [89 %-96 %] 92 %  Body mass index is 18 79 kg/m²  Input and Output Summary (last 24 hours):      Intake/Output Summary (Last 24 hours) at 7/10/2021 1312  Last data filed at 7/10/2021 1033  Gross per 24 hour   Intake 710 ml   Output 550 ml   Net 160 ml       Physical Exam:   Physical Exam     Dyspneic at rest but able to complete sentences with some difficulty  Pursed lip breathing noted  Features of protein calorie malnutrition noted  Neck supple  Lungs diminished breath sounds bilateral  Heart sounds S1-S2 noted  Tachycardia noted  Abdomen soft nontender  Awake obeys simple commands  Paraparesis noted  No rash    Additional Data:     Labs:  Results from last 7 days   Lab Units 21  0521 21  0942   WBC Thousand/uL 16 03* 14 29* HEMOGLOBIN g/dL 8 0* 7 2*   HEMATOCRIT % 29 6* 27 0*   PLATELETS Thousands/uL 601* 724*   BANDS PCT % 1  --    NEUTROS PCT %  --  88*   LYMPHS PCT %  --  3*   LYMPHO PCT % 1*  --    MONOS PCT %  --  7   MONO PCT % 4  --    EOS PCT % 0 0     Results from last 7 days   Lab Units 07/09/21  0521 07/06/21  0942   SODIUM mmol/L 140 142   POTASSIUM mmol/L 3 9 3 0*   CHLORIDE mmol/L 103 101   CO2 mmol/L 34* 32   BUN mg/dL 16 11   CREATININE mg/dL 0 23* 0 44*   ANION GAP mmol/L 3* 9   CALCIUM mg/dL 8 8 8 6   ALBUMIN g/dL  --  1 6*   TOTAL BILIRUBIN mg/dL  --  0 36   ALK PHOS U/L  --  249*   ALT U/L  --  37   AST U/L  --  21   GLUCOSE RANDOM mg/dL 160* 87     Results from last 7 days   Lab Units 07/06/21  0942   INR  1 36*             Results from last 7 days   Lab Units 07/10/21  0626 07/09/21  0521 07/07/21  0630 07/07/21  0400 07/06/21  0942   LACTIC ACID mmol/L  --   --   --   --  1 9   PROCALCITONIN ng/ml 0 45* 0 54* 0 73* 0 98* 0 32*       Lines/Drains:  Invasive Devices     Peripheral Intravenous Line            Peripheral IV (Ped) 07/10/21 Right Upper arm <1 day                      Imaging: No pertinent imaging reviewed  Recent Cultures (last 7 days):   Results from last 7 days   Lab Units 07/07/21  0359 07/06/21  0942   BLOOD CULTURE  No Growth at 72 hrs  No Growth at 72 hrs  No Growth at 72 hrs  No Growth at 72 hrs         Last 24 Hours Medication List:   Current Facility-Administered Medications   Medication Dose Route Frequency Provider Last Rate    acetaminophen  650 mg Oral 4x Daily (with meals and at bedtime) Margarita Rodriguez MD      albuterol  2 5 mg Nebulization Q6H PRN Ron Lopez MD     [facility] [START ON 7/11/2021] azithromycin  500 mg Oral Q24H Karly Lee MD      [START ON 7/11/2021] cefTRIAXone  1,000 mg Intravenous Q24H Karly Lee MD      dexamethasone  4 mg Intravenous TID With Meals Margarita Rodriguez, MD      dronabinol  5 mg Oral Daily With Rosalie-Yulissa, MD      fluticasone-vilanterol  1 puff Inhalation Daily Mar Verdin MD      heparin (porcine)  5,000 Units Subcutaneous [facility] Miranda Maloney MD      HYDROmorphone  0 5 mg Intravenous Q4H PRN Miranda Maloney MD      levalbuterol  1 25 mg Nebulization TID Mar Verdin MD      ondansetron  4 mg Intravenous Q6H PRN Mar Verdin MD      oxyCODONE  10 mg Oral Q4H PRN Mar Verdin MD      oxyCODONE  5 mg Oral Q4H PRN Mar Verdin MD      pantoprazole  40 mg Oral Daily Mar Verdin MD      potassium chloride  40 mEq Oral Once Mar Verdin MD      senna  2 tablet Oral Daily Mar Verdin MD      sodium chloride  3 mL Nebulization TID Mar Verdin MD      tiotropium  18 mcg Inhalation Daily Mar Verdin MD          Today, Patient Was Seen By: Miranda Maloney MD    **Please Note: This note may have been constructed using a voice recognition system  ** Patient

## 2021-07-11 PROBLEM — Z71.89 GOALS OF CARE, COUNSELING/DISCUSSION: Status: ACTIVE | Noted: 2021-07-11

## 2021-07-11 LAB
BACTERIA BLD CULT: NORMAL
BACTERIA BLD CULT: NORMAL

## 2021-07-11 PROCEDURE — 99233 SBSQ HOSP IP/OBS HIGH 50: CPT | Performed by: INTERNAL MEDICINE

## 2021-07-11 PROCEDURE — 94760 N-INVAS EAR/PLS OXIMETRY 1: CPT

## 2021-07-11 PROCEDURE — 94640 AIRWAY INHALATION TREATMENT: CPT

## 2021-07-11 RX ORDER — LORAZEPAM 2 MG/ML
0.5 INJECTION INTRAMUSCULAR EVERY 2 HOUR PRN
Status: DISCONTINUED | OUTPATIENT
Start: 2021-07-11 | End: 2021-07-12

## 2021-07-11 RX ORDER — HYDROMORPHONE HCL/PF 1 MG/ML
0.5 SYRINGE (ML) INJECTION EVERY 2 HOUR PRN
Status: DISCONTINUED | OUTPATIENT
Start: 2021-07-11 | End: 2021-07-12 | Stop reason: HOSPADM

## 2021-07-11 RX ORDER — HYDROMORPHONE HCL/PF 1 MG/ML
1 SYRINGE (ML) INJECTION EVERY 2 HOUR PRN
Status: DISCONTINUED | OUTPATIENT
Start: 2021-07-11 | End: 2021-07-12

## 2021-07-11 RX ADMIN — LEVALBUTEROL HYDROCHLORIDE 1.25 MG: 1.25 SOLUTION, CONCENTRATE RESPIRATORY (INHALATION) at 19:14

## 2021-07-11 RX ADMIN — TIOTROPIUM BROMIDE 18 MCG: 18 CAPSULE ORAL; RESPIRATORY (INHALATION) at 09:20

## 2021-07-11 RX ADMIN — DEXAMETHASONE SODIUM PHOSPHATE 4 MG: 4 INJECTION INTRA-ARTICULAR; INTRALESIONAL; INTRAMUSCULAR; INTRAVENOUS; SOFT TISSUE at 18:30

## 2021-07-11 RX ADMIN — ACETAMINOPHEN 650 MG: 325 TABLET, FILM COATED ORAL at 09:23

## 2021-07-11 RX ADMIN — DEXAMETHASONE SODIUM PHOSPHATE 4 MG: 4 INJECTION INTRA-ARTICULAR; INTRALESIONAL; INTRAMUSCULAR; INTRAVENOUS; SOFT TISSUE at 12:26

## 2021-07-11 RX ADMIN — HEPARIN SODIUM 5000 UNITS: 5000 INJECTION INTRAVENOUS; SUBCUTANEOUS at 06:37

## 2021-07-11 RX ADMIN — CEFTRIAXONE 1000 MG: 1 INJECTION, POWDER, FOR SOLUTION INTRAMUSCULAR; INTRAVENOUS at 06:37

## 2021-07-11 RX ADMIN — OXYCODONE HYDROCHLORIDE 10 MG: 10 TABLET ORAL at 10:23

## 2021-07-11 RX ADMIN — ISODIUM CHLORIDE 3 ML: 0.03 SOLUTION RESPIRATORY (INHALATION) at 07:14

## 2021-07-11 RX ADMIN — SENNOSIDES 17.2 MG: 8.6 TABLET, FILM COATED ORAL at 09:23

## 2021-07-11 RX ADMIN — LORAZEPAM 0.5 MG: 2 INJECTION INTRAMUSCULAR; INTRAVENOUS at 22:10

## 2021-07-11 RX ADMIN — OXYCODONE HYDROCHLORIDE 10 MG: 10 TABLET ORAL at 02:33

## 2021-07-11 RX ADMIN — LEVALBUTEROL HYDROCHLORIDE 1.25 MG: 1.25 SOLUTION, CONCENTRATE RESPIRATORY (INHALATION) at 07:14

## 2021-07-11 RX ADMIN — OXYCODONE HYDROCHLORIDE 10 MG: 10 TABLET ORAL at 06:37

## 2021-07-11 RX ADMIN — LEVALBUTEROL HYDROCHLORIDE 1.25 MG: 1.25 SOLUTION, CONCENTRATE RESPIRATORY (INHALATION) at 13:53

## 2021-07-11 RX ADMIN — HYDROMORPHONE HYDROCHLORIDE 1 MG: 1 INJECTION, SOLUTION INTRAMUSCULAR; INTRAVENOUS; SUBCUTANEOUS at 20:22

## 2021-07-11 RX ADMIN — AZITHROMYCIN MONOHYDRATE 500 MG: 250 TABLET ORAL at 06:38

## 2021-07-11 RX ADMIN — ISODIUM CHLORIDE 3 ML: 0.03 SOLUTION RESPIRATORY (INHALATION) at 13:53

## 2021-07-11 RX ADMIN — PANTOPRAZOLE SODIUM 40 MG: 40 TABLET, DELAYED RELEASE ORAL at 06:38

## 2021-07-11 RX ADMIN — FLUTICASONE FUROATE AND VILANTEROL TRIFENATATE 1 PUFF: 200; 25 POWDER RESPIRATORY (INHALATION) at 09:20

## 2021-07-11 RX ADMIN — ISODIUM CHLORIDE 3 ML: 0.03 SOLUTION RESPIRATORY (INHALATION) at 19:15

## 2021-07-11 RX ADMIN — DEXAMETHASONE SODIUM PHOSPHATE 4 MG: 4 INJECTION INTRA-ARTICULAR; INTRALESIONAL; INTRAMUSCULAR; INTRAVENOUS; SOFT TISSUE at 09:23

## 2021-07-11 RX ADMIN — HYDROMORPHONE HYDROCHLORIDE 1 MG: 1 INJECTION, SOLUTION INTRAMUSCULAR; INTRAVENOUS; SUBCUTANEOUS at 21:51

## 2021-07-11 RX ADMIN — ACETAMINOPHEN 650 MG: 325 TABLET, FILM COATED ORAL at 12:26

## 2021-07-11 NOTE — PROGRESS NOTES
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  · IV dexamethasone  · Continue supplemental oxygen to keep O2 sats more than 88-90%    * Bone metastases (HCC)  Assessment & Plan  Paraparesis bony metastasis  MRI lumbar and thoracic spines noted - noted to have cord edema below T8-T9, extensive metastatic disease  Continue IV dexamethasone  Discussed with neurosurgery - risks of operative intervention outweigh benefits, surgery deferred  Receiving palliative radiation with radiotherapy  Continue opioid analgesia    Goals of care, counseling/discussion  Assessment & Plan  Extensively metastatic renal cancer  Overall guarded prognosis  Discussed with brother and brother's wife in detail  Options discussed, they are agreeable to comfort level care and hospice referral  Discussed with palliative care  Hospice following    Renal cell carcinoma of left kidney [facility])  Assessment & Plan  · Follows with Dr Nora Silva as an outpatient  · Supportive care    Aspiration into airway  Assessment & Plan  · Pt aspirated thin liquids after arrival to the hospital  · Aspiration precautions    Hypercalcemia  Assessment & Plan  · Likely due to bone metastasis  · IV dexamethasone    Severe protein-calorie malnutrition (HCC)  Assessment & Plan  Encourage adequate calorie and protein intake    Thrombocytosis (HCC)  Assessment & Plan  · Likely reactive     Chronic obstructive pulmonary disease (HCC)  Assessment & Plan  · PRN albuterol    Pleural effusion  Assessment & Plan  · Possibly malignancy vs due to hypoalbuminemia    Leucocytosis  Assessment & Plan  · Likely reactive    Anemia  Assessment & Plan  · Multifactorial          VTE Pharmacologic Prophylaxis: VTE Score: 7 Comfort care status    Patient Centered Rounds: Comfort care status  Discussions with Specialists or Other Care Team Provider:  Palliative care    Education and Discussions with Family / Patient: Discussed with brother and brother's wife in detail questions answered  Overall guarded prognosis explained  They are agreeable to comfort level care, hospice referral      Time Spent for Care: 45 minutes  More than 50% of total time spent on counseling and coordination of care as described above  Current Length of Stay: 5 day(s)  Current Patient Status: Inpatient   Certification Statement: The patient will continue to require additional inpatient hospital stay due to As outlined  Discharge Plan: Pending placement to inpatient hospice house, hospice following    Code Status: Level 4 - Comfort Care    Subjective:     Patient reports feeling tired  His dyspneic at rest      Objective:     Vitals:   Temp (24hrs), Av 3 °F (36 8 °C), Min:97 4 °F (36 3 °C), Max:99 3 °F (37 4 °C)    Temp:  [97 4 °F (36 3 °C)-99 3 °F (37 4 °C)] 98 3 °F (36 8 °C)  HR:  [110-124] 124  Resp:  [18-23] 18  BP: (133-154)/(71-94) 133/71  SpO2:  [93 %-100 %] 99 %  Body mass index is 18 79 kg/m²  Input and Output Summary (last 24 hours):      Intake/Output Summary (Last 24 hours) at 2021 1547  Last data filed at 2021 1339  Gross per 24 hour   Intake 430 ml   Output 592 ml   Net -162 ml       Physical Exam:   Physical Exam     Comfortably sitting up in bed  Features of protein calorie malnutrition noted  Dyspneic at rest but able to complete sentences  Neck supple  Lungs diminished breath sounds bilaterally  Heart sounds S1-S2 noted  Tachycardia noted  Abdomen soft nontender  Awake obeys simple commands  Paraplegia noted  No rash    Additional Data:     Labs:  Results from last 7 days   Lab Units 21  0521 21  0400 WBC Thousand/uL 16 03* 14 29*   HEMOGLOBIN g/dL 8 0* 7 2*   HEMATOCRIT % 29 6* 27 0*   PLATELETS Thousands/uL 601* 724*   BANDS PCT % 1  --    NEUTROS PCT %  --  88*   LYMPHS PCT %  --  3*   LYMPHO PCT % 1*  --    MONOS PCT %  --  7   MONO PCT % 4  --    EOS PCT % 0 0     Results from last 7 days   Lab Units 07/09/21  0521 07/06/21  0942   SODIUM mmol/L 140 142   POTASSIUM mmol/L 3 9 3 0*   CHLORIDE mmol/L 103 101   CO2 mmol/L 34* 32   BUN mg/dL 16 11   CREATININE mg/dL 0 23* 0 44*   ANION GAP mmol/L 3* 9   CALCIUM mg/dL 8 8 8 6   ALBUMIN g/dL  --  1 6*   TOTAL BILIRUBIN mg/dL  --  0 36   ALK PHOS U/L  --  249*   ALT U/L  --  37   AST U/L  --  21   GLUCOSE RANDOM mg/dL 160* 87     Results from last 7 days   Lab Units 07/06/21  0942   INR  1 36*             Results from last 7 days   Lab Units 07/10/21  0626 07/09/21  0521 07/07/21  0630 07/07/21  0400 07/06/21  0942   LACTIC ACID mmol/L  --   --   --   --  1 9   PROCALCITONIN ng/ml 0 45* 0 54* 0 73* 0 98* 0 32*       Lines/Drains:  Invasive Devices     Peripheral Intravenous Line            Peripheral IV 07/10/21 Right Forearm <1 day                      Imaging: No pertinent imaging reviewed  Recent Cultures (last 7 days):   Results from last 7 days   Lab Units 07/07/21  0359 07/06/21  0942   BLOOD CULTURE  No Growth After 4 Days  No Growth After 4 Days  No Growth After 4 Days  No Growth After 4 Days         Last 24 Hours Medication List:   Current Facility-Administered Medications   Medication Dose Route Frequency Provider Last Rate    acetaminophen  650 mg Oral 4x Daily (with meals and at bedtime) Zafar Perez MD      albuterol  2 5 mg Nebulization Q6H PRN Miya Hardin MD      dexamethasone  4 mg Intravenous TID With Meals Zafar Perez MD      HYDROmorphone  0 5 mg Intravenous Q2H PRN Karishma Pagan MD      HYDROmorphone  1 mg Intravenous Q2H PRN Karishma Boozer, MD      levalbuterol  1 25 mg Nebulization TID Miya Hardin MD  LORazepam  0 5 mg Intravenous Q2H PRN Blayne Iverson MD      ondansetron  4 mg Intravenous Q6H PRN Brooks Jimenez MD      potassium chloride  40 mEq Oral Once Brooks Jimenez MD      senna  2 tablet Oral Daily Brooks Jimenez MD      sodium chloride  3 mL Nebulization TID Brooks Jimenez MD          Today, Patient Was Seen By: Deb Shore MD    **Please Note: This note may have been constructed using a voice recognition system  **

## 2021-07-11 NOTE — ASSESSMENT & PLAN NOTE
Paraparesis bony metastasis  MRI lumbar and thoracic spines noted - noted to have cord edema below T8-T9, extensive metastatic disease  Continue IV dexamethasone  Discussed with neurosurgery - risks of operative intervention outweigh benefits, surgery deferred  Receiving palliative radiation with radiotherapy  Continue opioid analgesia

## 2021-07-11 NOTE — CASE MANAGEMENT
CM responded to consult for hospice referral      CM placed TC to pt's brother Brant Coon ([phone]) in order discuss same  Freedom of choice discussed with Brant Coon requested referral to [facility] and requested for pt to be evaluated for [facility]  CM informed [facility] liaison Kristy of same  [facility] referral placed via [facility] per pt/family request  CM department will follow

## 2021-07-11 NOTE — QUICK NOTE
Alerted that IM provider had broached hospice and family was looking to pursue comfort care  Will d/c all additional PO meds and provide IV prns for comfort  Hospice house placement pending

## 2021-07-11 NOTE — ASSESSMENT & PLAN NOTE
Extensively metastatic renal cancer  Overall guarded prognosis  Discussed with brother and brother's wife in detail  Options discussed, they are agreeable to comfort level care and hospice referral  Discussed with palliative care  Hospice following

## 2021-07-11 NOTE — HOSPICE NOTE
Hospice liaison aware of referral and will contact family regarding hospice  Pt has been approved to transition to the hospice house  Liaison will be at bedside 7 12 21 to plan with CM time and obtain consents   Feel free to reach out with any concerns

## 2021-07-12 VITALS
TEMPERATURE: 98.2 F | RESPIRATION RATE: 16 BRPM | OXYGEN SATURATION: 93 % | HEIGHT: 70 IN | BODY MASS INDEX: 18.75 KG/M2 | WEIGHT: 130.95 LBS | SYSTOLIC BLOOD PRESSURE: 110 MMHG | HEART RATE: 72 BPM | DIASTOLIC BLOOD PRESSURE: 70 MMHG

## 2021-07-12 LAB
BACTERIA BLD CULT: NORMAL
BACTERIA BLD CULT: NORMAL

## 2021-07-12 PROCEDURE — 94640 AIRWAY INHALATION TREATMENT: CPT

## 2021-07-12 PROCEDURE — 94760 N-INVAS EAR/PLS OXIMETRY 1: CPT

## 2021-07-12 PROCEDURE — 99239 HOSP IP/OBS DSCHRG MGMT >30: CPT | Performed by: INTERNAL MEDICINE

## 2021-07-12 PROCEDURE — 77336 RADIATION PHYSICS CONSULT: CPT | Performed by: RADIOLOGY

## 2021-07-12 PROCEDURE — 99232 SBSQ HOSP IP/OBS MODERATE 35: CPT | Performed by: NURSE PRACTITIONER

## 2021-07-12 RX ORDER — LORAZEPAM 2 MG/ML
0.5 INJECTION INTRAMUSCULAR
Status: DISCONTINUED | OUTPATIENT
Start: 2021-07-12 | End: 2021-07-12 | Stop reason: HOSPADM

## 2021-07-12 RX ORDER — HYDROMORPHONE HCL/PF 1 MG/ML
1 SYRINGE (ML) INJECTION
Status: DISCONTINUED | OUTPATIENT
Start: 2021-07-12 | End: 2021-07-12 | Stop reason: HOSPADM

## 2021-07-12 RX ADMIN — ACETAMINOPHEN 650 MG: 325 TABLET, FILM COATED ORAL at 10:25

## 2021-07-12 RX ADMIN — LORAZEPAM 0.5 MG: 2 INJECTION INTRAMUSCULAR; INTRAVENOUS at 10:29

## 2021-07-12 RX ADMIN — HYDROMORPHONE HYDROCHLORIDE 1 MG: 1 INJECTION, SOLUTION INTRAMUSCULAR; INTRAVENOUS; SUBCUTANEOUS at 02:43

## 2021-07-12 RX ADMIN — ISODIUM CHLORIDE 3 ML: 0.03 SOLUTION RESPIRATORY (INHALATION) at 07:29

## 2021-07-12 RX ADMIN — DEXAMETHASONE SODIUM PHOSPHATE 4 MG: 4 INJECTION INTRA-ARTICULAR; INTRALESIONAL; INTRAMUSCULAR; INTRAVENOUS; SOFT TISSUE at 10:22

## 2021-07-12 RX ADMIN — HYDROMORPHONE HYDROCHLORIDE 0.5 MG: 1 INJECTION, SOLUTION INTRAMUSCULAR; INTRAVENOUS; SUBCUTANEOUS at 01:09

## 2021-07-12 RX ADMIN — LEVALBUTEROL HYDROCHLORIDE 1.25 MG: 1.25 SOLUTION, CONCENTRATE RESPIRATORY (INHALATION) at 07:29

## 2021-07-12 RX ADMIN — HYDROMORPHONE HYDROCHLORIDE 0.5 MG: 1 INJECTION, SOLUTION INTRAMUSCULAR; INTRAVENOUS; SUBCUTANEOUS at 10:29

## 2021-07-12 NOTE — CASE MANAGEMENT
CM was informed that pt is in need of transportation to [facility]  CM spoke with [facility] who reported that they are able to provide BLS transportation with an 11:15 AM pickup time  Hospice liaison, Dr Edna Triana, RN, and pt's brother informed of transportation time  CMN completed, copy in scan bin to be entered into pt's EHR

## 2021-07-12 NOTE — PROGRESS NOTES
Jasmin Case for the first time and he only responded with an enthusiastic "yes!" "that's right" to almost every question or statement   offered calming presence and affirmation of Antony's positive expressions

## 2021-07-12 NOTE — PROGRESS NOTES
Progress note - Palliative and Supportive Care   Tien Morel 77 y o  male [id number]    Patient Active Problem List   Diagnosis    Shortness of breath    Anemia    Leucocytosis    Pleural effusion    Lung mass    Mediastinal lymphadenopathy    Tachycardia    Bone metastases (HCC)    Chronic obstructive pulmonary disease (HCC)    Renal cell carcinoma of left kidney (HCC)    HCAP (healthcare-associated pneumonia)    Thrombocytosis (HCC)    Elevated d-dimer    Acute on chronic respiratory failure with hypoxia (HCC)    Hypophosphatemia    Vitamin D deficiency    Premature atrial contractions    Severe protein-calorie malnutrition (HCC)    Ventricular ectopy    Diastolic dysfunction    Peripheral edema    Thrush    Hypercalcemia    Aspiration into airway    Goals of care, counseling/discussion     Active issues specifically addressed today include:     Metastatic RCC  Comfort care  Cancer related pain    Plan:  1  Symptom management -  -  Increased frequency of prn hydromorphone and lorazepam for comfort      2  Goals   Comfort care  Patient likely for discharge to [facility]       Code Status: Comfort care - Level 4   Decisional apparatus:  Patient is not competent on my exam today  If competence is lost, patient's substitute decision maker would default to brother Sofía Murguia by [place] Act 169  Advance Directive / Living Will / POLST:  None on file  Interval history:       Patient transitioned to comfort care over the weekend  Patient mildly tachypneic at time of assessment, increased frequency of comfort medications        MEDICATIONS / ALLERGIES:     current meds:   Current Facility-Administered Medications   Medication Dose Route Frequency    acetaminophen (TYLENOL) tablet 650 mg  650 mg Oral 4x Daily (with meals and at bedtime)    albuterol inhalation solution 2 5 mg  2 5 mg Nebulization Q6H PRN    dexamethasone (DECADRON) injection 4 mg  4 mg Intravenous TID With Meals    HYDROmorphone (DILAUDID) injection 0 5 mg  0 5 mg Intravenous Q2H PRN    HYDROmorphone (DILAUDID) injection 1 mg  1 mg Intravenous Q1H PRN    levalbuterol (XOPENEX) inhalation solution 1 25 mg  1 25 mg Nebulization TID    LORazepam (ATIVAN) injection 0 5 mg  0 5 mg Intravenous Q1H PRN    ondansetron (ZOFRAN) injection 4 mg  4 mg Intravenous Q6H PRN    potassium chloride (K-DUR,KLOR-CON) CR tablet 40 mEq  40 mEq Oral Once    senna (SENOKOT) tablet 17 2 mg  2 tablet Oral Daily    sodium chloride 0 9 % inhalation solution 3 mL  3 mL Nebulization TID     Facility-Administered Medications Ordered in Other Encounters   Medication Dose Route Frequency    haloperidol lactate (HALDOL) 5 mg/mL injection **ADS Override Pull**        HYDROmorphone (DILAUDID) 1 mg/mL injection **ADS Override Pull**        HYDROmorphone (DILAUDID) 1 mg/mL injection **ADS Override Pull**           No Known Allergies    OBJECTIVE:    Physical Exam  Physical Exam  Vitals and nursing note reviewed  Constitutional:       Appearance: He is cachectic  He is ill-appearing  Interventions: Nasal cannula in place  HENT:      Head: Normocephalic and atraumatic  Pulmonary:      Effort: Tachypnea present  Breath sounds: Decreased breath sounds present  Skin:     General: Skin is cool  Coloration: Skin is pale  Neurological:      Mental Status: He is alert  Psychiatric:         Attention and Perception: He is inattentive  Behavior: Behavior is cooperative  Lab Results: CBC: No results found for: WBC, HGB, HCT, MCV, PLT, ADJUSTEDWBC, MCH, MCHC, RDW, MPV, NRBC, CMP: No results found for: SODIUM, K, CL, CO2, ANIONGAP, BUN, CREATININE, GLUCOSE, CALCIUM, AST, ALT, ALKPHOS, PROT, BILITOT, EGFR, PT/PTT:No results found for: PT, PTT    Counseling / Coordination of Care    Total floor / unit time spent today 20+  minutes   Greater than 50% of total time was spent with the patient and / or family counseling and / or coordination of care  A description of the counseling / coordination of care: modification of IV narcotic medications, collaboration with nursing

## 2021-07-12 NOTE — ASSESSMENT & PLAN NOTE
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

## 2021-07-12 NOTE — HOSPICE NOTE
Pt to be transport to the hospice house at [phone]  Family on the way to sign consents  Pt appears very restless on assessment  Primary nurse to give dilaudid and ativan for comfort  Family at bedside  Nurse provided with number to call report  DNR will be provided to EMS for transport

## 2021-07-12 NOTE — ASSESSMENT & PLAN NOTE
Extensively metastatic renal cancer  Overall guarded prognosis  Discussed with brother and brother's wife in detail  Options discussed, they are agreeable to comfort level care and hospice referral  To inpatient hospice house today

## 2021-07-12 NOTE — ASSESSMENT & PLAN NOTE
Paraparesis bony metastasis  MRI lumbar and thoracic spines noted - noted to have cord edema below T8-T9, extensive metastatic disease  Continue IV dexamethasone  Discussed with neurosurgery - risks of operative intervention outweigh benefits, surgery deferred  Continue opioid analgesia

## 2021-07-12 NOTE — SPEECH THERAPY NOTE
Patient transitioning to hospice care and will be discharging to IP hospice house   Will sign off for ST

## 2021-07-12 NOTE — TRANSPORTATION MEDICAL NECESSITY
Section I - General Information    Name of Patient: Marlene Moreno                 : 1955    Medicare #: [id number]  Transport Date: 21 (PCS is valid for round trips on this date and for all repetitive trips in the 60-day range as noted below )  Origin: [facility]                                                         Destination: [address]  Is the pt's stay covered under Medicare Part A (PPS/DRG)   []     Closest appropriate facility? If no, why is transport to more distant facility required? Yes  If hospice pt, is this transport related to pt's terminal illness? NA       Section II - Medical Necessity Questionnaire  Ambulance transportation is medically necessary only if other means of transport are contraindicated or would be potentially harmful to the patient  To meet this requirement, the patient must either be "bed confined" or suffer from a condition such that transport by means other than ambulance is contraindicated by the patient's condition  The following questions must be answered by the medical professional signing below for this form to be valid:    1)  Describe the MEDICAL CONDITION (physical and/or mental) of this patient AT [address] [place] [address] that requires the patient to be transported in an ambulance and why transport by other means is contraindicated by the patient's condition: Bone metastases, 12L 02 unable to self-administer, pleural effusion, COPD, severe calorie malnutrition, lung mass, bed bound  2) Is the patient "bed confined" as defined below? Yes  To be "be confined" the patient must satisfy all three of the following conditions: (1) unable to get up from bed without Assistance; AND (2) unable to ambulate; AND (3) unable to sit in a chair or wheelchair  3) Can this patient safely be transported by car or wheelchair van (i e , seated during transport without a medical attendant or monitoring)? No    4) In addition to completing questions 1-3 above, please check any of the following conditions that apply*:   *Note: supporting documentation for any boxes checked must be maintained in the patient's medical records  If hosp-hosp transfer, describe services needed at 2nd facility not available at 1st facility? Patient is confused  Moderate/severe pain on movement   Medical attendant required   Requires oxygen-unable to self administer  Unable to tolerate seated position for time needed to transport       Section III - Signature of Physician or Healthcare Professional  I certify that the above information is true and correct based on my evaluation of this patient, and represent that the patient requires transport by ambulance and that other forms of transport are contraindicated  I understand that this information will be used by the Centers for Medicare and Medicaid Services (CMS) to support the determination of medical necessity for ambulance services, and I represent that I have personal knowledge of the patient's condition at time of transport  []  If this box is checked, I also certify that the patient is physically or mentally incapable of signing the ambulance service's claim and that the institution with which I am affiliated has furnished care, services, or assistance to the patient  My signature below is made on behalf of the patient pursuant to 42 CFR §424 36(b)(4)  In accordance with 42 CFR §424 37, the specific reason(s) that the patient is physically or mentally incapable of signing the claim form is as follows:  Antony Mercado of Physician* or Healthcare Professional______________________________________________________________  Signature Date 07/12/21 (For scheduled repetitive transports, this form is not valid for transports performed more than 60 days after this date)    Printed Name & Credentials of Physician or Healthcare Professional (MD, DO, RN, etc )___Roseline Waldrop LSW_____________________________  *Form must be signed by patient's attending physician for scheduled, repetitive transports   For non-repetitive, unscheduled ambulance transports, if unable to obtain the signature of the attending physician, any of the following may sign (choose appropriate option below)  [] Physician Assistant []  Clinical Nurse Specialist []  Registered Nurse  []  Nurse Practitioner  [x] Discharge Planner

## 2021-07-12 NOTE — DISCHARGE SUMMARY
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  · Continue supplemental oxygen to keep O2 sats more than 88-90%    * Bone metastases (HCC)  Assessment & Plan  Paraparesis bony metastasis  MRI lumbar and thoracic spines noted - noted to have cord edema below T8-T9, extensive metastatic disease  Continue IV dexamethasone  Discussed with neurosurgery - risks of operative intervention outweigh benefits, surgery deferred  Continue opioid analgesia    Goals of care, counseling/discussion  Assessment & Plan  Extensively metastatic renal cancer  Overall guarded prognosis  Discussed with brother and brother's wife in detail  Options discussed, they are agreeable to comfort level care and hospice referral  To inpatient hospice house today    Renal cell carcinoma of left kidney [facility])  Assessment & Plan  · Follows with Dr Kristie Regan as an outpatient  · Supportive care    Aspiration into airway  Assessment & Plan  · Pt aspirated thin liquids after arrival to the hospital  · Aspiration precautions    Hypercalcemia  Assessment & Plan  · Likely due to bone metastasis    Severe protein-calorie malnutrition ([address] )  Assessment & Plan  Encourage adequate calorie and protein intake    Thrombocytosis (HCC)  Assessment & Plan  · Likely reactive     Chronic obstructive pulmonary disease (HCC)  Assessment & Plan  · PRN albuterol    Pleural effusion  Assessment & Plan  · Possibly malignancy vs due to hypoalbuminemia    Leucocytosis  Assessment & Plan  · Likely reactive    Anemia  Assessment & Plan  · Multifactorial          Discharge Summary - [facility]    Patient Information: Juan R Coats 77 y o  male MRN: [id number]  Unit/Bed#: [facility] 933-01 Encounter: [id number]    Discharging Physician / Practitioner: Stacy Forrester MD  PCP: Evon Sánchez MD  Admission Date: 7/6/2021  Discharge Date: 07/12/21    Disposition:     Other: Inpatient hospice house    Reason for Admission:  Back pain, Lower extremity weakness    Discharge Diagnoses:     Principal Problem:    Bone metastases ([address] )  Active Problems:    Acute on chronic respiratory failure with hypoxia ([address] )    Renal cell carcinoma of left kidney (HCC)    Goals of care, counseling/discussion    Anemia    Leucocytosis    Pleural effusion    Chronic obstructive pulmonary disease (HCC)    Thrombocytosis (HCC)    Severe protein-calorie malnutrition (HCC)    Hypercalcemia    Aspiration into airway  Resolved Problems:    * No resolved hospital problems  *      Consultations During Hospital Stay:  · Neurosurgery  · Radiation oncology    Procedures Performed:     · CT head no acute intracranial abnormality  · Chest x-ray  Multifocal patchy airspace disease and reticulonodular opacification of the lung fields, worse on the right upper and midlung fields since the prior examination  Prior CT suggestive of multiple metastatic pulmonary nodules and lymphangitic spread of  Disease  · MRI thoracic spine  Pathologic retropulsed fracture at the T9 level narrows the canal to 4 mm  Left dorsal epidural soft tissue which may represent tumor or hematoma extends caudally from the T8 to the T9 level contributing to canal stenosis  Cord edema extends below this   level  Widespread metastatic disease  · CT thoracic spine  Pathologic comminuted T9 level fracture associated with the oblique fracture through the base of the T8 spinous process  The dorsal epidural soft tissue thought to represent hematoma and redundant ligaments at the T8 level contribute to severe canal   stenosis which results from retropulsion of the T9 body  Significant disease within the chest     Hospital Course:     Marlene Moreno is a 77 y o  male patient who originally presented to the hospital on 7/6/2021 due to back pain paraplegia  Patient with history of renal cancer presented with back pain paraplegia  Imaging studies concerning for extensive metastatic disease  He was evaluated by Neurosurgery and Radiation Oncology  He was deemed a poor candidate for surgical procedure per neurosurgery  He received palliative radiation treatments  He has extensive metastatic disease, overall guarded prognosis explained to patient and his brother and POA in detail  They have opted for comfort care status, patient be transition to inpatient hospice today  He is comfortable and will be discharged to inpatient hospice today  Kindly review the chart for details  Condition at Discharge: To inpatient hospice     Discharge Day Visit / Exam:     Subjective:      Comfortably sitting up in bed  Dyspneic at rest but able to complete sentences  Reports feeling tired    Vitals: Blood Pressure: 154/89 (07/12/21 0823)  Pulse: (!) 125 (07/12/21 0823)  Temperature: (!) 97 °F (36 1 °C) (07/12/21 0702)  Temp Source: Oral (07/10/21 1512)  Respirations: 18 (07/12/21 0702)  Height: 5' 10" (177 8 cm) (per chart) (07/07/21 1658)  Weight - Scale: 59 4 kg (130 lb 15 3 oz) (per chart) (07/07/21 1658)  SpO2: 98 % (07/12/21 0823)  Exam:   Physical Exam    Comfortably sitting up in bed  Dyspneic at rest but able to complete sentences  Features of protein calorie malnutrition noted  Neck supple  Lungs diminished breath sounds bilateral  Heart sounds S1 and S2 noted  Abdomen soft  Awake obeys simple commands  Paraplegia noted  No rash    Discharge instructions/Information to patient and family:   See after visit summary for information provided to patient and family        Discharge plan discussed the patient  Discharge plan discussed the brother and the brother's wife at bedside questions answered  Follow-up with palliative care/hospice    Provisions for Follow-Up Care:  See after visit summary for information related to follow-up care and any pertinent home health orders  Planned Readmission: no     Discharge Statement:  I spent 45 minutes discharging the patient  This time was spent on the day of discharge  I had direct contact with the patient on the day of discharge  Greater than 50% of the total time was spent examining patient, answering all patient questions, arranging and discussing plan of care with patient as well as directly providing post-discharge instructions  Additional time then spent on discharge activities  Discharge Medications:  See after visit summary for reconciled discharge medications provided to patient and family        ** Please Note: This note has been constructed using a voice recognition system **

## 2021-07-13 ENCOUNTER — TELEPHONE (OUTPATIENT)
Dept: INTERNAL MEDICINE CLINIC | Facility: CLINIC | Age: 66
End: 2021-07-13

## 2021-07-13 ENCOUNTER — TRANSITIONAL CARE MANAGEMENT (OUTPATIENT)
Dept: INTERNAL MEDICINE CLINIC | Facility: CLINIC | Age: 66
End: 2021-07-13

## 2021-07-15 LAB
25(OH)D2 SERPL-MCNC: 19 NG/ML
25(OH)D3 SERPL-MCNC: 19 NG/ML
25(OH)D3+25(OH)D2 SERPL-MCNC: 38 NG/ML

## 2021-07-16 ENCOUNTER — TELEPHONE (OUTPATIENT)
Dept: INTERNAL MEDICINE CLINIC | Facility: CLINIC | Age: 66
End: 2021-07-16

## 2021-07-16 NOTE — TELEPHONE ENCOUNTER
Kapil Marie from [facility] called to inform you Dave Richter passed away on July 14

## 2021-07-30 ENCOUNTER — HOSPITAL ENCOUNTER (OUTPATIENT)
Dept: INFUSION CENTER | Facility: HOSPITAL | Age: 66
End: 2021-07-30
Attending: INTERNAL MEDICINE

## 2024-06-27 NOTE — PROGRESS NOTES
[address] [facility] [address]  Progress Note - Willei Whitlock 1955, 77 y o  male MRN: [id number]  Unit/Bed#: 400-01 Encounter: [id number]  Primary Care Provider: Charbel Nguyễn MD   Date and time admitted to hospital: 5/3/2021  7:23 PM    * Acute respiratory failure with hypoxia [facility])  Assessment & Plan  · Secondary to post-obstructive pneumonia  · Initially required 5 lpm of continuous supplemental oxygen via the nasal cannula at the time of admission to maintain oxygen saturation levels at 92% and above  · The patient then required the Vapotherm Unit to maintain oxygen saturation levels at 92% and above  · The patient was successfully weaned off the Vapotherm Unit on 05/06/2021  · The patient is currently requiring 5 5 lpm of continuous supplemental oxygen via the nasal cannula to maintain oxygen saturation levels at 92% and above  · The patient was seen in consultation by Pulmonology  · Decreased methylprednisolone to 40 mg IV every 12 hours on 05/07/2021  · Increase methylprednisolone to 40 mg IV every 8 hours on 05/08/2021 with increasing supplemental oxygen requirements  · Discontinued the NSS IV fluids on 05/06/2021  · Received lasix 40 mg IV x 1 dose on 05/06/2021  · Received an additional lasix 40 mg IV x 1 dose on 05/07/2021  · Initiated lasix 40 mg IV BID on 05/08/2021  · Respiratory protocol  · COVID-19 testing was negative  Sepsis ([facility] [address] )  Assessment & Plan  · Sepsis was present on admission and secondary to probable post-obstructive pneumonia  · SIRS criteria was met with tachycardia and leukocytosis    · Possible gram-negative pneumonia  · Initially treated with IV cefepime and IV vancomycin  · Discontinued IV vancomycin on 05/06/2021 with a negative MRSA nasal screen  · Continue IV cefepime (day #5)  · Follow culture results  · Follow the procalcitonin level  · Discontinued the NSS IV fluids on 05/06/2021    HCAP (healthcare-associated pneumonia)  Assessment & Plan  · Sepsis was REFILL REQUEST    Med: Terazosin   Med Dose: 1 mg  Med Frequency: one cap daily    Pharmacy:   Pharmacy Address: [address] [place] [address] in [facility]    LR: 05/16/2024  LV: 05/16/2024  NV: 08/12/2024   present on admission and secondary to probable post-obstructive pneumonia  · SIRS criteria was met with tachycardia and leukocytosis  · Possible gram-negative pneumonia  · Initially treated with IV cefepime and IV vancomycin  · Discontinued IV vancomycin on 05/06/2021 with a negative MRSA nasal screen  · Continue IV cefepime (day #5)  · Follow culture results  · Follow the procalcitonin level  · Discontinued the NSS IV fluids on 79/29/2242    Diastolic dysfunction  Assessment & Plan  · Received lasix 40 mg IV x 1 dose on 05/06/2021  · Received an additional lasix 40 mg IV x 1 dose on 05/07/2021  · Initiated lasix 40 mg IV BID on 05/08/2021  · Daily weights  · Strict intake/output    Ventricular ectopy  Assessment & Plan  · Telemetry monitoring  · Keep the patient's magnesium level at 2 mg/dl and above in the setting of ventricular ectopy  · Keep the patient's potassium level at 4 mmol/L and above in the setting of ventricular ectopy      Severe protein-calorie malnutrition ([address] )  Assessment & Plan  Malnutrition Findings:   Adult Malnutrition type: Acute illness  Adult Degree of Malnutrition: Other severe protein calorie malnutrition    BMI Findings: Body mass index is 19 89 kg/m²       · Continue the nutritional supplements    Premature atrial contractions  Assessment & Plan  · Outpatient Cardiology evaluation    ECG 12 lead  Order: [id number]  Status:  Final result   Visible to patient:  No (inaccessible in [facility])   Next appt:  05/11/2021 at 09:00 AM in Hematology and On[facility] [address]   Ref Range & Units 5/3/21 2108   Ventricular Rate     Atrial Rate     MT Interval ms 126    QRSD Interval ms 76    QT Interval ms 348    QTC Interval ms 475    P [place] degrees 69    QRS Axis degrees 58    T Wave Axis degrees 67       Narrative & Impression    Sinus tachycardia with Premature atrial complexes  Otherwise normal ECG  When compared with ECG of 05-APR-2021 09:52,  Premature atrial complexes are now Present  Confirmed by Charlotte Moore ([id number]) on 5/4/2021 4:29:54 PM      Specimen Collected: 05/03/21 21:08   Last Resulted: 05/04/21 16:30               Vitamin D deficiency  Assessment & Plan  · For the vitamin D deficiency, the patient was placed on ergocalciferol 50,000 I  U  PO Qweekly for 8 weekly doses  He will then need a repeat Vitamin D 25-OH level checked in 2 months and will need a daily cholecalciferol supplement prescribed by her PCP based on the repeat Vitamin D 25-OH level results  Results for Veena Hung (MRN [id number]) as of 5/5/2021 12:18   Ref  Range 5/5/2021 06:22   Vit D, 25-Hydroxy Latest Ref Range: 30 0 - 100 0 ng/mL 12 9 (L)         Hypocalcemia  Assessment & Plan  · Initiated on calcium carbonate 500 mg PO Qdaily    Hypophosphatemia  Assessment & Plan  · Replete with K-phos, 2 tablets PO x 1 dose on 05/08/2021  · Follow the phosphorus level    Transaminitis  Assessment & Plan  · Avoid all hepatotoxic agents  · Follow the liver function tests    Results from last 7 days   Lab Units 05/08/21  0436 05/07/21  0447 05/06/21  0512   SODIUM mmol/L 142 142 141   POTASSIUM mmol/L 4 2 5 0 4 4   CHLORIDE mmol/L 106 106 106   CO2 mmol/L 32 30 28   BUN mg/dL 16 17 14   CREATININE mg/dL 0 48* 0 61 0 55*   CALCIUM mg/dL 7 3* 7 7* 7 6*   ALK PHOS U/L 115 135* 135*   ALT U/L 102* 100* 84*   AST U/L 38 43 44     Results for Veena Hung (MRN [id number]) as of 5/5/2021 12:19   Ref   Range 5/5/2021 05:30   HEPATITIS A IGM ANTIBODY Latest Ref Range: Non-reactive, Equivocal-Suggest Recollect  Non-reactive   HEPATITIS B SURFACE ANTIGEN Latest Ref Range: Non-reactive, NonReactive - Confirmed  Non-reactive   HEPATITIS B CORE IGM ANTIBODY Latest Ref Range: Non-reactive  Non-reactive   HEPATITIS C ANTIBODY Latest Ref Range: Non-reactive  Non-reactive       Elevated d-dimer  Assessment & Plan  · Secondary to active malignancy    Results for Veena Hung (MRN [id number]) as of 5/4/2021 15:19   Ref  Range 5/3/2021 19:47   D-Dimer, Chandana Course Latest Ref Range: <0 50 ug/ml FEU 3 91 (H)       VAS lower limb venous duplex study, complete bilateral  Status: In process   PACS Images     Show images for VAS lower limb venous duplex study, complete bilateral   Study Result       [facility] REPORT  CLINICAL:  Indications:  Patient presents with bilateral lower extremity edema  Operative History:  No cardiovascular surgeries noted  FINDINGS:     Segment  Right            Left                Impression       Impression         CFV      Normal (Patent)  Normal (Patent)             CONCLUSION:  RIGHT LOWER LIMB:  No evidence of acute or chronic deep vein thrombosis  No evidence of superficial thrombophlebitis noted  Doppler evaluation shows a normal response to augmentation maneuvers  Popliteal, posterior tibial and anterior tibial arterial Doppler waveforms are  triphasic  LEFT LOWER LIMB:  No evidence of acute or chronic deep vein thrombosis  No evidence of superficial thrombophlebitis noted  Doppler evaluation shows a normal response to augmentation maneuvers  Popliteal, posterior tibial and anterior tibial arterial Doppler waveforms are  triphasic  CTA ED chest PE Study  Status: Final result   PACS Images     Show images for CTA ED chest PE Study   Study Result    CTA - CHEST WITH IV CONTRAST - PULMONARY ANGIOGRAM     INDICATION:   tachycardia, hypoxia, current cancer on chemo      COMPARISON: CT of the chest on April 5, 2021      TECHNIQUE: CTA examination of the chest was performed using angiographic technique according to a protocol specifically tailored to evaluate for pulmonary embolism  Axial, sagittal, and coronal 2D reformatted images were created from the source data and   submitted for interpretation  In addition, coronal 3D MIP postprocessing was performed on the acquisition scanner        Radiation dose length product (DLP) for this visit:  305 93 mGy-cm     This examination, like all CT scans performed in the [place], was performed utilizing techniques to minimize radiation dose exposure, including the use of iterative   reconstruction and automated exposure control      IV Contrast:  85 mL of iohexol (OMNIPAQUE)  was administered intravenously without immediate adverse reaction  FINDINGS:     PULMONARY ARTERIAL TREE:  No pulmonary embolus is seen       LUNGS:  Known left upper lobe mass is now obscured by large patchy opacity (series 3, image 45) likely due to pneumonia  Interval increase in patchy groundglass opacities in the right perihilar region (series 3, image 36)  Increase in size of dense   patchy opacity in the right apex (series 3, image 26) Interval increase in mass in the lingular region measuring 4 7 x 3 1 cm  Juxtapleural nodules in the left lower lobe are not significantly changed in size      PLEURA:  Stable size of moderate loculated left pleural effusion with nodularity and thickening of the pleura, increased since prior exam      HEART/GREAT VESSELS:  Unremarkable for patient's age      MEDIASTINUM AND MARGARITA:  Stable extensive mediastinal, hilar, and cardiophrenic lymphadenopathy     CHEST WALL AND LOWER NECK:   Unremarkable      VISUALIZED STRUCTURES IN THE UPPER ABDOMEN:  Stable left para-aortic mass which is partially visualized      OSSEOUS STRUCTURES:  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  Compression deformity at the T9 vertebral body with increase in moderate vertebral body height loss when compared with prior exam        IMPRESSION:     1  No evidence of pulmonary embolism  2   Large dense opacity in the left upper lobe and interval increase in patchy groundglass opacities in the right perihilar region likely due to pneumonia in the appropriate clinical setting  3   Known left upper lobe mass is obscured  Interval increase in mass in the lingular region    Redemonstrated right lower lobe nodular masses or metastasis  4   Stable extensive lymphadenopathy  5   Redemonstrated expansile destructive lesion in the 9th rib and interval increase in compression deformity at the T9 vertebral body  Thrombocytosis ([address] )  Assessment & Plan  · Likely reactive in the setting of active malignancy  · Follow the platelet count    Results from last 7 days   Lab Units 05/08/21  0436 05/07/21  0447 05/06/21  0512   WBC Thousand/uL 16 34* 17 42* 19 52*   HEMOGLOBIN g/dL 8 3* 9 3* 8 9*   HEMATOCRIT % 28 9* 32 1* 31 3*   PLATELETS Thousands/uL 793* 905* 939*         Renal cell carcinoma of left kidney [facility])  Assessment & Plan  · The patient was seen in consultation by Hematology/Oncology  · Outpatient follow-up with Dr Severiano Amsterdam (Hematology/Oncology)    Mediastinal lymphadenopathy  Assessment & Plan  · Known metastatic renal cell carcinoma  · Outpatient follow-up with Dr Severiano Amsterdam (Hematology/Oncology)    Lung mass  Assessment & Plan  · Known metastatic renal cell carcinoma with lung metastases  · Outpatient follow-up with Dr Severiano Amsterdam (Hematology/Oncology)      Anemia  Assessment & Plan  · Check the patient's stool for occult blood x 3 specimens  · Follow the CBC  · Transfuse for a hemoglobin less than 7 g/dl    Results from last 7 days   Lab Units 05/08/21  0436 05/07/21  0447 05/06/21  0512   WBC Thousand/uL 16 34* 17 42* 19 52*   HEMOGLOBIN g/dL 8 3* 9 3* 8 9*   HEMATOCRIT % 28 9* 32 1* 31 3*   PLATELETS Thousands/uL 793* 905* 939*     Results for Colletta Legato (MRN [id number]) as of 5/5/2021 12:24   Ref   Range 5/5/2021 05:30   Iron Latest Ref Range: 65 - 175 ug/dL 13 (L)   Ferritin Latest Ref Range: 8 - 388 ng/mL 1,687 (H)   Iron Saturation Latest Units: % 7   TIBC Latest Ref Range: 250 - 450 ug/dL 182 (L)   Folate Latest Ref Range: 3 1 - 17 5 ng/mL 8 2   Vitamin B-12 Latest Ref Range: 100 - 900 pg/mL 656           VTE Pharmacologic Prophylaxis:   Pharmacologic: Enoxaparin (Lovenox)  Mechanical VTE Prophylaxis in Place: Yes    Patient Centered Rounds: I have performed bedside rounds with nursing staff today  Time Spent for Care: 30 minutes  More than 50% of total time spent on counseling and coordination of care as described above  Current Length of Stay: 5 day(s)    Current Patient Status: Inpatient   Certification Statement: The patient will continue to require additional inpatient hospital stay due to the need for IV antibiotic treatment, for IV methylprednisolone treatment, for IV lasix treatment, and with the patient requiring 5 5 lpm of continuous supplemental oxygen via the nasal cannula to maintain adequate oxygen saturation levels  Code Status: Level 1 - Full Code      Subjective: The patient was seen and examined  The patient developed worsening hypoxia overnight and is now requiring 5 5 lpm of continuous supplemental oxygen via the nasal cannula to maintain adequate oxygen saturation levels  No chest pain  Objective:     Vitals:   Temp (24hrs), Av 7 °F (36 5 °C), Min:97 5 °F (36 4 °C), Max:97 9 °F (36 6 °C)    Temp:  [97 5 °F (36 4 °C)-97 9 °F (36 6 °C)] 97 5 °F (36 4 °C)  HR:  [] 80  Resp:  [16-19] 19  BP: (129-139)/(69-90) 139/83  SpO2:  [89 %-94 %] 94 %  Body mass index is 19 89 kg/m²  Input and Output Summary (last 24 hours):        Intake/Output Summary (Last 24 hours) at 2021 1218  Last data filed at 2021 1205  Gross per 24 hour   Intake 780 ml   Output 3350 ml   Net -2570 ml       Physical Exam:     Physical Exam  General:  NAD, follows commands, conversational dyspnea is present  HEENT:  NC/AT, mucous membranes moist  Neck:  Supple, No JVP elevation  CV:  + S1, + S2, RRR  Pulm:  Scattered rhonchi bilaterally with expiratory wheezing bilaterally  Abd:  Soft, Non-tender, Non-distended  Ext:  No clubbing/cyanosis, Improving edema of the bilateral lower extremities  Skin:  No rashes  Neuro:  Awake, alert, oriented  Psych:  Normal mood and affect      Additional Data:    Labs:    Results from last 7 days   Lab Units 05/08/21  0436   WBC Thousand/uL 16 34*   HEMOGLOBIN g/dL 8 3*   HEMATOCRIT % 28 9*   PLATELETS Thousands/uL 793*   NEUTROS PCT % 88*   LYMPHS PCT % 2*   MONOS PCT % 9   EOS PCT % 0     Results from last 7 days   Lab Units 05/08/21  0436   SODIUM mmol/L 142   POTASSIUM mmol/L 4 2   CHLORIDE mmol/L 106   CO2 mmol/L 32   BUN mg/dL 16   CREATININE mg/dL 0 48*   ANION GAP mmol/L 4   CALCIUM mg/dL 7 3*   ALBUMIN g/dL 1 7*   TOTAL BILIRUBIN mg/dL 0 18*   ALK PHOS U/L 115   ALT U/L 102*   AST U/L 38   GLUCOSE RANDOM mg/dL 118     Results from last 7 days   Lab Units 05/03/21  1951   INR  1 33*             Results from last 7 days   Lab Units 05/07/21  0447 05/06/21  0512 05/05/21  0622 05/04/21  0637 05/03/21 2059   LACTIC ACID mmol/L  --   --   --   --  2 0   PROCALCITONIN ng/ml 0 42* 0 70* 1 12* 1 25* 0 51*           * I Have Reviewed All Lab Data Listed Above  * Additional Pertinent Lab Tests Reviewed: [address] Admission Reviewed      Recent Cultures (last 7 days):     Results from last 7 days   Lab Units 05/06/21  1525 05/04/21  0124 05/03/21 2059   BLOOD CULTURE   --   --  No Growth After 4 Days  No Growth After 4 Days     SPUTUM CULTURE  2+ Growth of   --   --    GRAM STAIN RESULT  1+ Epithelial cells per low power field*  No Polys*  1+ Gram negative rods*  1+ Gram positive rods*  1+ Gram positive cocci in pairs*  --   --    LEGIONELLA URINARY ANTIGEN   --  Negative  --        Last 24 Hours Medication List:   Current Facility-Administered Medications   Medication Dose Route Frequency Provider Last Rate    albuterol  2 5 mg Nebulization Q4H PRN Fabian Adams DO      benzonatate  100 mg Oral TID PRN MARILOU Conley      calcium carbonate  1 tablet Oral Daily With Breakfast Fabian Adams DO      cefepime  2,000 mg Intravenous Q8H MARILOU Conley 2,000 mg (05/08/21 0513)    enoxaparin  40 mg Subcutaneous Q24H Haley Rose, DO      ergocalciferol  50,000 Units Oral Weekly Haley Burnetteroshona, DO      ferrous sulfate  325 mg Oral Daily With Breakfast Soundra Booty, CRNP      fluticasone-vilanterol  1 puff Inhalation Daily Soundra Booty, CRNP      furosemide  40 mg Intravenous BID (diuretic) Haley Hanleyr, DO      guaiFENesin  600 mg Oral BID Rosy Y Swank, CRNP      HYDROmorphone  0 5 mg Intravenous Q4H PRN Haley Rose, DO      ipratropium  0 5 mg Nebulization TID Haely Burnetteroshona, DO      levalbuterol  1 25 mg Nebulization TID Haley Rose, DO      methylPREDNISolone sodium succinate  40 mg Intravenous Q8H [address] Chato Dalton,       ondansetron  4 mg Intravenous Q6H PRN Jh Matiasoty, CRNP      oxyCODONE  5 mg Oral Q4H PRN Haley Rose, DO      Or    oxyCODONE  10 mg Oral Q4H PRN Haley Rose, DO      potassium-sodium phosphateS  2 tablet Oral Once Haley Rose DO          Today, Patient Was Seen By: Haley Rose DO    ** Please Note: Dictation voice to text software may have been used in the creation of this document   **

## 2024-07-28 NOTE — EMTALA/ACUTE CARE TRANSFER
[address] [facility] EMERGENCY DEPARTMENT  [address] [facility] [address]  Dept: [phone]      EMTALA TRANSFER CONSENT    NAME Nayeli Newsome                                         1955                              MRN [id number]    I have been informed of my rights regarding examination, treatment, and transfer   by Dr Elham Armando DO    Benefits:    Definitive treatment, Radiation Oncology availability    Risks:    Crash during transfer     Transfer Request   I acknowledge that my medical condition has been evaluated and explained to me by the emergency department physician or other qualified medical person and/or my attending physician who has recommended and offered to me further medical examination and treatment  I understand the Hospital's obligation with respect to the treatment and stabilization of my emergency medical condition  I nevertheless request to be transferred  I release the Hospital, the doctor, and any other persons caring for me from all responsibility or liability for any injury or ill effects that may result from my transfer and agree to accept all responsibility for the consequences of my choice to transfer, rather than receive stabilizing treatment at the Hospital  I understand that because the transfer is my request, my insurance may not provide reimbursement for the services  The Hospital will assist and direct me and my family in how to make arrangements for transfer, but the hospital is not liable for any fees charged by the transport service  In spite of this understanding, I refuse to consent to further medical examination and treatment which has been offered to me, and request transfer to    I authorize the performance of emergency medical procedures and treatments upon me in both transit and upon arrival at the receiving facility    Additionally, I authorize the release of any and all medical records to the receiving facility and request they be transported with me, if possible  I authorize the performance of emergency medical procedures and treatments upon me in both transit and upon arrival at the receiving facility  Additionally, I authorize the release of any and all medical records to the receiving facility and request they be transported with me, if possible  I understand that the safest mode of transportation during a medical emergency is an ambulance and that the Hospital advocates the use of this mode of transport  Risks of traveling to the receiving facility by car, including absence of medical control, life sustaining equipment, such as oxygen, and medical personnel has been explained to me and I fully understand them  (NEEL CORRECT BOX BELOW)  [ X ]  I consent to the stated transfer and to be transported by ambulance/helicopter  [  ]  I consent to the stated transfer, but refuse transportation by ambulance and accept full responsibility for my transportation by car  I understand the risks of non-ambulance transfers and I exonerate the Hospital and its staff from any deterioration in my condition that results from this refusal     X___________________________________________    DATE  21  TIME________  Signature of patient or legally responsible individual signing on patient behalf           RELATIONSHIP TO PATIENT_________________________          Provider Certification    NAME Jayden Garcia                                         1955                              MRN [id number]    A medical screening exam was performed on the above named patient  Based on the examination:    Condition Necessitating Transfer The primary encounter diagnosis was Right leg weakness  Diagnoses of Bony metastasis ([address] ), Ambulatory dysfunction, and Renal cell carcinoma ([address] ) were also pertinent to this visit      Patient Condition:    Stable    Reason for Transfer:    Definitive treatment, Radiation Oncology availability    Transfer Requirements: Facility     · Space available and qualified personnel available for treatment as acknowledged by    PACS  · Agreed to accept transfer and to provide appropriate medical treatment as acknowledged by: Dr Eliseo Razo ([facility]))          · Appropriate medical records of the examination and treatment of the patient are provided at the time of transfer   [address] [facility], [address] _______  · Transfer will be performed by qualified personnel from    and appropriate transfer equipment as required, including the use of necessary and appropriate life support measures  Provider Certification: I have examined the patient and explained the following risks and benefits of being transferred/refusing transfer to the patient/family:         Based on these reasonable risks and benefits to the patient and/or the unborn child(debby), and based upon the information available at the time of the patients examination, I certify that the medical benefits reasonably to be expected from the provision of appropriate medical treatments at another medical facility outweigh the increasing risks, if any, to the individuals medical condition, and in the case of labor to the unborn child, from effecting the transfer      X____________________________________________ DATE 07/06/21        TIME_______      ORIGINAL - SEND TO MEDICAL RECORDS   COPY - SEND WITH PATIENT DURING TRANSFER 28-Jul-2024 15:21